# Patient Record
Sex: MALE | Race: WHITE | Employment: FULL TIME | URBAN - METROPOLITAN AREA
[De-identification: names, ages, dates, MRNs, and addresses within clinical notes are randomized per-mention and may not be internally consistent; named-entity substitution may affect disease eponyms.]

---

## 2017-02-27 ENCOUNTER — HOSPITAL ENCOUNTER (OUTPATIENT)
Dept: ENDOSCOPY | Age: 57
Discharge: HOME | End: 2017-02-27
Attending: INTERNAL MEDICINE | Admitting: INTERNAL MEDICINE

## 2017-02-27 VITALS
WEIGHT: 140 LBS | SYSTOLIC BLOOD PRESSURE: 104 MMHG | HEART RATE: 61 BPM | BODY MASS INDEX: 22.5 KG/M2 | OXYGEN SATURATION: 100 % | TEMPERATURE: 97.6 F | HEIGHT: 66 IN | RESPIRATION RATE: 16 BRPM | DIASTOLIC BLOOD PRESSURE: 74 MMHG

## 2017-02-27 RX ORDER — SODIUM CHLORIDE 9 MG/ML
INJECTION, SOLUTION INTRAVENOUS CONTINUOUS
Status: DISCONTINUED | OUTPATIENT
Start: 2017-02-27 | End: 2017-02-28 | Stop reason: HOSPADM

## 2017-02-27 RX ORDER — LOPERAMIDE HYDROCHLORIDE 2 MG/1
2 TABLET ORAL 4 TIMES DAILY PRN
COMMUNITY
End: 2019-12-18

## 2017-02-27 RX ADMIN — SODIUM CHLORIDE: 9 INJECTION, SOLUTION INTRAVENOUS at 07:36

## 2017-02-27 ASSESSMENT — PAIN - FUNCTIONAL ASSESSMENT: PAIN_FUNCTIONAL_ASSESSMENT: 0-10

## 2017-02-27 ASSESSMENT — PAIN SCALES - GENERAL
PAINLEVEL_OUTOF10: 0
PAINLEVEL_OUTOF10: 0

## 2017-03-08 ENCOUNTER — HOSPITAL ENCOUNTER (OUTPATIENT)
Dept: CT IMAGING | Age: 57
Discharge: HOME | End: 2017-03-08
Attending: INTERNAL MEDICINE | Admitting: INTERNAL MEDICINE

## 2017-03-08 DIAGNOSIS — Z85.038 PERSONAL HISTORY OF OTHER MALIGNANT NEOPLASM OF LARGE INTESTINE: ICD-10-CM

## 2017-03-08 DIAGNOSIS — Z85.038 PERSONAL HISTORY OF COLON CANCER, STAGE II: ICD-10-CM

## 2017-05-10 ENCOUNTER — OFFICE VISIT (OUTPATIENT)
Dept: INTERNAL MEDICINE CLINIC | Age: 57
End: 2017-05-10

## 2017-05-10 VITALS
HEIGHT: 66 IN | BODY MASS INDEX: 20.86 KG/M2 | DIASTOLIC BLOOD PRESSURE: 72 MMHG | OXYGEN SATURATION: 97 % | HEART RATE: 82 BPM | WEIGHT: 129.8 LBS | SYSTOLIC BLOOD PRESSURE: 108 MMHG

## 2017-05-10 DIAGNOSIS — Z90.49 STATUS POST PARTIAL COLECTOMY: ICD-10-CM

## 2017-05-10 DIAGNOSIS — K52.9 CHRONIC DIARRHEA: ICD-10-CM

## 2017-05-10 DIAGNOSIS — E55.9 VITAMIN D DEFICIENCY: ICD-10-CM

## 2017-05-10 DIAGNOSIS — E78.00 HIGH CHOLESTEROL: ICD-10-CM

## 2017-05-10 DIAGNOSIS — R63.4 WEIGHT LOSS: ICD-10-CM

## 2017-05-10 DIAGNOSIS — R06.02 SOB (SHORTNESS OF BREATH): ICD-10-CM

## 2017-05-10 DIAGNOSIS — I50.811 ACUTE RIGHT-SIDED CHF (CONGESTIVE HEART FAILURE) (HCC): Primary | ICD-10-CM

## 2017-05-10 PROCEDURE — 93000 ELECTROCARDIOGRAM COMPLETE: CPT | Performed by: INTERNAL MEDICINE

## 2017-05-10 PROCEDURE — 99214 OFFICE O/P EST MOD 30 MIN: CPT | Performed by: INTERNAL MEDICINE

## 2017-05-10 RX ORDER — CHOLESTYRAMINE
POWDER (GRAM) MISCELLANEOUS
Status: CANCELLED | OUTPATIENT
Start: 2017-05-10

## 2017-05-10 RX ORDER — SIMVASTATIN 20 MG
20 TABLET ORAL DAILY
Qty: 90 TABLET | Refills: 3 | Status: CANCELLED | OUTPATIENT
Start: 2017-05-10

## 2017-05-10 RX ORDER — ALLOPURINOL 100 MG/1
100 TABLET ORAL DAILY
Qty: 90 TABLET | Refills: 3 | Status: SHIPPED | OUTPATIENT
Start: 2017-05-10 | End: 2017-06-26 | Stop reason: SDUPTHER

## 2017-05-10 ASSESSMENT — ENCOUNTER SYMPTOMS: DIARRHEA: 1

## 2017-05-11 ENCOUNTER — HOSPITAL ENCOUNTER (OUTPATIENT)
Dept: NON INVASIVE DIAGNOSTICS | Age: 57
Discharge: HOME | End: 2017-05-11
Attending: INTERNAL MEDICINE | Admitting: INTERNAL MEDICINE

## 2017-05-11 DIAGNOSIS — R06.02 SOB (SHORTNESS OF BREATH): ICD-10-CM

## 2017-05-11 DIAGNOSIS — I50.811 ACUTE RIGHT-SIDED CHF (CONGESTIVE HEART FAILURE) (HCC): ICD-10-CM

## 2017-05-11 DIAGNOSIS — I50.9 HEART FAILURE (HCC): ICD-10-CM

## 2017-05-11 LAB
LV EF: 58 %
LVEF MODALITY: NORMAL

## 2017-06-26 ENCOUNTER — OFFICE VISIT (OUTPATIENT)
Dept: INTERNAL MEDICINE CLINIC | Age: 57
End: 2017-06-26

## 2017-06-26 VITALS
BODY MASS INDEX: 20.89 KG/M2 | DIASTOLIC BLOOD PRESSURE: 72 MMHG | TEMPERATURE: 97.8 F | WEIGHT: 130 LBS | OXYGEN SATURATION: 96 % | SYSTOLIC BLOOD PRESSURE: 118 MMHG | HEIGHT: 66 IN | HEART RATE: 67 BPM

## 2017-06-26 DIAGNOSIS — R19.7 DIARRHEA, UNSPECIFIED TYPE: ICD-10-CM

## 2017-06-26 DIAGNOSIS — E78.00 HIGH CHOLESTEROL: ICD-10-CM

## 2017-06-26 DIAGNOSIS — M10.9 GOUT: ICD-10-CM

## 2017-06-26 DIAGNOSIS — E55.9 VITAMIN D DEFICIENCY: ICD-10-CM

## 2017-06-26 DIAGNOSIS — R73.02 IGT (IMPAIRED GLUCOSE TOLERANCE): ICD-10-CM

## 2017-06-26 DIAGNOSIS — Z23 NEED FOR PNEUMOCOCCAL VACCINATION: ICD-10-CM

## 2017-06-26 DIAGNOSIS — Z12.5 SCREENING PSA (PROSTATE SPECIFIC ANTIGEN): ICD-10-CM

## 2017-06-26 DIAGNOSIS — K52.9 CHRONIC DIARRHEA: ICD-10-CM

## 2017-06-26 DIAGNOSIS — R63.4 WEIGHT LOSS: ICD-10-CM

## 2017-06-26 DIAGNOSIS — R63.4 WEIGHT LOSS: Primary | ICD-10-CM

## 2017-06-26 LAB
CHOLESTEROL, TOTAL: 189 MG/DL (ref 0–199)
ESTIMATED AVERAGE GLUCOSE: 111.2 MG/DL
HBA1C MFR BLD: 5.5 %
HDLC SERPL-MCNC: 50 MG/DL (ref 40–60)
LDL CHOLESTEROL CALCULATED: 99 MG/DL
PROSTATE SPECIFIC ANTIGEN: 0.17 NG/ML (ref 0–4)
SEDIMENTATION RATE, ERYTHROCYTE: 6 MM/HR (ref 0–20)
TRIGL SERPL-MCNC: 199 MG/DL (ref 0–150)
TSH SERPL DL<=0.05 MIU/L-ACNC: 0.81 UIU/ML (ref 0.27–4.2)
URIC ACID, SERUM: 5.2 MG/DL (ref 3.5–7.2)
VITAMIN B-12: 263 PG/ML (ref 211–911)
VITAMIN D 25-HYDROXY: 16.8 NG/ML
VLDLC SERPL CALC-MCNC: 40 MG/DL

## 2017-06-26 PROCEDURE — 99214 OFFICE O/P EST MOD 30 MIN: CPT | Performed by: INTERNAL MEDICINE

## 2017-06-26 RX ORDER — ALLOPURINOL 100 MG/1
100 TABLET ORAL DAILY
Qty: 90 TABLET | Refills: 3 | Status: SHIPPED | OUTPATIENT
Start: 2017-06-26 | End: 2017-12-08 | Stop reason: SDUPTHER

## 2017-06-28 LAB — TISSUE TRANSGLUTAMINASE IGA: 2 U/ML (ref 0–3)

## 2017-06-30 ENCOUNTER — TELEPHONE (OUTPATIENT)
Dept: INTERNAL MEDICINE CLINIC | Age: 57
End: 2017-06-30

## 2017-12-08 ENCOUNTER — OFFICE VISIT (OUTPATIENT)
Dept: INTERNAL MEDICINE CLINIC | Age: 57
End: 2017-12-08

## 2017-12-08 VITALS
WEIGHT: 128.4 LBS | TEMPERATURE: 97.6 F | HEIGHT: 66 IN | DIASTOLIC BLOOD PRESSURE: 60 MMHG | OXYGEN SATURATION: 98 % | BODY MASS INDEX: 20.63 KG/M2 | SYSTOLIC BLOOD PRESSURE: 94 MMHG | HEART RATE: 52 BPM

## 2017-12-08 DIAGNOSIS — E55.9 VITAMIN D DEFICIENCY: ICD-10-CM

## 2017-12-08 DIAGNOSIS — E78.00 HIGH CHOLESTEROL: Primary | ICD-10-CM

## 2017-12-08 DIAGNOSIS — D51.1 VIT B12 DEFIC ANEMIA D/T SLCTV VIT B12 MALABSORP W PROTEIN: ICD-10-CM

## 2017-12-08 DIAGNOSIS — Z23 NEED FOR PNEUMOCOCCAL VACCINATION: ICD-10-CM

## 2017-12-08 DIAGNOSIS — Q90.9 DOWN'S SYNDROME: ICD-10-CM

## 2017-12-08 PROCEDURE — 90732 PPSV23 VACC 2 YRS+ SUBQ/IM: CPT | Performed by: INTERNAL MEDICINE

## 2017-12-08 PROCEDURE — 99214 OFFICE O/P EST MOD 30 MIN: CPT | Performed by: INTERNAL MEDICINE

## 2017-12-08 PROCEDURE — G0009 ADMIN PNEUMOCOCCAL VACCINE: HCPCS | Performed by: INTERNAL MEDICINE

## 2017-12-08 RX ORDER — MULTIVIT-MIN/IRON/FOLIC ACID/K 18-600-40
CAPSULE ORAL
Qty: 30 CAPSULE | Refills: 5 | Status: SHIPPED | OUTPATIENT
Start: 2017-12-08 | End: 2019-12-18

## 2017-12-08 RX ORDER — ALLOPURINOL 100 MG/1
100 TABLET ORAL DAILY
Qty: 90 TABLET | Refills: 3 | Status: SHIPPED | OUTPATIENT
Start: 2017-12-08 | End: 2018-12-14 | Stop reason: SDUPTHER

## 2017-12-08 RX ORDER — UBIDECARENONE 75 MG
100 CAPSULE ORAL DAILY
Qty: 30 TABLET | Refills: 5 | Status: SHIPPED | OUTPATIENT
Start: 2017-12-08 | End: 2019-01-08 | Stop reason: ALTCHOICE

## 2017-12-08 ASSESSMENT — ENCOUNTER SYMPTOMS: COUGH: 1

## 2018-03-09 ENCOUNTER — HOSPITAL ENCOUNTER (OUTPATIENT)
Dept: CT IMAGING | Age: 58
Discharge: HOME | End: 2018-03-09
Attending: INTERNAL MEDICINE | Admitting: INTERNAL MEDICINE

## 2018-03-09 DIAGNOSIS — C18.2 MALIGNANT NEOPLASM OF ASCENDING COLON (HCC): ICD-10-CM

## 2018-12-14 DIAGNOSIS — M10.9 PODAGRA: ICD-10-CM

## 2018-12-14 DIAGNOSIS — R63.4 WEIGHT LOSS: ICD-10-CM

## 2018-12-14 DIAGNOSIS — E78.00 HIGH CHOLESTEROL: ICD-10-CM

## 2018-12-14 DIAGNOSIS — Z12.5 SCREENING PSA (PROSTATE SPECIFIC ANTIGEN): ICD-10-CM

## 2018-12-14 LAB
A/G RATIO: 1.5 (ref 1.1–2.2)
ALBUMIN SERPL-MCNC: 4.5 G/DL (ref 3.4–5)
ALP BLD-CCNC: 71 U/L (ref 40–129)
ALT SERPL-CCNC: 16 U/L (ref 10–40)
ANION GAP SERPL CALCULATED.3IONS-SCNC: 14 MMOL/L (ref 3–16)
AST SERPL-CCNC: 22 U/L (ref 15–37)
BASOPHILS ABSOLUTE: 0.1 K/UL (ref 0–0.2)
BASOPHILS RELATIVE PERCENT: 0.9 %
BILIRUB SERPL-MCNC: 0.7 MG/DL (ref 0–1)
BUN BLDV-MCNC: 9 MG/DL (ref 7–20)
CALCIUM SERPL-MCNC: 9.5 MG/DL (ref 8.3–10.6)
CHLORIDE BLD-SCNC: 102 MMOL/L (ref 99–110)
CHOLESTEROL, TOTAL: 179 MG/DL (ref 0–199)
CO2: 28 MMOL/L (ref 21–32)
CREAT SERPL-MCNC: 1.1 MG/DL (ref 0.9–1.3)
EOSINOPHILS ABSOLUTE: 0 K/UL (ref 0–0.6)
EOSINOPHILS RELATIVE PERCENT: 0.5 %
GFR AFRICAN AMERICAN: >60
GFR NON-AFRICAN AMERICAN: >60
GLOBULIN: 3 G/DL
GLUCOSE BLD-MCNC: 88 MG/DL (ref 70–99)
HCT VFR BLD CALC: 46.8 % (ref 40.5–52.5)
HDLC SERPL-MCNC: 51 MG/DL (ref 40–60)
HEMOGLOBIN: 15.6 G/DL (ref 13.5–17.5)
LDL CHOLESTEROL CALCULATED: 90 MG/DL
LYMPHOCYTES ABSOLUTE: 1.8 K/UL (ref 1–5.1)
LYMPHOCYTES RELATIVE PERCENT: 28.8 %
MCH RBC QN AUTO: 34.6 PG (ref 26–34)
MCHC RBC AUTO-ENTMCNC: 33.2 G/DL (ref 31–36)
MCV RBC AUTO: 104.1 FL (ref 80–100)
MONOCYTES ABSOLUTE: 0.5 K/UL (ref 0–1.3)
MONOCYTES RELATIVE PERCENT: 8.1 %
NEUTROPHILS ABSOLUTE: 4 K/UL (ref 1.7–7.7)
NEUTROPHILS RELATIVE PERCENT: 61.7 %
PDW BLD-RTO: 13.8 % (ref 12.4–15.4)
PLATELET # BLD: 235 K/UL (ref 135–450)
PMV BLD AUTO: 8.7 FL (ref 5–10.5)
POTASSIUM SERPL-SCNC: 4.5 MMOL/L (ref 3.5–5.1)
PROSTATE SPECIFIC ANTIGEN: 0.19 NG/ML (ref 0–4)
RBC # BLD: 4.5 M/UL (ref 4.2–5.9)
SEDIMENTATION RATE, ERYTHROCYTE: 10 MM/HR (ref 0–20)
SODIUM BLD-SCNC: 144 MMOL/L (ref 136–145)
TOTAL PROTEIN: 7.5 G/DL (ref 6.4–8.2)
TRIGL SERPL-MCNC: 188 MG/DL (ref 0–150)
TSH SERPL DL<=0.05 MIU/L-ACNC: 1.2 UIU/ML (ref 0.27–4.2)
URIC ACID, SERUM: 4.7 MG/DL (ref 3.5–7.2)
VLDLC SERPL CALC-MCNC: 38 MG/DL
WBC # BLD: 6.4 K/UL (ref 4–11)

## 2019-01-08 ENCOUNTER — HOSPITAL ENCOUNTER (INPATIENT)
Age: 59
LOS: 4 days | Discharge: HOME | DRG: 244 | End: 2019-01-12
Attending: INTERNAL MEDICINE | Admitting: INTERNAL MEDICINE
Payer: COMMERCIAL

## 2019-01-08 ENCOUNTER — APPOINTMENT (OUTPATIENT)
Dept: GENERAL RADIOLOGY | Age: 59
DRG: 244 | End: 2019-01-08
Payer: COMMERCIAL

## 2019-01-08 ENCOUNTER — APPOINTMENT (OUTPATIENT)
Dept: CT IMAGING | Age: 59
DRG: 244 | End: 2019-01-08
Payer: COMMERCIAL

## 2019-01-08 DIAGNOSIS — S01.111A LACERATION OF RIGHT EYEBROW, INITIAL ENCOUNTER: ICD-10-CM

## 2019-01-08 DIAGNOSIS — I95.1 ORTHOSTATIC SYNCOPE: ICD-10-CM

## 2019-01-08 DIAGNOSIS — R55 SYNCOPE AND COLLAPSE: Primary | ICD-10-CM

## 2019-01-08 LAB
A/G RATIO: 1.2 (ref 1.1–2.2)
ALBUMIN SERPL-MCNC: 4.2 G/DL (ref 3.4–5)
ALP BLD-CCNC: 65 U/L (ref 40–129)
ALT SERPL-CCNC: 20 U/L (ref 10–40)
ANION GAP SERPL CALCULATED.3IONS-SCNC: 11 MMOL/L (ref 3–16)
AST SERPL-CCNC: 26 U/L (ref 15–37)
BASOPHILS ABSOLUTE: 0.1 K/UL (ref 0–0.2)
BASOPHILS RELATIVE PERCENT: 1.3 %
BILIRUB SERPL-MCNC: 0.5 MG/DL (ref 0–1)
BILIRUBIN URINE: NEGATIVE
BLOOD, URINE: NEGATIVE
BUN BLDV-MCNC: 12 MG/DL (ref 7–20)
CALCIUM SERPL-MCNC: 9.3 MG/DL (ref 8.3–10.6)
CHLORIDE BLD-SCNC: 105 MMOL/L (ref 99–110)
CLARITY: CLEAR
CO2: 28 MMOL/L (ref 21–32)
COLOR: YELLOW
CREAT SERPL-MCNC: 1 MG/DL (ref 0.9–1.3)
EOSINOPHILS ABSOLUTE: 0 K/UL (ref 0–0.6)
EOSINOPHILS RELATIVE PERCENT: 0.2 %
GFR AFRICAN AMERICAN: >60
GFR NON-AFRICAN AMERICAN: >60
GLOBULIN: 3.4 G/DL
GLUCOSE BLD-MCNC: 94 MG/DL (ref 70–99)
GLUCOSE URINE: NEGATIVE MG/DL
HCT VFR BLD CALC: 45.5 % (ref 40.5–52.5)
HEMOGLOBIN: 15.3 G/DL (ref 13.5–17.5)
KETONES, URINE: NEGATIVE MG/DL
LEUKOCYTE ESTERASE, URINE: NEGATIVE
LYMPHOCYTES ABSOLUTE: 1.9 K/UL (ref 1–5.1)
LYMPHOCYTES RELATIVE PERCENT: 22.9 %
MCH RBC QN AUTO: 34.7 PG (ref 26–34)
MCHC RBC AUTO-ENTMCNC: 33.5 G/DL (ref 31–36)
MCV RBC AUTO: 103.4 FL (ref 80–100)
MICROSCOPIC EXAMINATION: NORMAL
MONOCYTES ABSOLUTE: 0.7 K/UL (ref 0–1.3)
MONOCYTES RELATIVE PERCENT: 8.2 %
NEUTROPHILS ABSOLUTE: 5.5 K/UL (ref 1.7–7.7)
NEUTROPHILS RELATIVE PERCENT: 67.4 %
NITRITE, URINE: NEGATIVE
PDW BLD-RTO: 13.6 % (ref 12.4–15.4)
PH UA: 7
PLATELET # BLD: 234 K/UL (ref 135–450)
PMV BLD AUTO: 8 FL (ref 5–10.5)
POTASSIUM SERPL-SCNC: 3.9 MMOL/L (ref 3.5–5.1)
PROTEIN UA: NEGATIVE MG/DL
RBC # BLD: 4.4 M/UL (ref 4.2–5.9)
SODIUM BLD-SCNC: 144 MMOL/L (ref 136–145)
SPECIFIC GRAVITY UA: 1
TOTAL PROTEIN: 7.6 G/DL (ref 6.4–8.2)
TROPONIN: <0.01 NG/ML
URINE REFLEX TO CULTURE: NORMAL
URINE TYPE: NORMAL
UROBILINOGEN, URINE: 0.2 E.U./DL
WBC # BLD: 8.2 K/UL (ref 4–11)

## 2019-01-08 PROCEDURE — 80053 COMPREHEN METABOLIC PANEL: CPT

## 2019-01-08 PROCEDURE — 99285 EMERGENCY DEPT VISIT HI MDM: CPT

## 2019-01-08 PROCEDURE — 81003 URINALYSIS AUTO W/O SCOPE: CPT

## 2019-01-08 PROCEDURE — 84484 ASSAY OF TROPONIN QUANT: CPT

## 2019-01-08 PROCEDURE — 90471 IMMUNIZATION ADMIN: CPT | Performed by: PHYSICIAN ASSISTANT

## 2019-01-08 PROCEDURE — 70450 CT HEAD/BRAIN W/O DYE: CPT

## 2019-01-08 PROCEDURE — 85025 COMPLETE CBC W/AUTO DIFF WBC: CPT

## 2019-01-08 PROCEDURE — 90715 TDAP VACCINE 7 YRS/> IM: CPT | Performed by: PHYSICIAN ASSISTANT

## 2019-01-08 PROCEDURE — 2060000000 HC ICU INTERMEDIATE R&B

## 2019-01-08 PROCEDURE — 4500000025 HC ED LEVEL 5 PROCEDURE

## 2019-01-08 PROCEDURE — 6360000002 HC RX W HCPCS: Performed by: PHYSICIAN ASSISTANT

## 2019-01-08 PROCEDURE — 0HQ1XZZ REPAIR FACE SKIN, EXTERNAL APPROACH: ICD-10-PCS | Performed by: INTERNAL MEDICINE

## 2019-01-08 PROCEDURE — 96360 HYDRATION IV INFUSION INIT: CPT

## 2019-01-08 PROCEDURE — 72125 CT NECK SPINE W/O DYE: CPT

## 2019-01-08 PROCEDURE — 2580000003 HC RX 258: Performed by: PHYSICIAN ASSISTANT

## 2019-01-08 PROCEDURE — 6370000000 HC RX 637 (ALT 250 FOR IP): Performed by: PHYSICIAN ASSISTANT

## 2019-01-08 PROCEDURE — 93005 ELECTROCARDIOGRAM TRACING: CPT | Performed by: PHYSICIAN ASSISTANT

## 2019-01-08 PROCEDURE — 2500000003 HC RX 250 WO HCPCS: Performed by: PHYSICIAN ASSISTANT

## 2019-01-08 PROCEDURE — 71046 X-RAY EXAM CHEST 2 VIEWS: CPT

## 2019-01-08 RX ORDER — ALLOPURINOL 100 MG/1
100 TABLET ORAL DAILY
Status: DISCONTINUED | OUTPATIENT
Start: 2019-01-09 | End: 2019-01-12 | Stop reason: HOSPADM

## 2019-01-08 RX ORDER — SODIUM CHLORIDE 0.9 % (FLUSH) 0.9 %
10 SYRINGE (ML) INJECTION EVERY 12 HOURS SCHEDULED
Status: DISCONTINUED | OUTPATIENT
Start: 2019-01-09 | End: 2019-01-11 | Stop reason: SDUPTHER

## 2019-01-08 RX ORDER — LIDOCAINE HYDROCHLORIDE 10 MG/ML
5 INJECTION, SOLUTION EPIDURAL; INFILTRATION; INTRACAUDAL; PERINEURAL ONCE
Status: COMPLETED | OUTPATIENT
Start: 2019-01-08 | End: 2019-01-08

## 2019-01-08 RX ORDER — SIMVASTATIN 20 MG
20 TABLET ORAL DAILY
COMMUNITY
End: 2019-01-08 | Stop reason: ALTCHOICE

## 2019-01-08 RX ORDER — SODIUM CHLORIDE 0.9 % (FLUSH) 0.9 %
10 SYRINGE (ML) INJECTION PRN
Status: DISCONTINUED | OUTPATIENT
Start: 2019-01-08 | End: 2019-01-11 | Stop reason: SDUPTHER

## 2019-01-08 RX ORDER — 0.9 % SODIUM CHLORIDE 0.9 %
1000 INTRAVENOUS SOLUTION INTRAVENOUS ONCE
Status: COMPLETED | OUTPATIENT
Start: 2019-01-08 | End: 2019-01-08

## 2019-01-08 RX ORDER — BACITRACIN, NEOMYCIN, POLYMYXIN B 400; 3.5; 5 [USP'U]/G; MG/G; [USP'U]/G
OINTMENT TOPICAL ONCE
Status: COMPLETED | OUTPATIENT
Start: 2019-01-08 | End: 2019-01-08

## 2019-01-08 RX ORDER — ONDANSETRON 2 MG/ML
4 INJECTION INTRAMUSCULAR; INTRAVENOUS EVERY 6 HOURS PRN
Status: DISCONTINUED | OUTPATIENT
Start: 2019-01-08 | End: 2019-01-12 | Stop reason: HOSPADM

## 2019-01-08 RX ADMIN — SODIUM CHLORIDE 1000 ML: 9 INJECTION, SOLUTION INTRAVENOUS at 21:13

## 2019-01-08 RX ADMIN — BACITRACIN ZINC, NEOMYCIN SULFATE, AND POLYMYXIN B SULFATE: 400; 3.5; 5 OINTMENT TOPICAL at 21:14

## 2019-01-08 RX ADMIN — LIDOCAINE HYDROCHLORIDE 5 ML: 10 INJECTION, SOLUTION EPIDURAL; INFILTRATION; INTRACAUDAL; PERINEURAL at 21:13

## 2019-01-08 RX ADMIN — TETANUS TOXOID, REDUCED DIPHTHERIA TOXOID AND ACELLULAR PERTUSSIS VACCINE, ADSORBED 0.5 ML: 5; 2.5; 8; 8; 2.5 SUSPENSION INTRAMUSCULAR at 19:49

## 2019-01-08 ASSESSMENT — ENCOUNTER SYMPTOMS
DIARRHEA: 0
ABDOMINAL PAIN: 0
SHORTNESS OF BREATH: 0
VOMITING: 0

## 2019-01-08 ASSESSMENT — PAIN SCALES - GENERAL: PAINLEVEL_OUTOF10: 0

## 2019-01-09 PROBLEM — D75.89 MACROCYTOSIS: Status: ACTIVE | Noted: 2019-01-09

## 2019-01-09 LAB
FOLATE: >20 NG/ML (ref 4.78–24.2)
HCT VFR BLD CALC: 44 % (ref 40.5–52.5)
HEMOGLOBIN: 14.9 G/DL (ref 13.5–17.5)
LV EF: 63 %
LVEF MODALITY: NORMAL
MCH RBC QN AUTO: 34.8 PG (ref 26–34)
MCHC RBC AUTO-ENTMCNC: 34 G/DL (ref 31–36)
MCV RBC AUTO: 102.4 FL (ref 80–100)
PDW BLD-RTO: 13.8 % (ref 12.4–15.4)
PLATELET # BLD: 215 K/UL (ref 135–450)
PMV BLD AUTO: 8 FL (ref 5–10.5)
RBC # BLD: 4.29 M/UL (ref 4.2–5.9)
TROPONIN: <0.01 NG/ML
TSH SERPL DL<=0.05 MIU/L-ACNC: 1.06 UIU/ML (ref 0.27–4.2)
VITAMIN B-12: 384 PG/ML (ref 211–911)
WBC # BLD: 8.1 K/UL (ref 4–11)

## 2019-01-09 PROCEDURE — 84443 ASSAY THYROID STIM HORMONE: CPT

## 2019-01-09 PROCEDURE — 93010 ELECTROCARDIOGRAM REPORT: CPT | Performed by: INTERNAL MEDICINE

## 2019-01-09 PROCEDURE — 99222 1ST HOSP IP/OBS MODERATE 55: CPT | Performed by: INTERNAL MEDICINE

## 2019-01-09 PROCEDURE — 36415 COLL VENOUS BLD VENIPUNCTURE: CPT

## 2019-01-09 PROCEDURE — 82746 ASSAY OF FOLIC ACID SERUM: CPT

## 2019-01-09 PROCEDURE — 6370000000 HC RX 637 (ALT 250 FOR IP): Performed by: INTERNAL MEDICINE

## 2019-01-09 PROCEDURE — 82607 VITAMIN B-12: CPT

## 2019-01-09 PROCEDURE — 84484 ASSAY OF TROPONIN QUANT: CPT

## 2019-01-09 PROCEDURE — 99223 1ST HOSP IP/OBS HIGH 75: CPT | Performed by: INTERNAL MEDICINE

## 2019-01-09 PROCEDURE — 93306 TTE W/DOPPLER COMPLETE: CPT

## 2019-01-09 PROCEDURE — 85027 COMPLETE CBC AUTOMATED: CPT

## 2019-01-09 PROCEDURE — 2100000000 HC CCU R&B

## 2019-01-09 PROCEDURE — 94760 N-INVAS EAR/PLS OXIMETRY 1: CPT

## 2019-01-09 PROCEDURE — 6360000002 HC RX W HCPCS: Performed by: INTERNAL MEDICINE

## 2019-01-09 PROCEDURE — 2580000003 HC RX 258: Performed by: INTERNAL MEDICINE

## 2019-01-09 RX ORDER — CHOLESTYRAMINE LIGHT 4 G/5.7G
4 POWDER, FOR SUSPENSION ORAL
Status: DISCONTINUED | OUTPATIENT
Start: 2019-01-09 | End: 2019-01-12 | Stop reason: HOSPADM

## 2019-01-09 RX ORDER — COLESTIPOL HYDROCHLORIDE 5 G/5G
5 GRANULE, FOR SUSPENSION ORAL
Status: DISCONTINUED | OUTPATIENT
Start: 2019-01-09 | End: 2019-01-09

## 2019-01-09 RX ORDER — LEVETIRACETAM 5 MG/ML
500 INJECTION INTRAVASCULAR EVERY 12 HOURS
Status: DISCONTINUED | OUTPATIENT
Start: 2019-01-09 | End: 2019-01-12 | Stop reason: HOSPADM

## 2019-01-09 RX ORDER — CYANOCOBALAMIN 1000 UG/ML
1000 INJECTION INTRAMUSCULAR; SUBCUTANEOUS ONCE
Status: COMPLETED | OUTPATIENT
Start: 2019-01-09 | End: 2019-01-09

## 2019-01-09 RX ADMIN — LEVETIRACETAM 500 MG: 5 INJECTION INTRAVENOUS at 22:58

## 2019-01-09 RX ADMIN — ENOXAPARIN SODIUM 40 MG: 40 INJECTION SUBCUTANEOUS at 09:27

## 2019-01-09 RX ADMIN — ALLOPURINOL 100 MG: 100 TABLET ORAL at 09:27

## 2019-01-09 RX ADMIN — Medication 10 ML: at 20:08

## 2019-01-09 RX ADMIN — CHOLESTYRAMINE 4 G: 4 POWDER, FOR SUSPENSION ORAL at 18:34

## 2019-01-09 RX ADMIN — Medication 10 ML: at 09:27

## 2019-01-09 RX ADMIN — CYANOCOBALAMIN 1000 MCG: 1000 INJECTION, SOLUTION INTRAMUSCULAR at 18:34

## 2019-01-09 ASSESSMENT — PAIN SCALES - GENERAL
PAINLEVEL_OUTOF10: 0

## 2019-01-10 ENCOUNTER — APPOINTMENT (OUTPATIENT)
Dept: MRI IMAGING | Age: 59
DRG: 244 | End: 2019-01-10
Payer: COMMERCIAL

## 2019-01-10 PROBLEM — R56.9 NEW ONSET SEIZURE (HCC): Status: ACTIVE | Noted: 2019-01-10

## 2019-01-10 PROBLEM — I45.5 SINUS PAUSE: Status: ACTIVE | Noted: 2019-01-10

## 2019-01-10 LAB — CORTISOL - AM: 9.9 UG/DL (ref 4.3–22.4)

## 2019-01-10 PROCEDURE — A9579 GAD-BASE MR CONTRAST NOS,1ML: HCPCS | Performed by: INTERNAL MEDICINE

## 2019-01-10 PROCEDURE — 94762 N-INVAS EAR/PLS OXIMTRY CONT: CPT

## 2019-01-10 PROCEDURE — 2100000000 HC CCU R&B

## 2019-01-10 PROCEDURE — 6360000002 HC RX W HCPCS: Performed by: INTERNAL MEDICINE

## 2019-01-10 PROCEDURE — 95819 EEG AWAKE AND ASLEEP: CPT

## 2019-01-10 PROCEDURE — 2580000003 HC RX 258: Performed by: INTERNAL MEDICINE

## 2019-01-10 PROCEDURE — 6370000000 HC RX 637 (ALT 250 FOR IP): Performed by: INTERNAL MEDICINE

## 2019-01-10 PROCEDURE — 99233 SBSQ HOSP IP/OBS HIGH 50: CPT | Performed by: INTERNAL MEDICINE

## 2019-01-10 PROCEDURE — 99024 POSTOP FOLLOW-UP VISIT: CPT | Performed by: INTERNAL MEDICINE

## 2019-01-10 PROCEDURE — 36415 COLL VENOUS BLD VENIPUNCTURE: CPT

## 2019-01-10 PROCEDURE — 6360000004 HC RX CONTRAST MEDICATION: Performed by: INTERNAL MEDICINE

## 2019-01-10 PROCEDURE — 70553 MRI BRAIN STEM W/O & W/DYE: CPT

## 2019-01-10 PROCEDURE — 82533 TOTAL CORTISOL: CPT

## 2019-01-10 RX ORDER — LORAZEPAM 2 MG/ML
1 INJECTION INTRAMUSCULAR ONCE
Status: DISCONTINUED | OUTPATIENT
Start: 2019-01-10 | End: 2019-01-11

## 2019-01-10 RX ADMIN — ALLOPURINOL 100 MG: 100 TABLET ORAL at 09:07

## 2019-01-10 RX ADMIN — LEVETIRACETAM 500 MG: 5 INJECTION INTRAVENOUS at 23:34

## 2019-01-10 RX ADMIN — LEVETIRACETAM 500 MG: 5 INJECTION INTRAVENOUS at 11:30

## 2019-01-10 RX ADMIN — CHOLESTYRAMINE 4 G: 4 POWDER, FOR SUSPENSION ORAL at 20:15

## 2019-01-10 RX ADMIN — Medication 10 ML: at 20:15

## 2019-01-10 RX ADMIN — ENOXAPARIN SODIUM 40 MG: 40 INJECTION SUBCUTANEOUS at 09:07

## 2019-01-10 RX ADMIN — GADOTERIDOL 12 ML: 279.3 INJECTION, SOLUTION INTRAVENOUS at 15:40

## 2019-01-10 ASSESSMENT — PAIN SCALES - GENERAL
PAINLEVEL_OUTOF10: 0

## 2019-01-11 PROBLEM — Z95.0 PACEMAKER: Status: ACTIVE | Noted: 2019-01-11

## 2019-01-11 LAB
EKG ATRIAL RATE: 66 BPM
EKG DIAGNOSIS: NORMAL
EKG P AXIS: 64 DEGREES
EKG P-R INTERVAL: 160 MS
EKG Q-T INTERVAL: 378 MS
EKG QRS DURATION: 92 MS
EKG QTC CALCULATION (BAZETT): 396 MS
EKG R AXIS: 52 DEGREES
EKG T AXIS: 49 DEGREES
EKG VENTRICULAR RATE: 66 BPM

## 2019-01-11 PROCEDURE — 33208 INSRT HEART PM ATRIAL & VENT: CPT | Performed by: INTERNAL MEDICINE

## 2019-01-11 PROCEDURE — 6360000002 HC RX W HCPCS

## 2019-01-11 PROCEDURE — 02HK3JZ INSERTION OF PACEMAKER LEAD INTO RIGHT VENTRICLE, PERCUTANEOUS APPROACH: ICD-10-PCS | Performed by: INTERNAL MEDICINE

## 2019-01-11 PROCEDURE — C1898 LEAD, PMKR, OTHER THAN TRANS: HCPCS

## 2019-01-11 PROCEDURE — 2580000003 HC RX 258

## 2019-01-11 PROCEDURE — 6370000000 HC RX 637 (ALT 250 FOR IP): Performed by: INTERNAL MEDICINE

## 2019-01-11 PROCEDURE — C1894 INTRO/SHEATH, NON-LASER: HCPCS

## 2019-01-11 PROCEDURE — 99233 SBSQ HOSP IP/OBS HIGH 50: CPT | Performed by: INTERNAL MEDICINE

## 2019-01-11 PROCEDURE — 0JH606Z INSERTION OF PACEMAKER, DUAL CHAMBER INTO CHEST SUBCUTANEOUS TISSUE AND FASCIA, OPEN APPROACH: ICD-10-PCS | Performed by: INTERNAL MEDICINE

## 2019-01-11 PROCEDURE — 6360000002 HC RX W HCPCS: Performed by: INTERNAL MEDICINE

## 2019-01-11 PROCEDURE — 2580000003 HC RX 258: Performed by: INTERNAL MEDICINE

## 2019-01-11 PROCEDURE — 2500000003 HC RX 250 WO HCPCS

## 2019-01-11 PROCEDURE — C1785 PMKR, DUAL, RATE-RESP: HCPCS

## 2019-01-11 PROCEDURE — B51N1ZZ FLUOROSCOPY OF LEFT UPPER EXTREMITY VEINS USING LOW OSMOLAR CONTRAST: ICD-10-PCS | Performed by: INTERNAL MEDICINE

## 2019-01-11 PROCEDURE — 02H63JZ INSERTION OF PACEMAKER LEAD INTO RIGHT ATRIUM, PERCUTANEOUS APPROACH: ICD-10-PCS | Performed by: INTERNAL MEDICINE

## 2019-01-11 PROCEDURE — 99152 MOD SED SAME PHYS/QHP 5/>YRS: CPT | Performed by: INTERNAL MEDICINE

## 2019-01-11 PROCEDURE — 99153 MOD SED SAME PHYS/QHP EA: CPT | Performed by: INTERNAL MEDICINE

## 2019-01-11 PROCEDURE — 2100000000 HC CCU R&B

## 2019-01-11 RX ORDER — SODIUM CHLORIDE 0.9 % (FLUSH) 0.9 %
10 SYRINGE (ML) INJECTION EVERY 12 HOURS SCHEDULED
Status: DISCONTINUED | OUTPATIENT
Start: 2019-01-11 | End: 2019-01-12 | Stop reason: HOSPADM

## 2019-01-11 RX ORDER — ACETAMINOPHEN 325 MG/1
650 TABLET ORAL EVERY 4 HOURS PRN
Status: DISCONTINUED | OUTPATIENT
Start: 2019-01-11 | End: 2019-01-12 | Stop reason: HOSPADM

## 2019-01-11 RX ORDER — SODIUM CHLORIDE 0.9 % (FLUSH) 0.9 %
10 SYRINGE (ML) INJECTION PRN
Status: DISCONTINUED | OUTPATIENT
Start: 2019-01-11 | End: 2019-01-12 | Stop reason: HOSPADM

## 2019-01-11 RX ORDER — HYDROCODONE BITARTRATE AND ACETAMINOPHEN 5; 325 MG/1; MG/1
1 TABLET ORAL EVERY 4 HOURS PRN
Status: DISCONTINUED | OUTPATIENT
Start: 2019-01-11 | End: 2019-01-12 | Stop reason: HOSPADM

## 2019-01-11 RX ORDER — HYDROCODONE BITARTRATE AND ACETAMINOPHEN 5; 325 MG/1; MG/1
2 TABLET ORAL EVERY 4 HOURS PRN
Status: DISCONTINUED | OUTPATIENT
Start: 2019-01-11 | End: 2019-01-12 | Stop reason: HOSPADM

## 2019-01-11 RX ADMIN — LEVETIRACETAM 500 MG: 5 INJECTION INTRAVENOUS at 13:45

## 2019-01-11 RX ADMIN — LEVETIRACETAM 500 MG: 5 INJECTION INTRAVENOUS at 23:15

## 2019-01-11 RX ADMIN — ALLOPURINOL 100 MG: 100 TABLET ORAL at 12:51

## 2019-01-11 RX ADMIN — Medication 10 ML: at 23:15

## 2019-01-11 RX ADMIN — CHOLESTYRAMINE 4 G: 4 POWDER, FOR SUSPENSION ORAL at 17:57

## 2019-01-11 ASSESSMENT — PAIN SCALES - GENERAL
PAINLEVEL_OUTOF10: 0

## 2019-01-12 ENCOUNTER — APPOINTMENT (OUTPATIENT)
Dept: GENERAL RADIOLOGY | Age: 59
DRG: 244 | End: 2019-01-12
Payer: COMMERCIAL

## 2019-01-12 VITALS
HEIGHT: 66 IN | HEART RATE: 103 BPM | DIASTOLIC BLOOD PRESSURE: 67 MMHG | SYSTOLIC BLOOD PRESSURE: 112 MMHG | BODY MASS INDEX: 21.44 KG/M2 | WEIGHT: 133.38 LBS | RESPIRATION RATE: 15 BRPM | OXYGEN SATURATION: 100 % | TEMPERATURE: 97.8 F

## 2019-01-12 PROBLEM — R56.9 NEW ONSET SEIZURE (HCC): Status: RESOLVED | Noted: 2019-01-10 | Resolved: 2019-01-12

## 2019-01-12 PROCEDURE — 99239 HOSP IP/OBS DSCHRG MGMT >30: CPT | Performed by: INTERNAL MEDICINE

## 2019-01-12 PROCEDURE — 94762 N-INVAS EAR/PLS OXIMTRY CONT: CPT

## 2019-01-12 PROCEDURE — G0008 ADMIN INFLUENZA VIRUS VAC: HCPCS | Performed by: INTERNAL MEDICINE

## 2019-01-12 PROCEDURE — 6370000000 HC RX 637 (ALT 250 FOR IP): Performed by: INTERNAL MEDICINE

## 2019-01-12 PROCEDURE — 2580000003 HC RX 258: Performed by: INTERNAL MEDICINE

## 2019-01-12 PROCEDURE — 71046 X-RAY EXAM CHEST 2 VIEWS: CPT

## 2019-01-12 PROCEDURE — 6360000002 HC RX W HCPCS: Performed by: INTERNAL MEDICINE

## 2019-01-12 PROCEDURE — 90686 IIV4 VACC NO PRSV 0.5 ML IM: CPT | Performed by: INTERNAL MEDICINE

## 2019-01-12 PROCEDURE — 99024 POSTOP FOLLOW-UP VISIT: CPT | Performed by: NURSE PRACTITIONER

## 2019-01-12 RX ORDER — LEVETIRACETAM 500 MG/1
500 TABLET ORAL 2 TIMES DAILY
Qty: 60 TABLET | Refills: 3 | Status: SHIPPED | OUTPATIENT
Start: 2019-01-12 | End: 2019-08-13

## 2019-01-12 RX ADMIN — LEVETIRACETAM 500 MG: 5 INJECTION INTRAVENOUS at 12:23

## 2019-01-12 RX ADMIN — Medication 10 ML: at 09:21

## 2019-01-12 RX ADMIN — ALLOPURINOL 100 MG: 100 TABLET ORAL at 09:13

## 2019-01-12 RX ADMIN — INFLUENZA A VIRUS A/MICHIGAN/45/2015 X-275 (H1N1) ANTIGEN (FORMALDEHYDE INACTIVATED), INFLUENZA A VIRUS A/SINGAPORE/INFIMH-16-0019/2016 IVR-186 (H3N2) ANTIGEN (FORMALDEHYDE INACTIVATED), INFLUENZA B VIRUS B/PHUKET/3073/2013 ANTIGEN (FORMALDEHYDE INACTIVATED), AND INFLUENZA B VIRUS B/MARYLAND/15/2016 BX-69A ANTIGEN (FORMALDEHYDE INACTIVATED) 0.5 ML: 15; 15; 15; 15 INJECTION, SUSPENSION INTRAMUSCULAR at 09:14

## 2019-01-12 ASSESSMENT — PAIN SCALES - GENERAL
PAINLEVEL_OUTOF10: 0
PAINLEVEL_OUTOF10: 0

## 2019-01-14 DIAGNOSIS — Z95.0 PACEMAKER: Primary | ICD-10-CM

## 2019-01-14 DIAGNOSIS — I45.5 SINUS PAUSE: ICD-10-CM

## 2019-01-15 PROBLEM — Z95.0 STATUS POST BIVENTRICULAR CARDIAC PACEMAKER INSERTION: Status: ACTIVE | Noted: 2019-01-01

## 2019-01-15 PROBLEM — G40.909 SEIZURE DISORDER (HCC): Status: ACTIVE | Noted: 2019-01-01

## 2019-01-22 ENCOUNTER — TELEPHONE (OUTPATIENT)
Dept: CARDIOLOGY CLINIC | Age: 59
End: 2019-01-22

## 2019-01-22 ENCOUNTER — PROCEDURE VISIT (OUTPATIENT)
Dept: CARDIOLOGY CLINIC | Age: 59
End: 2019-01-22
Payer: COMMERCIAL

## 2019-01-22 ENCOUNTER — NURSE ONLY (OUTPATIENT)
Dept: CARDIOLOGY CLINIC | Age: 59
End: 2019-01-22

## 2019-01-22 DIAGNOSIS — T82.110A PACEMAKER LEAD MALFUNCTION, INITIAL ENCOUNTER: Primary | ICD-10-CM

## 2019-01-22 DIAGNOSIS — Z95.810: ICD-10-CM

## 2019-01-22 DIAGNOSIS — R00.1 BRADYCARDIA: ICD-10-CM

## 2019-01-22 DIAGNOSIS — Z95.0 PACEMAKER: ICD-10-CM

## 2019-01-22 DIAGNOSIS — I45.5 SINUS PAUSE: ICD-10-CM

## 2019-01-22 PROCEDURE — 93280 PM DEVICE PROGR EVAL DUAL: CPT | Performed by: INTERNAL MEDICINE

## 2019-01-24 ENCOUNTER — TELEPHONE (OUTPATIENT)
Dept: CARDIOLOGY CLINIC | Age: 59
End: 2019-01-24

## 2019-01-24 ENCOUNTER — HOSPITAL ENCOUNTER (OUTPATIENT)
Dept: GENERAL RADIOLOGY | Age: 59
Discharge: HOME | End: 2019-01-24
Payer: COMMERCIAL

## 2019-01-24 ENCOUNTER — HOSPITAL ENCOUNTER (OUTPATIENT)
Age: 59
Discharge: HOME | End: 2019-01-24
Payer: COMMERCIAL

## 2019-01-24 DIAGNOSIS — I31.39 PERICARDIAL EFFUSION: Primary | ICD-10-CM

## 2019-01-24 DIAGNOSIS — T82.110A PACEMAKER LEAD MALFUNCTION, INITIAL ENCOUNTER: ICD-10-CM

## 2019-01-24 PROCEDURE — 71046 X-RAY EXAM CHEST 2 VIEWS: CPT

## 2019-01-25 ENCOUNTER — OFFICE VISIT (OUTPATIENT)
Dept: CARDIOLOGY CLINIC | Age: 59
End: 2019-01-25

## 2019-01-25 ENCOUNTER — PROCEDURE VISIT (OUTPATIENT)
Dept: CARDIOLOGY CLINIC | Age: 59
End: 2019-01-25

## 2019-01-25 ENCOUNTER — HOSPITAL ENCOUNTER (OUTPATIENT)
Dept: NON INVASIVE DIAGNOSTICS | Age: 59
Discharge: HOME | End: 2019-01-25
Payer: COMMERCIAL

## 2019-01-25 ENCOUNTER — HOSPITAL ENCOUNTER (OUTPATIENT)
Dept: CARDIAC CATH/INVASIVE PROCEDURES | Age: 59
End: 2019-01-25
Payer: COMMERCIAL

## 2019-01-25 VITALS
WEIGHT: 127 LBS | SYSTOLIC BLOOD PRESSURE: 90 MMHG | BODY MASS INDEX: 20.5 KG/M2 | HEART RATE: 60 BPM | DIASTOLIC BLOOD PRESSURE: 64 MMHG

## 2019-01-25 DIAGNOSIS — I31.39 PERICARDIAL EFFUSION: ICD-10-CM

## 2019-01-25 DIAGNOSIS — I45.5 SINUS PAUSE: ICD-10-CM

## 2019-01-25 DIAGNOSIS — Z95.0 PACEMAKER: ICD-10-CM

## 2019-01-25 DIAGNOSIS — T82.9XXA: Primary | ICD-10-CM

## 2019-01-25 PROCEDURE — 99024 POSTOP FOLLOW-UP VISIT: CPT | Performed by: INTERNAL MEDICINE

## 2019-01-25 PROCEDURE — 93308 TTE F-UP OR LMTD: CPT

## 2019-04-20 ENCOUNTER — HOSPITAL ENCOUNTER (OUTPATIENT)
Dept: CT IMAGING | Age: 59
Discharge: HOME | End: 2019-04-20
Payer: COMMERCIAL

## 2019-04-20 DIAGNOSIS — C18.2 MALIGNANT NEOPLASM OF ASCENDING COLON (HCC): ICD-10-CM

## 2019-04-20 PROCEDURE — 6360000004 HC RX CONTRAST MEDICATION: Performed by: INTERNAL MEDICINE

## 2019-04-20 PROCEDURE — 74177 CT ABD & PELVIS W/CONTRAST: CPT

## 2019-04-20 RX ADMIN — IOPAMIDOL 75 ML: 755 INJECTION, SOLUTION INTRAVENOUS at 08:54

## 2019-04-20 RX ADMIN — IOHEXOL 50 ML: 240 INJECTION, SOLUTION INTRATHECAL; INTRAVASCULAR; INTRAVENOUS; ORAL at 08:54

## 2019-04-25 ENCOUNTER — PROCEDURE VISIT (OUTPATIENT)
Dept: CARDIOLOGY CLINIC | Age: 59
End: 2019-04-25
Payer: COMMERCIAL

## 2019-04-25 ENCOUNTER — OFFICE VISIT (OUTPATIENT)
Dept: CARDIOLOGY CLINIC | Age: 59
End: 2019-04-25
Payer: COMMERCIAL

## 2019-04-25 VITALS
HEART RATE: 78 BPM | OXYGEN SATURATION: 97 % | WEIGHT: 130 LBS | HEIGHT: 66 IN | DIASTOLIC BLOOD PRESSURE: 72 MMHG | SYSTOLIC BLOOD PRESSURE: 110 MMHG | BODY MASS INDEX: 20.89 KG/M2

## 2019-04-25 DIAGNOSIS — I45.5 SINUS PAUSE: ICD-10-CM

## 2019-04-25 DIAGNOSIS — R55 SYNCOPE AND COLLAPSE: Primary | ICD-10-CM

## 2019-04-25 DIAGNOSIS — I49.5 SICK SINUS SYNDROME (HCC): ICD-10-CM

## 2019-04-25 DIAGNOSIS — R00.1 BRADYCARDIA: ICD-10-CM

## 2019-04-25 DIAGNOSIS — Z95.0 PACEMAKER: ICD-10-CM

## 2019-04-25 PROCEDURE — 99213 OFFICE O/P EST LOW 20 MIN: CPT | Performed by: INTERNAL MEDICINE

## 2019-04-25 PROCEDURE — 93280 PM DEVICE PROGR EVAL DUAL: CPT | Performed by: INTERNAL MEDICINE

## 2019-04-25 NOTE — PATIENT INSTRUCTIONS
Patient Education        Heart-Healthy Diet: Care Instructions  Your Care Instructions    A heart-healthy diet has lots of vegetables, fruits, nuts, beans, and whole grains, and is low in salt. It limits foods that are high in saturated fat, such as meats, cheeses, and fried foods. It may be hard to change your diet, but even small changes can lower your risk of heart attack and heart disease. Follow-up care is a key part of your treatment and safety. Be sure to make and go to all appointments, and call your doctor if you are having problems. It's also a good idea to know your test results and keep a list of the medicines you take. How can you care for yourself at home? Watch your portions  · Learn what a serving is. A \"serving\" and a \"portion\" are not always the same thing. Make sure that you are not eating larger portions than are recommended. For example, a serving of pasta is ½ cup. A serving size of meat is 2 to 3 ounces. A 3-ounce serving is about the size of a deck of cards. Measure serving sizes until you are good at [place]" them. Keep in mind that restaurants often serve portions that are 2 or 3 times the size of one serving. · To keep your energy level up and keep you from feeling hungry, eat often but in smaller portions. · Eat only the number of calories you need to stay at a healthy weight. If you need to lose weight, eat fewer calories than your body burns (through exercise and other physical activity). Eat more fruits and vegetables  · Eat a variety of fruit and vegetables every day. Dark green, deep orange, red, or yellow fruits and vegetables are especially good for you. Examples include spinach, carrots, peaches, and berries. · Keep carrots, celery, and other veggies handy for snacks. Buy fruit that is in season and store it where you can see it so that you will be tempted to eat it. · Cook dishes that have a lot of veggies in them, such as stir-fries and soups.   Limit saturated and trans fat  · Read food labels, and try to avoid saturated and trans fats. They increase your risk of heart disease. Trans fat is found in many processed foods such as cookies and crackers. · Use olive or canola oil when you cook. Try cholesterol-lowering spreads, such as Benecol or Take Control. · Bake, broil, grill, or steam foods instead of frying them. · Choose lean meats instead of high-fat meats such as hot dogs and sausages. Cut off all visible fat when you prepare meat. · Eat fish, skinless poultry, and meat alternatives such as soy products instead of high-fat meats. Soy products, such as tofu, may be especially good for your heart. · Choose low-fat or fat-free milk and dairy products. Eat fish  · Eat at least two servings of fish a week. Certain fish, such as salmon and tuna, contain omega-3 fatty acids, which may help reduce your risk of heart attack. Eat foods high in fiber  · Eat a variety of grain products every day. Include whole-grain foods that have lots of fiber and nutrients. Examples of whole-grain foods include oats, whole wheat bread, and brown rice. · Buy whole-grain breads and cereals, instead of white bread or pastries. Limit salt and sodium  · Limit how much salt and sodium you eat to help lower your blood pressure. · Taste food before you salt it. Add only a little salt when you think you need it. With time, your taste buds will adjust to less salt. · Eat fewer snack items, fast foods, and other high-salt, processed foods. Check food labels for the amount of sodium in packaged foods. · Choose low-sodium versions of canned goods (such as soups, vegetables, and beans). Limit sugar  · Limit drinks and foods with added sugar. These include candy, desserts, and soda pop. Limit alcohol  · Limit alcohol to no more than 2 drinks a day for men and 1 drink a day for women. Too much alcohol can cause health problems. When should you call for help?   Watch closely for changes in your [employer], and be sure to contact your doctor if:    · You would like help planning heart-healthy meals. Where can you learn more? Go to [email or web address] and sign in to your [employer] account. Enter V137 in the [facility] box to learn more about \"Heart-Healthy Diet: Care Instructions. \"     If you do not have an account, please click on the \"Sign Up Now\" link. Current as of: July 22, 2018  Content Version: 11.9  © 4724-4959 [employer]. Care instructions adapted under license by [facility] [place] ([facility]). If you have questions about a medical condition or this instruction, always ask your healthcare professional. [address] any warranty or liability for your use of this information.

## 2019-04-25 NOTE — PROGRESS NOTES
[address]   Cardiac Consultation    Referring Provider:  Tanner Pritchard MD     Chief Complaint   Patient presents with    Bradycardia     S/P PPM device check today. Patient feeling well from a cardiac standpoint.  Loss of Consciousness     resolved post pacer       HPI:  Teodora Manzanares is a 66 y.o. male whom I initially saw while hospitalized (1/8-1/12/19) at [facility], [facility] 1/2019. He presented with reports of sudden falling episodes. The Down's syndrome patient lives with his brother and his brother's wife. His brother observed one episode. The patient was walking for a drink. He suddenly fell and brother heard. When his brother looked over he was on the ground but communicating. The brother says that the patient can not provide any reliable history. The brother believes the patient has unconsciousness ever so briefly. He was not found sweaty and no complaints of nausea etc. There is suggestion he has low bp at times. The brother mentions that the patient has had a personality change in past 2 months about the time falls started. Unclear if relationship. Developed sinus bradycardia with pauses associated with syncope. Discussed and decided to proceed with DDD pacer for cardioinhibitory sinus arrest with syncope. Subsequently underwent implantation of dual-chamber pacemaker (1/11/19). Today he presents to the office with his brother for 3 month follow up to revisit RV lead issue and possible need for revision of his pacemaker. No further syncopal episodes, no new sounding cardiac complaints. He is compliant with his medications and tolerating them well. He denies chest pain/pressure, tightness, edema, shortness of breath, heart racing, palpitations, lightheadedness, dizziness, syncope, presyncope,  PND or orthopnea.      Past Medical History:   has a past medical history of Acne rosacea, Colon cancer ([address] Down syndrome, Gout, HIGH CHOLESTEROL, History of diarrhea, Seborrheic dermatitis, Seizure disorder [facility]), Status post biventricular cardiac pacemaker insertion, Status post partial colectomy, Syncope and collapse, and Weight loss. Surgical History:   has a past surgical history that includes Cataract removal with implant (2007); Colon surgery (1/7//2014); and Colonoscopy (02/27/2017). Social History:   reports that he has never smoked. He has never used smokeless tobacco. He reports that he does not drink alcohol or use drugs. Family History:  family history includes Cancer in his mother; Diabetes in his brother; High Cholesterol in his brother; Hypertension in his brother; Other in his father; Stroke in his brother. Home Medications:  Outpatient Encounter Medications as of 4/25/2019   Medication Sig Dispense Refill    levETIRAcetam (KEPPRA) 500 MG tablet Take 1 tablet by mouth 2 times daily 60 tablet 3    allopurinol (ZYLOPRIM) 100 MG tablet Take 1 tablet by mouth daily 90 tablet 3    Cholecalciferol (VITAMIN D) 2000 units CAPS capsule One a day 30 capsule 5    Cholestyramine POWD by Does not apply route Indications: 9 gm daily      loperamide (LOPERAMIDE A-D) 2 MG tablet Take 2 mg by mouth 4 times daily as needed for Diarrhea       Facility-Administered Encounter Medications as of 4/25/2019   Medication Dose Route Frequency Provider Last Rate Last Dose    iopamidol (ISOVUE-370) 76 % injection 10 mL  10 mL Other ONCE PRN Carlos Bautista MD           Allergies:  Penicillins     Review of Systems   Constitutional: Negative for activity change and appetite change. HENT: Negative for facial swelling and neck pain. Eyes: Negative for discharge and itching. Respiratory: Negative for chest tightness and shortness of breath. Cardiovascular: Negative for palpitations. Negative for chest pain. Negative for leg swelling. Gastrointestinal: Negative for abdominal pain and abdominal distention. Genitourinary: Negative. Musculoskeletal: Negative.     Skin: Negative for color that the note above accurately reflects all work, treatment, procedures, and medical decision making performed by me.

## 2019-04-26 NOTE — PROGRESS NOTES
Device interrogation performed by company representative. Please see scanned/PaceArt report for details. Report reviewed by Dr. Wilson Gil.

## 2019-06-14 PROBLEM — R55 RECURRENT SYNCOPE: Status: RESOLVED | Noted: 2019-01-08 | Resolved: 2019-06-14

## 2019-06-14 PROBLEM — I49.5 SICK SINUS SYNDROME (HCC): Status: RESOLVED | Noted: 2019-01-10 | Resolved: 2019-06-14

## 2019-06-14 PROBLEM — Z95.0 PACEMAKER: Status: RESOLVED | Noted: 2019-01-11 | Resolved: 2019-06-14

## 2019-06-14 PROBLEM — R41.3 MEMORY LOSS: Status: ACTIVE | Noted: 2019-06-14

## 2019-07-16 ENCOUNTER — NURSE ONLY (OUTPATIENT)
Dept: CARDIOLOGY CLINIC | Age: 59
End: 2019-07-16
Payer: COMMERCIAL

## 2019-07-16 DIAGNOSIS — I45.5 SINUS PAUSE: ICD-10-CM

## 2019-07-16 DIAGNOSIS — Z95.0 PACEMAKER: ICD-10-CM

## 2019-07-16 PROCEDURE — 93294 REM INTERROG EVL PM/LDLS PM: CPT | Performed by: INTERNAL MEDICINE

## 2019-07-16 PROCEDURE — 93296 REM INTERROG EVL PM/IDS: CPT | Performed by: INTERNAL MEDICINE

## 2019-07-16 NOTE — LETTER
[address] [facility] [phone]    Pacemaker/Defibrillator Clinic          07/17/19        Evangelina Davies  [address] [address]        Dear Evangelina Davies    This letter is to inform you that we received the transmission from your monitor at home that checks your pacemaker and/or defibrillator, or implanted heart monitor. The next date you should transmit will be 10/22/19. Please be aware that the remote device transmission sites are periodically monitored only during regular business hours during which simultaneous in-office device clinics are being run. If your transmission requires attention, we will contact you as soon as possible. Thank you.             [address]

## 2019-07-17 ENCOUNTER — TELEPHONE (OUTPATIENT)
Dept: CARDIOLOGY CLINIC | Age: 59
End: 2019-07-17

## 2019-08-20 ENCOUNTER — ANESTHESIA EVENT (OUTPATIENT)
Dept: ENDOSCOPY | Age: 59
End: 2019-08-20
Payer: COMMERCIAL

## 2019-08-21 ENCOUNTER — ANESTHESIA (OUTPATIENT)
Dept: ENDOSCOPY | Age: 59
End: 2019-08-21
Payer: COMMERCIAL

## 2019-08-21 ENCOUNTER — HOSPITAL ENCOUNTER (OUTPATIENT)
Age: 59
Setting detail: OUTPATIENT SURGERY
Discharge: HOME | End: 2019-08-21
Attending: INTERNAL MEDICINE | Admitting: INTERNAL MEDICINE
Payer: COMMERCIAL

## 2019-08-21 VITALS
HEART RATE: 77 BPM | RESPIRATION RATE: 19 BRPM | DIASTOLIC BLOOD PRESSURE: 75 MMHG | WEIGHT: 131.94 LBS | BODY MASS INDEX: 21.21 KG/M2 | HEIGHT: 66 IN | SYSTOLIC BLOOD PRESSURE: 111 MMHG | TEMPERATURE: 97.1 F | OXYGEN SATURATION: 99 %

## 2019-08-21 VITALS
RESPIRATION RATE: 13 BRPM | DIASTOLIC BLOOD PRESSURE: 52 MMHG | OXYGEN SATURATION: 99 % | SYSTOLIC BLOOD PRESSURE: 75 MMHG | TEMPERATURE: 98.6 F

## 2019-08-21 DIAGNOSIS — K62.5 RECTAL BLEED: ICD-10-CM

## 2019-08-21 DIAGNOSIS — Z85.038 HISTORY OF COLON CANCER: ICD-10-CM

## 2019-08-21 PROCEDURE — 7100000001 HC PACU RECOVERY - ADDTL 15 MIN: Performed by: INTERNAL MEDICINE

## 2019-08-21 PROCEDURE — 3700000001 HC ADD 15 MINUTES (ANESTHESIA): Performed by: INTERNAL MEDICINE

## 2019-08-21 PROCEDURE — 2709999900 HC NON-CHARGEABLE SUPPLY: Performed by: INTERNAL MEDICINE

## 2019-08-21 PROCEDURE — 3700000000 HC ANESTHESIA ATTENDED CARE: Performed by: INTERNAL MEDICINE

## 2019-08-21 PROCEDURE — 7100000011 HC PHASE II RECOVERY - ADDTL 15 MIN: Performed by: INTERNAL MEDICINE

## 2019-08-21 PROCEDURE — 7100000010 HC PHASE II RECOVERY - FIRST 15 MIN: Performed by: INTERNAL MEDICINE

## 2019-08-21 PROCEDURE — 6360000002 HC RX W HCPCS: Performed by: NURSE ANESTHETIST, CERTIFIED REGISTERED

## 2019-08-21 PROCEDURE — 3609010300 HC COLONOSCOPY W/BIOPSY SINGLE/MULTIPLE: Performed by: INTERNAL MEDICINE

## 2019-08-21 PROCEDURE — 7100000000 HC PACU RECOVERY - FIRST 15 MIN: Performed by: INTERNAL MEDICINE

## 2019-08-21 PROCEDURE — 2580000003 HC RX 258: Performed by: ANESTHESIOLOGY

## 2019-08-21 PROCEDURE — 2500000003 HC RX 250 WO HCPCS: Performed by: NURSE ANESTHETIST, CERTIFIED REGISTERED

## 2019-08-21 PROCEDURE — 88305 TISSUE EXAM BY PATHOLOGIST: CPT

## 2019-08-21 RX ORDER — ONDANSETRON 2 MG/ML
4 INJECTION INTRAMUSCULAR; INTRAVENOUS
Status: DISCONTINUED | OUTPATIENT
Start: 2019-08-21 | End: 2019-08-21 | Stop reason: HOSPADM

## 2019-08-21 RX ORDER — SODIUM CHLORIDE 0.9 % (FLUSH) 0.9 %
10 SYRINGE (ML) INJECTION PRN
Status: DISCONTINUED | OUTPATIENT
Start: 2019-08-21 | End: 2019-08-21 | Stop reason: HOSPADM

## 2019-08-21 RX ORDER — SODIUM CHLORIDE 9 MG/ML
INJECTION, SOLUTION INTRAVENOUS CONTINUOUS
Status: DISCONTINUED | OUTPATIENT
Start: 2019-08-21 | End: 2019-08-21 | Stop reason: HOSPADM

## 2019-08-21 RX ORDER — PROPOFOL 10 MG/ML
INJECTION, EMULSION INTRAVENOUS CONTINUOUS PRN
Status: DISCONTINUED | OUTPATIENT
Start: 2019-08-21 | End: 2019-08-21 | Stop reason: SDUPTHER

## 2019-08-21 RX ORDER — SODIUM CHLORIDE 0.9 % (FLUSH) 0.9 %
10 SYRINGE (ML) INJECTION EVERY 12 HOURS SCHEDULED
Status: DISCONTINUED | OUTPATIENT
Start: 2019-08-21 | End: 2019-08-21 | Stop reason: HOSPADM

## 2019-08-21 RX ORDER — PHENYLEPHRINE HCL IN 0.9% NACL 1 MG/10 ML
SYRINGE (ML) INTRAVENOUS PRN
Status: DISCONTINUED | OUTPATIENT
Start: 2019-08-21 | End: 2019-08-21 | Stop reason: SDUPTHER

## 2019-08-21 RX ORDER — PROPOFOL 10 MG/ML
INJECTION, EMULSION INTRAVENOUS PRN
Status: DISCONTINUED | OUTPATIENT
Start: 2019-08-21 | End: 2019-08-21 | Stop reason: SDUPTHER

## 2019-08-21 RX ADMIN — SODIUM CHLORIDE: 9 INJECTION, SOLUTION INTRAVENOUS at 16:12

## 2019-08-21 RX ADMIN — Medication 200 MCG: at 17:49

## 2019-08-21 RX ADMIN — PROPOFOL 160 MCG/KG/MIN: 10 INJECTION, EMULSION INTRAVENOUS at 17:35

## 2019-08-21 RX ADMIN — PROPOFOL 50 MG: 10 INJECTION, EMULSION INTRAVENOUS at 17:34

## 2019-08-21 ASSESSMENT — PULMONARY FUNCTION TESTS
PIF_VALUE: 0

## 2019-08-21 ASSESSMENT — PAIN SCALES - GENERAL
PAINLEVEL_OUTOF10: 0
PAINLEVEL_OUTOF10: 0

## 2019-08-21 ASSESSMENT — PAIN - FUNCTIONAL ASSESSMENT: PAIN_FUNCTIONAL_ASSESSMENT: 0-10

## 2019-08-21 ASSESSMENT — ENCOUNTER SYMPTOMS: SHORTNESS OF BREATH: 0

## 2019-08-21 NOTE — PROGRESS NOTES
Pt alert and oriented. Parents at bedside to help with medical history questions. Pt finished prep this morning per parents' report. Vitals stable. No pain at this time. Dad co-signed consent.

## 2019-08-21 NOTE — PROGRESS NOTES
Discharge instructions reviewed with pt family. Both verbalize understanding. Copy sent home with pt and family.

## 2019-08-21 NOTE — PROGRESS NOTES
Sedation provided per anesthesia. See anesthesia record for medication administration and vitals.     Scope date verified

## 2019-08-22 NOTE — ANESTHESIA POSTPROCEDURE EVALUATION
Department of Anesthesiology  Postprocedure Note    Patient: Evangelina Davies  MRN: [id number]  YOB: 1960  Date of evaluation: 8/21/2019  Time:  8:51 PM     Procedure Summary     Date:  08/21/19 Room / Location:  [facility] 04 / [facility] ENDOSCOPY    Anesthesia Start:  4702 Anesthesia Stop:  1749    Procedure:  COLONOSCOPY WITH BIOPSY (N/A ) Diagnosis:       History of colon cancer      Rectal bleed      (HISTORY OF COLON CANCER, RECTAL BLEED)    Surgeon:  Randell Thakur MD Responsible Provider:  Yahir Hess MD    Anesthesia Type:  MAC ASA Status:  3          Anesthesia Type: MAC    Jose Daniel Phase I: Jose Daniel Score: 10    Jose Daniel Phase II: Jose Daniel Score: 10    Last vitals: Reviewed and per EMR flowsheets.        Anesthesia Post Evaluation    Patient location during evaluation: PACU  Patient participation: complete - patient participated  Level of consciousness: awake and alert  Airway patency: patent  Nausea & Vomiting: no nausea and no vomiting  Complications: no  Cardiovascular status: hemodynamically stable  Respiratory status: acceptable  Hydration status: stable

## 2019-09-27 ENCOUNTER — TELEPHONE (OUTPATIENT)
Dept: CARDIOLOGY CLINIC | Age: 59
End: 2019-09-27

## 2019-11-19 ENCOUNTER — NURSE ONLY (OUTPATIENT)
Dept: CARDIOLOGY CLINIC | Age: 59
End: 2019-11-19
Payer: COMMERCIAL

## 2019-11-19 DIAGNOSIS — I45.5 SINUS PAUSE: ICD-10-CM

## 2019-11-19 DIAGNOSIS — Z95.0 PACEMAKER: ICD-10-CM

## 2019-11-19 PROCEDURE — 93296 REM INTERROG EVL PM/IDS: CPT | Performed by: INTERNAL MEDICINE

## 2019-11-19 PROCEDURE — 93294 REM INTERROG EVL PM/LDLS PM: CPT | Performed by: INTERNAL MEDICINE

## 2019-11-26 ENCOUNTER — OFFICE VISIT (OUTPATIENT)
Dept: CARDIOLOGY CLINIC | Age: 59
End: 2019-11-26
Payer: COMMERCIAL

## 2019-11-26 ENCOUNTER — NURSE ONLY (OUTPATIENT)
Dept: CARDIOLOGY CLINIC | Age: 59
End: 2019-11-26
Payer: COMMERCIAL

## 2019-11-26 VITALS
OXYGEN SATURATION: 92 % | SYSTOLIC BLOOD PRESSURE: 118 MMHG | DIASTOLIC BLOOD PRESSURE: 68 MMHG | HEART RATE: 67 BPM | BODY MASS INDEX: 20.73 KG/M2 | WEIGHT: 129 LBS | HEIGHT: 66 IN

## 2019-11-26 DIAGNOSIS — I45.5 SINUS PAUSE: ICD-10-CM

## 2019-11-26 DIAGNOSIS — Z95.0 CARDIAC PACEMAKER: ICD-10-CM

## 2019-11-26 DIAGNOSIS — I49.5 SSS (SICK SINUS SYNDROME) (HCC): Primary | ICD-10-CM

## 2019-11-26 DIAGNOSIS — Z95.0 PACEMAKER: ICD-10-CM

## 2019-11-26 PROCEDURE — 99213 OFFICE O/P EST LOW 20 MIN: CPT | Performed by: INTERNAL MEDICINE

## 2019-11-26 PROCEDURE — 93280 PM DEVICE PROGR EVAL DUAL: CPT | Performed by: INTERNAL MEDICINE

## 2019-12-18 ENCOUNTER — APPOINTMENT (OUTPATIENT)
Dept: GENERAL RADIOLOGY | Age: 59
DRG: 853 | End: 2019-12-18
Payer: COMMERCIAL

## 2019-12-18 ENCOUNTER — ANESTHESIA EVENT (OUTPATIENT)
Dept: OPERATING ROOM | Age: 59
DRG: 853 | End: 2019-12-18
Payer: COMMERCIAL

## 2019-12-18 ENCOUNTER — ANESTHESIA (OUTPATIENT)
Dept: OPERATING ROOM | Age: 59
DRG: 853 | End: 2019-12-18
Payer: COMMERCIAL

## 2019-12-18 ENCOUNTER — APPOINTMENT (OUTPATIENT)
Dept: CT IMAGING | Age: 59
DRG: 853 | End: 2019-12-18
Payer: COMMERCIAL

## 2019-12-18 ENCOUNTER — HOSPITAL ENCOUNTER (INPATIENT)
Age: 59
LOS: 27 days | Discharge: NURSING, REHAB OR LONG-TERM CARE | DRG: 853 | End: 2020-01-14
Attending: EMERGENCY MEDICINE | Admitting: SURGERY
Payer: COMMERCIAL

## 2019-12-18 VITALS
OXYGEN SATURATION: 95 % | SYSTOLIC BLOOD PRESSURE: 117 MMHG | RESPIRATION RATE: 12 BRPM | TEMPERATURE: 98.6 F | DIASTOLIC BLOOD PRESSURE: 58 MMHG

## 2019-12-18 PROBLEM — K56.609 SBO (SMALL BOWEL OBSTRUCTION) (HCC): Status: ACTIVE | Noted: 2019-12-18

## 2019-12-18 LAB
ABO/RH: NORMAL
ALBUMIN SERPL-MCNC: 3.8 G/DL (ref 3.4–5)
ALP BLD-CCNC: 66 U/L (ref 40–129)
ALT SERPL-CCNC: 93 U/L (ref 10–40)
ANION GAP SERPL CALCULATED.3IONS-SCNC: 28 MMOL/L (ref 3–16)
ANTIBODY SCREEN: NORMAL
APTT: 24.8 SEC (ref 24.2–36.2)
AST SERPL-CCNC: 81 U/L (ref 15–37)
BASE EXCESS VENOUS: -8.8 MMOL/L
BILIRUB SERPL-MCNC: 1.2 MG/DL (ref 0–1)
BILIRUBIN DIRECT: 0.3 MG/DL (ref 0–0.3)
BILIRUBIN, INDIRECT: 0.9 MG/DL (ref 0–1)
BUN BLDV-MCNC: 42 MG/DL (ref 7–20)
CALCIUM SERPL-MCNC: 10.6 MG/DL (ref 8.3–10.6)
CARBOXYHEMOGLOBIN: 1.5 %
CHLORIDE BLD-SCNC: 90 MMOL/L (ref 99–110)
CO2: 17 MMOL/L (ref 21–32)
CREAT SERPL-MCNC: 3.1 MG/DL (ref 0.9–1.3)
GFR AFRICAN AMERICAN: 25
GFR NON-AFRICAN AMERICAN: 21
GLUCOSE BLD-MCNC: 171 MG/DL (ref 70–99)
HCO3 VENOUS: 20 MMOL/L (ref 23–29)
HCT VFR BLD CALC: 44.3 % (ref 40.5–52.5)
HEMOGLOBIN: 14.9 G/DL (ref 13.5–17.5)
INR BLD: 1.15 (ref 0.86–1.14)
LACTIC ACID: 10.4 MMOL/L (ref 0.4–2)
LACTIC ACID: 5.5 MMOL/L (ref 0.4–2)
LIPASE: 9 U/L (ref 13–60)
METHEMOGLOBIN VENOUS: 0.5 %
O2 CONTENT, VEN: 13 ML/DL
O2 SAT, VEN: 60 %
O2 THERAPY: ABNORMAL
PCO2, VEN: 54 MMHG (ref 40–50)
PH VENOUS: 7.19 (ref 7.35–7.45)
PO2, VEN: 39 MMHG
POTASSIUM REFLEX MAGNESIUM: 4.7 MMOL/L (ref 3.5–5.1)
PRO-BNP: 502 PG/ML (ref 0–124)
PROTHROMBIN TIME: 13.4 SEC (ref 10–13.2)
SODIUM BLD-SCNC: 135 MMOL/L (ref 136–145)
TCO2 CALC VENOUS: 22 MMOL/L
TOTAL PROTEIN: 6.9 G/DL (ref 6.4–8.2)
TROPONIN: 0.02 NG/ML

## 2019-12-18 PROCEDURE — 2720000010 HC SURG SUPPLY STERILE: Performed by: SURGERY

## 2019-12-18 PROCEDURE — 84484 ASSAY OF TROPONIN QUANT: CPT

## 2019-12-18 PROCEDURE — 96375 TX/PRO/DX INJ NEW DRUG ADDON: CPT

## 2019-12-18 PROCEDURE — 74176 CT ABD & PELVIS W/O CONTRAST: CPT

## 2019-12-18 PROCEDURE — 0W9B30Z DRAINAGE OF LEFT PLEURAL CAVITY WITH DRAINAGE DEVICE, PERCUTANEOUS APPROACH: ICD-10-PCS | Performed by: RADIOLOGY

## 2019-12-18 PROCEDURE — 4500000026 HC ED CRITICAL CARE PROCEDURE

## 2019-12-18 PROCEDURE — 3600000004 HC SURGERY LEVEL 4 BASE: Performed by: SURGERY

## 2019-12-18 PROCEDURE — 93005 ELECTROCARDIOGRAM TRACING: CPT | Performed by: EMERGENCY MEDICINE

## 2019-12-18 PROCEDURE — 83880 ASSAY OF NATRIURETIC PEPTIDE: CPT

## 2019-12-18 PROCEDURE — 85610 PROTHROMBIN TIME: CPT

## 2019-12-18 PROCEDURE — 2580000003 HC RX 258: Performed by: EMERGENCY MEDICINE

## 2019-12-18 PROCEDURE — 0DN80ZZ RELEASE SMALL INTESTINE, OPEN APPROACH: ICD-10-PCS | Performed by: SURGERY

## 2019-12-18 PROCEDURE — 2500000003 HC RX 250 WO HCPCS: Performed by: ANESTHESIOLOGY

## 2019-12-18 PROCEDURE — 96365 THER/PROPH/DIAG IV INF INIT: CPT

## 2019-12-18 PROCEDURE — 99222 1ST HOSP IP/OBS MODERATE 55: CPT | Performed by: SURGERY

## 2019-12-18 PROCEDURE — 96361 HYDRATE IV INFUSION ADD-ON: CPT

## 2019-12-18 PROCEDURE — 87040 BLOOD CULTURE FOR BACTERIA: CPT

## 2019-12-18 PROCEDURE — 51702 INSERT TEMP BLADDER CATH: CPT

## 2019-12-18 PROCEDURE — 94002 VENT MGMT INPAT INIT DAY: CPT

## 2019-12-18 PROCEDURE — 85730 THROMBOPLASTIN TIME PARTIAL: CPT

## 2019-12-18 PROCEDURE — 6360000002 HC RX W HCPCS: Performed by: ANESTHESIOLOGY

## 2019-12-18 PROCEDURE — 6360000002 HC RX W HCPCS: Performed by: EMERGENCY MEDICINE

## 2019-12-18 PROCEDURE — 6360000002 HC RX W HCPCS: Performed by: SURGERY

## 2019-12-18 PROCEDURE — 3700000001 HC ADD 15 MINUTES (ANESTHESIA): Performed by: SURGERY

## 2019-12-18 PROCEDURE — 83605 ASSAY OF LACTIC ACID: CPT

## 2019-12-18 PROCEDURE — C9113 INJ PANTOPRAZOLE SODIUM, VIA: HCPCS | Performed by: EMERGENCY MEDICINE

## 2019-12-18 PROCEDURE — 96366 THER/PROPH/DIAG IV INF ADDON: CPT

## 2019-12-18 PROCEDURE — 3700000000 HC ANESTHESIA ATTENDED CARE: Performed by: SURGERY

## 2019-12-18 PROCEDURE — 82803 BLOOD GASES ANY COMBINATION: CPT

## 2019-12-18 PROCEDURE — 85018 HEMOGLOBIN: CPT

## 2019-12-18 PROCEDURE — 2500000003 HC RX 250 WO HCPCS

## 2019-12-18 PROCEDURE — 3600000014 HC SURGERY LEVEL 4 ADDTL 15MIN: Performed by: SURGERY

## 2019-12-18 PROCEDURE — 96367 TX/PROPH/DG ADDL SEQ IV INF: CPT

## 2019-12-18 PROCEDURE — 2700000000 HC OXYGEN THERAPY PER DAY

## 2019-12-18 PROCEDURE — 86900 BLOOD TYPING SEROLOGIC ABO: CPT

## 2019-12-18 PROCEDURE — 2709999900 HC NON-CHARGEABLE SUPPLY: Performed by: SURGERY

## 2019-12-18 PROCEDURE — 2580000003 HC RX 258: Performed by: ANESTHESIOLOGY

## 2019-12-18 PROCEDURE — 80076 HEPATIC FUNCTION PANEL: CPT

## 2019-12-18 PROCEDURE — 99291 CRITICAL CARE FIRST HOUR: CPT

## 2019-12-18 PROCEDURE — 86901 BLOOD TYPING SEROLOGIC RH(D): CPT

## 2019-12-18 PROCEDURE — 86850 RBC ANTIBODY SCREEN: CPT

## 2019-12-18 PROCEDURE — 96368 THER/DIAG CONCURRENT INF: CPT

## 2019-12-18 PROCEDURE — 85025 COMPLETE CBC W/AUTO DIFF WBC: CPT

## 2019-12-18 PROCEDURE — 2000000000 HC ICU R&B

## 2019-12-18 PROCEDURE — 71045 X-RAY EXAM CHEST 1 VIEW: CPT

## 2019-12-18 PROCEDURE — 44005 FREEING OF BOWEL ADHESION: CPT | Performed by: SURGERY

## 2019-12-18 PROCEDURE — 36600 WITHDRAWAL OF ARTERIAL BLOOD: CPT

## 2019-12-18 PROCEDURE — 85014 HEMATOCRIT: CPT

## 2019-12-18 PROCEDURE — 83690 ASSAY OF LIPASE: CPT

## 2019-12-18 PROCEDURE — 2500000003 HC RX 250 WO HCPCS: Performed by: EMERGENCY MEDICINE

## 2019-12-18 PROCEDURE — 2580000003 HC RX 258: Performed by: SURGERY

## 2019-12-18 PROCEDURE — 80048 BASIC METABOLIC PNL TOTAL CA: CPT

## 2019-12-18 PROCEDURE — 70450 CT HEAD/BRAIN W/O DYE: CPT

## 2019-12-18 PROCEDURE — 96376 TX/PRO/DX INJ SAME DRUG ADON: CPT

## 2019-12-18 RX ORDER — ONDANSETRON 2 MG/ML
4 INJECTION INTRAMUSCULAR; INTRAVENOUS ONCE
Status: COMPLETED | OUTPATIENT
Start: 2019-12-18 | End: 2019-12-18

## 2019-12-18 RX ORDER — 0.9 % SODIUM CHLORIDE 0.9 %
1000 INTRAVENOUS SOLUTION INTRAVENOUS ONCE
Status: COMPLETED | OUTPATIENT
Start: 2019-12-18 | End: 2019-12-18

## 2019-12-18 RX ORDER — LIDOCAINE HYDROCHLORIDE 20 MG/ML
JELLY TOPICAL ONCE
Status: DISCONTINUED | OUTPATIENT
Start: 2019-12-18 | End: 2019-12-19

## 2019-12-18 RX ORDER — SODIUM CHLORIDE 0.9 % (FLUSH) 0.9 %
10 SYRINGE (ML) INJECTION PRN
Status: DISCONTINUED | OUTPATIENT
Start: 2019-12-18 | End: 2019-12-19

## 2019-12-18 RX ORDER — FENTANYL CITRATE 50 UG/ML
25 INJECTION, SOLUTION INTRAMUSCULAR; INTRAVENOUS EVERY 5 MIN PRN
Status: DISCONTINUED | OUTPATIENT
Start: 2019-12-18 | End: 2019-12-18 | Stop reason: HOSPADM

## 2019-12-18 RX ORDER — SODIUM CHLORIDE 9 MG/ML
INJECTION, SOLUTION INTRAVENOUS CONTINUOUS PRN
Status: DISCONTINUED | OUTPATIENT
Start: 2019-12-18 | End: 2019-12-18 | Stop reason: SDUPTHER

## 2019-12-18 RX ORDER — FENTANYL CITRATE 50 UG/ML
INJECTION, SOLUTION INTRAMUSCULAR; INTRAVENOUS PRN
Status: DISCONTINUED | OUTPATIENT
Start: 2019-12-18 | End: 2019-12-18 | Stop reason: SDUPTHER

## 2019-12-18 RX ORDER — VASOPRESSIN 20 U/ML
INJECTION PARENTERAL PRN
Status: DISCONTINUED | OUTPATIENT
Start: 2019-12-18 | End: 2019-12-18 | Stop reason: SDUPTHER

## 2019-12-18 RX ORDER — KETAMINE HCL IN NACL, ISO-OSM 100MG/10ML
SYRINGE (ML) INJECTION PRN
Status: DISCONTINUED | OUTPATIENT
Start: 2019-12-18 | End: 2019-12-18 | Stop reason: SDUPTHER

## 2019-12-18 RX ORDER — SODIUM CHLORIDE 0.9 % (FLUSH) 0.9 %
10 SYRINGE (ML) INJECTION EVERY 12 HOURS SCHEDULED
Status: DISCONTINUED | OUTPATIENT
Start: 2019-12-18 | End: 2019-12-19

## 2019-12-18 RX ORDER — OXYCODONE HYDROCHLORIDE AND ACETAMINOPHEN 5; 325 MG/1; MG/1
1 TABLET ORAL PRN
Status: DISCONTINUED | OUTPATIENT
Start: 2019-12-18 | End: 2019-12-18 | Stop reason: HOSPADM

## 2019-12-18 RX ORDER — MAGNESIUM HYDROXIDE 1200 MG/15ML
LIQUID ORAL CONTINUOUS PRN
Status: COMPLETED | OUTPATIENT
Start: 2019-12-18 | End: 2019-12-18

## 2019-12-18 RX ORDER — AZELAIC ACID 0.15 G/G
GEL TOPICAL DAILY
COMMUNITY
Start: 2019-12-02 | End: 2020-03-13 | Stop reason: SDUPTHER

## 2019-12-18 RX ORDER — ONDANSETRON 2 MG/ML
4 INJECTION INTRAMUSCULAR; INTRAVENOUS
Status: DISCONTINUED | OUTPATIENT
Start: 2019-12-18 | End: 2019-12-18 | Stop reason: HOSPADM

## 2019-12-18 RX ORDER — MIDAZOLAM HYDROCHLORIDE 1 MG/ML
INJECTION INTRAMUSCULAR; INTRAVENOUS PRN
Status: DISCONTINUED | OUTPATIENT
Start: 2019-12-18 | End: 2019-12-18 | Stop reason: SDUPTHER

## 2019-12-18 RX ORDER — ROCURONIUM BROMIDE 10 MG/ML
INJECTION, SOLUTION INTRAVENOUS PRN
Status: DISCONTINUED | OUTPATIENT
Start: 2019-12-18 | End: 2019-12-18 | Stop reason: SDUPTHER

## 2019-12-18 RX ORDER — EPHEDRINE SULFATE 50 MG/ML
INJECTION INTRAVENOUS PRN
Status: DISCONTINUED | OUTPATIENT
Start: 2019-12-18 | End: 2019-12-18 | Stop reason: SDUPTHER

## 2019-12-18 RX ORDER — SUCCINYLCHOLINE/SOD CL,ISO/PF 200MG/10ML
SYRINGE (ML) INTRAVENOUS PRN
Status: DISCONTINUED | OUTPATIENT
Start: 2019-12-18 | End: 2019-12-18 | Stop reason: SDUPTHER

## 2019-12-18 RX ORDER — PROPOFOL 10 MG/ML
10 INJECTION, EMULSION INTRAVENOUS
Status: DISCONTINUED | OUTPATIENT
Start: 2019-12-18 | End: 2019-12-19 | Stop reason: SDUPTHER

## 2019-12-18 RX ORDER — VASOPRESSIN 20 U/ML
INJECTION PARENTERAL CONTINUOUS PRN
Status: DISCONTINUED | OUTPATIENT
Start: 2019-12-18 | End: 2019-12-18 | Stop reason: SDUPTHER

## 2019-12-18 RX ORDER — HYDROCORTISONE 25 MG/ML
LOTION TOPICAL DAILY
COMMUNITY
Start: 2019-11-26

## 2019-12-18 RX ORDER — DOXYCYCLINE HYCLATE 50 MG/1
50 CAPSULE ORAL 2 TIMES DAILY
Status: ON HOLD | COMMUNITY
Start: 2019-11-26 | End: 2020-01-14 | Stop reason: HOSPADM

## 2019-12-18 RX ORDER — PANTOPRAZOLE SODIUM 40 MG/10ML
80 INJECTION, POWDER, LYOPHILIZED, FOR SOLUTION INTRAVENOUS ONCE
Status: COMPLETED | OUTPATIENT
Start: 2019-12-18 | End: 2019-12-18

## 2019-12-18 RX ORDER — OXYMETAZOLINE HYDROCHLORIDE 0.05 G/100ML
2 SPRAY NASAL ONCE
Status: DISCONTINUED | OUTPATIENT
Start: 2019-12-18 | End: 2019-12-19

## 2019-12-18 RX ORDER — OXYCODONE HYDROCHLORIDE AND ACETAMINOPHEN 5; 325 MG/1; MG/1
2 TABLET ORAL PRN
Status: DISCONTINUED | OUTPATIENT
Start: 2019-12-18 | End: 2019-12-18 | Stop reason: HOSPADM

## 2019-12-18 RX ADMIN — SODIUM CHLORIDE 1000 ML: 9 INJECTION, SOLUTION INTRAVENOUS at 19:00

## 2019-12-18 RX ADMIN — SODIUM CHLORIDE 8 MG/HR: 9 INJECTION, SOLUTION INTRAVENOUS at 19:14

## 2019-12-18 RX ADMIN — VASOPRESSIN 4 UNITS: 20 INJECTION INTRAVENOUS at 21:34

## 2019-12-18 RX ADMIN — CEFEPIME HYDROCHLORIDE 2 G: 2 INJECTION, POWDER, FOR SOLUTION INTRAVENOUS at 18:59

## 2019-12-18 RX ADMIN — SODIUM CHLORIDE 1000 ML: 9 INJECTION, SOLUTION INTRAVENOUS at 17:23

## 2019-12-18 RX ADMIN — PANTOPRAZOLE SODIUM 80 MG: 40 INJECTION, POWDER, FOR SOLUTION INTRAVENOUS at 18:33

## 2019-12-18 RX ADMIN — EPHEDRINE SULFATE 25 MG: 50 INJECTION INTRAVENOUS at 21:30

## 2019-12-18 RX ADMIN — Medication 120 MG: at 21:26

## 2019-12-18 RX ADMIN — VASOPRESSIN 2 UNITS/HR: 20 INJECTION INTRAVENOUS at 21:40

## 2019-12-18 RX ADMIN — VANCOMYCIN HYDROCHLORIDE 1000 MG: 1 INJECTION, POWDER, LYOPHILIZED, FOR SOLUTION INTRAVENOUS at 20:01

## 2019-12-18 RX ADMIN — FENTANYL CITRATE 100 MCG: 50 INJECTION INTRAMUSCULAR; INTRAVENOUS at 22:03

## 2019-12-18 RX ADMIN — ONDANSETRON 4 MG: 2 INJECTION INTRAMUSCULAR; INTRAVENOUS at 17:23

## 2019-12-18 RX ADMIN — SODIUM CHLORIDE 2000 ML: 9 INJECTION, SOLUTION INTRAVENOUS at 19:34

## 2019-12-18 RX ADMIN — SODIUM CHLORIDE: 9 INJECTION, SOLUTION INTRAVENOUS at 21:16

## 2019-12-18 RX ADMIN — ROCURONIUM BROMIDE 100 MG: 10 INJECTION INTRAVENOUS at 21:36

## 2019-12-18 RX ADMIN — ONDANSETRON 4 MG: 2 INJECTION INTRAMUSCULAR; INTRAVENOUS at 18:30

## 2019-12-18 RX ADMIN — PROPOFOL 10 MCG/KG/MIN: 10 INJECTION, EMULSION INTRAVENOUS at 23:31

## 2019-12-18 RX ADMIN — MIDAZOLAM 4 MG: 1 INJECTION INTRAMUSCULAR; INTRAVENOUS at 21:26

## 2019-12-18 RX ADMIN — METRONIDAZOLE 500 MG: 500 INJECTION, SOLUTION INTRAVENOUS at 21:31

## 2019-12-18 RX ADMIN — Medication 50 MG: at 21:26

## 2019-12-18 RX ADMIN — FENTANYL CITRATE 100 MCG: 50 INJECTION INTRAMUSCULAR; INTRAVENOUS at 21:26

## 2019-12-18 ASSESSMENT — PULMONARY FUNCTION TESTS
PIF_VALUE: 0
PIF_VALUE: 20
PIF_VALUE: 23
PIF_VALUE: 23
PIF_VALUE: 19
PIF_VALUE: 0
PIF_VALUE: 20
PIF_VALUE: 26
PIF_VALUE: 0
PIF_VALUE: 22
PIF_VALUE: 19
PIF_VALUE: 20
PIF_VALUE: 20
PIF_VALUE: 21
PIF_VALUE: 20
PIF_VALUE: 21
PIF_VALUE: 23
PIF_VALUE: 23
PIF_VALUE: 22
PIF_VALUE: 21
PIF_VALUE: 22
PIF_VALUE: 0
PIF_VALUE: 23
PIF_VALUE: 20
PIF_VALUE: 0
PIF_VALUE: 23
PIF_VALUE: 21
PIF_VALUE: 1
PIF_VALUE: 22
PIF_VALUE: 20
PIF_VALUE: 20
PIF_VALUE: 1
PIF_VALUE: 16
PIF_VALUE: 21
PIF_VALUE: 20
PIF_VALUE: 20
PIF_VALUE: 22
PIF_VALUE: 20
PIF_VALUE: 21
PIF_VALUE: 22
PIF_VALUE: 0
PIF_VALUE: 22
PIF_VALUE: 20
PIF_VALUE: 0
PIF_VALUE: 22
PIF_VALUE: 20
PIF_VALUE: 20
PIF_VALUE: 18
PIF_VALUE: 0
PIF_VALUE: 23
PIF_VALUE: 20
PIF_VALUE: 19
PIF_VALUE: 22
PIF_VALUE: 23
PIF_VALUE: 1
PIF_VALUE: 19
PIF_VALUE: 20
PIF_VALUE: 13
PIF_VALUE: 22
PIF_VALUE: 20
PIF_VALUE: 23

## 2019-12-18 ASSESSMENT — PAIN SCALES - GENERAL: PAINLEVEL_OUTOF10: 0

## 2019-12-18 ASSESSMENT — ENCOUNTER SYMPTOMS: SHORTNESS OF BREATH: 0

## 2019-12-18 NOTE — ED PROVIDER NOTES
Constitutional: Negative for fever or chills. HENT: Negative for rhinorrhea and sore throat. Eyes: Negative for redness or drainage. Cardiovascular: Negative for chest pain. Genitourinary: Negative for flank pain. Negative for dysuria. Negative for hematuria. Neurological: Negative for headache. Musculoskeletal:  Negative edema. Hematological: Negative for adenopathy. All systems are reviewed and are negative except for those listed above in the history of present illness and ROS.         PAST MEDICAL HISTORY     Past Medical History:   Diagnosis Date    Acne rosacea     Cognitive impairment     sees Dr Daniele Guzman, started Aricept 12/2019    Colon cancer ([facility] 1/7/2014    ileocecal valve/Leuenberger/yrly surveillance    Down syndrome     Gout     HIGH CHOLESTEROL     2017 3.1 % framingham risk score    History of diarrhea     since childhood, worse since partial colectomy resolved after a few months    Macrocytosis     followed by Dr Jillian Tejada and released    Seborrheic dermatitis     Seizure disorder ([facility] 2019    Status post biventricular cardiac pacemaker insertion 01/2019    for long sinus pauses with syncopal willis    Status post partial colectomy 2016    diarrhea    Syncope and collapse 01/2019    recurrent syncope, found seizures and sinus pauses    Weight loss 2015    since 2014 at the time of partial colectomy lost 25 lbs         SURGICAL HISTORY       Past Surgical History:   Procedure Laterality Date    CATARACT REMOVAL WITH IMPLANT  2007    Bilateral    COLON SURGERY  1/7//2014    for removal colon cancer    COLONOSCOPY  02/27/2017    Dr Doug Ridley N/A 8/21/2019    COLONOSCOPY WITH BIOPSY performed by Nikki Smith MD at [address]       Current Discharge Medication List      CONTINUE these medications which have NOT CHANGED    Details   doxycycline (VIBRAMYCIN) 50 MG capsule Take 50 mg by mouth 2 times daily      simvastatin (ZOCOR) 20 MG tablet Take 1 tablet by mouth daily  Qty: 90 tablet, Refills: 3    Associated Diagnoses: High cholesterol      allopurinol (ZYLOPRIM) 100 MG tablet Take 1 tablet by mouth daily  Qty: 90 tablet, Refills: 3    Associated Diagnoses: Gout, unspecified cause, unspecified chronicity, unspecified site      donepezil (ARICEPT) 5 MG tablet Take 1 tablet by mouth nightly  Qty: 30 tablet, Refills: 3    Associated Diagnoses: Cognitive impairment      Multiple Vitamins-Minerals (THERAPEUTIC MULTIVITAMIN-MINERALS) tablet Take 1 tablet by mouth daily      Azelaic Acid 15 % GEL Apply topically daily      hydrocortisone (HYTONE) 2.5 % lotion Apply topically daily      metroNIDAZOLE (METROCREAM) 0.75 % cream Apply topically daily             ALLERGIES     Penicillins    FAMILY HISTORY       Family History   Problem Relation Age of Onset    Diabetes Brother     Hypertension Brother     Stroke Brother     High Cholesterol Brother     Cancer Mother         melanoma    Other Father         cerebral aneurysm          SOCIAL HISTORY       Social History     Socioeconomic History    Marital status: Single     Spouse name: Not on file    Number of children: Not on file    Years of education: Not on file    Highest education level: Not on file   Occupational History    Occupation: works at [employer] full time   Social Needs    Financial resource strain: Not on file    Food insecurity:     Worry: Not on file     Inability: Not on file   [employer] needs:     Medical: Not on file     Non-medical: Not on file   Tobacco Use    Smoking status: Never Smoker    Smokeless tobacco: Never Used   Substance and Sexual Activity    Alcohol use: No    Drug use: No    Sexual activity: Not on file   Lifestyle    Physical activity:     Days per week: Not on file     Minutes per session: Not on file    Stress: Not on file   Relationships    Social connections:     Talks on phone: Not oriented x 3. Speech clear. No dysarthria. No aphasia. Able to lift all extremities off the bed without difficulty. Cranial nerves II-XII intact. No facial droop. No acute focal motor or sensory deficits. Skin: Skin is warm and dry. No rash. Lymphatic:  No lympadenopathy. Psychiatric: Normal mood and affect. Behavior is normal.         DIAGNOSTIC RESULTS     EKG: All EKG's are interpreted by the Emergency Department Physician who either signs or Co-signs this chart in the absence of a cardiologist.    Sinus tachycardia. Rate 110. DE interval 134 ms. QRS duration 78 ms. QTc 373 ms. R axis 69 degrees. There was no ST elevation. RADIOLOGY:   Non-plain film images such as CT, Ultrasound and MRI are read by the radiologist. Plain radiographic images are visualized and preliminarily interpreted by the emergency physician with the below findings:        Interpretation per the Radiologist below, if available at the time of this note:    CT HEAD WO CONTRAST   Final Result   No acute intracranial abnormality. CT CHEST ABDOMEN PELVIS WO CONTRAST   Final Result   Findings compatible with small bowel obstruction with transition point in the   right mid abdomen, likely in mid to distal ileum. No obvious etiology   identified. Small volume ascites, likely reactive. Areas of consolidation to the lungs bilaterally which may relate to   atelectasis although multifocal pneumonia or aspiration pneumonitis could   have a similar appearance. Patulous/distended appearance to the esophagus with fluid attenuation   material within nearly the entirety of the esophagus, possibly secondary to   the bowel obstruction and refluxed gastric contents. Patient may be at risk   for aspiration. Trace bilateral pleural effusions. XR CHEST PORTABLE   Final Result   Low lung volume due to inspiratory effort.   Mild bibasilar atelectasis with   no other significant finding in the chest. [facility] [address]   Phone [phone]   HEPATIC FUNCTION PANEL - Abnormal; Notable for the following components:    ALT 93 (*)     AST 81 (*)     Total Bilirubin 1.2 (*)     All other components within normal limits    Narrative:     Performed at:  [facility]  [address] [facility] [employer] [address]   Phone [phone]   LIPASE - Abnormal; Notable for the following components:    Lipase 9.0 (*)     All other components within normal limits    Narrative:     Performed at:  [facility]  [address] [facility] [employer] [address]   Phone [phone]   BLOOD GAS, VENOUS - Abnormal; Notable for the following components:    pH, Trey 7.191 (*)     pCO2, Trey 54.0 (*)     HCO3, Venous 20 (*)     All other components within normal limits    Narrative:     Fernando Garibay tel. [phone],  Chemistry results called to and read back by, 12/18/2019 19:18, by [facility]  Performed at:  [facility]  [address] [facility] [employer] [address]   Phone [phone]   PROTIME-INR - Abnormal; Notable for the following components:    Protime 13.4 (*)     INR 1.15 (*)     All other components within normal limits    Narrative:     Performed at:  [facility]  [address] [facility] [employer] [address]   Phone [phone]   CULTURE BLOOD #1   CULTURE BLOOD #2   GASTROINTESTINAL PANEL BY DNA   C DIFF TOXIN/ANTIGEN   CULTURE BLOOD #1   CULTURE BLOOD #2   APTT    Narrative:     Performed at:  [facility]  [address] [facility] [employer] [address]   Phone [phone]   HEMOGLOBIN AND HEMATOCRIT, BLOOD    Narrative:     Performed at:  [address] [employer] [address]   Phone [phone]   URINE RT REFLEX TO CULTURE   BLOOD OCCULT STOOL SCREEN #1   LACTIC ACID, PLASMA   LACTATE, SEPSIS patient urinated around the catheter. Therefore, balloon was not inflated. There was a small amount of bleeding noted. Catheter was removed. Bladder scan revealed 154 mL.    5:49 PM: CT was originally ordered with IV contrast but the patient's creatinine was found to be 3.1 with a BUN of 42 and a GFR of 21. Previous laboratory studies within the last 30 days revealed a normal creatinine and GFR. Patient does have evidence of acute kidney injury. IV fluids will be continued. White blood cell count is 15.5. He does have a 69% bandemia. IV antibiotics, cefepime and vancomycin have been ordered. X-ray revealed mild bibasilar atelectasis. No infiltrates. 6:17 PM: The patient had a small amount of emesis that was very dark brown in appearance. No coffee grounds. No bright red blood. He was given Protonix 80 mg IV. Type and screen was added. Rectal exam was completed with the nurse present. There was some pink to bright red residue in the rectal vault but no clots. No melena. Protonix drip was initiated. MDM      REASSESSMENT          7:01 PM: CT was reviewed. There is evidence of small bowel obstruction. Small volume of ascites. There is some bibasilar atelectasis. I cannot exclude aspiration. Current oxygen saturation is 99%. No evidence of tachypnea. Antibiotics have been ordered. I spoke with Dr. Yahir Whittington, general surgery on-call. He reviewed CT imaging. He recommends continued volume resuscitation and suspects that the patient may need up to 5 to 6 L of fluid. NG tube was inserted to low intermittent suction. There were immediately 600 mL of dark brown fluid obtained from the stomach. The patient appeared to be much more comfortable after NG insertion. Dr. Yahir Whittington recommends admission to the medicine service given the presence of aspiration pneumonia and need for other intervention. A call was placed to Dr. Socorro Gomez his primary care physician.   I spoke with . I spoke with  Jillian Kaba who is on-call. Dr. Finn Pablo does not admit to the ICU and he requested admission to the hospitalist service. 7:52 PM: Dr. Isaiah Duque evaluated the patient in the emergency department. He plans to take the patient to the operating room tonight. I spoke with the hospitalist Dr. Francesca Kaminski, agrees to admit the patient. CRITICAL CARE TIME   Total Critical Care time was 65 minutes, excluding separately reportable procedures. There was a high probability of clinically significant/life threatening deterioration in the patient's condition which required my urgent intervention. CONSULTS:  IP CONSULT TO GENERAL SURGERY  IP CONSULT TO PRIMARY CARE PROVIDER  IP CONSULT TO HOSPITALIST    PROCEDURES:  Unless otherwise noted below, none     Procedures    Right femoral triple-lumen central venous catheter insertion:    The risk, benefits and alternatives including but not limited to the possibility of bruising, bleeding, infection, nerve injury, vascular injury, hematoma were discussed with the patient's brother who is his guardian, power of . He gives consent for treatment. The procedure was also explained to the patient and he gave consent for treatment. The procedure was completed using maximum sterile technique including cap, mask, gown, and sterile gloves. The right groin was sterilely prepped and draped with chlorhexidine/ChloraPrep swabs. Local anesthesia was given with 1% plain lidocaine subcutaneously. The right femoral vein was identified with a Cook needle on the first attempt with excellent venous return. Using the Seldinger technique, a triple-lumen catheter was inserted on the first attempt without difficulty. There was excellent venous return at all 3 ports. The catheter was sutured in place using 2-0 silk suture. Biopatch was applied. Sterile dressing was applied. The patient tolerated the procedure well. FINAL IMPRESSION      1. Small bowel obstruction ([address]    2.

## 2019-12-18 NOTE — PROGRESS NOTES
Unable to obtain ABG. Pt has low Blood pressure. Attempted once in left brachial. Unsuccessful.  MD aware

## 2019-12-19 ENCOUNTER — APPOINTMENT (OUTPATIENT)
Dept: GENERAL RADIOLOGY | Age: 59
DRG: 853 | End: 2019-12-19
Payer: COMMERCIAL

## 2019-12-19 LAB
ANION GAP SERPL CALCULATED.3IONS-SCNC: 16 MMOL/L (ref 3–16)
BANDED NEUTROPHILS RELATIVE PERCENT: 69 % (ref 0–7)
BASE EXCESS ARTERIAL: -6.8 MMOL/L (ref -3–3)
BASOPHILS ABSOLUTE: 0.2 K/UL (ref 0–0.2)
BASOPHILS RELATIVE PERCENT: 1 %
BILIRUBIN URINE: NEGATIVE
BILIRUBIN URINE: NEGATIVE
BLOOD, URINE: ABNORMAL
BLOOD, URINE: ABNORMAL
BUN BLDV-MCNC: 41 MG/DL (ref 7–20)
CALCIUM SERPL-MCNC: 7.7 MG/DL (ref 8.3–10.6)
CARBOXYHEMOGLOBIN ARTERIAL: 0.9 % (ref 0–1.5)
CHLORIDE BLD-SCNC: 103 MMOL/L (ref 99–110)
CLARITY: ABNORMAL
CLARITY: CLEAR
CO2: 18 MMOL/L (ref 21–32)
COLOR: ABNORMAL
COLOR: ABNORMAL
CREAT SERPL-MCNC: 2.4 MG/DL (ref 0.9–1.3)
CREATININE URINE: 108.8 MG/DL (ref 39–259)
EKG ATRIAL RATE: 110 BPM
EKG DIAGNOSIS: NORMAL
EKG P AXIS: 36 DEGREES
EKG P-R INTERVAL: 134 MS
EKG Q-T INTERVAL: 276 MS
EKG QRS DURATION: 78 MS
EKG QTC CALCULATION (BAZETT): 373 MS
EKG R AXIS: 69 DEGREES
EKG T AXIS: -18 DEGREES
EKG VENTRICULAR RATE: 110 BPM
EOSINOPHILS ABSOLUTE: 0 K/UL (ref 0–0.6)
EOSINOPHILS RELATIVE PERCENT: 0 %
EPITHELIAL CELLS, UA: 0 /HPF (ref 0–5)
EPITHELIAL CELLS, UA: 1 /HPF (ref 0–5)
ESTIMATED AVERAGE GLUCOSE: 114 MG/DL
GFR AFRICAN AMERICAN: 34
GFR NON-AFRICAN AMERICAN: 28
GLUCOSE BLD-MCNC: 121 MG/DL (ref 70–99)
GLUCOSE BLD-MCNC: 137 MG/DL (ref 70–99)
GLUCOSE BLD-MCNC: 166 MG/DL (ref 70–99)
GLUCOSE BLD-MCNC: 169 MG/DL (ref 70–99)
GLUCOSE BLD-MCNC: 183 MG/DL (ref 70–99)
GLUCOSE URINE: NEGATIVE MG/DL
GLUCOSE URINE: NEGATIVE MG/DL
HBA1C MFR BLD: 5.6 %
HCO3 ARTERIAL: 17.5 MMOL/L (ref 21–29)
HCT VFR BLD CALC: 45.5 % (ref 40.5–52.5)
HCT VFR BLD CALC: 51.6 % (ref 40.5–52.5)
HEMATOLOGY PATH CONSULT: NORMAL
HEMATOLOGY PATH CONSULT: YES
HEMOGLOBIN, ART, EXTENDED: 15.6 G/DL (ref 13.5–17.5)
HEMOGLOBIN: 15.3 G/DL (ref 13.5–17.5)
HEMOGLOBIN: 17 G/DL (ref 13.5–17.5)
HYALINE CASTS: 2 /LPF (ref 0–8)
HYALINE CASTS: 7 /LPF (ref 0–8)
KETONES, URINE: NEGATIVE MG/DL
KETONES, URINE: NEGATIVE MG/DL
LACTIC ACID, SEPSIS: 4 MMOL/L (ref 0.4–1.9)
LACTIC ACID, SEPSIS: 4.7 MMOL/L (ref 0.4–1.9)
LACTIC ACID: 3.4 MMOL/L (ref 0.4–2)
LEUKOCYTE ESTERASE, URINE: NEGATIVE
LEUKOCYTE ESTERASE, URINE: NEGATIVE
LYMPHOCYTES ABSOLUTE: 0.8 K/UL (ref 1–5.1)
LYMPHOCYTES RELATIVE PERCENT: 5 %
MAGNESIUM: 1.6 MG/DL (ref 1.8–2.4)
MCH RBC QN AUTO: 34.6 PG (ref 26–34)
MCH RBC QN AUTO: 34.9 PG (ref 26–34)
MCHC RBC AUTO-ENTMCNC: 32.9 G/DL (ref 31–36)
MCHC RBC AUTO-ENTMCNC: 33.6 G/DL (ref 31–36)
MCV RBC AUTO: 104 FL (ref 80–100)
MCV RBC AUTO: 105.3 FL (ref 80–100)
METHEMOGLOBIN ARTERIAL: 0.8 %
MICROSCOPIC EXAMINATION: YES
MICROSCOPIC EXAMINATION: YES
MONOCYTES ABSOLUTE: 1.7 K/UL (ref 0–1.3)
MONOCYTES RELATIVE PERCENT: 11 %
MRSA SCREEN RT-PCR: NORMAL
NEUTROPHILS ABSOLUTE: 12.9 K/UL (ref 1.7–7.7)
NEUTROPHILS RELATIVE PERCENT: 14 %
NITRITE, URINE: NEGATIVE
NITRITE, URINE: NEGATIVE
O2 CONTENT ARTERIAL: 20 ML/DL
O2 SAT, ARTERIAL: 93.7 %
O2 THERAPY: ABNORMAL
PCO2 ARTERIAL: 33.4 MMHG (ref 35–45)
PDW BLD-RTO: 14.1 % (ref 12.4–15.4)
PDW BLD-RTO: 14.3 % (ref 12.4–15.4)
PERFORMED ON: ABNORMAL
PH ARTERIAL: 7.34 (ref 7.35–7.45)
PH UA: 5 (ref 5–8)
PH UA: 5.5 (ref 5–8)
PHOSPHORUS: 3.2 MG/DL (ref 2.5–4.9)
PLATELET # BLD: 163 K/UL (ref 135–450)
PLATELET # BLD: 200 K/UL (ref 135–450)
PMV BLD AUTO: 8.6 FL (ref 5–10.5)
PMV BLD AUTO: 9.3 FL (ref 5–10.5)
PO2 ARTERIAL: 68.1 MMHG (ref 75–108)
POTASSIUM REFLEX MAGNESIUM: 5.5 MMOL/L (ref 3.5–5.1)
PROTEIN UA: 30 MG/DL
PROTEIN UA: 30 MG/DL
RBC # BLD: 4.37 M/UL (ref 4.2–5.9)
RBC # BLD: 4.9 M/UL (ref 4.2–5.9)
RBC UA: 65 /HPF (ref 0–4)
RBC UA: ABNORMAL /HPF (ref 0–2)
SLIDE REVIEW: ABNORMAL
SODIUM BLD-SCNC: 137 MMOL/L (ref 136–145)
SODIUM URINE: 61 MMOL/L
SPECIFIC GRAVITY UA: 1.02 (ref 1–1.03)
SPECIFIC GRAVITY UA: 1.02 (ref 1–1.03)
TCO2 ARTERIAL: 18.6 MMOL/L
UREA NITROGEN, UR: 900 MG/DL (ref 800–1666)
URINE REFLEX TO CULTURE: ABNORMAL
URINE TYPE: ABNORMAL
URINE TYPE: ABNORMAL
UROBILINOGEN, URINE: 0.2 E.U./DL
UROBILINOGEN, URINE: 0.2 E.U./DL
VANCOMYCIN RANDOM: 8.4 UG/ML
WBC # BLD: 11.8 K/UL (ref 4–11)
WBC # BLD: 15.5 K/UL (ref 4–11)
WBC UA: 2 /HPF (ref 0–5)
WBC UA: 5 /HPF (ref 0–5)

## 2019-12-19 PROCEDURE — 83605 ASSAY OF LACTIC ACID: CPT

## 2019-12-19 PROCEDURE — 85027 COMPLETE CBC AUTOMATED: CPT

## 2019-12-19 PROCEDURE — 87205 SMEAR GRAM STAIN: CPT

## 2019-12-19 PROCEDURE — 2580000003 HC RX 258: Performed by: NURSE PRACTITIONER

## 2019-12-19 PROCEDURE — 04HY32Z INSERTION OF MONITORING DEVICE INTO LOWER ARTERY, PERCUTANEOUS APPROACH: ICD-10-PCS | Performed by: INTERNAL MEDICINE

## 2019-12-19 PROCEDURE — 99024 POSTOP FOLLOW-UP VISIT: CPT | Performed by: SURGERY

## 2019-12-19 PROCEDURE — 82570 ASSAY OF URINE CREATININE: CPT

## 2019-12-19 PROCEDURE — 2580000003 HC RX 258: Performed by: INTERNAL MEDICINE

## 2019-12-19 PROCEDURE — 2500000003 HC RX 250 WO HCPCS: Performed by: EMERGENCY MEDICINE

## 2019-12-19 PROCEDURE — 6360000002 HC RX W HCPCS: Performed by: INTERNAL MEDICINE

## 2019-12-19 PROCEDURE — 94003 VENT MGMT INPAT SUBQ DAY: CPT

## 2019-12-19 PROCEDURE — 2700000000 HC OXYGEN THERAPY PER DAY

## 2019-12-19 PROCEDURE — 83036 HEMOGLOBIN GLYCOSYLATED A1C: CPT

## 2019-12-19 PROCEDURE — 94760 N-INVAS EAR/PLS OXIMETRY 1: CPT

## 2019-12-19 PROCEDURE — 2000000000 HC ICU R&B

## 2019-12-19 PROCEDURE — 2500000003 HC RX 250 WO HCPCS: Performed by: INTERNAL MEDICINE

## 2019-12-19 PROCEDURE — 36620 INSERTION CATHETER ARTERY: CPT

## 2019-12-19 PROCEDURE — 2580000003 HC RX 258: Performed by: SURGERY

## 2019-12-19 PROCEDURE — 71045 X-RAY EXAM CHEST 1 VIEW: CPT

## 2019-12-19 PROCEDURE — 5A1955Z RESPIRATORY VENTILATION, GREATER THAN 96 CONSECUTIVE HOURS: ICD-10-PCS | Performed by: SURGERY

## 2019-12-19 PROCEDURE — 36600 WITHDRAWAL OF ARTERIAL BLOOD: CPT

## 2019-12-19 PROCEDURE — APPNB15 APP NON BILLABLE TIME 0-15 MINS: Performed by: NURSE PRACTITIONER

## 2019-12-19 PROCEDURE — 80202 ASSAY OF VANCOMYCIN: CPT

## 2019-12-19 PROCEDURE — 6370000000 HC RX 637 (ALT 250 FOR IP): Performed by: NURSE PRACTITIONER

## 2019-12-19 PROCEDURE — APPSS30 APP SPLIT SHARED TIME 16-30 MINUTES: Performed by: NURSE PRACTITIONER

## 2019-12-19 PROCEDURE — 0BH17EZ INSERTION OF ENDOTRACHEAL AIRWAY INTO TRACHEA, VIA NATURAL OR ARTIFICIAL OPENING: ICD-10-PCS | Performed by: SURGERY

## 2019-12-19 PROCEDURE — 84100 ASSAY OF PHOSPHORUS: CPT

## 2019-12-19 PROCEDURE — C9113 INJ PANTOPRAZOLE SODIUM, VIA: HCPCS | Performed by: SURGERY

## 2019-12-19 PROCEDURE — 6370000000 HC RX 637 (ALT 250 FOR IP): Performed by: INTERNAL MEDICINE

## 2019-12-19 PROCEDURE — 94750 HC PULMONARY COMPLIANCE STUDY: CPT

## 2019-12-19 PROCEDURE — 36620 INSERTION CATHETER ARTERY: CPT | Performed by: NURSE PRACTITIONER

## 2019-12-19 PROCEDURE — 93010 ELECTROCARDIOGRAM REPORT: CPT | Performed by: INTERNAL MEDICINE

## 2019-12-19 PROCEDURE — 87070 CULTURE OTHR SPECIMN AEROBIC: CPT

## 2019-12-19 PROCEDURE — 81001 URINALYSIS AUTO W/SCOPE: CPT

## 2019-12-19 PROCEDURE — 36415 COLL VENOUS BLD VENIPUNCTURE: CPT

## 2019-12-19 PROCEDURE — 6360000002 HC RX W HCPCS: Performed by: SURGERY

## 2019-12-19 PROCEDURE — 83735 ASSAY OF MAGNESIUM: CPT

## 2019-12-19 PROCEDURE — 84300 ASSAY OF URINE SODIUM: CPT

## 2019-12-19 PROCEDURE — 2500000003 HC RX 250 WO HCPCS: Performed by: SURGERY

## 2019-12-19 PROCEDURE — 6360000002 HC RX W HCPCS: Performed by: NURSE PRACTITIONER

## 2019-12-19 PROCEDURE — 2500000003 HC RX 250 WO HCPCS: Performed by: NURSE PRACTITIONER

## 2019-12-19 PROCEDURE — 84540 ASSAY OF URINE/UREA-N: CPT

## 2019-12-19 PROCEDURE — APPNB30 APP NON BILLABLE TIME 0-30 MINS: Performed by: NURSE PRACTITIONER

## 2019-12-19 PROCEDURE — 87641 MR-STAPH DNA AMP PROBE: CPT

## 2019-12-19 PROCEDURE — 80048 BASIC METABOLIC PNL TOTAL CA: CPT

## 2019-12-19 PROCEDURE — 99291 CRITICAL CARE FIRST HOUR: CPT | Performed by: INTERNAL MEDICINE

## 2019-12-19 PROCEDURE — 82803 BLOOD GASES ANY COMBINATION: CPT

## 2019-12-19 RX ORDER — FENTANYL CITRATE 50 UG/ML
25 INJECTION, SOLUTION INTRAMUSCULAR; INTRAVENOUS
Status: DISCONTINUED | OUTPATIENT
Start: 2019-12-19 | End: 2019-12-26

## 2019-12-19 RX ORDER — MAGNESIUM SULFATE IN WATER 40 MG/ML
4 INJECTION, SOLUTION INTRAVENOUS ONCE
Status: COMPLETED | OUTPATIENT
Start: 2019-12-19 | End: 2019-12-19

## 2019-12-19 RX ORDER — SODIUM CHLORIDE 9 MG/ML
INJECTION, SOLUTION INTRAVENOUS ONCE
Status: COMPLETED | OUTPATIENT
Start: 2019-12-19 | End: 2019-12-19

## 2019-12-19 RX ORDER — ACETAMINOPHEN 650 MG/1
650 SUPPOSITORY RECTAL EVERY 4 HOURS PRN
Status: DISCONTINUED | OUTPATIENT
Start: 2019-12-19 | End: 2020-01-14 | Stop reason: HOSPADM

## 2019-12-19 RX ORDER — NICOTINE POLACRILEX 4 MG
15 LOZENGE BUCCAL PRN
Status: DISCONTINUED | OUTPATIENT
Start: 2019-12-19 | End: 2020-01-09

## 2019-12-19 RX ORDER — ONDANSETRON 2 MG/ML
4 INJECTION INTRAMUSCULAR; INTRAVENOUS EVERY 6 HOURS PRN
Status: DISCONTINUED | OUTPATIENT
Start: 2019-12-19 | End: 2020-01-14 | Stop reason: HOSPADM

## 2019-12-19 RX ORDER — SODIUM CHLORIDE 0.9 % (FLUSH) 0.9 %
10 SYRINGE (ML) INJECTION PRN
Status: DISCONTINUED | OUTPATIENT
Start: 2019-12-19 | End: 2020-01-14 | Stop reason: HOSPADM

## 2019-12-19 RX ORDER — DEXTROSE MONOHYDRATE 25 G/50ML
12.5 INJECTION, SOLUTION INTRAVENOUS PRN
Status: DISCONTINUED | OUTPATIENT
Start: 2019-12-19 | End: 2020-01-09

## 2019-12-19 RX ORDER — DEXTROSE MONOHYDRATE 50 MG/ML
100 INJECTION, SOLUTION INTRAVENOUS PRN
Status: DISCONTINUED | OUTPATIENT
Start: 2019-12-19 | End: 2020-01-09

## 2019-12-19 RX ORDER — SODIUM CHLORIDE 9 MG/ML
INJECTION, SOLUTION INTRAVENOUS CONTINUOUS
Status: DISCONTINUED | OUTPATIENT
Start: 2019-12-19 | End: 2019-12-19 | Stop reason: SDUPTHER

## 2019-12-19 RX ORDER — 0.9 % SODIUM CHLORIDE 0.9 %
1000 INTRAVENOUS SOLUTION INTRAVENOUS ONCE
Status: COMPLETED | OUTPATIENT
Start: 2019-12-19 | End: 2019-12-19

## 2019-12-19 RX ORDER — CHLORHEXIDINE GLUCONATE 0.12 MG/ML
15 RINSE ORAL 2 TIMES DAILY
Status: DISCONTINUED | OUTPATIENT
Start: 2019-12-19 | End: 2019-12-27

## 2019-12-19 RX ORDER — SODIUM CHLORIDE 9 MG/ML
INJECTION, SOLUTION INTRAVENOUS CONTINUOUS
Status: DISCONTINUED | OUTPATIENT
Start: 2019-12-19 | End: 2019-12-19

## 2019-12-19 RX ORDER — SODIUM CHLORIDE 0.9 % (FLUSH) 0.9 %
10 SYRINGE (ML) INJECTION EVERY 12 HOURS SCHEDULED
Status: DISCONTINUED | OUTPATIENT
Start: 2019-12-19 | End: 2019-12-30

## 2019-12-19 RX ORDER — PROPOFOL 10 MG/ML
10 INJECTION, EMULSION INTRAVENOUS
Status: DISCONTINUED | OUTPATIENT
Start: 2019-12-19 | End: 2019-12-23

## 2019-12-19 RX ADMIN — Medication 75 MCG/HR: at 15:14

## 2019-12-19 RX ADMIN — INSULIN LISPRO 1 UNITS: 100 INJECTION, SOLUTION INTRAVENOUS; SUBCUTANEOUS at 17:36

## 2019-12-19 RX ADMIN — SODIUM CHLORIDE 8 MG/HR: 9 INJECTION, SOLUTION INTRAVENOUS at 13:36

## 2019-12-19 RX ADMIN — SODIUM CHLORIDE 1000 ML: 9 INJECTION, SOLUTION INTRAVENOUS at 01:20

## 2019-12-19 RX ADMIN — METRONIDAZOLE 500 MG: 500 INJECTION, SOLUTION INTRAVENOUS at 22:06

## 2019-12-19 RX ADMIN — MAGNESIUM SULFATE HEPTAHYDRATE 4 G: 40 INJECTION, SOLUTION INTRAVENOUS at 11:50

## 2019-12-19 RX ADMIN — INSULIN LISPRO 1 UNITS: 100 INJECTION, SOLUTION INTRAVENOUS; SUBCUTANEOUS at 20:41

## 2019-12-19 RX ADMIN — MUPIROCIN: 20 OINTMENT TOPICAL at 10:32

## 2019-12-19 RX ADMIN — EPINEPHRINE 2 MCG/MIN: 1 INJECTION INTRAMUSCULAR; INTRAVENOUS; SUBCUTANEOUS at 12:28

## 2019-12-19 RX ADMIN — CHLORHEXIDINE GLUCONATE 15 ML: 1.2 RINSE ORAL at 20:41

## 2019-12-19 RX ADMIN — CALCIUM GLUCONATE 1 G: 98 INJECTION, SOLUTION INTRAVENOUS at 10:20

## 2019-12-19 RX ADMIN — CEFEPIME HYDROCHLORIDE 2 G: 2 INJECTION, POWDER, FOR SOLUTION INTRAVENOUS at 10:34

## 2019-12-19 RX ADMIN — SODIUM CHLORIDE 1000 ML: 9 INJECTION, SOLUTION INTRAVENOUS at 09:11

## 2019-12-19 RX ADMIN — METRONIDAZOLE 500 MG: 500 INJECTION, SOLUTION INTRAVENOUS at 06:00

## 2019-12-19 RX ADMIN — SODIUM CHLORIDE, PRESERVATIVE FREE 10 ML: 5 INJECTION INTRAVENOUS at 20:42

## 2019-12-19 RX ADMIN — INSULIN LISPRO 1 UNITS: 100 INJECTION, SOLUTION INTRAVENOUS; SUBCUTANEOUS at 15:02

## 2019-12-19 RX ADMIN — Medication 30 MCG/MIN: at 01:57

## 2019-12-19 RX ADMIN — SODIUM CHLORIDE 8 MG/HR: 9 INJECTION, SOLUTION INTRAVENOUS at 03:15

## 2019-12-19 RX ADMIN — SODIUM CHLORIDE 8 MG/HR: 9 INJECTION, SOLUTION INTRAVENOUS at 22:06

## 2019-12-19 RX ADMIN — VASOPRESSIN 0.04 UNITS/MIN: 20 INJECTION INTRAVENOUS at 01:38

## 2019-12-19 RX ADMIN — Medication 30 MCG/MIN: at 21:50

## 2019-12-19 RX ADMIN — CHLORHEXIDINE GLUCONATE 15 ML: 1.2 RINSE ORAL at 10:50

## 2019-12-19 RX ADMIN — Medication 50 MCG/HR: at 08:41

## 2019-12-19 RX ADMIN — VASOPRESSIN 0.04 UNITS/MIN: 20 INJECTION INTRAVENOUS at 10:39

## 2019-12-19 RX ADMIN — SODIUM BICARBONATE: 84 INJECTION, SOLUTION INTRAVENOUS at 09:13

## 2019-12-19 RX ADMIN — Medication 2 MCG/MIN: at 00:08

## 2019-12-19 RX ADMIN — SODIUM CHLORIDE: 9 INJECTION, SOLUTION INTRAVENOUS at 15:02

## 2019-12-19 RX ADMIN — SODIUM CHLORIDE: 9 INJECTION, SOLUTION INTRAVENOUS at 01:22

## 2019-12-19 RX ADMIN — MUPIROCIN: 20 OINTMENT TOPICAL at 22:10

## 2019-12-19 RX ADMIN — Medication 10 MCG/MIN: at 00:30

## 2019-12-19 RX ADMIN — SODIUM CHLORIDE, PRESERVATIVE FREE 10 ML: 5 INJECTION INTRAVENOUS at 09:15

## 2019-12-19 RX ADMIN — Medication 10 ML: at 00:09

## 2019-12-19 RX ADMIN — DEXTRAN 70 AND HYPROMELLOSE 2910 1 DROP: 1; 3 SOLUTION/ DROPS OPHTHALMIC at 14:37

## 2019-12-19 RX ADMIN — SODIUM BICARBONATE: 84 INJECTION, SOLUTION INTRAVENOUS at 21:10

## 2019-12-19 RX ADMIN — METRONIDAZOLE 500 MG: 500 INJECTION, SOLUTION INTRAVENOUS at 15:02

## 2019-12-19 RX ADMIN — CEFEPIME HYDROCHLORIDE 2 G: 2 INJECTION, POWDER, FOR SOLUTION INTRAVENOUS at 20:41

## 2019-12-19 ASSESSMENT — PULMONARY FUNCTION TESTS
PIF_VALUE: 16
PIF_VALUE: 21
PIF_VALUE: 23
PIF_VALUE: 17
PIF_VALUE: 17
PIF_VALUE: 18
PIF_VALUE: 19
PIF_VALUE: 22
PIF_VALUE: 23
PIF_VALUE: 8
PIF_VALUE: 18
PIF_VALUE: 23
PIF_VALUE: 17

## 2019-12-19 NOTE — OP NOTE
proceed urgently to the  operating room. He had received 4 L of intravenous resuscitation and  was on low-dose Levophed to maintain the blood pressure. The risks,  benefits, and alternatives were reviewed and the patient's guardian  agreed to proceed. PROCEDURE:  The patient was brought to the operating room, placed  supine, general anesthesia and intubation were performed. He did have  temporary dip in his blood pressure which responded to fluid and  vasopressin in addition to the ongoing Levophed infusion. Midline  incision was made after prepping and draping the abdomen. Dissection  was carried down into the peritoneum where a large amount of clear to  pink ascites was evacuated. This allowed us to now extend the fascial  incision along the length of the wound and identify the small bowel. The initial bowel was markedly dilated and as we began to run the small  bowel, a dense adhesion was noted into the right lower quadrant with the  omentum. This was taken down with electrocautery which freed a closed  loop obstruction which had two clear transition points. The  interspersed small bowel was reddish, discolored on the antimesenteric  border and was the source of the ascites. As we detorsed this, it was  viable with peristalsis and pulses within the mesentery. We now ran the bowel from the ileocecal valve which was the distal point  of transition proximal through the affected segment with a second  transition point approximately 60 cm from the anastomosis. This point  opened and allowed for flow of enteric contents after reduction of the  obstruction. The small bowel was then run proximal all the way to the  ligament of [place]. Other than being dilated this bowel was normal in  appearance. The nasogastric tube was confirmed be in the stomach but  was having some trouble with decompression due to the solid nature of  the material in the stomach as well as possible clots.   This was  irrigated

## 2019-12-19 NOTE — PROGRESS NOTES
4 Eyes Skin Assessment     The patient is being assess for  Shift Handoff    I agree that 2 RN's have performed a thorough Head to Toe Skin Assessment on the patient. ALL assessment sites listed below have been assessed. Areas assessed by both nurses:   [x]   Head, Face, and Ears   [x]   Shoulders, Back, and Chest  [x]   Arms, Elbows, and Hands   [x]   Coccyx, Sacrum, and IschIum  [x]   Legs, Feet, and Heels        Does the Patient have Skin Breakdown? Yes LDA WOUND CARE was Initiated documentation include the Debra-wound, Wound Assessment, Measurements, Dressing Treatment, Drainage, and Color\",         Stuart Prevention initiated:  Yes   Wound Care Orders initiated:  Yes      [address] nurse consulted for Pressure Injury (Stage 3,4, Unstageable, DTI, NWPT, and Complex wounds), New and Established Ostomies:  No      Nurse 1 eSignature: Electronically signed by Tyrell Go RN on 12/19/19 at 7:22 AM    **SHARE this note so that the co-signing nurse is able to place an eSignature**    Nurse 2 eSignature: {Esignature:[id number]}

## 2019-12-19 NOTE — PROGRESS NOTES
4 Eyes Skin Assessment     The patient is being assess for  Admission    I agree that 2 RN's have performed a thorough Head to Toe Skin Assessment on the patient. ALL assessment sites listed below have been assessed. Areas assessed by both nurses:   [x]   Head, Face, and Ears   [x]   Shoulders, Back, and Chest  [x]   Arms, Elbows, and Hands   [x]   Coccyx, Sacrum, and IschIum  [x]   Legs, Feet, and Heels        Does the Patient have Skin Breakdown? No         Stuart Prevention initiated:  Yes   Wound Care Orders initiated:  Yes      [address]  nurse consulted for Pressure Injury (Stage 3,4, Unstageable, DTI, NWPT, and Complex wounds), New and Established Ostomies:  No      Nurse 1 eSignature: Electronically signed by Sherly Angulo.  Padmini Gastelum RN on 12/19/19 at 7:21 AM    **SHARE this note so that the co-signing nurse is able to place an eSignature**    Nurse 2 eSignature: {Esignature:[id number]}

## 2019-12-19 NOTE — PROGRESS NOTES
RN walked into patient's room to find patient removed NG tube. Sats 88, suctioned and called RT (bedside currently). HR: 150, will notify physician.

## 2019-12-19 NOTE — CARE COORDINATION
INITIAL CASE MANAGEMENT ASSESSMENT    Reviewed chart, met with patient to assess possible discharge needs. Explained Case Management role/services. Spoke w/ pts brother John Roche    Living Situation: pt has Garry Murguia, confirmed address, lives w/ brother John Roche and sister in law. Home is a trilevel. Has 1/2 flight steps x 2 in the home    ADLs: had been independent prior to this procedure, went to work at [employer] 5 days /wk     DME: none    PT/OT Recs: needs evals when able     Active Services: none     Transportation: [facility][1]     Medications: confirmed [employer] as primary and Medicaid as secondary, rx are obtained through mail ( [facility] ) or at [address] [facility] in [place]    PCP: confirmed Negin Hemp      HD/PD: n/a    PLAN/COMMENTS:   Needs to be re evaluated after extubation    SW/CM provided contact information for patient or family to call with any questions. SW/CM will follow and assist as needed.   Electronically signed by Nabil Powell RN on 12/19/2019 at 3:03 PM

## 2019-12-19 NOTE — BRIEF OP NOTE
Brief Postoperative Note  ______________________________________________________________    Patient: Phoenix Webb  YOB: 1960  MRN: [id number]  Date of Procedure: 12/18/2019    Pre-Op Diagnosis: small bowel obstruction    Post-Op Diagnosis: Same with closed loop and ischemia but no necrosis       Procedure(s):  EXPLORATORY LAPAROTOMY, LYSIS OF ADHESIONS    Anesthesia: General    Surgeon(s):  Mars Kern MD    Assistant: Nima Matt    Estimated Blood Loss (mL): less than 50     Complications: None    Specimens:   * No specimens in log *    Implants:  * No implants in log *      Drains:   NG/OG/NJ/NE Tube Nasogastric Right nostril (Active)   Surrounding Skin Dry;Calloused; Intact 12/18/2019  7:06 PM   Securement device Yes 12/18/2019  7:06 PM   Status Suction-low continuous 12/18/2019  7:06 PM   Drainage Appearance Unk Forward 12/18/2019  7:06 PM   Output (mL) 650 ml 12/18/2019  7:54 PM       Urethral Catheter 18 fr (Active)   Catheter Indications Need for fluid management in critically ill patients in a critical care setting not able to be managed by other means such as BSC with hat, bedpan, urinal, condom catheter, or short term intermittent urethral catherization 12/18/2019  7:51 PM   Site Assessment Pink 12/18/2019  7:51 PM   Urine Color Pink 12/18/2019  7:51 PM       Findings: omental adhesion into RLQ with closed loop obstruction of terminal ileum and asictes    Nu Goodson MD  Date: 12/18/2019  Time: 10:16 PM

## 2019-12-19 NOTE — PROGRESS NOTES
[facility] [phone]                                     Daily Progress Note                                                         Pt Name: Umer Costa  Medical Record Number: [id number]  Date of Birth 1960   Today's Date: 12/19/2019  Chief Complaint   Patient presents with    Shortness of Breath     onset - 1530 today    Altered Mental Status     pt lethargic, unsteady        ASSESSMENT/PLAN  Small bowel obstruction, hypovolemic shock  -12/18 exploratory laparotomy with RITESH, reduction of closed loop SBO  -continue NG. Pulled out this morning, replaced.   -Aggressively fluid resuscitated in the ED. LA improving.   -Appreciate ICU assistance with care  -await return of GI function  -continue antibiotics  12/18 Right femoral TLC  -Cr and WBC improving    Discharge Planning: continue ICU care      Tanesha Hightower has slightly improved from yesterday. He is intubated, answering yes no questions appropriately. Pain is well controlled. He has no nausea and no vomiting. He has not passed flatus and has not had a bowel movement. He is tolerating NPO. OBJECTIVE  VITALS:  height is 5' 6\" (1.676 m) and weight is 141 lb 1.5 oz (64 kg). His axillary temperature is 97.9 °F (36.6 °C). His blood pressure is 99/66 and his pulse is 148. His respiration is 27 and oxygen saturation is 91%. INTAKE/OUTPUT:    Intake/Output Summary (Last 24 hours) at 12/19/2019 1102  Last data filed at 12/19/2019 1055  Gross per 24 hour   Intake 3331.6 ml   Output 2510 ml   Net 821.6 ml     GENERAL: alert, no distress, intubated, answering yes no questions and following commands. LUNGS: intubated  HEART: normal rate and regular rhythm  ABDOMEN: soft, appropriately-tender, non-distended and bowel sounds present in all 4 quadrants. Dressing intact with no drainage. EXTREMITY: no cyanosis, clubbing or edema    I/O last 3 completed shifts:   In: 3331.6 [I.V.:2301.6; NG/GT:30; IV Piggyback:1000]  Out: 4167 [Urine:855; Emesis/NG output:650; Blood:30]      LABS  Recent Labs     12/18/19  1703  12/19/19  0243 12/19/19  0546   WBC 15.5*  --   --  11.8*   HGB 17.0   < >  --  15.3   HCT 51.6   < >  --  45.5     --   --  163   *  --   --  137   K 4.7  --   --  5.5*   CL 90*  --   --  103   CO2 17*  --   --  18*   BUN 42*  --   --  41*   CREATININE 3.1*  --   --  2.4*   CALCIUM 10.6  --   --  7.7*   INR 1.15*  --   --   --    AST 81*  --   --   --    ALT 93*  --   --   --    BILITOT 1.2*  --   --   --    BILIDIR 0.3  --   --   --    NITRU  --   --  Negative  --    COLORU  --   --  DK YELLOW  --     < > = values in this interval not displayed.        EDUCATION  Patient educated about Disease Process, Medications, Smoking Cessation, Oxygenation, Incentive Spirometry and Deep Breath and Cough, Diabetes, Hyperlipidemia, Smoking Cessation, Nutrition, Exercise and Hypertension    Electronically signed by SAUMYA Benson CNP on 12/19/2019 at 8:28 AM      [facility]   [phone] Office  [phone] Cell     As above  POD 1 from lysis of adhesions   Still with significant pressor support   Will try another fluid bolus   Repeat lactate at 1600   If increasing lactate or pressor requirement will return to OR to inspect bowel     Urine output is improved along with drop in creatinine    Oxygen sats OK but at FiO2 of 70    Electronically signed by Rustam Holt MD on 12/19/2019 at 3:15 PM

## 2019-12-19 NOTE — CONSULTS
REASON FOR CONSULTATION/CC: sbo      Consult at request of  Aisha Darling MD     PCP: Kelli Win MD  Established Pulmonologist:  None    HISTORY OF PRESENT ILLNESS: Kieran Chiu is a 64y.o. year old male with a history of downs who presents with :     Patient intubated. All history per chart. He presented to the emergency room last night with shortness of breath, lethargy, weakness started over the last day or so. Family member stated his abdomen was more distended than usual.  With his history of stage II cancer with a possible colectomy, work-up included CT abdomen and emergency room. This found small bowel obstruction and large volume resuscitation was underway. General surgery and hospitalist were both consulted. After review, he was taken to the operating room. In the operating room, exploratory laparotomy completed with lysis of adhesions small bowel obstructionwith closed loop and ischemia but no necrosis was found.     PAST MEDICAL HISTORY:  Past Medical History:   Diagnosis Date    Acne rosacea     Cognitive impairment     sees Dr Torie Concepcion, started Aricept 12/2019    Colon cancer ([facility] [address] 1/7/2014    ileocecal valve/Leuenberger/yrly surveillance    Down syndrome     Gout     HIGH CHOLESTEROL     2017 3.1 % framingham risk score    History of diarrhea     since childhood, worse since partial colectomy resolved after a few months    Macrocytosis     followed by Dr Robert Snow and released    Seborrheic dermatitis     Seizure disorder ([address] 2019    Status post biventricular cardiac pacemaker insertion 01/2019    for long sinus pauses with syncopal willis    Status post partial colectomy 2016    diarrhea    Syncope and collapse 01/2019    recurrent syncope, found seizures and sinus pauses    Weight loss 2015    since 2014 at the time of partial colectomy lost 25 lbs       PAST SURGICAL HISTORY:  Past Surgical History:   Procedure Laterality Date    CATARACT REMOVAL WITH IMPLANT malfunction, initial  encounter  TECHNOLOGIST PROVIDED HISTORY:  Reason for exam:->possible pacemaker lead dislodgement  Ordering Physician Provided Reason for Exam: possible pacemaker lead  malfuction  Acuity: Acute  Type of Exam: Initial  Relevant Medical/Surgical History: hx of colon cancer    FINDINGS:  The heart size is within normal limits. The pulmonary vasculature is also  within normal limits. No acute infiltrates are seen. The costophrenic angles  are sharp bilaterally. No pneumothoraces are noted. There is a left subclavian  AICD. Impression 1. No active pulmonary disease. CXR portable:   Results for orders placed during the hospital encounter of 12/18/19   XR CHEST PORTABLE    Narrative EXAMINATION:  ONE XRAY VIEW OF THE CHEST    12/19/2019 2:44 am    COMPARISON:  12/18/2019    HISTORY:  ORDERING SYSTEM PROVIDED HISTORY: ETT  TECHNOLOGIST PROVIDED HISTORY:  Reason for exam:->ETT  Reason for Exam: ETT  Acuity: Acute  Type of Exam: Initial    FINDINGS:  Endotracheal tube tip is 3.1 cm above the saad. Cardiac pacer stable in  positioning. Stable cardiac silhouette. No pneumothorax. Bibasilar  atelectasis. Small bilateral pleural effusions. The osseous structures  otherwise stable. Enteric tube tip and side port project over the stomach. Impression Small bilateral pleural effusions with adjacent atelectasis. Assessment:     Aspiration pneumonia   Small bowel obstruction status post ex lap 12/18/2019 with reduction of closed-loop and lysis of adhesions  History of Down syndrome, seizure disorder, colon cancer  Acute kidney injury  Lactic acidosis  Shock      Plan:      Mechanical Ventilation  -Repeat arterial blood gas    -increased PEEP to 12. - AC , 12     Recent Labs     12/19/19  0805   PHART 7.340*   KRY7QIB 33.4*   PO2ART 68.1*         Tachy  - sepsis, pain, shock  - on levophed.    -     Sedation  - add fentanyl   - continue propofol     Shock  -Improving lactic acid  -Status post at least 5 L of fluid  -Levophed 27 and vasopressin 0.04.  -Wean vasopressors to map 65  -Brachial A line was lost over night      Acid-base  -Improving lactic acidosis  -Start bicarbonate drip  -Monitor pH      Acute kidney injury  -Status post at least 5 L of fluid  -Bicarb drip      Leukocytosis  -Likely related to aspiration and stress  -Empiric antibiotics of vancomycin Flagyl cefepime  -MRSA in sputum cultures. -Ruling out C. difficile with presentation of diarrhea      Prophylaxis  - GI -  protonix    - DVT -  scd  - VAP - peridex  - Nasal Decolonization - Bactroban    Nutrition  - Diet NPO Effective Now    Mobility  Sedated. Access  Arterial   Arterial Line 12/18/19 Brachial (Active)   Continued need for line? Yes 12/19/2019 12:00 AM   Site Assessment Clean;Dry; Intact 12/19/2019 12:00 AM   Line Status Other (Comment) 12/19/2019 12:00 AM   Art Line Waveform Other (Comment) 12/19/2019 12:00 AM   Art Line Interventions Zeroed and calibrated; Leveled; Tubing changed; Connections checked and tightened;Flushed per protocol;Line pulled back 12/18/2019 10:30 PM   Color/Movement/Sensation Capillary refill less than 3 sec 12/18/2019 10:30 PM   Dressing Status Intact;Dry; Old drainage 12/19/2019 12:00 AM   Dressing Type Transparent 12/19/2019 12:00 AM   Dressing Intervention New 12/18/2019 10:30 PM   Number of days: 0       PICC          CVC       CVC Triple Lumen 12/18/19 Right Femoral (Active)   Continued need for line? Yes 12/19/2019  6:00 AM   Site Assessment Clean;Dry; Intact 12/19/2019  6:00 AM   Proximal Lumen Status Normal saline locked 12/19/2019  6:00 AM   Medial Lumen Status Infusing 12/19/2019  6:00 AM   Distal Lumen Status Infusing 12/19/2019  6:00 AM   Dressing Type Anti-microbial patch;Transparent 12/19/2019  6:00 AM   Dressing Status Clean;Dry; Intact 12/19/2019  6:00 AM   Dressing Change Due 12/25/19 12/19/2019  6:00 AM   Number of days: 0          I spent 45 minutes of critical care time with this patient excluding any procedures. This note was transcribed using [id number] [employer]. Please disregard any translational errors.     Thank you for the consult    [facility]  [phone]

## 2019-12-20 ENCOUNTER — APPOINTMENT (OUTPATIENT)
Dept: GENERAL RADIOLOGY | Age: 59
DRG: 853 | End: 2019-12-20
Payer: COMMERCIAL

## 2019-12-20 PROBLEM — N17.9 AKI (ACUTE KIDNEY INJURY) (HCC): Status: ACTIVE | Noted: 2019-12-20

## 2019-12-20 PROBLEM — R94.31 ABNORMAL EKG: Status: ACTIVE | Noted: 2019-12-20

## 2019-12-20 LAB
ALBUMIN SERPL-MCNC: 1.6 G/DL (ref 3.4–5)
ALBUMIN SERPL-MCNC: 1.6 G/DL (ref 3.4–5)
ALP BLD-CCNC: 41 U/L (ref 40–129)
ALT SERPL-CCNC: 333 U/L (ref 10–40)
ANION GAP SERPL CALCULATED.3IONS-SCNC: 12 MMOL/L (ref 3–16)
AST SERPL-CCNC: 212 U/L (ref 15–37)
BANDED NEUTROPHILS RELATIVE PERCENT: 15 % (ref 0–7)
BASE EXCESS ARTERIAL: -1.2 MMOL/L (ref -3–3)
BASE EXCESS ARTERIAL: 0.9 MMOL/L (ref -3–3)
BASOPHILS ABSOLUTE: 0 K/UL (ref 0–0.2)
BASOPHILS RELATIVE PERCENT: 0 %
BILIRUB SERPL-MCNC: 0.5 MG/DL (ref 0–1)
BILIRUBIN DIRECT: 0.3 MG/DL (ref 0–0.3)
BILIRUBIN, INDIRECT: 0.2 MG/DL (ref 0–1)
BUN BLDV-MCNC: 41 MG/DL (ref 7–20)
CALCIUM SERPL-MCNC: 7.2 MG/DL (ref 8.3–10.6)
CARBOXYHEMOGLOBIN ARTERIAL: 0.8 % (ref 0–1.5)
CARBOXYHEMOGLOBIN ARTERIAL: 0.9 % (ref 0–1.5)
CHLORIDE BLD-SCNC: 99 MMOL/L (ref 99–110)
CO2: 23 MMOL/L (ref 21–32)
CREAT SERPL-MCNC: 2.3 MG/DL (ref 0.9–1.3)
DOHLE BODIES: PRESENT
EKG ATRIAL RATE: 110 BPM
EKG DIAGNOSIS: NORMAL
EKG P AXIS: 59 DEGREES
EKG P-R INTERVAL: 148 MS
EKG Q-T INTERVAL: 302 MS
EKG QRS DURATION: 78 MS
EKG QTC CALCULATION (BAZETT): 408 MS
EKG R AXIS: 57 DEGREES
EKG T AXIS: 17 DEGREES
EKG VENTRICULAR RATE: 110 BPM
EOSINOPHILS ABSOLUTE: 0 K/UL (ref 0–0.6)
EOSINOPHILS RELATIVE PERCENT: 0 %
GFR AFRICAN AMERICAN: 36
GFR NON-AFRICAN AMERICAN: 29
GLUCOSE BLD-MCNC: 178 MG/DL (ref 70–99)
GLUCOSE BLD-MCNC: 180 MG/DL (ref 70–99)
GLUCOSE BLD-MCNC: 182 MG/DL (ref 70–99)
GLUCOSE BLD-MCNC: 184 MG/DL (ref 70–99)
GLUCOSE BLD-MCNC: 191 MG/DL (ref 70–99)
GLUCOSE BLD-MCNC: 194 MG/DL (ref 70–99)
GLUCOSE BLD-MCNC: 207 MG/DL (ref 70–99)
HCO3 ARTERIAL: 24.3 MMOL/L (ref 21–29)
HCO3 ARTERIAL: 26 MMOL/L (ref 21–29)
HCT VFR BLD CALC: 37.4 % (ref 40.5–52.5)
HEMOGLOBIN, ART, EXTENDED: 10.4 G/DL (ref 13.5–17.5)
HEMOGLOBIN, ART, EXTENDED: 12.4 G/DL (ref 13.5–17.5)
HEMOGLOBIN: 12.6 G/DL (ref 13.5–17.5)
LACTIC ACID: 3.3 MMOL/L (ref 0.4–2)
LACTIC ACID: 3.3 MMOL/L (ref 0.4–2)
LYMPHOCYTES ABSOLUTE: 0.2 K/UL (ref 1–5.1)
LYMPHOCYTES RELATIVE PERCENT: 2 %
MACROCYTES: ABNORMAL
MAGNESIUM: 2.7 MG/DL (ref 1.8–2.4)
MCH RBC QN AUTO: 35 PG (ref 26–34)
MCHC RBC AUTO-ENTMCNC: 33.7 G/DL (ref 31–36)
MCV RBC AUTO: 103.8 FL (ref 80–100)
METAMYELOCYTES RELATIVE PERCENT: 1 %
METHEMOGLOBIN ARTERIAL: 1.2 %
METHEMOGLOBIN ARTERIAL: 1.3 %
MONOCYTES ABSOLUTE: 0.1 K/UL (ref 0–1.3)
MONOCYTES RELATIVE PERCENT: 1 %
NEUTROPHILS ABSOLUTE: 12 K/UL (ref 1.7–7.7)
NEUTROPHILS RELATIVE PERCENT: 81 %
O2 CONTENT ARTERIAL: 14 ML/DL
O2 CONTENT ARTERIAL: 17 ML/DL
O2 SAT, ARTERIAL: 97.8 %
O2 SAT, ARTERIAL: 98.8 %
O2 THERAPY: ABNORMAL
O2 THERAPY: ABNORMAL
PCO2 ARTERIAL: 46 MMHG (ref 35–45)
PCO2 ARTERIAL: 46.6 MMHG (ref 35–45)
PDW BLD-RTO: 14.2 % (ref 12.4–15.4)
PERFORMED ON: ABNORMAL
PH ARTERIAL: 7.34 (ref 7.35–7.45)
PH ARTERIAL: 7.37 (ref 7.35–7.45)
PHOSPHORUS: 3.6 MG/DL (ref 2.5–4.9)
PLATELET # BLD: 125 K/UL (ref 135–450)
PLATELET SLIDE REVIEW: ABNORMAL
PMV BLD AUTO: 9.2 FL (ref 5–10.5)
PO2 ARTERIAL: 119 MMHG (ref 75–108)
PO2 ARTERIAL: 91.4 MMHG (ref 75–108)
POTASSIUM SERPL-SCNC: 4.6 MMOL/L (ref 3.5–5.1)
RBC # BLD: 3.6 M/UL (ref 4.2–5.9)
SLIDE REVIEW: ABNORMAL
SODIUM BLD-SCNC: 134 MMOL/L (ref 136–145)
TCO2 ARTERIAL: 25.8 MMOL/L
TCO2 ARTERIAL: 27.4 MMOL/L
TOTAL PROTEIN: 4.5 G/DL (ref 6.4–8.2)
TOXIC GRANULATION: PRESENT
TROPONIN: 0.04 NG/ML
VACUOLATED NEUTROPHILS: PRESENT
WBC # BLD: 12.4 K/UL (ref 4–11)

## 2019-12-20 PROCEDURE — 36415 COLL VENOUS BLD VENIPUNCTURE: CPT

## 2019-12-20 PROCEDURE — 6360000002 HC RX W HCPCS: Performed by: SURGERY

## 2019-12-20 PROCEDURE — 6360000002 HC RX W HCPCS: Performed by: INTERNAL MEDICINE

## 2019-12-20 PROCEDURE — 2000000000 HC ICU R&B

## 2019-12-20 PROCEDURE — 2580000003 HC RX 258: Performed by: SURGERY

## 2019-12-20 PROCEDURE — 80069 RENAL FUNCTION PANEL: CPT

## 2019-12-20 PROCEDURE — 94750 HC PULMONARY COMPLIANCE STUDY: CPT

## 2019-12-20 PROCEDURE — 85025 COMPLETE CBC W/AUTO DIFF WBC: CPT

## 2019-12-20 PROCEDURE — 94003 VENT MGMT INPAT SUBQ DAY: CPT

## 2019-12-20 PROCEDURE — P9045 ALBUMIN (HUMAN), 5%, 250 ML: HCPCS | Performed by: INTERNAL MEDICINE

## 2019-12-20 PROCEDURE — 71045 X-RAY EXAM CHEST 1 VIEW: CPT

## 2019-12-20 PROCEDURE — 94760 N-INVAS EAR/PLS OXIMETRY 1: CPT

## 2019-12-20 PROCEDURE — APPNB30 APP NON BILLABLE TIME 0-30 MINS: Performed by: NURSE PRACTITIONER

## 2019-12-20 PROCEDURE — 2500000003 HC RX 250 WO HCPCS: Performed by: INTERNAL MEDICINE

## 2019-12-20 PROCEDURE — 80076 HEPATIC FUNCTION PANEL: CPT

## 2019-12-20 PROCEDURE — C9113 INJ PANTOPRAZOLE SODIUM, VIA: HCPCS | Performed by: NURSE PRACTITIONER

## 2019-12-20 PROCEDURE — APPSS30 APP SPLIT SHARED TIME 16-30 MINUTES: Performed by: NURSE PRACTITIONER

## 2019-12-20 PROCEDURE — 83735 ASSAY OF MAGNESIUM: CPT

## 2019-12-20 PROCEDURE — 6370000000 HC RX 637 (ALT 250 FOR IP): Performed by: INTERNAL MEDICINE

## 2019-12-20 PROCEDURE — 2580000003 HC RX 258: Performed by: NURSE PRACTITIONER

## 2019-12-20 PROCEDURE — 84484 ASSAY OF TROPONIN QUANT: CPT

## 2019-12-20 PROCEDURE — 6360000002 HC RX W HCPCS: Performed by: NURSE PRACTITIONER

## 2019-12-20 PROCEDURE — 2500000003 HC RX 250 WO HCPCS: Performed by: SURGERY

## 2019-12-20 PROCEDURE — 6370000000 HC RX 637 (ALT 250 FOR IP): Performed by: NURSE PRACTITIONER

## 2019-12-20 PROCEDURE — 82803 BLOOD GASES ANY COMBINATION: CPT

## 2019-12-20 PROCEDURE — 99291 CRITICAL CARE FIRST HOUR: CPT | Performed by: INTERNAL MEDICINE

## 2019-12-20 PROCEDURE — 99024 POSTOP FOLLOW-UP VISIT: CPT | Performed by: SURGERY

## 2019-12-20 PROCEDURE — 2500000003 HC RX 250 WO HCPCS: Performed by: NURSE PRACTITIONER

## 2019-12-20 PROCEDURE — 93010 ELECTROCARDIOGRAM REPORT: CPT | Performed by: INTERNAL MEDICINE

## 2019-12-20 PROCEDURE — 83605 ASSAY OF LACTIC ACID: CPT

## 2019-12-20 PROCEDURE — C9113 INJ PANTOPRAZOLE SODIUM, VIA: HCPCS | Performed by: SURGERY

## 2019-12-20 PROCEDURE — 99233 SBSQ HOSP IP/OBS HIGH 50: CPT | Performed by: INTERNAL MEDICINE

## 2019-12-20 PROCEDURE — 2700000000 HC OXYGEN THERAPY PER DAY

## 2019-12-20 PROCEDURE — 2580000003 HC RX 258: Performed by: INTERNAL MEDICINE

## 2019-12-20 PROCEDURE — 93005 ELECTROCARDIOGRAM TRACING: CPT | Performed by: INTERNAL MEDICINE

## 2019-12-20 RX ORDER — PANTOPRAZOLE SODIUM 40 MG/10ML
40 INJECTION, POWDER, LYOPHILIZED, FOR SOLUTION INTRAVENOUS DAILY
Status: DISCONTINUED | OUTPATIENT
Start: 2019-12-20 | End: 2020-01-13

## 2019-12-20 RX ORDER — 0.9 % SODIUM CHLORIDE 0.9 %
1000 INTRAVENOUS SOLUTION INTRAVENOUS ONCE
Status: COMPLETED | OUTPATIENT
Start: 2019-12-20 | End: 2019-12-20

## 2019-12-20 RX ORDER — 0.9 % SODIUM CHLORIDE 0.9 %
1000 INTRAVENOUS SOLUTION INTRAVENOUS ONCE
Status: DISCONTINUED | OUTPATIENT
Start: 2019-12-20 | End: 2019-12-21

## 2019-12-20 RX ORDER — ALBUMIN, HUMAN INJ 5% 5 %
12.5 SOLUTION INTRAVENOUS EVERY 8 HOURS
Status: DISCONTINUED | OUTPATIENT
Start: 2019-12-20 | End: 2019-12-21

## 2019-12-20 RX ORDER — SODIUM CHLORIDE 9 MG/ML
10 INJECTION INTRAVENOUS DAILY
Status: DISCONTINUED | OUTPATIENT
Start: 2019-12-20 | End: 2020-01-14 | Stop reason: HOSPADM

## 2019-12-20 RX ORDER — ALBUMIN, HUMAN INJ 5% 5 %
25 SOLUTION INTRAVENOUS EVERY 8 HOURS
Status: DISCONTINUED | OUTPATIENT
Start: 2019-12-20 | End: 2019-12-20

## 2019-12-20 RX ADMIN — INSULIN LISPRO 1 UNITS: 100 INJECTION, SOLUTION INTRAVENOUS; SUBCUTANEOUS at 08:35

## 2019-12-20 RX ADMIN — CHLORHEXIDINE GLUCONATE 15 ML: 1.2 RINSE ORAL at 20:57

## 2019-12-20 RX ADMIN — Medication 50 MCG/HR: at 18:59

## 2019-12-20 RX ADMIN — ALBUMIN (HUMAN) 12.5 G: 12.5 INJECTION, SOLUTION INTRAVENOUS at 20:35

## 2019-12-20 RX ADMIN — SODIUM CHLORIDE 1000 ML: 9 INJECTION, SOLUTION INTRAVENOUS at 08:52

## 2019-12-20 RX ADMIN — INSULIN LISPRO 1 UNITS: 100 INJECTION, SOLUTION INTRAVENOUS; SUBCUTANEOUS at 05:40

## 2019-12-20 RX ADMIN — EPINEPHRINE 10 MCG/MIN: 1 INJECTION INTRAMUSCULAR; INTRAVENOUS; SUBCUTANEOUS at 14:30

## 2019-12-20 RX ADMIN — SODIUM BICARBONATE: 84 INJECTION, SOLUTION INTRAVENOUS at 21:22

## 2019-12-20 RX ADMIN — INSULIN LISPRO 1 UNITS: 100 INJECTION, SOLUTION INTRAVENOUS; SUBCUTANEOUS at 17:50

## 2019-12-20 RX ADMIN — PROPOFOL 10 MCG/KG/MIN: 10 INJECTION, EMULSION INTRAVENOUS at 03:48

## 2019-12-20 RX ADMIN — Medication 50 MCG/HR: at 01:14

## 2019-12-20 RX ADMIN — MUPIROCIN: 20 OINTMENT TOPICAL at 20:56

## 2019-12-20 RX ADMIN — METRONIDAZOLE 500 MG: 500 INJECTION, SOLUTION INTRAVENOUS at 22:20

## 2019-12-20 RX ADMIN — SODIUM CHLORIDE, PRESERVATIVE FREE 10 ML: 5 INJECTION INTRAVENOUS at 20:55

## 2019-12-20 RX ADMIN — SODIUM BICARBONATE: 84 INJECTION, SOLUTION INTRAVENOUS at 09:20

## 2019-12-20 RX ADMIN — INSULIN LISPRO 1 UNITS: 100 INJECTION, SOLUTION INTRAVENOUS; SUBCUTANEOUS at 20:57

## 2019-12-20 RX ADMIN — Medication 10 ML: at 13:20

## 2019-12-20 RX ADMIN — INSULIN LISPRO 1 UNITS: 100 INJECTION, SOLUTION INTRAVENOUS; SUBCUTANEOUS at 00:59

## 2019-12-20 RX ADMIN — SODIUM CHLORIDE 8 MG/HR: 9 INJECTION, SOLUTION INTRAVENOUS at 07:20

## 2019-12-20 RX ADMIN — CEFEPIME HYDROCHLORIDE 2 G: 2 INJECTION, POWDER, FOR SOLUTION INTRAVENOUS at 08:35

## 2019-12-20 RX ADMIN — EPINEPHRINE 7 MCG/MIN: 1 INJECTION INTRAMUSCULAR; INTRAVENOUS; SUBCUTANEOUS at 04:28

## 2019-12-20 RX ADMIN — METRONIDAZOLE 500 MG: 500 INJECTION, SOLUTION INTRAVENOUS at 05:41

## 2019-12-20 RX ADMIN — ALBUMIN (HUMAN) 12.5 G: 12.5 INJECTION, SOLUTION INTRAVENOUS at 10:49

## 2019-12-20 RX ADMIN — METRONIDAZOLE 500 MG: 500 INJECTION, SOLUTION INTRAVENOUS at 14:32

## 2019-12-20 RX ADMIN — INSULIN LISPRO 2 UNITS: 100 INJECTION, SOLUTION INTRAVENOUS; SUBCUTANEOUS at 12:29

## 2019-12-20 RX ADMIN — SODIUM CHLORIDE, PRESERVATIVE FREE 10 ML: 5 INJECTION INTRAVENOUS at 08:53

## 2019-12-20 RX ADMIN — VASOPRESSIN 0.04 UNITS/MIN: 20 INJECTION INTRAVENOUS at 03:11

## 2019-12-20 RX ADMIN — Medication 50 MCG/HR: at 10:09

## 2019-12-20 RX ADMIN — PANTOPRAZOLE SODIUM 40 MG: 40 INJECTION, POWDER, FOR SOLUTION INTRAVENOUS at 13:19

## 2019-12-20 RX ADMIN — ALBUMIN (HUMAN) 12.5 G: 12.5 INJECTION, SOLUTION INTRAVENOUS at 11:51

## 2019-12-20 RX ADMIN — Medication 30 MCG/MIN: at 07:18

## 2019-12-20 RX ADMIN — EPINEPHRINE 7 MCG/MIN: 1 INJECTION INTRAMUSCULAR; INTRAVENOUS; SUBCUTANEOUS at 04:02

## 2019-12-20 RX ADMIN — VASOPRESSIN 0.04 UNITS/MIN: 20 INJECTION INTRAVENOUS at 20:38

## 2019-12-20 RX ADMIN — Medication 30 MCG/MIN: at 18:10

## 2019-12-20 RX ADMIN — ALBUMIN (HUMAN) 12.5 G: 12.5 INJECTION, SOLUTION INTRAVENOUS at 18:03

## 2019-12-20 RX ADMIN — VASOPRESSIN 0.04 UNITS/MIN: 20 INJECTION INTRAVENOUS at 11:32

## 2019-12-20 RX ADMIN — SODIUM CHLORIDE 8 MG/HR: 9 INJECTION, SOLUTION INTRAVENOUS at 07:21

## 2019-12-20 RX ADMIN — MUPIROCIN: 20 OINTMENT TOPICAL at 08:54

## 2019-12-20 RX ADMIN — DEXTRAN 70 AND HYPROMELLOSE 2910 1 DROP: 1; 3 SOLUTION/ DROPS OPHTHALMIC at 08:54

## 2019-12-20 RX ADMIN — CHLORHEXIDINE GLUCONATE 15 ML: 1.2 RINSE ORAL at 08:57

## 2019-12-20 ASSESSMENT — PULMONARY FUNCTION TESTS
PIF_VALUE: 23
PIF_VALUE: 24
PIF_VALUE: 22
PIF_VALUE: 25
PIF_VALUE: 25
PIF_VALUE: 24
PIF_VALUE: 22
PIF_VALUE: 23

## 2019-12-20 ASSESSMENT — PAIN SCALES - GENERAL
PAINLEVEL_OUTOF10: 0
PAINLEVEL_OUTOF10: 0

## 2019-12-20 NOTE — PROGRESS NOTES
[facility]                                                                        [address] [address]                                                                 Laura Bynum                                                         Phone Number: [phone]                                                           Fax Number: [phone]                                                             Nephrology Progress Note    Chief Complaint: Diarrhea for the last few days, abdominal distention, one episode of vomiting    History of Present Illness: We are following this patient for ANISH, Hyperkalemia and acidosis. The patient was admitted with SBO - Taken to OR on 19 for Ex Lap and RITESH - In shock requiring pressor (max Levo, Epi and Vasopressin) and ventilator support post op. Family at bedside    Subjective:      Scheduled Meds:   sodium chloride  1,000 mL Intravenous Once    sodium chloride flush  10 mL Intravenous 2 times per day    metroNIDAZOLE  500 mg Intravenous Q8H    chlorhexidine  15 mL Mouth/Throat BID    mupirocin   Topical BID    cefepime (MAXIPIME) 2 g IVPB minibag  2 g Intravenous Q12H    insulin lispro  0-6 Units Subcutaneous Q4H        propofol 10 mcg/kg/min (19 0348)    vasopressin (Septic Shock) infusion 0.04 Units/min (19 0311)    norepinephrine 30 mcg/min (19 0718)    sodium bicarbonate infusion 100 mL/hr at 19 0920    fentaNYL 50 mcg/hr (19 0114)    dextrose      EPINEPHrine infusion 10 mcg/min (19 0839)    pantoprozole (PROTONIX) infusion 8 mg/hr (19 0721)       PRN Meds:.sodium chloride flush, ondansetron, acetaminophen, fentanNYL, glucose, dextrose, glucagon (rDNA), dextrose, dextran 70-hypromellose    Physical Exam:    TEMPERATURE:  Current - Temp: 99.3 °F (37.4 °C);  Max - Temp  Av.5 °F (36.9 °C)  Min: 98.1 °F (36.7 °C)  Max: 99.3 °F (37.4 °C)  RESPIRATIONS RANGE: Resp  Av.1  Min: 16  Max: 25  PULSE RANGE: Pulse  Av.1  Min: 120  Max: 140  BLOOD PRESSURE RANGE:  Systolic (32FYQ), JVR:63 , Min:87 , IOV:232   ; Diastolic (33HKC), IIU:80, Min:45, Max:65    24HR INTAKE/OUTPUT:      Intake/Output Summary (Last 24 hours) at 2019 0945  Last data filed at 2019 8824  Gross per 24 hour   Intake 5498.24 ml   Output 2850 ml   Net 2648.24 ml       Patient Vitals for the past 96 hrs (Last 3 readings):   Weight   19 0600 141 lb 1.5 oz (64 kg)   19 2230 142 lb 10.2 oz (64.7 kg)   19 1637 136 lb 7.4 oz (61.9 kg)       GENERAL:  Sedated, intubated, clinically euvolemic. HEENT:  Ears and nose appear externally without deformity. Normocephalic, atraumatic. Mucous membranes are moist.  NECK:  Supple. No thyromegaly. CHEST:  Clear to auscultation anteriorly with no intercostal retractions. CARDIOVASCULAR:  Sinus tachycardia. No rubs. ABDOMEN:  Surgical dressing intact, no drainage, soft. No organomegaly. EXTREMITIES:  Lower extremities:  Trace lower extremity edema. No extremity lymphadenopathy. SKIN:  Normal texture and turgor. No rash. : deleon in place; no hematuria noted    Allergies:   Allergies   Allergen Reactions    Penicillins      rash          LAB DATA:    CBC:   Lab Results   Component Value Date    WBC 12.4 2019    RBC 3.60 2019    RBC 4.68 2017    HGB 12.6 2019    HCT 37.4 2019    .8 2019    MCH 35.0 2019    MCHC 33.7 2019    RDW 14.2 2019     2019    MPV 9.2 2019     BMP:    Lab Results   Component Value Date     2019    K 4.6 2019    K 5.5 2019    CL 99 2019    CO2 23 2019    BUN 41 2019    CREATININE 2.3 2019    CALCIUM 7.2 2019    GFRAA 36 2019    GFRAA >60 2013    LABGLOM 29 2019    GLUCOSE 191 12/20/2019    GLUCOSE 115 03/13/2017     Ionized Calcium:  No results found for: IONCA  Magnesium:    Lab Results   Component Value Date    MG 2.70 12/20/2019     Phosphorus:    Lab Results   Component Value Date    PHOS 3.6 12/20/2019     U/A:    Lab Results   Component Value Date    COLORU DK YELLOW 12/19/2019    PHUR 5.0 12/19/2019    WBCUA 2 12/19/2019    RBCUA 20-50 12/19/2019    CLARITYU Clear 12/19/2019    SPECGRAV 1.023 12/19/2019    LEUKOCYTESUR Negative 12/19/2019    UROBILINOGEN 0.2 12/19/2019    BILIRUBINUR Negative 12/19/2019    BLOODU MODERATE 12/19/2019    GLUCOSEU Negative 12/19/2019         IMPRESSION/RECOMMENDATIONS:      Active Problems:    Small bowel obstruction (HCC)    Aspiration pneumonia of both lower lobes (HCC)    Hypoxia    Septicemia ([address]    Septic shock (HCC)    Postprocedural hypotension  Resolved Problems:    * No resolved hospital problems. *    1. NAISH (non oliguric) - in the setting of hypotension, vomiting, diarrhea, currently, requiring pressor support and getting aggressive volume resuscitation and scheduled IV albumin. I suspect prerenal etiology although ATN cannot be excluded (Fe Na 0.99  And Fe Urea 48 %) at this point in time. Avoid exposure to nephrotoxic agents. No immediate indication for renal replacement therapy at this point in time. 2.  Hyperkalemia - resolved with medical management    3. Acidosis. The patient is currently on bicarb drip. Improved    4. Respiratory failure requiring Ventilator support. Empiric abx for ? aspiration    5. Shock, currently requiring aggressive volume resuscitation and pressor support.

## 2019-12-20 NOTE — PLAN OF CARE
Patient added to my list today  Called and discussed with Dr Jenna Light  She kindly agreed to follow up on patient from Internal Medicine point of view, also Critical care and surgical team following.    Will take off from my list

## 2019-12-20 NOTE — PROGRESS NOTES
Pulmonary Progress Note    Date of Admission: 12/18/2019   LOS: 2 days       CC:  Shock    Subjective: Worsening blood pressure over night. Given ivf bolus with slight improvement. Improved urine output   Improved Lactic acid      ROS:  Unable to assess        PHYSICAL EXAM:    Blood pressure (!) 101/45, pulse 120, temperature 99.3 °F (37.4 °C), temperature source Axillary, resp. rate 18, height 5' 6\" (1.676 m), weight 141 lb 1.5 oz (64 kg), SpO2 98 %.'  Gen: + distress. Eyes: PERRL. No sclera icterus. No conjunctival injection. ENT: No discharge. Pharynx clear. External appearance of ears and nose normal. ett  Neck: Trachea midline. No obvious mass. Resp: No accessory muscle use. No crackles. No wheezes. No rhonchi. On vent. CV: tachy rate. Regular rhythm. No murmur or rub. No edema. GI: ++ tender. + distended. No hernia. Staples. Skin: Warm, dry, normal texture and turgor. No nodule on exposed extremities. Lymph: No cervical LAD. No supraclavicular LAD. M/S: No cyanosis. No clubbing. No joint deformity.     Neuro:   sedated  Psych: sedated      Medications:    Scheduled Meds:   sodium chloride  1,000 mL Intravenous Once    albumin human  12.5 g Intravenous Q8H    And    albumin human  12.5 g Intravenous Q8H    sodium chloride flush  10 mL Intravenous 2 times per day    metroNIDAZOLE  500 mg Intravenous Q8H    chlorhexidine  15 mL Mouth/Throat BID    mupirocin   Topical BID    cefepime (MAXIPIME) 2 g IVPB minibag  2 g Intravenous Q12H    insulin lispro  0-6 Units Subcutaneous Q4H       Continuous Infusions:   propofol 10 mcg/kg/min (12/20/19 6138)    vasopressin (Septic Shock) infusion 0.04 Units/min (12/20/19 0311)    norepinephrine 30 mcg/min (12/20/19 0718)    sodium bicarbonate infusion 100 mL/hr at 12/20/19 0920    fentaNYL 50 mcg/hr (12/20/19 1009)    dextrose      EPINEPHrine infusion 10 mcg/min (12/20/19 0839)    pantoprozole (PROTONIX) infusion 8 mg/hr (12/20/19 2798)       PRN Meds:  sodium chloride flush, ondansetron, acetaminophen, fentanNYL, glucose, dextrose, glucagon (rDNA), dextrose, dextran 70-hypromellose    Labs reviewed:  CBC:   Recent Labs     12/18/19  1703 12/18/19  2306 12/19/19  0546 12/20/19  0509   WBC 15.5*  --  11.8* 12.4*   HGB 17.0 14.9 15.3 12.6*   HCT 51.6 44.3 45.5 37.4*   .3*  --  104.0* 103.8*     --  163 125*     BMP:   Recent Labs     12/18/19  1703 12/19/19  0526 12/19/19  0546 12/20/19  0509   *  --  137 134*   K 4.7  --  5.5* 4.6   CL 90*  --  103 99   CO2 17*  --  18* 23   PHOS  --  3.2  --  3.6   BUN 42*  --  41* 41*   CREATININE 3.1*  --  2.4* 2.3*     LIVER PROFILE:   Recent Labs     12/18/19  1703   AST 81*   ALT 93*   LIPASE 9.0*   BILIDIR 0.3   BILITOT 1.2*   ALKPHOS 66     PT/INR:   Recent Labs     12/18/19  1703   PROTIME 13.4*   INR 1.15*     APTT:   Recent Labs     12/18/19  1703   APTT 24.8     UA:  Recent Labs     12/19/19  1633   COLORU DK YELLOW   PHUR 5.0   WBCUA 2   RBCUA 20-50*   CLARITYU Clear   SPECGRAV 1.023   LEUKOCYTESUR Negative   UROBILINOGEN 0.2   BILIRUBINUR Negative   BLOODU MODERATE*   GLUCOSEU Negative     No results for input(s): PH, PCO2, PO2 in the last 72 hours. Cx:      Films:  Radiology Review:  Pertinent images / reports were reviewed as a part of this visit. CT Chest w/ contrast: No results found for this or any previous visit. CT Chest w/o contrast: No results found for this or any previous visit. CTPA: No results found for this or any previous visit. CXR PA/LAT:   Results for orders placed during the hospital encounter of 01/24/19   XR CHEST STANDARD (2 VW)    Narrative EXAMINATION:  TWO VIEWS OF THE CHEST    1/24/2019 9:42 am    COMPARISON:  01/12/2019.     HISTORY:  ORDERING SYSTEM PROVIDED HISTORY: Pacemaker lead malfunction, initial  encounter  TECHNOLOGIST PROVIDED HISTORY:  Reason for exam:->possible pacemaker lead dislodgement  Ordering Physician Provided Reason for Exam: possible pacemaker lead  malfuction  Acuity: Acute  Type of Exam: Initial  Relevant Medical/Surgical History: hx of colon cancer    FINDINGS:  The heart size is within normal limits. The pulmonary vasculature is also  within normal limits. No acute infiltrates are seen. The costophrenic angles  are sharp bilaterally. No pneumothoraces are noted. There is a left subclavian  AICD. Impression 1. No active pulmonary disease. CXR portable:   Results for orders placed during the hospital encounter of 12/18/19   XR CHEST PORTABLE    Narrative EXAMINATION:  ONE XRAY VIEW OF THE CHEST    12/19/2019 10:11 am    COMPARISON:  12/19/2019    HISTORY:  ORDERING SYSTEM PROVIDED HISTORY: ng placement  TECHNOLOGIST PROVIDED HISTORY:  Reason for exam:->ng placement  Reason for Exam: ng placement  Acuity: Acute  Type of Exam: Ongoing    FINDINGS:  The distal side port of the nasogastric tube projects over the gastric  fundus. Endotracheal tube remains in normal position. Bibasilar patchy opacities are present. There is no pneumothorax. Left  infraclavicular cardiac pacemaker with 2 lead wires is noted. A dilated loop of small bowel is noted in the mid abdomen. Lucency in the  left upper quadrant may be secondary to recent surgery. Cutaneous surgical  staples in the upper abdomen are partially visualized. Impression Nasogastric tube projects in intragastric position. Bibasilar atelectasis. Pneumonia or aspiration pneumonitis are differential  considerations. Nonspecific bowel gas pattern. Assessment:      Aspiration pneumonia   Small bowel obstruction status post ex lap 12/18/2019 with reduction of closed-loop and lysis of adhesions  History of Down syndrome, seizure disorder, colon cancer  Acute kidney injury  Lactic acidosis  Shock        Plan:      Mechanical Ventilation  - AC , 12   - improved ph.  Keep peep 12.   Wean O2 to sat >90%      Recent Labs     12/19/19  0805 12/20/19  0644   PHART 7.340* 7.338*   NPB2JNJ 33.4* 46.6*   PO2ART 68.1* 91.4           Tachy  - sepsis, pain, shock  - on levophed / epi  -      Sedation  -  fentanyl   -  propofol      Shock  -Improving lactic acid  -Given additional IV fluids this morning. Starting albumin per nephrology.  -Levophed  and vasopressin 0.04.  -Wean vasopressors to map 65        Acid-base  -Improving lactic acidosis  -bicarbonate drip  -Monitor pH. Check abg 3pm         Acute kidney injury  -Status post at least 5 L of fluid  -Bicarb drip        Leukocytosis  -Likely related to aspiration and stress  -Empiric antibiotics of vancomycin Flagyl cefepime  -MRSA negative            Prophylaxis  - GI - pepcid  - DVT -scd  - VAP - peridex  - Nasal Decolonization - Bactroban    Nutrition  - Diet NPO Effective Now    Intake/Output Summary (Last 24 hours) at 12/20/2019 1028  Last data filed at 12/20/2019 0906  Gross per 24 hour   Intake 5498.24 ml   Output 2850 ml   Net 2648.24 ml          Access  Arterial   Arterial Line 12/19/19 Left Femoral (Active)   Continued need for line? Yes 12/20/2019  6:00 AM   Site Assessment Clean;Dry; Intact 12/20/2019  6:00 AM   Line Status Arterial fluids per protocol; Intact and in place 12/20/2019  6:00 AM   Art Line Waveform Appropriate 12/20/2019  6:00 AM   Art Line Interventions Zeroed and calibrated; Leveled; Flushed per protocol 12/20/2019 12:00 AM   Color/Movement/Sensation Capillary refill less than 3 sec 12/20/2019  6:00 AM   Dressing Status Clean;Dry; Intact 12/20/2019  6:00 AM   Dressing Type Anti-microbial patch;Transparent 12/20/2019  6:00 AM   Dressing Change Due 12/26/19 12/20/2019  6:00 AM   Number of days: 0       PICC          CVC       CVC Triple Lumen 12/18/19 Right Femoral (Active)   Continued need for line? Yes 12/20/2019  6:00 AM   Site Assessment Clean;Dry; Intact 12/20/2019  6:00 AM   Proximal Lumen Status Infusing 12/20/2019  6:00 AM   Medial Lumen Status Infusing 12/20/2019  6:00 AM

## 2019-12-20 NOTE — PROGRESS NOTES
4 Eyes Skin Assessment     The patient is being assess for  Shift Handoff    I agree that 2 RN's have performed a thorough Head to Toe Skin Assessment on the patient. ALL assessment sites listed below have been assessed. Areas assessed by both nurses:   [x]   Head, Face, and Ears   [x]   Shoulders, Back, and Chest  [x]   Arms, Elbows, and Hands   [x]   Coccyx, Sacrum, and IschIum  [x]   Legs, Feet, and Heels        Does the Patient have Skin Breakdown? Yes LDA WOUND CARE was Initiated documentation include the Debra-wound, Wound Assessment, Measurements, Dressing Treatment, Drainage, and Color\",         Stuart Prevention initiated:  Yes   Wound Care Orders initiated:  Yes      [id number]  nurse consulted for Pressure Injury (Stage 3,4, Unstageable, DTI, NWPT, and Complex wounds), New and Established Ostomies:  No      Nurse 1 eSignature: Electronically signed by Ethelene Osler. Barth Roles on 12/20/19 at 7:01 AM    **SHARE this note so that the co-signing nurse is able to place an eSignature**    Nurse 2 eSignature: {Esignature:[id number]}

## 2019-12-20 NOTE — PROGRESS NOTES
124 18 98 %   12/20/19 0500 -- -- -- 127 18 98 %   12/20/19 0401 -- -- -- 127 18 98 %   12/20/19 0400 -- 98.1 °F (36.7 °C) Axillary 125 18 98 %   12/20/19 0300 -- -- -- 129 18 97 %   12/20/19 0200 -- -- -- 130 18 97 %     I/O last 3 completed shifts: In: 5468.2 [I.V.:5188. 2; NG/GT:30; IV Piggyback:250]  Out: 2625 [Urine:1775; Emesis/NG output:850]  I/O this shift:  In: 27 [NG/GT:30]  Out: 225 [Urine:125; Emesis/NG output:100]  VITALS:    BP (!) 101/45   Pulse 120   Temp 99.3 °F (37.4 °C) (Axillary)   Resp 18   Ht 5' 6\" (1.676 m)   Wt 141 lb 1.5 oz (64 kg)   SpO2 98%   BMI 22.77 kg/m²     General Appearance: asleep on vent  Skin: warm and dry, no rash or erythema  Head: normocephalic and atraumatic  Eyes: conjunctivae normal and sclera anicteric  ENT: ng tube in place  Neck: neck supple and non tender without mass, no thyromegaly or thyroid nodules, no cervical lymphadenopathy   Pulmonary/Chest: clear to auscultation bilaterally- upper lobes symmetric bs Cardiovascular: normal rate, normal S1 and S2, no gallops and no carotid bruits  Abdomen: very quiet abdomen distension  Extremities: no cyanosis, clubbing or edema  Musculoskeletal: no swollen joints and no tender joints,  Neurologic: not moving or awake enough currently to assess    DATA:   Labs:  CBC:   Recent Labs     12/18/19  1703 12/18/19  2306 12/19/19  0546 12/20/19  0509   WBC 15.5*  --  11.8* 12.4*   HGB 17.0 14.9 15.3 12.6*     --  163 125*     BMP:    Recent Labs     12/18/19  1703 12/19/19  0546 12/20/19  0509   * 137 134*   K 4.7 5.5* 4.6   CL 90* 103 99   CO2 17* 18* 23   BUN 42* 41* 41*   CREATININE 3.1* 2.4* 2.3*   GLUCOSE 171* 137* 191*     POC GLUCOSE:    Recent Labs     12/19/19  1718 12/19/19 2014 12/20/19  0057 12/20/19  0511 12/20/19  0733   POCGLU 169* 183* 178* 184* 180*     Ca/Mg/Phos:   Recent Labs     12/18/19  1703 12/19/19  0526 12/19/19  0546 12/20/19  0509   CALCIUM 10.6  --  7.7* 7.2*   MG  --  1.60*  --  2.70* PHOS  --  3.2  --  3.6     Hepatic:   Recent Labs     12/18/19  1703   AST 81*   ALT 93*   BILITOT 1.2*   ALKPHOS 66     Troponin:   Recent Labs     12/18/19 1703   TROPONINI 0.02*     ProBNP:   Recent Labs     12/18/19  1703   PROBNP 502*     Lipids: No results for input(s): CHOL, TRIG, HDL, LDLCALC, LABVLDL in the last 72 hours. ABGs:   Recent Labs     12/20/19  0644   PHART 7.338*   PO2ART 91.4   FBN2KUX 46.6*     PT/INR:   Recent Labs     12/18/19 1703   PROTIME 13.4*   INR 1.15*     APTT:   Recent Labs     12/18/19 1703   APTT 24.8     Lactic acid 10.4 on presentation 3.3 today, 3.4 yesterday    EKG: ST and sinus arrhythmia, t wave inversion inferiorally    DIAGNOSTICS:  Ct Head Wo Contrast    Result Date: 12/18/2019  No acute intracranial abnormality. Xr Chest Portable    Result Date: 12/20/2019  1. Recommend advancement of endotracheal tube 1.0 cm. 2. Bibasilar subsegmental atelectasis plus or minus mild pleural effusions, unchanged. Xr Chest Portable    Result Date: 12/19/2019  Nasogastric tube projects in intragastric position. Bibasilar atelectasis. Pneumonia or aspiration pneumonitis are differential considerations. Nonspecific bowel gas pattern. Xr Chest Portable    Result Date: 12/19/2019  Small bilateral pleural effusions with adjacent atelectasis. Xr Chest Portable    Result Date: 12/18/2019  Low lung volume due to inspiratory effort. Mild bibasilar atelectasis with no other significant finding in the chest.     Ct Chest Abdomen Pelvis Wo Contrast    Result Date: 12/18/2019  Findings compatible with small bowel obstruction with transition point in the right mid abdomen, likely in mid to distal ileum. No obvious etiology identified. Small volume ascites, likely reactive. Areas of consolidation to the lungs bilaterally which may relate to atelectasis although multifocal pneumonia or aspiration pneumonitis could have a similar appearance.  Patulous/distended appearance to the

## 2019-12-20 NOTE — CONSULTS
hypotension, vomiting, diarrhea, currently,  requiring pressor support and getting aggressive volume resuscitation. I suspect prerenal etiology although ATN cannot be excluded at this  point in time. Avoid exposure to nephrotoxic agents. Check urine lytes  and no immediate indication for renal replacement therapy at this point  in time. 2.  Hyperkalemia. The patient is making adequate amount of urine. This  should improve with improvement in kidney function. 3.  Acidosis. The patient is currently on bicarb drip. 4.  Respiratory failure requiring pressor support. 5.  Shock, currently requiring aggressive volume resuscitation and  pressor support. RECOMMENDATIONS:  1. Continue current IV fluids. 2.  Check urine lytes. 3.  Avoid exposure to nephrotoxic agents. 4.  No immediate indication for renal replacement therapy at this point  in time. Thank you for the consultation. Total critical care time spent:  35 minutes.         Leann Torres MD    D: 12/19/2019 16:04:06       T: 12/20/2019 2:12:09     [email or web address]  Job#: [id number]     Doc#: [id number]    CC:

## 2019-12-20 NOTE — PROGRESS NOTES
[facility] [phone]                                     Daily Progress Note                                                         Pt Name: Mayte Pizarro  Medical Record Number: [id number]  Date of Birth 1960   Today's Date: 12/20/2019  Chief Complaint   Patient presents with    Shortness of Breath     onset - 1530 today    Altered Mental Status     pt lethargic, unsteady        ASSESSMENT/PLAN  Small bowel obstruction, hypovolemic shock  -12/18 exploratory laparotomy with RITESH, reduction of closed loop SBO  -continue NG.  -LA up to 4 at 1307 12/19, down to 3.4 at recheck 1630.  -Cr trending down  -continue antibiotics    12/18 Right femoral TLC  -Cr and WBC improving  -Appreciate ICU assistance with care  -remains on pressors     Discharge Planning: continue ICU care      Darcie Morillo has slightly improved from yesterday. He is intubated, answering yes no questions appropriately. Pain is well controlled. He has no nausea and no vomiting. He has not passed flatus and has not had a bowel movement. He is tolerating NPO. OBJECTIVE  VITALS:  height is 5' 6\" (1.676 m) and weight is 141 lb 1.5 oz (64 kg). His axillary temperature is 98.1 °F (36.7 °C). His blood pressure is 100/65 and his pulse is 125. His respiration is 18 and oxygen saturation is 97%. INTAKE/OUTPUT:      Intake/Output Summary (Last 24 hours) at 12/20/2019 0806  Last data filed at 12/20/2019 0600  Gross per 24 hour   Intake 5468.24 ml   Output 2625 ml   Net 2843.24 ml     GENERAL: alert, no distress, intubated, answering yes no questions and following commands. LUNGS: intubated  HEART: normal rate and regular rhythm  ABDOMEN: soft, appropriately-tender, non-distended and bowel sounds present in all 4 quadrants. Dressing intact with no drainage. EXTREMITY: no cyanosis, clubbing or edema    I/O last 3 completed shifts:   In: 5468.2

## 2019-12-21 ENCOUNTER — APPOINTMENT (OUTPATIENT)
Dept: GENERAL RADIOLOGY | Age: 59
DRG: 853 | End: 2019-12-21
Payer: COMMERCIAL

## 2019-12-21 LAB
ALBUMIN SERPL-MCNC: 2.6 G/DL (ref 3.4–5)
ALP BLD-CCNC: 39 U/L (ref 40–129)
ALT SERPL-CCNC: 206 U/L (ref 10–40)
ANION GAP SERPL CALCULATED.3IONS-SCNC: 13 MMOL/L (ref 3–16)
ANISOCYTOSIS: ABNORMAL
AST SERPL-CCNC: 119 U/L (ref 15–37)
BANDED NEUTROPHILS RELATIVE PERCENT: 53 % (ref 0–7)
BASE EXCESS ARTERIAL: 6.8 MMOL/L (ref -3–3)
BASOPHILS ABSOLUTE: 0 K/UL (ref 0–0.2)
BASOPHILS RELATIVE PERCENT: 0 %
BILIRUB SERPL-MCNC: 1.2 MG/DL (ref 0–1)
BILIRUBIN DIRECT: 0.6 MG/DL (ref 0–0.3)
BILIRUBIN, INDIRECT: 0.6 MG/DL (ref 0–1)
BUN BLDV-MCNC: 26 MG/DL (ref 7–20)
CALCIUM SERPL-MCNC: 7.4 MG/DL (ref 8.3–10.6)
CARBOXYHEMOGLOBIN ARTERIAL: 1.2 % (ref 0–1.5)
CHLORIDE BLD-SCNC: 94 MMOL/L (ref 99–110)
CO2: 26 MMOL/L (ref 21–32)
CREAT SERPL-MCNC: 1.5 MG/DL (ref 0.9–1.3)
CULTURE, RESPIRATORY: NORMAL
DOHLE BODIES: PRESENT
EOSINOPHILS ABSOLUTE: 0.1 K/UL (ref 0–0.6)
EOSINOPHILS RELATIVE PERCENT: 1 %
GFR AFRICAN AMERICAN: 58
GFR NON-AFRICAN AMERICAN: 48
GLUCOSE BLD-MCNC: 115 MG/DL (ref 70–99)
GLUCOSE BLD-MCNC: 120 MG/DL (ref 70–99)
GLUCOSE BLD-MCNC: 139 MG/DL (ref 70–99)
GLUCOSE BLD-MCNC: 145 MG/DL (ref 70–99)
GLUCOSE BLD-MCNC: 156 MG/DL (ref 70–99)
GLUCOSE BLD-MCNC: 193 MG/DL (ref 70–99)
GLUCOSE BLD-MCNC: 98 MG/DL (ref 70–99)
GRAM STAIN RESULT: NORMAL
HCO3 ARTERIAL: 30.2 MMOL/L (ref 21–29)
HCT VFR BLD CALC: 26.9 % (ref 40.5–52.5)
HEMOGLOBIN, ART, EXTENDED: 9.2 G/DL (ref 13.5–17.5)
HEMOGLOBIN: 9.4 G/DL (ref 13.5–17.5)
LACTIC ACID: 1.4 MMOL/L (ref 0.4–2)
LACTIC ACID: 2.1 MMOL/L (ref 0.4–2)
LYMPHOCYTES ABSOLUTE: 0.2 K/UL (ref 1–5.1)
LYMPHOCYTES RELATIVE PERCENT: 2 %
MAGNESIUM: 2.2 MG/DL (ref 1.8–2.4)
MCH RBC QN AUTO: 35.4 PG (ref 26–34)
MCHC RBC AUTO-ENTMCNC: 34.8 G/DL (ref 31–36)
MCV RBC AUTO: 101.6 FL (ref 80–100)
METAMYELOCYTES RELATIVE PERCENT: 2 %
METHEMOGLOBIN ARTERIAL: 0.8 %
MONOCYTES ABSOLUTE: 0.8 K/UL (ref 0–1.3)
MONOCYTES RELATIVE PERCENT: 9 %
NEUTROPHILS ABSOLUTE: 7.8 K/UL (ref 1.7–7.7)
NEUTROPHILS RELATIVE PERCENT: 33 %
O2 CONTENT ARTERIAL: 13 ML/DL
O2 SAT, ARTERIAL: 99.9 %
O2 THERAPY: ABNORMAL
PCO2 ARTERIAL: 39.4 MMHG (ref 35–45)
PDW BLD-RTO: 14.2 % (ref 12.4–15.4)
PERFORMED ON: ABNORMAL
PERFORMED ON: NORMAL
PH ARTERIAL: 7.5 (ref 7.35–7.45)
PHOSPHORUS: 1.4 MG/DL (ref 2.5–4.9)
PLATELET # BLD: 84 K/UL (ref 135–450)
PLATELET SLIDE REVIEW: ABNORMAL
PMV BLD AUTO: 9.3 FL (ref 5–10.5)
PO2 ARTERIAL: 191 MMHG (ref 75–108)
POLYCHROMASIA: ABNORMAL
POTASSIUM SERPL-SCNC: 3.3 MMOL/L (ref 3.5–5.1)
RBC # BLD: 2.65 M/UL (ref 4.2–5.9)
SLIDE REVIEW: ABNORMAL
SODIUM BLD-SCNC: 133 MMOL/L (ref 136–145)
TCO2 ARTERIAL: 31.4 MMOL/L
TOTAL PROTEIN: 4.7 G/DL (ref 6.4–8.2)
TOXIC GRANULATION: PRESENT
TRIGL SERPL-MCNC: 129 MG/DL (ref 0–150)
TROPONIN: 0.02 NG/ML
VACUOLATED NEUTROPHILS: PRESENT
WBC # BLD: 8.9 K/UL (ref 4–11)

## 2019-12-21 PROCEDURE — 80069 RENAL FUNCTION PANEL: CPT

## 2019-12-21 PROCEDURE — 94761 N-INVAS EAR/PLS OXIMETRY MLT: CPT

## 2019-12-21 PROCEDURE — 6370000000 HC RX 637 (ALT 250 FOR IP): Performed by: NURSE PRACTITIONER

## 2019-12-21 PROCEDURE — 85025 COMPLETE CBC W/AUTO DIFF WBC: CPT

## 2019-12-21 PROCEDURE — 2580000003 HC RX 258: Performed by: INTERNAL MEDICINE

## 2019-12-21 PROCEDURE — 2580000003 HC RX 258: Performed by: NURSE PRACTITIONER

## 2019-12-21 PROCEDURE — 71045 X-RAY EXAM CHEST 1 VIEW: CPT

## 2019-12-21 PROCEDURE — C9113 INJ PANTOPRAZOLE SODIUM, VIA: HCPCS | Performed by: NURSE PRACTITIONER

## 2019-12-21 PROCEDURE — 6360000002 HC RX W HCPCS: Performed by: INTERNAL MEDICINE

## 2019-12-21 PROCEDURE — 2700000000 HC OXYGEN THERAPY PER DAY

## 2019-12-21 PROCEDURE — 2000000000 HC ICU R&B

## 2019-12-21 PROCEDURE — 94750 HC PULMONARY COMPLIANCE STUDY: CPT

## 2019-12-21 PROCEDURE — 84478 ASSAY OF TRIGLYCERIDES: CPT

## 2019-12-21 PROCEDURE — APPSS15 APP SPLIT SHARED TIME 0-15 MINUTES: Performed by: PHYSICIAN ASSISTANT

## 2019-12-21 PROCEDURE — 2500000003 HC RX 250 WO HCPCS: Performed by: INTERNAL MEDICINE

## 2019-12-21 PROCEDURE — 99024 POSTOP FOLLOW-UP VISIT: CPT | Performed by: PHYSICIAN ASSISTANT

## 2019-12-21 PROCEDURE — 99024 POSTOP FOLLOW-UP VISIT: CPT | Performed by: SURGERY

## 2019-12-21 PROCEDURE — P9045 ALBUMIN (HUMAN), 5%, 250 ML: HCPCS | Performed by: INTERNAL MEDICINE

## 2019-12-21 PROCEDURE — 82803 BLOOD GASES ANY COMBINATION: CPT

## 2019-12-21 PROCEDURE — 6360000002 HC RX W HCPCS: Performed by: NURSE PRACTITIONER

## 2019-12-21 PROCEDURE — 99291 CRITICAL CARE FIRST HOUR: CPT | Performed by: INTERNAL MEDICINE

## 2019-12-21 PROCEDURE — 6370000000 HC RX 637 (ALT 250 FOR IP): Performed by: INTERNAL MEDICINE

## 2019-12-21 PROCEDURE — 2500000003 HC RX 250 WO HCPCS: Performed by: NURSE PRACTITIONER

## 2019-12-21 PROCEDURE — 94003 VENT MGMT INPAT SUBQ DAY: CPT

## 2019-12-21 PROCEDURE — 83735 ASSAY OF MAGNESIUM: CPT

## 2019-12-21 PROCEDURE — 2500000003 HC RX 250 WO HCPCS: Performed by: SURGERY

## 2019-12-21 PROCEDURE — APPNB30 APP NON BILLABLE TIME 0-30 MINS: Performed by: PHYSICIAN ASSISTANT

## 2019-12-21 PROCEDURE — 2580000003 HC RX 258: Performed by: SURGERY

## 2019-12-21 PROCEDURE — 83605 ASSAY OF LACTIC ACID: CPT

## 2019-12-21 PROCEDURE — 84484 ASSAY OF TROPONIN QUANT: CPT

## 2019-12-21 PROCEDURE — 80076 HEPATIC FUNCTION PANEL: CPT

## 2019-12-21 RX ORDER — SODIUM CHLORIDE 9 MG/ML
500 INJECTION, SOLUTION INTRAVENOUS ONCE
Status: COMPLETED | OUTPATIENT
Start: 2019-12-21 | End: 2019-12-21

## 2019-12-21 RX ADMIN — SODIUM CHLORIDE, PRESERVATIVE FREE 10 ML: 5 INJECTION INTRAVENOUS at 21:00

## 2019-12-21 RX ADMIN — METRONIDAZOLE 500 MG: 500 INJECTION, SOLUTION INTRAVENOUS at 06:01

## 2019-12-21 RX ADMIN — INSULIN LISPRO 1 UNITS: 100 INJECTION, SOLUTION INTRAVENOUS; SUBCUTANEOUS at 05:30

## 2019-12-21 RX ADMIN — METRONIDAZOLE 500 MG: 500 INJECTION, SOLUTION INTRAVENOUS at 21:57

## 2019-12-21 RX ADMIN — CEFEPIME HYDROCHLORIDE 2 G: 2 INJECTION, POWDER, FOR SOLUTION INTRAVENOUS at 08:41

## 2019-12-21 RX ADMIN — Medication 50 MCG/HR: at 04:58

## 2019-12-21 RX ADMIN — SODIUM CHLORIDE 500 ML: 9 INJECTION, SOLUTION INTRAVENOUS at 14:59

## 2019-12-21 RX ADMIN — INSULIN LISPRO 1 UNITS: 100 INJECTION, SOLUTION INTRAVENOUS; SUBCUTANEOUS at 08:42

## 2019-12-21 RX ADMIN — MUPIROCIN: 20 OINTMENT TOPICAL at 09:05

## 2019-12-21 RX ADMIN — ALBUMIN (HUMAN) 12.5 G: 12.5 INJECTION, SOLUTION INTRAVENOUS at 03:10

## 2019-12-21 RX ADMIN — Medication 10 ML: at 08:48

## 2019-12-21 RX ADMIN — POTASSIUM PHOSPHATE, MONOBASIC AND POTASSIUM PHOSPHATE, DIBASIC 30 MMOL: 224; 236 INJECTION, SOLUTION, CONCENTRATE INTRAVENOUS at 12:51

## 2019-12-21 RX ADMIN — PANTOPRAZOLE SODIUM 40 MG: 40 INJECTION, POWDER, FOR SOLUTION INTRAVENOUS at 08:42

## 2019-12-21 RX ADMIN — SODIUM BICARBONATE: 84 INJECTION, SOLUTION INTRAVENOUS at 09:40

## 2019-12-21 RX ADMIN — VASOPRESSIN 0.04 UNITS/MIN: 20 INJECTION INTRAVENOUS at 12:51

## 2019-12-21 RX ADMIN — CEFEPIME HYDROCHLORIDE 2 G: 2 INJECTION, POWDER, FOR SOLUTION INTRAVENOUS at 21:00

## 2019-12-21 RX ADMIN — CHLORHEXIDINE GLUCONATE 15 ML: 1.2 RINSE ORAL at 21:00

## 2019-12-21 RX ADMIN — ALBUMIN (HUMAN) 12.5 G: 12.5 INJECTION, SOLUTION INTRAVENOUS at 04:18

## 2019-12-21 RX ADMIN — Medication 50 MCG/HR: at 14:58

## 2019-12-21 RX ADMIN — VASOPRESSIN 0.04 UNITS/MIN: 20 INJECTION INTRAVENOUS at 21:03

## 2019-12-21 RX ADMIN — Medication 25 MCG/MIN: at 04:21

## 2019-12-21 RX ADMIN — SODIUM CHLORIDE, PRESERVATIVE FREE 10 ML: 5 INJECTION INTRAVENOUS at 09:00

## 2019-12-21 RX ADMIN — METRONIDAZOLE 500 MG: 500 INJECTION, SOLUTION INTRAVENOUS at 14:08

## 2019-12-21 RX ADMIN — CHLORHEXIDINE GLUCONATE 15 ML: 1.2 RINSE ORAL at 08:48

## 2019-12-21 RX ADMIN — VASOPRESSIN 0.04 UNITS/MIN: 20 INJECTION INTRAVENOUS at 05:04

## 2019-12-21 RX ADMIN — PROPOFOL 10 MCG/KG/MIN: 10 INJECTION, EMULSION INTRAVENOUS at 18:31

## 2019-12-21 RX ADMIN — MUPIROCIN: 20 OINTMENT TOPICAL at 21:00

## 2019-12-21 RX ADMIN — INSULIN LISPRO 1 UNITS: 100 INJECTION, SOLUTION INTRAVENOUS; SUBCUTANEOUS at 00:54

## 2019-12-21 ASSESSMENT — PULMONARY FUNCTION TESTS
PIF_VALUE: 23
PIF_VALUE: 25
PIF_VALUE: 22
PIF_VALUE: 26
PIF_VALUE: 26
PIF_VALUE: 23
PIF_VALUE: 25
PIF_VALUE: 25
PIF_VALUE: 24
PIF_VALUE: 22
PIF_VALUE: 25
PIF_VALUE: 21
PIF_VALUE: 24
PIF_VALUE: 24
PIF_VALUE: 23
PIF_VALUE: 25
PIF_VALUE: 24
PIF_VALUE: 23
PIF_VALUE: 28
PIF_VALUE: 29
PIF_VALUE: 24
PIF_VALUE: 26
PIF_VALUE: 21
PIF_VALUE: 23
PIF_VALUE: 23
PIF_VALUE: 25
PIF_VALUE: 23

## 2019-12-21 ASSESSMENT — PAIN SCALES - GENERAL
PAINLEVEL_OUTOF10: 0

## 2019-12-21 NOTE — PROGRESS NOTES
Pulmonary Progress Note    Date of Admission: 12/18/2019   LOS: 3 days       CC:  Shock    Subjective:  Improving overnight. Levophed decreased. Epinephrine off. Following commands. ROS:  Unable to assess        PHYSICAL EXAM:    Blood pressure (!) 120/50, pulse 84, temperature 99.1 °F (37.3 °C), temperature source Oral, resp. rate 19, height 5' 6\" (1.676 m), weight 159 lb 9.8 oz (72.4 kg), SpO2 100 %.'  Gen: No distress. Eyes: PERRL. No sclera icterus. No conjunctival injection. ENT: No discharge. Pharynx clear. External appearance of ears and nose normal. ett  Neck: Trachea midline. No obvious mass. Resp: No accessory muscle use. Few crackles. No wheezes. No rhonchi. On vent. CV: tachy rate. Regular rhythm. No murmur or rub. No edema. GI: Non-tender. + distended. No hernia. Staples. Skin: Warm, dry, normal texture and turgor. No nodule on exposed extremities. Lymph: No cervical LAD. No supraclavicular LAD. M/S: No cyanosis. No clubbing. No joint deformity. Neuro:  Psych: Awake. Follows some commands. On sedation.       Medications:    Scheduled Meds:   potassium phosphate IVPB  30 mmol Intravenous Once    sodium chloride  1,000 mL Intravenous Once    albumin human  12.5 g Intravenous Q8H    And    albumin human  12.5 g Intravenous Q8H    pantoprazole  40 mg Intravenous Daily    And    sodium chloride (PF)  10 mL Intravenous Daily    cefepime (MAXIPIME) 2 g IVPB minibag  2 g Intravenous Q24H    sodium chloride flush  10 mL Intravenous 2 times per day    metroNIDAZOLE  500 mg Intravenous Q8H    chlorhexidine  15 mL Mouth/Throat BID    mupirocin   Topical BID    insulin lispro  0-6 Units Subcutaneous Q4H       Continuous Infusions:   propofol 15 mcg/kg/min (12/21/19 0845)    vasopressin (Septic Shock) infusion 0.04 Units/min (12/21/19 0844)    norepinephrine 14 mcg/min (12/21/19 1018)    sodium bicarbonate infusion 100 mL/hr at 12/21/19 0940    fentaNYL 50 mcg/hr initial  encounter  TECHNOLOGIST PROVIDED HISTORY:  Reason for exam:->possible pacemaker lead dislodgement  Ordering Physician Provided Reason for Exam: possible pacemaker lead  malfuction  Acuity: Acute  Type of Exam: Initial  Relevant Medical/Surgical History: hx of colon cancer    FINDINGS:  The heart size is within normal limits. The pulmonary vasculature is also  within normal limits. No acute infiltrates are seen. The costophrenic angles  are sharp bilaterally. No pneumothoraces are noted. There is a left subclavian  AICD. Impression 1. No active pulmonary disease. CXR portable:   Results for orders placed during the hospital encounter of 12/18/19   XR CHEST PORTABLE    Narrative EXAMINATION:  ONE XRAY VIEW OF THE CHEST    12/20/2019 12:00 pm    COMPARISON:  Frontal view of the chest 12/20/2019 at 4:30 a.m., 12/19/2019, CT thorax  12/18/2019. HISTORY:  ORDERING SYSTEM PROVIDED HISTORY: ETT advanced  TECHNOLOGIST PROVIDED HISTORY:  Reason for exam:->ETT advanced  Reason for Exam: ETT advanced  Acuity: Acute  Type of Exam: Initial    FINDINGS:  Support devices:    Endotracheal tube again noted with tip terminating approximately 4.3 cm above  the saad in good position. Enteric tube again seen with tip and side holes within the proximal stomach,  recommend advancing by 3 cm. Left chest wall battery pack and pacemaker leads are stable. The cardiopericardial silhouette is stable in size and configuration. There are stable bibasilar opacities. There is no pneumothorax. Surgical  staples partially imaged in the upper abdomen. Impression 1. Tip of endotracheal tube terminates approximately 4.4 cm above the saad  in good position. 2. Recommend advancing enteric tube by 3 cm. 3. Stable bibasilar opacities either reflecting atelectasis or an  infectious/inflammatory process.            Assessment:      Aspiration pneumonia   Small bowel obstruction status post ex lap 12/18/2019 with reduction of closed-loop and lysis of adhesions  History of Down syndrome, seizure disorder, colon cancer  Acute kidney injury  Lactic acidosis  Shock        Plan:      Mechanical Ventilation  - AC , 12   -Wean FiO2 to 40%. PEEP decreased to 8.  -Likely able to complete spontaneous breathing trial tomorrow.     Recent Labs     12/20/19  1457 12/21/19  0540   PHART 7.370 7.497*   ZEM1OSC 46.0* 39.4   PO2ART 119.0* 191.0*         Sedation  -  fentanyl   -  propofol      Shock  -Significantly improving  -Levophed 14 vasopressin 0.04        Acid-base  -Stop bicarb drip     Acute kidney injury  -Significantly improved. Creatinine 1.5. Improved. Urine output.  -10 L positive. Hold IV fluids.        Aspiration pneumonia  -Flagyl and cefepime            Prophylaxis  - GI - pepcid  - DVT -scd  - VAP - peridex  - Nasal Decolonization - Bactroban    Nutrition  - Diet NPO Effective Now    Intake/Output Summary (Last 24 hours) at 12/21/2019 1050  Last data filed at 12/21/2019 1018  Gross per 24 hour   Intake 8489 ml   Output 2480 ml   Net 6009 ml          Access  Arterial   Arterial Line 12/19/19 Left Femoral (Active)   Continued need for line? Yes 12/21/2019  6:00 AM   Site Assessment Clean;Dry; Intact 12/21/2019  6:00 AM   Line Status Arterial fluids per protocol; Intact and in place 12/21/2019  6:00 AM   Art Line Waveform Appropriate 12/21/2019  6:00 AM   Art Line Interventions Zeroed and calibrated; Leveled; Flushed per protocol 12/20/2019 12:00 AM   Color/Movement/Sensation Capillary refill less than 3 sec 12/21/2019  6:00 AM   Dressing Status Clean;Dry; Intact 12/21/2019  6:00 AM   Dressing Type Anti-microbial patch;Transparent 12/21/2019  6:00 AM   Dressing Change Due 12/26/19 12/21/2019  6:00 AM   Number of days: 1       PICC          CVC       CVC Triple Lumen 12/18/19 Right Femoral (Active)   Continued need for line? Yes 12/21/2019  6:00 AM   Site Assessment Clean;Dry; Intact 12/21/2019  6:00 AM   Proximal Lumen

## 2019-12-21 NOTE — PROGRESS NOTES
[facility] [phone]                                     Daily Progress Note                                                         Pt Name: Mary León  Medical Record Number: [id number]  Date of Birth 1960   Today's Date: 12/21/2019  Chief Complaint   Patient presents with    Shortness of Breath     onset - 1530 today    Altered Mental Status     pt lethargic, unsteady        ASSESSMENT/PLAN  Small bowel obstruction, hypovolemic shock  -12/18 exploratory laparotomy with RITESH, reduction of closed loop SBO  -Intubated, continue NG.  -On pressors, Bicarb drip   -LA up to 4 at 1307 12/19, down to 3.3 at recheck 1630.  -Cr trending down, Hypokalemia  -On propofol and fentanyl. Did open eyes to stimulus.    -continue antibiotics    -12/18 Right femoral TLC  -Appreciate ICU assistance with care  -Midline incision site with two open areas. Dressing and packing changed. Serous drainage on bandage     Discharge Planning: continue ICU care      Asa Bottoms has slightly improved from yesterday. He is intubated, answering yes no questions appropriately. Pain is well controlled. He has no nausea and no vomiting. He has not passed flatus and has not had a bowel movement. He is tolerating NPO. OBJECTIVE  VITALS:  height is 5' 6\" (1.676 m) and weight is 159 lb 9.8 oz (72.4 kg). His oral temperature is 99.5 °F (37.5 °C). His blood pressure is 120/50 (abnormal) and his pulse is 85. His respiration is 18 and oxygen saturation is 100%. INTAKE/OUTPUT:      Intake/Output Summary (Last 24 hours) at 12/21/2019 0735  Last data filed at 12/21/2019 4230  Gross per 24 hour   Intake 6790 ml   Output 1725 ml   Net 5065 ml     GENERAL: alert, no distress, intubated, answering yes no questions and following commands.   LUNGS: intubated  HEART: normal rate and regular rhythm  ABDOMEN: soft, appropriately-tender, non-distended and bowel sounds present in all 4 quadrants. Dressing intact with no drainage. EXTREMITY: no cyanosis, clubbing or edema    I/O last 3 completed shifts: In: 6790 [I.V.:5980; NG/GT:90; IV Piggyback:720]  Out: 1101 [Urine:1425; Emesis/NG output:300]      LABS  Recent Labs     12/18/19  1703  12/19/19  1633  12/21/19  0540   WBC 15.5*   < >  --    < > 8.9   HGB 17.0   < >  --    < > 9.4*   HCT 51.6   < >  --    < > 26.9*      < >  --    < > 84*   *   < >  --    < > 133*   K 4.7   < >  --    < > 3.3*   CL 90*   < >  --    < > 94*   CO2 17*   < >  --    < > 26   BUN 42*   < >  --    < > 26*   CREATININE 3.1*   < >  --    < > 1.5*   MG  --    < >  --    < > 2.20   PHOS  --    < >  --    < > 1.4*   CALCIUM 10.6   < >  --    < > 7.4*   INR 1.15*  --   --   --   --    AST 81*  --   --    < > 119*   ALT 93*  --   --    < > 206*   BILITOT 1.2*  --   --    < > 1.2*   BILIDIR 0.3  --   --    < > 0.6*   NITRU  --    < > Negative  --   --    COLORU  --    < > DK YELLOW  --   --     < > = values in this interval not displayed. EDUCATION  Patient educated about Disease Process, Medications, Smoking Cessation, Oxygenation, Incentive Spirometry and Deep Breath and Cough, Diabetes, Hyperlipidemia, Smoking Cessation, Nutrition, Exercise and Hypertension    Electronically signed by EDGAR Goncalves on 12/21/2019 at 7:35 AM      [facility]   [phone] Office  [phone] Cell       Surgery Staff  I have examined this patient and read and agree with the note by Katarzyna Soriano PA-C from today. Slowly improving clinically. Epi off. WBC and Cr trending down  Continue supportive care.   NG to remain until return of GI function  Electronically signed by Juhi Zhao MD on 12/21/2019 at 11:27 AM

## 2019-12-21 NOTE — PROGRESS NOTES
Pulmonary Progress Note    Date of Admission: 12/18/2019   LOS: 3 days       CC:  Shock    Subjective:    On ventilator. ROS:  Unable to assess        PHYSICAL EXAM:    Blood pressure (!) 120/50, pulse 84, temperature 99.1 °F (37.3 °C), temperature source Oral, resp. rate 19, height 5' 6\" (1.676 m), weight 159 lb 9.8 oz (72.4 kg), SpO2 100 %.'  Gen: No acute distress  Eyes: PERRL. No sclera icterus. No conjunctival injection. ENT: No discharge. Pharynx clear. External appearance of ears and nose normal. ett  Neck: Trachea midline. No obvious mass. Resp: No accessory muscle use. Positive crackles. No wheezes. No rhonchi. On vent. CV: tachy rate. Regular rhythm. No murmur or rub. No edema. GI: No tender. Slight distended. No hernia. Staples. Skin: Warm, dry, normal texture and turgor. No nodule on exposed extremities. Lymph: No cervical LAD. No supraclavicular LAD. M/S: No cyanosis. No clubbing. No joint deformity. Neuro:  Psych: Awake on ventilator.       Medications:    Scheduled Meds:   sodium chloride  1,000 mL Intravenous Once    albumin human  12.5 g Intravenous Q8H    And    albumin human  12.5 g Intravenous Q8H    pantoprazole  40 mg Intravenous Daily    And    sodium chloride (PF)  10 mL Intravenous Daily    cefepime (MAXIPIME) 2 g IVPB minibag  2 g Intravenous Q24H    sodium chloride flush  10 mL Intravenous 2 times per day    metroNIDAZOLE  500 mg Intravenous Q8H    chlorhexidine  15 mL Mouth/Throat BID    mupirocin   Topical BID    insulin lispro  0-6 Units Subcutaneous Q4H       Continuous Infusions:   propofol 15 mcg/kg/min (12/21/19 0845)    vasopressin (Septic Shock) infusion 0.04 Units/min (12/21/19 0504)    norepinephrine 14 mcg/min (12/21/19 1018)    sodium bicarbonate infusion 100 mL/hr at 12/21/19 0940    fentaNYL 50 mcg/hr (12/21/19 0458)    dextrose      EPINEPHrine infusion Stopped (12/21/19 0008)       PRN Meds:  sodium chloride flush, ondansetron, acetaminophen, fentanNYL, glucose, dextrose, glucagon (rDNA), dextrose, dextran 70-hypromellose    Labs reviewed:  CBC:   Recent Labs     12/19/19  0546 12/20/19  0509 12/21/19  0540   WBC 11.8* 12.4* 8.9   HGB 15.3 12.6* 9.4*   HCT 45.5 37.4* 26.9*   .0* 103.8* 101.6*    125* 84*     BMP:   Recent Labs     12/19/19  0526 12/19/19  0546 12/20/19  0509 12/21/19  0540   NA  --  137 134* 133*   K  --  5.5* 4.6 3.3*   CL  --  103 99 94*   CO2  --  18* 23 26   PHOS 3.2  --  3.6 1.4*   BUN  --  41* 41* 26*   CREATININE  --  2.4* 2.3* 1.5*     LIVER PROFILE:   Recent Labs     12/18/19  1703 12/20/19  0509 12/21/19  0540   AST 81* 212* 119*   ALT 93* 333* 206*   LIPASE 9.0*  --   --    BILIDIR 0.3 0.3 0.6*   BILITOT 1.2* 0.5 1.2*   ALKPHOS 66 41 39*     PT/INR:   Recent Labs     12/18/19  1703   PROTIME 13.4*   INR 1.15*     APTT:   Recent Labs     12/18/19  1703   APTT 24.8     UA:  Recent Labs     12/19/19  1633   COLORU DK YELLOW   PHUR 5.0   WBCUA 2   RBCUA 20-50*   CLARITYU Clear   SPECGRAV 1.023   LEUKOCYTESUR Negative   UROBILINOGEN 0.2   BILIRUBINUR Negative   BLOODU MODERATE*   GLUCOSEU Negative     No results for input(s): PH, PCO2, PO2 in the last 72 hours. Cx:      Films:  Radiology Review:  Pertinent images / reports were reviewed as a part of this visit. CT Chest w/ contrast: No results found for this or any previous visit. CT Chest w/o contrast: No results found for this or any previous visit. CTPA: No results found for this or any previous visit. CXR PA/LAT:   Results for orders placed during the hospital encounter of 01/24/19   XR CHEST STANDARD (2 VW)    Narrative EXAMINATION:  TWO VIEWS OF THE CHEST    1/24/2019 9:42 am    COMPARISON:  01/12/2019.     HISTORY:  ORDERING SYSTEM PROVIDED HISTORY: Pacemaker lead malfunction, initial  encounter  TECHNOLOGIST PROVIDED HISTORY:  Reason for exam:->possible pacemaker lead dislodgement  Ordering Physician Provided Reason for Exam:

## 2019-12-21 NOTE — PROGRESS NOTES
4 Eyes Skin Assessment     The patient is being assess for  Shift Handoff    I agree that 2 RN's have performed a thorough Head to Toe Skin Assessment on the patient. ALL assessment sites listed below have been assessed. Areas assessed by both nurses:yes  [x]   Head, Face, and Ears   [x]   Shoulders, Back, and Chest  [x]   Arms, Elbows, and Hands   [x]   Coccyx, Sacrum, and IschIum  [x]   Legs, Feet, and Heels        Does the Patient have Skin Breakdown?   Yes LDA WOUND CARE was Initiated documentation include the Debra-wound, Wound Assessment, Measurements, Dressing Treatment, Drainage, and Color\",         Stuart Prevention initiated:  Yes   Wound Care Orders initiated:  No      WOC nurse consulted for Pressure Injury (Stage 3,4, Unstageable, DTI, NWPT, and Complex wounds), New and Established Ostomies:  No      Nurse 1 eSignature: Electronically signed by Rusty Cevallos RN on 12/21/19 at 7:49 AM    **SHARE this note so that the co-signing nurse is able to place an eSignature**    Nurse 2 eSignature: Electronically signed by Leslye Renteria RN on 12/21/19 at 8:16 AM

## 2019-12-21 NOTE — PROGRESS NOTES
[facility]                                                                        [address] [address]                                                                 Laura Bynum                                                         Phone Number: [phone]                                                           Fax Number: [phone]                                                             Nephrology Progress Note    Chief Complaint: Diarrhea for the last few days, abdominal distention, one episode of vomiting    History of Present Illness: We are following this patient for ANISH, Hyperkalemia and acidosis. The patient was admitted with SBO - Taken to OR on 19 for Ex Lap and RITESH. Today he is doing better. Pressor requirement is decreasing. ROS: Limited d/t pt factors. Family at bedside    Subjective:      Scheduled Meds:   potassium phosphate IVPB  30 mmol Intravenous Once    pantoprazole  40 mg Intravenous Daily    And    sodium chloride (PF)  10 mL Intravenous Daily    cefepime (MAXIPIME) 2 g IVPB minibag  2 g Intravenous Q24H    sodium chloride flush  10 mL Intravenous 2 times per day    metroNIDAZOLE  500 mg Intravenous Q8H    chlorhexidine  15 mL Mouth/Throat BID    mupirocin   Topical BID    insulin lispro  0-6 Units Subcutaneous Q4H        propofol 15 mcg/kg/min (19 0845)    vasopressin (Septic Shock) infusion 0.04 Units/min (19 0504)    norepinephrine 14 mcg/min (19 1018)    fentaNYL 50 mcg/hr (19 7688)    dextrose         PRN Meds:.sodium chloride flush, ondansetron, acetaminophen, fentanNYL, glucose, dextrose, glucagon (rDNA), dextrose, dextran 70-hypromellose    Physical Exam:    TEMPERATURE:  Current - Temp: 99.1 °F (37.3 °C);  Max - Temp  Av.6 °F (37.6 °C)  Min: 99.1 °F (37.3 °C)  Max: 100.1 °F (37.8

## 2019-12-21 NOTE — PLAN OF CARE
Problem: Restraint Use - Nonviolent/Non-Self-Destructive Behavior:  Goal: Absence of restraint indications  Description  Absence of restraint indications  Outcome: Ongoing  Note:   Upper extremity soft wrist restraints intact. No S/S of restraint related injury . ROM performed Q2HR.        Problem: Nutrition  Goal: Optimal nutrition therapy  Outcome: Ongoing

## 2019-12-22 ENCOUNTER — APPOINTMENT (OUTPATIENT)
Dept: GENERAL RADIOLOGY | Age: 59
DRG: 853 | End: 2019-12-22
Payer: COMMERCIAL

## 2019-12-22 LAB
ALBUMIN SERPL-MCNC: 2.3 G/DL (ref 3.4–5)
ALP BLD-CCNC: 49 U/L (ref 40–129)
ALT SERPL-CCNC: 137 U/L (ref 10–40)
ANION GAP SERPL CALCULATED.3IONS-SCNC: 13 MMOL/L (ref 3–16)
ANISOCYTOSIS: ABNORMAL
AST SERPL-CCNC: 77 U/L (ref 15–37)
ATYPICAL LYMPHOCYTE RELATIVE PERCENT: 2 % (ref 0–6)
BANDED NEUTROPHILS RELATIVE PERCENT: 28 % (ref 0–7)
BASE EXCESS ARTERIAL: 7.4 MMOL/L (ref -3–3)
BASOPHILS ABSOLUTE: 0.1 K/UL (ref 0–0.2)
BASOPHILS RELATIVE PERCENT: 1 %
BILIRUB SERPL-MCNC: 1.3 MG/DL (ref 0–1)
BILIRUBIN DIRECT: 0.8 MG/DL (ref 0–0.3)
BILIRUBIN, INDIRECT: 0.5 MG/DL (ref 0–1)
BLOOD CULTURE, ROUTINE: NORMAL
BUN BLDV-MCNC: 19 MG/DL (ref 7–20)
CALCIUM SERPL-MCNC: 7.4 MG/DL (ref 8.3–10.6)
CARBOXYHEMOGLOBIN ARTERIAL: 1.6 % (ref 0–1.5)
CHLORIDE BLD-SCNC: 92 MMOL/L (ref 99–110)
CO2: 28 MMOL/L (ref 21–32)
CREAT SERPL-MCNC: 1.1 MG/DL (ref 0.9–1.3)
CULTURE, BLOOD 2: NORMAL
DOHLE BODIES: PRESENT
EOSINOPHILS ABSOLUTE: 0 K/UL (ref 0–0.6)
EOSINOPHILS RELATIVE PERCENT: 0 %
GFR AFRICAN AMERICAN: >60
GFR NON-AFRICAN AMERICAN: >60
GLUCOSE BLD-MCNC: 100 MG/DL (ref 70–99)
GLUCOSE BLD-MCNC: 77 MG/DL (ref 70–99)
GLUCOSE BLD-MCNC: 80 MG/DL (ref 70–99)
GLUCOSE BLD-MCNC: 82 MG/DL (ref 70–99)
GLUCOSE BLD-MCNC: 84 MG/DL (ref 70–99)
GLUCOSE BLD-MCNC: 84 MG/DL (ref 70–99)
GLUCOSE BLD-MCNC: 96 MG/DL (ref 70–99)
HCO3 ARTERIAL: 32.3 MMOL/L (ref 21–29)
HCT VFR BLD CALC: 30 % (ref 40.5–52.5)
HEMOGLOBIN, ART, EXTENDED: 10.2 G/DL (ref 13.5–17.5)
HEMOGLOBIN: 10.3 G/DL (ref 13.5–17.5)
HYPOCHROMIA: ABNORMAL
LYMPHOCYTES ABSOLUTE: 0.8 K/UL (ref 1–5.1)
LYMPHOCYTES RELATIVE PERCENT: 5 %
MAGNESIUM: 2 MG/DL (ref 1.8–2.4)
MCH RBC QN AUTO: 34.9 PG (ref 26–34)
MCHC RBC AUTO-ENTMCNC: 34.4 G/DL (ref 31–36)
MCV RBC AUTO: 101.6 FL (ref 80–100)
METHEMOGLOBIN ARTERIAL: 1.1 %
MONOCYTES ABSOLUTE: 1.3 K/UL (ref 0–1.3)
MONOCYTES RELATIVE PERCENT: 12 %
NEUTROPHILS ABSOLUTE: 8.7 K/UL (ref 1.7–7.7)
NEUTROPHILS RELATIVE PERCENT: 52 %
O2 CONTENT ARTERIAL: 13 ML/DL
O2 SAT, ARTERIAL: 91.9 %
O2 THERAPY: ABNORMAL
PCO2 ARTERIAL: 49.7 MMHG (ref 35–45)
PDW BLD-RTO: 13.9 % (ref 12.4–15.4)
PERFORMED ON: ABNORMAL
PERFORMED ON: NORMAL
PH ARTERIAL: 7.43 (ref 7.35–7.45)
PHOSPHORUS: 2.6 MG/DL (ref 2.5–4.9)
PLATELET # BLD: 85 K/UL (ref 135–450)
PLATELET SLIDE REVIEW: ABNORMAL
PMV BLD AUTO: 9.7 FL (ref 5–10.5)
PO2 ARTERIAL: 57.7 MMHG (ref 75–108)
POLYCHROMASIA: ABNORMAL
POTASSIUM SERPL-SCNC: 3.6 MMOL/L (ref 3.5–5.1)
RBC # BLD: 2.95 M/UL (ref 4.2–5.9)
SLIDE REVIEW: ABNORMAL
SODIUM BLD-SCNC: 133 MMOL/L (ref 136–145)
TCO2 ARTERIAL: 33.9 MMOL/L
TOTAL PROTEIN: 4.6 G/DL (ref 6.4–8.2)
TOXIC GRANULATION: PRESENT
TROPONIN: 0.01 NG/ML
VACUOLATED NEUTROPHILS: PRESENT
WBC # BLD: 10.9 K/UL (ref 4–11)

## 2019-12-22 PROCEDURE — 2580000003 HC RX 258: Performed by: NURSE PRACTITIONER

## 2019-12-22 PROCEDURE — 83735 ASSAY OF MAGNESIUM: CPT

## 2019-12-22 PROCEDURE — 99024 POSTOP FOLLOW-UP VISIT: CPT | Performed by: SURGERY

## 2019-12-22 PROCEDURE — 84484 ASSAY OF TROPONIN QUANT: CPT

## 2019-12-22 PROCEDURE — 2500000003 HC RX 250 WO HCPCS: Performed by: SURGERY

## 2019-12-22 PROCEDURE — 6360000002 HC RX W HCPCS: Performed by: NURSE PRACTITIONER

## 2019-12-22 PROCEDURE — 2000000000 HC ICU R&B

## 2019-12-22 PROCEDURE — 80076 HEPATIC FUNCTION PANEL: CPT

## 2019-12-22 PROCEDURE — 94003 VENT MGMT INPAT SUBQ DAY: CPT

## 2019-12-22 PROCEDURE — 85025 COMPLETE CBC W/AUTO DIFF WBC: CPT

## 2019-12-22 PROCEDURE — 6370000000 HC RX 637 (ALT 250 FOR IP): Performed by: INTERNAL MEDICINE

## 2019-12-22 PROCEDURE — 94761 N-INVAS EAR/PLS OXIMETRY MLT: CPT

## 2019-12-22 PROCEDURE — 2580000003 HC RX 258: Performed by: INTERNAL MEDICINE

## 2019-12-22 PROCEDURE — 6360000002 HC RX W HCPCS: Performed by: INTERNAL MEDICINE

## 2019-12-22 PROCEDURE — 82803 BLOOD GASES ANY COMBINATION: CPT

## 2019-12-22 PROCEDURE — C9113 INJ PANTOPRAZOLE SODIUM, VIA: HCPCS | Performed by: NURSE PRACTITIONER

## 2019-12-22 PROCEDURE — 94750 HC PULMONARY COMPLIANCE STUDY: CPT

## 2019-12-22 PROCEDURE — 71045 X-RAY EXAM CHEST 1 VIEW: CPT

## 2019-12-22 PROCEDURE — 2700000000 HC OXYGEN THERAPY PER DAY

## 2019-12-22 PROCEDURE — 80069 RENAL FUNCTION PANEL: CPT

## 2019-12-22 PROCEDURE — 99291 CRITICAL CARE FIRST HOUR: CPT | Performed by: INTERNAL MEDICINE

## 2019-12-22 PROCEDURE — 2580000003 HC RX 258: Performed by: SURGERY

## 2019-12-22 PROCEDURE — 2500000003 HC RX 250 WO HCPCS: Performed by: NURSE PRACTITIONER

## 2019-12-22 RX ORDER — FUROSEMIDE 10 MG/ML
40 INJECTION INTRAMUSCULAR; INTRAVENOUS 2 TIMES DAILY
Status: DISCONTINUED | OUTPATIENT
Start: 2019-12-22 | End: 2019-12-24

## 2019-12-22 RX ADMIN — SODIUM CHLORIDE, PRESERVATIVE FREE 10 ML: 5 INJECTION INTRAVENOUS at 08:25

## 2019-12-22 RX ADMIN — Medication 50 MCG/HR: at 01:19

## 2019-12-22 RX ADMIN — CEFEPIME HYDROCHLORIDE 2 G: 2 INJECTION, POWDER, FOR SOLUTION INTRAVENOUS at 21:01

## 2019-12-22 RX ADMIN — SODIUM CHLORIDE, PRESERVATIVE FREE 10 ML: 5 INJECTION INTRAVENOUS at 21:01

## 2019-12-22 RX ADMIN — METRONIDAZOLE 500 MG: 500 INJECTION, SOLUTION INTRAVENOUS at 05:35

## 2019-12-22 RX ADMIN — CHLORHEXIDINE GLUCONATE 15 ML: 1.2 RINSE ORAL at 21:02

## 2019-12-22 RX ADMIN — PROPOFOL 10 MCG/KG/MIN: 10 INJECTION, EMULSION INTRAVENOUS at 05:19

## 2019-12-22 RX ADMIN — VASOPRESSIN 0.04 UNITS/MIN: 20 INJECTION INTRAVENOUS at 06:12

## 2019-12-22 RX ADMIN — METRONIDAZOLE 500 MG: 500 INJECTION, SOLUTION INTRAVENOUS at 21:42

## 2019-12-22 RX ADMIN — MUPIROCIN: 20 OINTMENT TOPICAL at 08:26

## 2019-12-22 RX ADMIN — FUROSEMIDE 40 MG: 10 INJECTION, SOLUTION INTRAMUSCULAR; INTRAVENOUS at 17:26

## 2019-12-22 RX ADMIN — PANTOPRAZOLE SODIUM 40 MG: 40 INJECTION, POWDER, FOR SOLUTION INTRAVENOUS at 08:19

## 2019-12-22 RX ADMIN — FUROSEMIDE 40 MG: 10 INJECTION, SOLUTION INTRAMUSCULAR; INTRAVENOUS at 10:21

## 2019-12-22 RX ADMIN — VASOPRESSIN 0.04 UNITS/MIN: 20 INJECTION INTRAVENOUS at 23:58

## 2019-12-22 RX ADMIN — PROPOFOL 10 MCG/KG/MIN: 10 INJECTION, EMULSION INTRAVENOUS at 15:32

## 2019-12-22 RX ADMIN — METRONIDAZOLE 500 MG: 500 INJECTION, SOLUTION INTRAVENOUS at 13:12

## 2019-12-22 RX ADMIN — CEFEPIME HYDROCHLORIDE 2 G: 2 INJECTION, POWDER, FOR SOLUTION INTRAVENOUS at 08:19

## 2019-12-22 RX ADMIN — CHLORHEXIDINE GLUCONATE 15 ML: 1.2 RINSE ORAL at 08:28

## 2019-12-22 RX ADMIN — Medication 10 ML: at 08:25

## 2019-12-22 RX ADMIN — VASOPRESSIN 0.04 UNITS/MIN: 20 INJECTION INTRAVENOUS at 14:58

## 2019-12-22 RX ADMIN — Medication 50 MCG/HR: at 09:00

## 2019-12-22 RX ADMIN — Medication 15 MCG/MIN: at 03:25

## 2019-12-22 RX ADMIN — MUPIROCIN: 20 OINTMENT TOPICAL at 21:02

## 2019-12-22 RX ADMIN — Medication 50 MCG/HR: at 18:56

## 2019-12-22 ASSESSMENT — PULMONARY FUNCTION TESTS
PIF_VALUE: 24
PIF_VALUE: 25
PIF_VALUE: 22
PIF_VALUE: 23
PIF_VALUE: 26
PIF_VALUE: 25
PIF_VALUE: 25
PIF_VALUE: 26
PIF_VALUE: 24
PIF_VALUE: 23
PIF_VALUE: 23
PIF_VALUE: 25
PIF_VALUE: 23
PIF_VALUE: 23
PIF_VALUE: 24
PIF_VALUE: 24
PIF_VALUE: 25
PIF_VALUE: 23
PIF_VALUE: 24
PIF_VALUE: 26
PIF_VALUE: 23
PIF_VALUE: 24
PIF_VALUE: 23
PIF_VALUE: 25
PIF_VALUE: 25
PIF_VALUE: 23
PIF_VALUE: 25
PIF_VALUE: 25
PIF_VALUE: 23
PIF_VALUE: 24
PIF_VALUE: 24
PIF_VALUE: 26
PIF_VALUE: 23
PIF_VALUE: 26
PIF_VALUE: 23
PIF_VALUE: 24
PIF_VALUE: 24
PIF_VALUE: 23
PIF_VALUE: 22
PIF_VALUE: 26
PIF_VALUE: 25
PIF_VALUE: 23
PIF_VALUE: 23
PIF_VALUE: 22
PIF_VALUE: 22
PIF_VALUE: 25
PIF_VALUE: 23
PIF_VALUE: 26
PIF_VALUE: 23
PIF_VALUE: 22
PIF_VALUE: 25
PIF_VALUE: 24
PIF_VALUE: 25
PIF_VALUE: 24
PIF_VALUE: 24
PIF_VALUE: 23
PIF_VALUE: 24
PIF_VALUE: 23
PIF_VALUE: 24
PIF_VALUE: 23
PIF_VALUE: 23
PIF_VALUE: 26

## 2019-12-22 ASSESSMENT — PAIN SCALES - GENERAL
PAINLEVEL_OUTOF10: 0

## 2019-12-22 NOTE — PROGRESS NOTES
Pulmonary Progress Note    Date of Admission: 12/18/2019   LOS: 4 days       CC:  Shock    Subjective:  On vent. ROS:  Unable to assess        PHYSICAL EXAM:    Blood pressure (!) 116/55, pulse 85, temperature 98.9 °F (37.2 °C), temperature source Axillary, resp. rate 12, height 5' 6\" (1.676 m), weight 162 lb 11.2 oz (73.8 kg), SpO2 96 %.'  Gen: No acute distress  Eyes: PERRL. No sclera icterus. No conjunctival injection. ENT: No discharge. Pharynx clear. External appearance of ears and nose normal. ett  Neck: Trachea midline. No obvious mass. Resp: No accessory muscle use. Positive crackles. No wheezes. No rhonchi. On vent. CV: tachy rate. Regular rhythm. No murmur or rub. No edema. GI: No tender. Slight distended. No hernia. Staples. Skin: Warm, dry, normal texture and turgor. No nodule on exposed extremities. Lymph: No cervical LAD. No supraclavicular LAD. M/S: No cyanosis. No clubbing. No joint deformity. Neuro:  Psych: Awake on ventilator.       Medications:    Scheduled Meds:   furosemide  40 mg Intravenous BID    cefepime  2 g Intravenous Q12H    pantoprazole  40 mg Intravenous Daily    And    sodium chloride (PF)  10 mL Intravenous Daily    sodium chloride flush  10 mL Intravenous 2 times per day    metroNIDAZOLE  500 mg Intravenous Q8H    chlorhexidine  15 mL Mouth/Throat BID    mupirocin   Topical BID    insulin lispro  0-6 Units Subcutaneous Q4H       Continuous Infusions:   propofol 10 mcg/kg/min (12/22/19 0519)    vasopressin (Septic Shock) infusion 0.04 Units/min (12/22/19 0612)    norepinephrine 7 mcg/min (12/22/19 0922)    fentaNYL 50 mcg/hr (12/22/19 0900)    dextrose         PRN Meds:  sodium chloride flush, ondansetron, acetaminophen, fentanNYL, glucose, dextrose, glucagon (rDNA), dextrose, dextran 70-hypromellose    Labs reviewed:  CBC:   Recent Labs     12/20/19  0509 12/21/19  0540 12/22/19  0545   WBC 12.4* 8.9 10.9   HGB 12.6* 9.4* 10.3*   HCT 37.4* bilaterally. No pneumothoraces are noted. There is a left subclavian  AICD. Impression 1. No active pulmonary disease. CXR portable:   Results for orders placed during the hospital encounter of 12/18/19   XR CHEST PORTABLE    Narrative EXAMINATION:  ONE XRAY VIEW OF THE CHEST    12/22/2019 5:22 am    COMPARISON:  12/21/2019    HISTORY:  ORDERING SYSTEM PROVIDED HISTORY: intubated  TECHNOLOGIST PROVIDED HISTORY:  Reason for exam:->intubated  Reason for Exam: intubated    FINDINGS:  Support hardware is stable in position. The heart is enlarged with interval  increase in central pulmonary venous congestion. Bilateral perihilar and  lower lobe airspace disease has also increased along with small bilateral  pleural effusions. Bipolar cardiac pacemaker is stable in position. Impression Worsening CHF/pulmonary edema. Assessment:      Aspiration pneumonia   Small bowel obstruction status post ex lap 12/18/2019 with reduction of closed-loop and lysis of adhesions  History of Down syndrome, seizure disorder, colon cancer  Acute kidney injury  Lactic acidosis  Shock        Plan:      Mechanical Ventilation  - AC , 10  - not able to wean PEEP secondary to pulmonary edema    Small bowel obstruction  -Status post operating room 12/18  -Surgery following  -Improving       Sedation  -  fentanyl   -  propofol      Shock  -Normalized lactic acid  -Levophed  and vasopressin 0.04.  -Wean vasopressors to map 65        Acid-base  -Resolved. Bicarb drip stopped.        Acute kidney injury  -Resolved. Currently 10 L positive. Start Lasix.             Prophylaxis  - GI - pepcid  - DVT -scd  - VAP - peridex  - Nasal Decolonization - Bactroban    Nutrition  - Diet NPO Effective Now    Intake/Output Summary (Last 24 hours) at 12/22/2019 1031  Last data filed at 12/22/2019 1018  Gross per 24 hour   Intake 2214.03 ml   Output 2483 ml   Net -268.97 ml          Access  Arterial   Arterial Line 12/19/19 Left Femoral (Active)   Continued need for line? Yes 12/22/2019  8:00 AM   Site Assessment Clean;Dry; Intact 12/22/2019  8:00 AM   Line Status Arterial fluids per protocol; Intact and in place; Blood return noted 12/22/2019  8:00 AM   Art Line Waveform Appropriate;Square wave test performed; Whip 12/22/2019  8:00 AM   Art Line Interventions Zeroed and calibrated; Leveled; Connections checked and tightened;Flushed per protocol 12/22/2019  8:00 AM   Color/Movement/Sensation Capillary refill less than 3 sec 12/22/2019  8:00 AM   Dressing Status Clean;Dry; Intact 12/22/2019  8:00 AM   Dressing Type Transparent; Anti-microbial patch 12/22/2019  8:00 AM   Dressing Change Due 12/26/19 12/22/2019  8:00 AM   Number of days: 2       PICC          CVC       CVC Triple Lumen 12/18/19 Right Femoral (Active)   Continued need for line? Yes 12/22/2019  8:00 AM   Site Assessment Clean;Dry; Intact 12/22/2019  8:00 AM   Proximal Lumen Status Infusing;Brisk blood return;Flushed 12/22/2019  8:00 AM   Medial Lumen Status Infusing;Brisk blood return;Flushed 12/22/2019  8:00 AM   Distal Lumen Status Infusing;Capped;Flushed 12/22/2019  8:00 AM   Dressing Type Transparent; Anti-microbial patch 12/22/2019  8:00 AM   Dressing Status Clean;Dry; Intact; Changed 12/22/2019  8:00 AM   Dressing Intervention New;Dressing changed 12/22/2019  8:00 AM   Dressing Change Due 12/25/19 12/22/2019  8:00 AM   Number of days: 3            Brother at the bedside. Updated. I spent 31 minutes of critical care time with this patient excluding any procedures. This note was transcribed using [id number] [employer]. Please disregard any translational errors.       [facility] [phone]

## 2019-12-22 NOTE — PROGRESS NOTES
Min: 8  Max: 28  PULSE RANGE: Pulse  Av.8  Min: 75  Max: 93  BLOOD PRESSURE RANGE:  Systolic (22YFV), YDT:063 , Min:84 , WGW:949   ; Diastolic (92KYG), SIW:70, Min:48, Max:55    24HR INTAKE/OUTPUT:      Intake/Output Summary (Last 24 hours) at 2019 1108  Last data filed at 2019 1018  Gross per 24 hour   Intake 2080.7 ml   Output 2483 ml   Net -402.3 ml       Patient Vitals for the past 96 hrs (Last 3 readings):   Weight   19 0100 162 lb 11.2 oz (73.8 kg)   19 0503 159 lb 9.8 oz (72.4 kg)   19 0600 141 lb 1.5 oz (64 kg)       GENERAL:  Sedated, intubated, in ICU. HEENT:  Ears and nose appear externally without deformity. Normocephalic, atraumatic. Mucous membranes are moist.  NECK:  Supple. No thyromegaly. CHEST:  +vent, +rhonchi. CARDIOVASCULAR: RRR no S3.  ABDOMEN:  Surgical dressing intact, no drainage, soft. No organomegaly. EXTREMITIES:  Lower extremities:  Trace lower extremity edema. No extremity lymphadenopathy.  +sacral/dependent edema. SKIN:  Normal texture and turgor. No rash. : deleon in place; no hematuria noted    Allergies:   Allergies   Allergen Reactions    Penicillins      rash          LAB DATA:    CBC:   Lab Results   Component Value Date    WBC 10.9 2019    RBC 2.95 2019    RBC 4.68 2017    HGB 10.3 2019    HCT 30.0 2019    .6 2019    MCH 34.9 2019    MCHC 34.4 2019    RDW 13.9 2019    PLT 85 2019    MPV 9.7 2019     BMP:    Lab Results   Component Value Date     2019    K 3.6 2019    K 5.5 2019    CL 92 2019    CO2 28 2019    BUN 19 2019    CREATININE 1.1 2019    CALCIUM 7.4 2019    GFRAA >60 2019    GFRAA >60 2013    LABGLOM >60 2019    GLUCOSE 84 2019    GLUCOSE 115 2017     Ionized Calcium:  No results found for: IONCA  Magnesium:    Lab Results   Component Value Date    MG 2.00 2019

## 2019-12-22 NOTE — PROGRESS NOTES
4 Eyes Skin Assessment     The patient is being assess for  Shift Handoff    I agree that 2 RN's have performed a thorough Head to Toe Skin Assessment on the patient. ALL assessment sites listed below have been assessed. Areas assessed by both nurses:   [x]   Head, Face, and Ears   [x]   Shoulders, Back, and Chest  [x]   Arms, Elbows, and Hands   [x]   Coccyx, Sacrum, and IschIum  [x]   Legs, Feet, and Heels        Does the Patient have Skin Breakdown?   No         Stuart Prevention initiated:  Yes   Wound Care Orders initiated:  No      [facility] nurse consulted for Pressure Injury (Stage 3,4, Unstageable, DTI, NWPT, and Complex wounds), New and Established Ostomies:  NA      Nurse 1 eSignature: Electronically signed by Hallie Mohs, RN on 12/22/19 at 7:39 AM    **SHARE this note so that the co-signing nurse is able to place an eSignature**    Nurse 2 eSignature: Electronically signed by Margret Hutton RN on 12/22/19 at 9:13 AM

## 2019-12-22 NOTE — PROGRESS NOTES
4 Eyes Skin Assessment     The patient is being assess for  Shift Handoff    I agree that 2 RN's have performed a thorough Head to Toe Skin Assessment on the patient. ALL assessment sites listed below have been assessed. Areas assessed by both nurses: yes  [x]   Head, Face, and Ears   [x]   Shoulders, Back, and Chest  [x]   Arms, Elbows, and Hands   [x]   Coccyx, Sacrum, and IschIum  [x]   Legs, Feet, and Heels        Does the Patient have Skin Breakdown?   Yes LDA WOUND CARE was Initiated documentation include the Debra-wound, Wound Assessment, Measurements, Dressing Treatment, Drainage, and Color\",         Stuart Prevention initiated:  Yes   Wound Care Orders initiated:  Yes      [id number]  nurse consulted for Pressure Injury (Stage 3,4, Unstageable, DTI, NWPT, and Complex wounds), New and Established Ostomies:  No      Nurse 1 eSignature: Electronically signed by Liam Rivers RN on 12/21/19 at 7:49 PM    **SHARE this note so that the co-signing nurse is able to place an eSignature**    Nurse 2 eSignature: Electronically signed by Ryan Giraldo RN on 12/21/19 at 11:05 PM

## 2019-12-22 NOTE — PROGRESS NOTES
CREATININE  --    < > 1.1   MG  --    < > 2.00   PHOS  --    < > 2.6   CALCIUM  --    < > 7.4*   AST  --    < > 77*   ALT  --    < > 137*   BILITOT  --    < > 1.3*   BILIDIR  --    < > 0.8*   NITRU Negative  --   --    COLORU DK YELLOW  --   --     < > = values in this interval not displayed.        Electronically signed by Annika Joseph MD on 12/22/2019 at 9:38 AM

## 2019-12-22 NOTE — PROGRESS NOTES
0800: Assessment complete. Pt remains on vent. Able to follow all commands. Levophed titrated as needed for BP support  0930: Family @ bedside. Update given  1700: Pt awake & alert on vent.  UE restraints remain intact for safety  Electronically signed by Rashida Foster RN on 12/22/2019 at 5:08 PM

## 2019-12-23 ENCOUNTER — APPOINTMENT (OUTPATIENT)
Dept: GENERAL RADIOLOGY | Age: 59
DRG: 853 | End: 2019-12-23
Payer: COMMERCIAL

## 2019-12-23 LAB
ALBUMIN SERPL-MCNC: 2.3 G/DL (ref 3.4–5)
ALP BLD-CCNC: 60 U/L (ref 40–129)
ALT SERPL-CCNC: 102 U/L (ref 10–40)
ANION GAP SERPL CALCULATED.3IONS-SCNC: 16 MMOL/L (ref 3–16)
AST SERPL-CCNC: 66 U/L (ref 15–37)
BASE EXCESS ARTERIAL: 7.3 MMOL/L (ref -3–3)
BASE EXCESS ARTERIAL: 7.9 MMOL/L (ref -3–3)
BASOPHILS ABSOLUTE: 0 K/UL (ref 0–0.2)
BASOPHILS RELATIVE PERCENT: 0.3 %
BILIRUB SERPL-MCNC: 1.4 MG/DL (ref 0–1)
BILIRUBIN DIRECT: 0.9 MG/DL (ref 0–0.3)
BILIRUBIN, INDIRECT: 0.5 MG/DL (ref 0–1)
BUN BLDV-MCNC: 18 MG/DL (ref 7–20)
CALCIUM SERPL-MCNC: 7.4 MG/DL (ref 8.3–10.6)
CARBOXYHEMOGLOBIN ARTERIAL: 1.4 % (ref 0–1.5)
CARBOXYHEMOGLOBIN ARTERIAL: 1.5 % (ref 0–1.5)
CHLORIDE BLD-SCNC: 89 MMOL/L (ref 99–110)
CO2: 29 MMOL/L (ref 21–32)
CREAT SERPL-MCNC: 1 MG/DL (ref 0.9–1.3)
EOSINOPHILS ABSOLUTE: 0.1 K/UL (ref 0–0.6)
EOSINOPHILS RELATIVE PERCENT: 0.4 %
GFR AFRICAN AMERICAN: >60
GFR NON-AFRICAN AMERICAN: >60
GLUCOSE BLD-MCNC: 100 MG/DL (ref 70–99)
GLUCOSE BLD-MCNC: 111 MG/DL (ref 70–99)
GLUCOSE BLD-MCNC: 82 MG/DL (ref 70–99)
GLUCOSE BLD-MCNC: 83 MG/DL (ref 70–99)
GLUCOSE BLD-MCNC: 92 MG/DL (ref 70–99)
GLUCOSE BLD-MCNC: 96 MG/DL (ref 70–99)
GLUCOSE BLD-MCNC: 99 MG/DL (ref 70–99)
HCO3 ARTERIAL: 32.4 MMOL/L (ref 21–29)
HCO3 ARTERIAL: 32.9 MMOL/L (ref 21–29)
HCT VFR BLD CALC: 28.6 % (ref 40.5–52.5)
HEMOGLOBIN, ART, EXTENDED: 10.5 G/DL (ref 13.5–17.5)
HEMOGLOBIN, ART, EXTENDED: 9.9 G/DL (ref 13.5–17.5)
HEMOGLOBIN: 9.9 G/DL (ref 13.5–17.5)
LACTIC ACID: 1.1 MMOL/L (ref 0.4–2)
LYMPHOCYTES ABSOLUTE: 0.5 K/UL (ref 1–5.1)
LYMPHOCYTES RELATIVE PERCENT: 3.7 %
MAGNESIUM: 1.7 MG/DL (ref 1.8–2.4)
MCH RBC QN AUTO: 35.2 PG (ref 26–34)
MCHC RBC AUTO-ENTMCNC: 34.7 G/DL (ref 31–36)
MCV RBC AUTO: 101.3 FL (ref 80–100)
METHEMOGLOBIN ARTERIAL: 0.8 %
METHEMOGLOBIN ARTERIAL: 0.9 %
MONOCYTES ABSOLUTE: 1.1 K/UL (ref 0–1.3)
MONOCYTES RELATIVE PERCENT: 8.1 %
NEUTROPHILS ABSOLUTE: 12.3 K/UL (ref 1.7–7.7)
NEUTROPHILS RELATIVE PERCENT: 87.5 %
O2 CONTENT ARTERIAL: 13 ML/DL
O2 CONTENT ARTERIAL: 14 ML/DL
O2 SAT, ARTERIAL: 96.4 %
O2 SAT, ARTERIAL: 98.9 %
O2 THERAPY: ABNORMAL
O2 THERAPY: ABNORMAL
PCO2 ARTERIAL: 50.6 MMHG (ref 35–45)
PCO2 ARTERIAL: 50.8 MMHG (ref 35–45)
PDW BLD-RTO: 14 % (ref 12.4–15.4)
PERFORMED ON: ABNORMAL
PERFORMED ON: ABNORMAL
PERFORMED ON: NORMAL
PH ARTERIAL: 7.42 (ref 7.35–7.45)
PH ARTERIAL: 7.43 (ref 7.35–7.45)
PHOSPHORUS: 3 MG/DL (ref 2.5–4.9)
PLATELET # BLD: 114 K/UL (ref 135–450)
PMV BLD AUTO: 10.4 FL (ref 5–10.5)
PO2 ARTERIAL: 103 MMHG (ref 75–108)
PO2 ARTERIAL: 76.6 MMHG (ref 75–108)
POTASSIUM SERPL-SCNC: 3 MMOL/L (ref 3.5–5.1)
POTASSIUM SERPL-SCNC: 4 MMOL/L (ref 3.5–5.1)
RBC # BLD: 2.83 M/UL (ref 4.2–5.9)
SODIUM BLD-SCNC: 134 MMOL/L (ref 136–145)
TCO2 ARTERIAL: 34 MMOL/L
TCO2 ARTERIAL: 34.5 MMOL/L
TOTAL PROTEIN: 4.7 G/DL (ref 6.4–8.2)
TROPONIN: <0.01 NG/ML
WBC # BLD: 14 K/UL (ref 4–11)

## 2019-12-23 PROCEDURE — 71045 X-RAY EXAM CHEST 1 VIEW: CPT

## 2019-12-23 PROCEDURE — 83735 ASSAY OF MAGNESIUM: CPT

## 2019-12-23 PROCEDURE — 85025 COMPLETE CBC W/AUTO DIFF WBC: CPT

## 2019-12-23 PROCEDURE — 99232 SBSQ HOSP IP/OBS MODERATE 35: CPT | Performed by: INTERNAL MEDICINE

## 2019-12-23 PROCEDURE — 2580000003 HC RX 258: Performed by: NURSE PRACTITIONER

## 2019-12-23 PROCEDURE — 2000000000 HC ICU R&B

## 2019-12-23 PROCEDURE — 2500000003 HC RX 250 WO HCPCS: Performed by: NURSE PRACTITIONER

## 2019-12-23 PROCEDURE — 2580000003 HC RX 258: Performed by: INTERNAL MEDICINE

## 2019-12-23 PROCEDURE — 83605 ASSAY OF LACTIC ACID: CPT

## 2019-12-23 PROCEDURE — 2580000003 HC RX 258: Performed by: SURGERY

## 2019-12-23 PROCEDURE — 99024 POSTOP FOLLOW-UP VISIT: CPT | Performed by: PHYSICIAN ASSISTANT

## 2019-12-23 PROCEDURE — 2700000000 HC OXYGEN THERAPY PER DAY

## 2019-12-23 PROCEDURE — 82803 BLOOD GASES ANY COMBINATION: CPT

## 2019-12-23 PROCEDURE — 80069 RENAL FUNCTION PANEL: CPT

## 2019-12-23 PROCEDURE — APPNB15 APP NON BILLABLE TIME 0-15 MINS: Performed by: NURSE PRACTITIONER

## 2019-12-23 PROCEDURE — 80076 HEPATIC FUNCTION PANEL: CPT

## 2019-12-23 PROCEDURE — 94760 N-INVAS EAR/PLS OXIMETRY 1: CPT

## 2019-12-23 PROCEDURE — APPSS15 APP SPLIT SHARED TIME 0-15 MINUTES: Performed by: PHYSICIAN ASSISTANT

## 2019-12-23 PROCEDURE — APPNB30 APP NON BILLABLE TIME 0-30 MINS: Performed by: PHYSICIAN ASSISTANT

## 2019-12-23 PROCEDURE — 94003 VENT MGMT INPAT SUBQ DAY: CPT

## 2019-12-23 PROCEDURE — 94750 HC PULMONARY COMPLIANCE STUDY: CPT

## 2019-12-23 PROCEDURE — C9113 INJ PANTOPRAZOLE SODIUM, VIA: HCPCS | Performed by: NURSE PRACTITIONER

## 2019-12-23 PROCEDURE — 2500000003 HC RX 250 WO HCPCS: Performed by: SURGERY

## 2019-12-23 PROCEDURE — 99291 CRITICAL CARE FIRST HOUR: CPT | Performed by: INTERNAL MEDICINE

## 2019-12-23 PROCEDURE — 84484 ASSAY OF TROPONIN QUANT: CPT

## 2019-12-23 PROCEDURE — 2500000003 HC RX 250 WO HCPCS

## 2019-12-23 PROCEDURE — 6360000002 HC RX W HCPCS: Performed by: NURSE PRACTITIONER

## 2019-12-23 PROCEDURE — 37799 UNLISTED PX VASCULAR SURGERY: CPT

## 2019-12-23 PROCEDURE — 84132 ASSAY OF SERUM POTASSIUM: CPT

## 2019-12-23 PROCEDURE — 6370000000 HC RX 637 (ALT 250 FOR IP): Performed by: INTERNAL MEDICINE

## 2019-12-23 PROCEDURE — 99024 POSTOP FOLLOW-UP VISIT: CPT | Performed by: SURGERY

## 2019-12-23 PROCEDURE — 6360000002 HC RX W HCPCS: Performed by: INTERNAL MEDICINE

## 2019-12-23 RX ORDER — POTASSIUM CHLORIDE 29.8 MG/ML
20 INJECTION INTRAVENOUS
Status: COMPLETED | OUTPATIENT
Start: 2019-12-23 | End: 2019-12-23

## 2019-12-23 RX ORDER — MAGNESIUM SULFATE IN WATER 40 MG/ML
2 INJECTION, SOLUTION INTRAVENOUS ONCE
Status: COMPLETED | OUTPATIENT
Start: 2019-12-23 | End: 2019-12-23

## 2019-12-23 RX ADMIN — ENOXAPARIN SODIUM 40 MG: 40 INJECTION SUBCUTANEOUS at 10:42

## 2019-12-23 RX ADMIN — PROPOFOL 10 MCG/KG/MIN: 10 INJECTION, EMULSION INTRAVENOUS at 05:25

## 2019-12-23 RX ADMIN — MAGNESIUM SULFATE HEPTAHYDRATE 2 G: 40 INJECTION, SOLUTION INTRAVENOUS at 09:48

## 2019-12-23 RX ADMIN — MUPIROCIN: 20 OINTMENT TOPICAL at 08:53

## 2019-12-23 RX ADMIN — VASOPRESSIN 0.04 UNITS/MIN: 20 INJECTION INTRAVENOUS at 15:35

## 2019-12-23 RX ADMIN — CEFEPIME HYDROCHLORIDE 2 G: 2 INJECTION, POWDER, FOR SOLUTION INTRAVENOUS at 21:03

## 2019-12-23 RX ADMIN — METRONIDAZOLE 500 MG: 500 INJECTION, SOLUTION INTRAVENOUS at 22:24

## 2019-12-23 RX ADMIN — POTASSIUM CHLORIDE 20 MEQ: 29.8 INJECTION, SOLUTION INTRAVENOUS at 11:03

## 2019-12-23 RX ADMIN — FUROSEMIDE 40 MG: 10 INJECTION, SOLUTION INTRAMUSCULAR; INTRAVENOUS at 17:21

## 2019-12-23 RX ADMIN — PANTOPRAZOLE SODIUM 40 MG: 40 INJECTION, POWDER, FOR SOLUTION INTRAVENOUS at 07:32

## 2019-12-23 RX ADMIN — METRONIDAZOLE 500 MG: 500 INJECTION, SOLUTION INTRAVENOUS at 13:47

## 2019-12-23 RX ADMIN — FENTANYL CITRATE 25 MCG: 50 INJECTION, SOLUTION INTRAMUSCULAR; INTRAVENOUS at 17:46

## 2019-12-23 RX ADMIN — Medication 50 MCG/HR: at 04:51

## 2019-12-23 RX ADMIN — CHLORHEXIDINE GLUCONATE 15 ML: 1.2 RINSE ORAL at 21:04

## 2019-12-23 RX ADMIN — CEFEPIME HYDROCHLORIDE 2 G: 2 INJECTION, POWDER, FOR SOLUTION INTRAVENOUS at 07:32

## 2019-12-23 RX ADMIN — CALCIUM GLUCONATE 1 G: 98 INJECTION, SOLUTION INTRAVENOUS at 11:03

## 2019-12-23 RX ADMIN — SODIUM CHLORIDE, PRESERVATIVE FREE 10 ML: 5 INJECTION INTRAVENOUS at 21:04

## 2019-12-23 RX ADMIN — SODIUM CHLORIDE, PRESERVATIVE FREE 10 ML: 5 INJECTION INTRAVENOUS at 07:33

## 2019-12-23 RX ADMIN — POTASSIUM CHLORIDE 20 MEQ: 29.8 INJECTION, SOLUTION INTRAVENOUS at 12:12

## 2019-12-23 RX ADMIN — MUPIROCIN: 20 OINTMENT TOPICAL at 21:06

## 2019-12-23 RX ADMIN — POTASSIUM CHLORIDE 20 MEQ: 29.8 INJECTION, SOLUTION INTRAVENOUS at 09:44

## 2019-12-23 RX ADMIN — METRONIDAZOLE 500 MG: 500 INJECTION, SOLUTION INTRAVENOUS at 05:32

## 2019-12-23 RX ADMIN — FUROSEMIDE 40 MG: 10 INJECTION, SOLUTION INTRAMUSCULAR; INTRAVENOUS at 07:32

## 2019-12-23 RX ADMIN — Medication 10 ML: at 07:33

## 2019-12-23 RX ADMIN — Medication 100 MCG/HR: at 19:44

## 2019-12-23 RX ADMIN — VASOPRESSIN 0.04 UNITS/MIN: 20 INJECTION INTRAVENOUS at 09:18

## 2019-12-23 RX ADMIN — CHLORHEXIDINE GLUCONATE 15 ML: 1.2 RINSE ORAL at 07:35

## 2019-12-23 ASSESSMENT — PULMONARY FUNCTION TESTS
PIF_VALUE: 18
PIF_VALUE: 23
PIF_VALUE: 21
PIF_VALUE: 16
PIF_VALUE: 19
PIF_VALUE: 21
PIF_VALUE: 23
PIF_VALUE: 16
PIF_VALUE: 15
PIF_VALUE: 15
PIF_VALUE: 30
PIF_VALUE: 15
PIF_VALUE: 20
PIF_VALUE: 18
PIF_VALUE: 29
PIF_VALUE: 31
PIF_VALUE: 30
PIF_VALUE: 19
PIF_VALUE: 11
PIF_VALUE: 21
PIF_VALUE: 16
PIF_VALUE: 18
PIF_VALUE: 16
PIF_VALUE: 25
PIF_VALUE: 31
PIF_VALUE: 30
PIF_VALUE: 32
PIF_VALUE: 17
PIF_VALUE: 15
PIF_VALUE: 22
PIF_VALUE: 27
PIF_VALUE: 26
PIF_VALUE: 23
PIF_VALUE: 33
PIF_VALUE: 30
PIF_VALUE: 24
PIF_VALUE: 19
PIF_VALUE: 17
PIF_VALUE: 19
PIF_VALUE: 15
PIF_VALUE: 21
PIF_VALUE: 20
PIF_VALUE: 33
PIF_VALUE: 20
PIF_VALUE: 24
PIF_VALUE: 24
PIF_VALUE: 33
PIF_VALUE: 19
PIF_VALUE: 31
PIF_VALUE: 30
PIF_VALUE: 29
PIF_VALUE: 15
PIF_VALUE: 15
PIF_VALUE: 28
PIF_VALUE: 32
PIF_VALUE: 10
PIF_VALUE: 25
PIF_VALUE: 28
PIF_VALUE: 15
PIF_VALUE: 22
PIF_VALUE: 21
PIF_VALUE: 15
PIF_VALUE: 28
PIF_VALUE: 15
PIF_VALUE: 21
PIF_VALUE: 26
PIF_VALUE: 22
PIF_VALUE: 10
PIF_VALUE: 23
PIF_VALUE: 31
PIF_VALUE: 29
PIF_VALUE: 26
PIF_VALUE: 30
PIF_VALUE: 15
PIF_VALUE: 33
PIF_VALUE: 22
PIF_VALUE: 20
PIF_VALUE: 17
PIF_VALUE: 15
PIF_VALUE: 29
PIF_VALUE: 26
PIF_VALUE: 18
PIF_VALUE: 27
PIF_VALUE: 21
PIF_VALUE: 28
PIF_VALUE: 23

## 2019-12-23 ASSESSMENT — PAIN SCALES - GENERAL
PAINLEVEL_OUTOF10: 0
PAINLEVEL_OUTOF10: 0
PAINLEVEL_OUTOF10: 4
PAINLEVEL_OUTOF10: 0

## 2019-12-23 NOTE — PROGRESS NOTES
Nutrition Assessment    Type and Reason for Visit: Reassess    Nutrition Recommendations:   Monitor for start of nutrition    Nutrition Assessment: Pt with improved status. Remains intubated on pressor support. NG to LWS. Pt with + BM this date. Noted plan for possible sbt on 12/24. Malnutrition Assessment:  · Malnutrition Status: At risk for malnutrition  · Context: Acute illness or injury  · Findings of the 6 clinical characteristics of malnutrition (Minimum of 2 out of 6 clinical characteristics is required to make the diagnosis of moderate or severe Protein Calorie Malnutrition based on AND/ASPEN Guidelines):  1. Energy Intake-Unable to assess,      2. Weight Loss-No significant weight loss,    3. Fat Loss-No significant subcutaneous fat loss,    4. Muscle Loss-No significant muscle mass loss,    5. Fluid Accumulation-No significant fluid accumulation,    6.  Strength-Not measured    Nutrition Risk Level: High    Nutrient Needs:  · Estimated Daily Total Kcal: 7825-2862 (20-25 x ABW 64 kg)  · Estimated Daily Protein (g): 77-96 (1.2-1.5 x ABW)  · Estimated Daily Total Fluid (ml/day): 1 ml per kcal    Nutrition Diagnosis:   · Problem: Inadequate oral intake  · Etiology: related to Alteration in GI function     Signs and symptoms:  as evidenced by NPO status due to medical condition    Objective Information:  · Nutrition-Focused Physical Findings: Noted +2 generalized edema. Noted BM 12/23. · Wound Type: (Noted no issues to abd SI.  Noted red stanislav area.)  · Current Nutrition Therapies:  · Oral Diet Orders: NPO   · Oral Diet intake: NPO  · Oral Nutrition Supplement (ONS) Orders: None  · ONS intake: NPO  · Anthropometric Measures:  · Ht: 5' 6\" (167.6 cm)   · Current Body Wt: 157 lb (71.2 kg)  · Usual Body Wt: (per records, wt looks to be high 120s - low 130s)  · BMI Classification: BMI 25.0 - 29.9 Overweight   ·  lb    Nutrition Interventions:   Continue NPO(monitor for start of

## 2019-12-23 NOTE — PROGRESS NOTES
3992: Dr. Shreya Siegel @ bedside  2776: Pt on SBT @ this time  [phone]: Critical Care team aware of recent ABG  1535: Pt not pulling tidal volumes < 300, PS 10/5. RT aware to place patient back on [facility].  Fentanyl restarted  Electronically signed by Luis Alberto Torres RN on 12/23/2019 at 6:07 PM

## 2019-12-23 NOTE — PLAN OF CARE
Problem: Restraint Use - Nonviolent/Non-Self-Destructive Behavior:  Goal: Absence of restraint indications  Description  Absence of restraint indications  12/22/2019 2251 by Hallie Mohs, RN  Outcome: Ongoing  12/22/2019 1716 by Margret Hutton RN  Outcome: Ongoing  Goal: Absence of restraint-related injury  Description  Absence of restraint-related injury  12/22/2019 2251 by Hallie Mohs, RN  Outcome: Ongoing  Note:   Restraints checked hourly with passive range of motion performed. 12/22/2019 1716 by Margret Hutton RN  Outcome: Ongoing     Problem: Nutrition  Goal: Optimal nutrition therapy  12/22/2019 1716 by Margret Hutton RN  Outcome: Ongoing     Problem: Risk for Impaired Skin Integrity  Goal: Tissue integrity - skin and mucous membranes  Description  Structural intactness and normal physiological function of skin and  mucous membranes. 12/22/2019 2251 by Hallie Mohs, RN  Outcome: Ongoing  Note:   Pt turned Q2H, preventive mepilex border on sacrum, oral care done Q4H and as needed. 12/22/2019 1716 by Margret Hutton RN  Outcome: Ongoing     Problem: Falls - Risk of:  Goal: Will remain free from falls  Description  Will remain free from falls  12/22/2019 2251 by Hallie Mohs, RN  Outcome: Ongoing  Note:   Pt's bed left in lowest position, 3/4 side-rails up, wheels locked. Carter wrist restraints in place for protection of airway.    12/22/2019 1716 by Margret Hutton RN  Outcome: Ongoing  Goal: Absence of physical injury  Description  Absence of physical injury  12/22/2019 1716 by Margret Hutton RN  Outcome: Ongoing

## 2019-12-23 NOTE — PROGRESS NOTES
LAB DATA:    CBC:   Lab Results   Component Value Date    WBC 14.0 12/23/2019    RBC 2.83 12/23/2019    RBC 4.68 03/13/2017    HGB 9.9 12/23/2019    HCT 28.6 12/23/2019    .3 12/23/2019    MCH 35.2 12/23/2019    MCHC 34.7 12/23/2019    RDW 14.0 12/23/2019     12/23/2019    MPV 10.4 12/23/2019     BMP:    Lab Results   Component Value Date     12/23/2019    K 3.0 12/23/2019    K 5.5 12/19/2019    CL 89 12/23/2019    CO2 29 12/23/2019    BUN 18 12/23/2019    CREATININE 1.0 12/23/2019    CALCIUM 7.4 12/23/2019    GFRAA >60 12/23/2019    GFRAA >60 04/09/2013    LABGLOM >60 12/23/2019    GLUCOSE 83 12/23/2019    GLUCOSE 115 03/13/2017     Ionized Calcium:  No results found for: IONCA  Magnesium:    Lab Results   Component Value Date    MG 1.70 12/23/2019     Phosphorus:    Lab Results   Component Value Date    PHOS 3.0 12/23/2019     U/A:    Lab Results   Component Value Date    COLORU DK YELLOW 12/19/2019    PHUR 5.0 12/19/2019    WBCUA 2 12/19/2019    RBCUA 20-50 12/19/2019    CLARITYU Clear 12/19/2019    SPECGRAV 1.023 12/19/2019    LEUKOCYTESUR Negative 12/19/2019    UROBILINOGEN 0.2 12/19/2019    BILIRUBINUR Negative 12/19/2019    BLOODU MODERATE 12/19/2019    GLUCOSEU Negative 12/19/2019         IMPRESSION/RECOMMENDATIONS:      1. ANISH (non oliguric) - in the setting of hypotension, vomiting, diarrhea, currently, required pressor support and getting aggressive volume resuscitation. Prerenal etiology    - resolved    2. Hyperkalemia - resolved    3. Acidosis: Better. Off IV bicarb. 4.  Respiratory failure requiring Ventilator support  - per pulmonary  - on lasix IV until tomorrow    5.   Shock:  - off pressors

## 2019-12-23 NOTE — PROGRESS NOTES
Pulmonary Progress Note    CC: Septic shock  Small bowel obstruction  Acute hypoxic respiratory failure   Aspiration pneumonia  Pleural effusions  Ischemic hepatitis  Thrombocytopenia       24 hr events: On mechanical ventilation. Received lasix. Had 3.7L of urine output in the past 24 hours. PHYSICAL EXAM:  Blood pressure (!) 107/48, pulse 84, temperature 98.9 °F (37.2 °C), temperature source Oral, resp. rate 12, height 5' 6\" (1.676 m), weight 157 lb 6.5 oz (71.4 kg), SpO2 96 %. on [facility] 12/400/40%/8    Intake/Output Summary (Last 24 hours) at 12/23/2019 0733  Last data filed at 12/23/2019 4871  Gross per 24 hour   Intake 1128.8 ml   Output 3674 ml   Net -2545.2 ml       Gen: Well developed; well nourished  Eyes: No scleral icterus. No conjunctival injection. ENT:  Oropharynx with ETT. External appearance of ears and nose normal.  Neck: Trachea midline. No obvious mass. No visible thyroid enlargement    Respiratory: Decreased breath sounds over bilateral lower lung zones, no accessory muscle use  Cardiovascular: Regular rate and rhythm, no appreciable murmurs. Edema of BUE  Gastrointestinal: Soft, abdominal dressing c/d/i  Skin: Warm and dry. No rashes or ulcers on visible areas. Normal texture and turgor  Musculoskeletal: No cyanosis, clubbing or joint deformity.     Psychiatric: Intubated, but awake and interactive     Medications:  Current Facility-Administered Medications: furosemide (LASIX) injection 40 mg, 40 mg, Intravenous, BID  cefepime (MAXIPIME) 2 g IVPB minibag, 2 g, Intravenous, Q12H  pantoprazole (PROTONIX) injection 40 mg, 40 mg, Intravenous, Daily **AND** sodium chloride (PF) 0.9 % injection 10 mL, 10 mL, Intravenous, Daily  sodium chloride flush 0.9 % injection 10 mL, 10 mL, Intravenous, 2 times per day  sodium chloride flush 0.9 % injection 10 mL, 10 mL, Intravenous, PRN  ondansetron (ZOFRAN) injection 4 mg, 4 mg, Intravenous, Q6H PRN  acetaminophen (TYLENOL) suppository 650 mg, 650 mg, (12/18): Negative  Sputum culture (12/19): Normal lio   Nasal MRSA probe: Negative      Films:  Chest images and reports were reviewed by me. My interpretation is:  CXR (12/23/19): Decreased bilateral pleuro-parenchymal disease; ETT, gastric tube and pacemaker in place      Assessment:     Septic shock  Small bowel obstruction  Acute hypoxic respiratory failure  Aspiration pneumonia  Pleural effusions  Ischemic hepatitis  Thrombocytopenia       Plan:    Septic shock  - Due to bowel ischemia from closed loop obstruction and aspiration pneumonia  - Levophed and vasopressin to keep MAP>65  - Cefepime and flagyl (day #5)    Small bowel obstruction  - s/p ex-laparotomy and lysis of adhesions on 12/18  - Per general surgery    Acute hypoxic respiratory failure  - On mechanical ventilation. Decrease PEEP and try SBT  - Continue diuresis    Aspiration pneumonia  - Cefepime and flagyl (day #5)    Pleural effusions   - Lasix twice daily  - Strict I/Os    Ischemic hepatitis  - Keep MAP>65  - Check daily labs    Thrombocytopenia   - Likely due to sepsis. Antibiotics as above  - Check labs in a.m. Prophylaxis   DVT- lovenox  GI- protonix  VAP- peridex    I spent 40 minutes of critical care time with this patient excluding procedures.     Leona Gamboa MD  [facility] Pulmonary, Critical Care and Sleep

## 2019-12-23 NOTE — PROGRESS NOTES
4 Eyes Skin Assessment     The patient is being assess for  Shift Handoff    I agree that 2 RN's have performed a thorough Head to Toe Skin Assessment on the patient. ALL assessment sites listed below have been assessed. Areas assessed by both nurses: Cyndi/Zaki  [x]   Head, Face, and Ears   [x]   Shoulders, Back, and Chest  [x]   Arms, Elbows, and Hands   [x]   Coccyx, Sacrum, and IschIum  [x]   Legs, Feet, and Heels        Does the Patient have Skin Breakdown? No         Stuart Prevention initiated:  Yes   Wound Care Orders initiated:  NA      [facility] nurse consulted for Pressure Injury (Stage 3,4, Unstageable, DTI, NWPT, and Complex wounds), New and Established Ostomies:  NA      Nurse 1 eSignature: Electronically signed by Margret Hutton RN on 12/23/19 at 4:21 PM    **SHARE this note so that the co-signing nurse is able to place an eSignature**    Nurse 2 eSignature: Electronically signed by Ethelene Osler. Barth Roles on 12/24/19 at 11:22 PM

## 2019-12-24 ENCOUNTER — APPOINTMENT (OUTPATIENT)
Dept: GENERAL RADIOLOGY | Age: 59
DRG: 853 | End: 2019-12-24
Payer: COMMERCIAL

## 2019-12-24 LAB
ALBUMIN SERPL-MCNC: 2.1 G/DL (ref 3.4–5)
ALP BLD-CCNC: 71 U/L (ref 40–129)
ALT SERPL-CCNC: 83 U/L (ref 10–40)
ANION GAP SERPL CALCULATED.3IONS-SCNC: 17 MMOL/L (ref 3–16)
AST SERPL-CCNC: 65 U/L (ref 15–37)
BASE EXCESS ARTERIAL: 8.8 MMOL/L (ref -3–3)
BASOPHILS ABSOLUTE: 0 K/UL (ref 0–0.2)
BASOPHILS RELATIVE PERCENT: 0.2 %
BILIRUB SERPL-MCNC: 1.4 MG/DL (ref 0–1)
BILIRUBIN DIRECT: 0.9 MG/DL (ref 0–0.3)
BILIRUBIN, INDIRECT: 0.5 MG/DL (ref 0–1)
BUN BLDV-MCNC: 13 MG/DL (ref 7–20)
CALCIUM SERPL-MCNC: 7.5 MG/DL (ref 8.3–10.6)
CARBOXYHEMOGLOBIN ARTERIAL: 1.5 % (ref 0–1.5)
CHLORIDE BLD-SCNC: 84 MMOL/L (ref 99–110)
CO2: 31 MMOL/L (ref 21–32)
CREAT SERPL-MCNC: 1 MG/DL (ref 0.9–1.3)
EOSINOPHILS ABSOLUTE: 0.1 K/UL (ref 0–0.6)
EOSINOPHILS RELATIVE PERCENT: 0.6 %
GFR AFRICAN AMERICAN: >60
GFR NON-AFRICAN AMERICAN: >60
GLUCOSE BLD-MCNC: 106 MG/DL (ref 70–99)
GLUCOSE BLD-MCNC: 109 MG/DL (ref 70–99)
GLUCOSE BLD-MCNC: 112 MG/DL (ref 70–99)
GLUCOSE BLD-MCNC: 117 MG/DL (ref 70–99)
GLUCOSE BLD-MCNC: 118 MG/DL (ref 70–99)
GLUCOSE BLD-MCNC: 118 MG/DL (ref 70–99)
GLUCOSE BLD-MCNC: 119 MG/DL (ref 70–99)
HCO3 ARTERIAL: 33.9 MMOL/L (ref 21–29)
HCT VFR BLD CALC: 28.8 % (ref 40.5–52.5)
HEMOGLOBIN, ART, EXTENDED: 9.8 G/DL (ref 13.5–17.5)
HEMOGLOBIN: 9.8 G/DL (ref 13.5–17.5)
LACTIC ACID: 0.9 MMOL/L (ref 0.4–2)
LYMPHOCYTES ABSOLUTE: 0.6 K/UL (ref 1–5.1)
LYMPHOCYTES RELATIVE PERCENT: 3.6 %
MAGNESIUM: 1.7 MG/DL (ref 1.8–2.4)
MCH RBC QN AUTO: 34.4 PG (ref 26–34)
MCHC RBC AUTO-ENTMCNC: 33.9 G/DL (ref 31–36)
MCV RBC AUTO: 101.3 FL (ref 80–100)
METHEMOGLOBIN ARTERIAL: 1 %
MONOCYTES ABSOLUTE: 1.2 K/UL (ref 0–1.3)
MONOCYTES RELATIVE PERCENT: 7.2 %
NEUTROPHILS ABSOLUTE: 14.1 K/UL (ref 1.7–7.7)
NEUTROPHILS RELATIVE PERCENT: 88.4 %
O2 CONTENT ARTERIAL: 13 ML/DL
O2 SAT, ARTERIAL: 97.8 %
O2 THERAPY: ABNORMAL
PCO2 ARTERIAL: 51.5 MMHG (ref 35–45)
PDW BLD-RTO: 13.9 % (ref 12.4–15.4)
PERFORMED ON: ABNORMAL
PH ARTERIAL: 7.43 (ref 7.35–7.45)
PHOSPHORUS: 2.6 MG/DL (ref 2.5–4.9)
PLATELET # BLD: 149 K/UL (ref 135–450)
PMV BLD AUTO: 10.5 FL (ref 5–10.5)
PO2 ARTERIAL: 90.5 MMHG (ref 75–108)
POTASSIUM SERPL-SCNC: 3 MMOL/L (ref 3.5–5.1)
POTASSIUM SERPL-SCNC: 3.7 MMOL/L (ref 3.5–5.1)
RBC # BLD: 2.84 M/UL (ref 4.2–5.9)
SODIUM BLD-SCNC: 132 MMOL/L (ref 136–145)
TCO2 ARTERIAL: 35.5 MMOL/L
TOTAL PROTEIN: 4.9 G/DL (ref 6.4–8.2)
TROPONIN: <0.01 NG/ML
WBC # BLD: 16 K/UL (ref 4–11)

## 2019-12-24 PROCEDURE — 2580000003 HC RX 258: Performed by: INTERNAL MEDICINE

## 2019-12-24 PROCEDURE — 84132 ASSAY OF SERUM POTASSIUM: CPT

## 2019-12-24 PROCEDURE — C1751 CATH, INF, PER/CENT/MIDLINE: HCPCS

## 2019-12-24 PROCEDURE — 6370000000 HC RX 637 (ALT 250 FOR IP): Performed by: INTERNAL MEDICINE

## 2019-12-24 PROCEDURE — 82803 BLOOD GASES ANY COMBINATION: CPT

## 2019-12-24 PROCEDURE — 83605 ASSAY OF LACTIC ACID: CPT

## 2019-12-24 PROCEDURE — 6360000002 HC RX W HCPCS: Performed by: INTERNAL MEDICINE

## 2019-12-24 PROCEDURE — 80069 RENAL FUNCTION PANEL: CPT

## 2019-12-24 PROCEDURE — 6360000002 HC RX W HCPCS: Performed by: NURSE PRACTITIONER

## 2019-12-24 PROCEDURE — 2700000000 HC OXYGEN THERAPY PER DAY

## 2019-12-24 PROCEDURE — 2580000003 HC RX 258: Performed by: SURGERY

## 2019-12-24 PROCEDURE — 85025 COMPLETE CBC W/AUTO DIFF WBC: CPT

## 2019-12-24 PROCEDURE — 02HV33Z INSERTION OF INFUSION DEVICE INTO SUPERIOR VENA CAVA, PERCUTANEOUS APPROACH: ICD-10-PCS | Performed by: INTERNAL MEDICINE

## 2019-12-24 PROCEDURE — 36569 INSJ PICC 5 YR+ W/O IMAGING: CPT

## 2019-12-24 PROCEDURE — 2500000003 HC RX 250 WO HCPCS: Performed by: INTERNAL MEDICINE

## 2019-12-24 PROCEDURE — 3E05317 INTRODUCTION OF OTHER THROMBOLYTIC INTO PERIPHERAL ARTERY, PERCUTANEOUS APPROACH: ICD-10-PCS | Performed by: INTERNAL MEDICINE

## 2019-12-24 PROCEDURE — C9113 INJ PANTOPRAZOLE SODIUM, VIA: HCPCS | Performed by: NURSE PRACTITIONER

## 2019-12-24 PROCEDURE — 84484 ASSAY OF TROPONIN QUANT: CPT

## 2019-12-24 PROCEDURE — 99291 CRITICAL CARE FIRST HOUR: CPT | Performed by: INTERNAL MEDICINE

## 2019-12-24 PROCEDURE — 80076 HEPATIC FUNCTION PANEL: CPT

## 2019-12-24 PROCEDURE — 99024 POSTOP FOLLOW-UP VISIT: CPT | Performed by: SURGERY

## 2019-12-24 PROCEDURE — 2580000003 HC RX 258: Performed by: NURSE PRACTITIONER

## 2019-12-24 PROCEDURE — APPSS15 APP SPLIT SHARED TIME 0-15 MINUTES: Performed by: PHYSICIAN ASSISTANT

## 2019-12-24 PROCEDURE — 99024 POSTOP FOLLOW-UP VISIT: CPT | Performed by: PHYSICIAN ASSISTANT

## 2019-12-24 PROCEDURE — 94750 HC PULMONARY COMPLIANCE STUDY: CPT

## 2019-12-24 PROCEDURE — 94760 N-INVAS EAR/PLS OXIMETRY 1: CPT

## 2019-12-24 PROCEDURE — APPNB30 APP NON BILLABLE TIME 0-30 MINS: Performed by: PHYSICIAN ASSISTANT

## 2019-12-24 PROCEDURE — 2500000003 HC RX 250 WO HCPCS: Performed by: SURGERY

## 2019-12-24 PROCEDURE — 94003 VENT MGMT INPAT SUBQ DAY: CPT

## 2019-12-24 PROCEDURE — 2500000003 HC RX 250 WO HCPCS: Performed by: NURSE PRACTITIONER

## 2019-12-24 PROCEDURE — 2000000000 HC ICU R&B

## 2019-12-24 PROCEDURE — 99232 SBSQ HOSP IP/OBS MODERATE 35: CPT | Performed by: INTERNAL MEDICINE

## 2019-12-24 PROCEDURE — 76937 US GUIDE VASCULAR ACCESS: CPT

## 2019-12-24 PROCEDURE — 83735 ASSAY OF MAGNESIUM: CPT

## 2019-12-24 PROCEDURE — 71045 X-RAY EXAM CHEST 1 VIEW: CPT

## 2019-12-24 RX ORDER — MAGNESIUM SULFATE 1 G/100ML
1 INJECTION INTRAVENOUS PRN
Status: DISCONTINUED | OUTPATIENT
Start: 2019-12-24 | End: 2020-01-14 | Stop reason: HOSPADM

## 2019-12-24 RX ORDER — LIDOCAINE HYDROCHLORIDE 10 MG/ML
5 INJECTION, SOLUTION EPIDURAL; INFILTRATION; INTRACAUDAL; PERINEURAL ONCE
Status: DISCONTINUED | OUTPATIENT
Start: 2019-12-24 | End: 2019-12-26

## 2019-12-24 RX ORDER — MAGNESIUM SULFATE IN WATER 40 MG/ML
2 INJECTION, SOLUTION INTRAVENOUS ONCE
Status: COMPLETED | OUTPATIENT
Start: 2019-12-24 | End: 2019-12-24

## 2019-12-24 RX ORDER — SODIUM CHLORIDE 0.9 % (FLUSH) 0.9 %
10 SYRINGE (ML) INJECTION EVERY 12 HOURS SCHEDULED
Status: DISCONTINUED | OUTPATIENT
Start: 2019-12-24 | End: 2020-01-14 | Stop reason: HOSPADM

## 2019-12-24 RX ORDER — SODIUM CHLORIDE 0.9 % (FLUSH) 0.9 %
10 SYRINGE (ML) INJECTION PRN
Status: DISCONTINUED | OUTPATIENT
Start: 2019-12-24 | End: 2020-01-14 | Stop reason: HOSPADM

## 2019-12-24 RX ORDER — POTASSIUM CHLORIDE 29.8 MG/ML
20 INJECTION INTRAVENOUS PRN
Status: DISCONTINUED | OUTPATIENT
Start: 2019-12-24 | End: 2020-01-14 | Stop reason: HOSPADM

## 2019-12-24 RX ADMIN — FUROSEMIDE 40 MG: 10 INJECTION, SOLUTION INTRAMUSCULAR; INTRAVENOUS at 08:36

## 2019-12-24 RX ADMIN — METRONIDAZOLE 500 MG: 500 INJECTION, SOLUTION INTRAVENOUS at 13:50

## 2019-12-24 RX ADMIN — POTASSIUM CHLORIDE 20 MEQ: 29.8 INJECTION, SOLUTION INTRAVENOUS at 13:03

## 2019-12-24 RX ADMIN — MAGNESIUM SULFATE HEPTAHYDRATE 2 G: 40 INJECTION, SOLUTION INTRAVENOUS at 10:32

## 2019-12-24 RX ADMIN — Medication 10 ML: at 08:36

## 2019-12-24 RX ADMIN — SODIUM CHLORIDE, PRESERVATIVE FREE 10 ML: 5 INJECTION INTRAVENOUS at 13:05

## 2019-12-24 RX ADMIN — POTASSIUM CHLORIDE 20 MEQ: 29.8 INJECTION, SOLUTION INTRAVENOUS at 10:32

## 2019-12-24 RX ADMIN — METRONIDAZOLE 500 MG: 500 INJECTION, SOLUTION INTRAVENOUS at 22:37

## 2019-12-24 RX ADMIN — SODIUM CHLORIDE, PRESERVATIVE FREE 10 ML: 5 INJECTION INTRAVENOUS at 21:00

## 2019-12-24 RX ADMIN — VASOPRESSIN 0.04 UNITS/MIN: 20 INJECTION INTRAVENOUS at 09:05

## 2019-12-24 RX ADMIN — VASOPRESSIN 0.04 UNITS/MIN: 20 INJECTION INTRAVENOUS at 00:25

## 2019-12-24 RX ADMIN — CHLORHEXIDINE GLUCONATE 15 ML: 1.2 RINSE ORAL at 08:41

## 2019-12-24 RX ADMIN — SODIUM CHLORIDE, PRESERVATIVE FREE 10 ML: 5 INJECTION INTRAVENOUS at 20:59

## 2019-12-24 RX ADMIN — PANTOPRAZOLE SODIUM 40 MG: 40 INJECTION, POWDER, FOR SOLUTION INTRAVENOUS at 08:36

## 2019-12-24 RX ADMIN — CHLORHEXIDINE GLUCONATE 15 ML: 1.2 RINSE ORAL at 20:59

## 2019-12-24 RX ADMIN — ENOXAPARIN SODIUM 40 MG: 40 INJECTION SUBCUTANEOUS at 08:35

## 2019-12-24 RX ADMIN — CEFEPIME HYDROCHLORIDE 2 G: 2 INJECTION, POWDER, FOR SOLUTION INTRAVENOUS at 20:58

## 2019-12-24 RX ADMIN — POTASSIUM CHLORIDE 20 MEQ: 29.8 INJECTION, SOLUTION INTRAVENOUS at 12:02

## 2019-12-24 RX ADMIN — METRONIDAZOLE 500 MG: 500 INJECTION, SOLUTION INTRAVENOUS at 06:18

## 2019-12-24 RX ADMIN — Medication 100 MCG/HR: at 05:43

## 2019-12-24 RX ADMIN — VASOPRESSIN 0.04 UNITS/MIN: 20 INJECTION INTRAVENOUS at 17:48

## 2019-12-24 RX ADMIN — CEFEPIME HYDROCHLORIDE 2 G: 2 INJECTION, POWDER, FOR SOLUTION INTRAVENOUS at 08:40

## 2019-12-24 RX ADMIN — SODIUM CHLORIDE, PRESERVATIVE FREE 10 ML: 5 INJECTION INTRAVENOUS at 08:38

## 2019-12-24 RX ADMIN — Medication 100 MCG/HR: at 00:45

## 2019-12-24 RX ADMIN — Medication 6 MCG/MIN: at 08:36

## 2019-12-24 ASSESSMENT — PULMONARY FUNCTION TESTS
PIF_VALUE: 16
PIF_VALUE: 15
PIF_VALUE: 16
PIF_VALUE: 17
PIF_VALUE: 15
PIF_VALUE: 15
PIF_VALUE: 20
PIF_VALUE: 15
PIF_VALUE: 15
PIF_VALUE: 11
PIF_VALUE: 18
PIF_VALUE: 15
PIF_VALUE: 11
PIF_VALUE: 19
PIF_VALUE: 15
PIF_VALUE: 21
PIF_VALUE: 13
PIF_VALUE: 15
PIF_VALUE: 15
PIF_VALUE: 18
PIF_VALUE: 16
PIF_VALUE: 16
PIF_VALUE: 18
PIF_VALUE: 11
PIF_VALUE: 19
PIF_VALUE: 15
PIF_VALUE: 15
PIF_VALUE: 13
PIF_VALUE: 15
PIF_VALUE: 20
PIF_VALUE: 19
PIF_VALUE: 11
PIF_VALUE: 15
PIF_VALUE: 18
PIF_VALUE: 11
PIF_VALUE: 15
PIF_VALUE: 15
PIF_VALUE: 19
PIF_VALUE: 15
PIF_VALUE: 15

## 2019-12-24 NOTE — PROGRESS NOTES
[facility] General Surgery [phone]                                     Daily Progress Note                                                         Pt Name: Melissa Decker  Medical Record Number: [id number]  Date of Birth 1960   Today's Date: 12/24/2019  Chief Complaint   Patient presents with    Shortness of Breath     onset - 1530 today    Altered Mental Status     pt lethargic, unsteady        ASSESSMENT/PLAN  Small bowel obstruction, hypovolemic shock  -12/18 exploratory laparotomy with RITESH, reduction of closed loop SBO  -More alert today  -Weaning pressors appropriately  -UOP improved  Leukocytosis: will monitor: 8.9-->10.9-->14.0-->16.0  -wean vent per pulm  -Continue NGT  -+BMs  -Midline incision site ok, dressing and packing changes      SUBJECTIVE  Lorenza Michele has slightly improved from yesterday. He is intubated, answering yes no questions appropriately. OBJECTIVE  VITALS:  height is 5' 6\" (1.676 m) and weight is 157 lb 6.5 oz (71.4 kg). His oral temperature is 98.7 °F (37.1 °C). His blood pressure is 110/45 (abnormal) and his pulse is 84. His respiration is 19 and oxygen saturation is 93%. INTAKE/OUTPUT:      Intake/Output Summary (Last 24 hours) at 12/24/2019 0857  Last data filed at 12/24/2019 0847  Gross per 24 hour   Intake 1173.54 ml   Output 4350 ml   Net -3176.46 ml     GENERAL: more alert today following commands, no distress, intubated,   LUNGS: intubated  HEART: normal rate and regular rhythm  ABDOMEN: soft, appropriately-tender, non-distended   EXTREMITY: no cyanosis, clubbing or edema    I/O last 3 completed shifts:   In: 1223.5 [I.V.:583.5; NG/GT:40; IV Piggyback:600]  Out: 5040 [Urine:4940; Emesis/NG output:100]      LABS  Recent Labs     12/24/19  0527   WBC 16.0*   HGB 9.8*   HCT 28.8*      *   K 3.0*   CL 84*   CO2 31   BUN 13   CREATININE 1.0   MG 1.70*   PHOS 2.6   CALCIUM 7.5*   AST 65*   ALT 83*

## 2019-12-24 NOTE — PROGRESS NOTES
LAB DATA:    CBC:   Lab Results   Component Value Date    WBC 16.0 12/24/2019    RBC 2.84 12/24/2019    RBC 4.68 03/13/2017    HGB 9.8 12/24/2019    HCT 28.8 12/24/2019    .3 12/24/2019    MCH 34.4 12/24/2019    MCHC 33.9 12/24/2019    RDW 13.9 12/24/2019     12/24/2019    MPV 10.5 12/24/2019     BMP:    Lab Results   Component Value Date     12/24/2019    K 3.0 12/24/2019    K 5.5 12/19/2019    CL 84 12/24/2019    CO2 31 12/24/2019    BUN 13 12/24/2019    CREATININE 1.0 12/24/2019    CALCIUM 7.5 12/24/2019    GFRAA >60 12/24/2019    GFRAA >60 04/09/2013    LABGLOM >60 12/24/2019    GLUCOSE 119 12/24/2019    GLUCOSE 115 03/13/2017     Ionized Calcium:  No results found for: IONCA  Magnesium:    Lab Results   Component Value Date    MG 1.70 12/24/2019     Phosphorus:    Lab Results   Component Value Date    PHOS 2.6 12/24/2019     U/A:    Lab Results   Component Value Date    COLORU DK YELLOW 12/19/2019    PHUR 5.0 12/19/2019    WBCUA 2 12/19/2019    RBCUA 20-50 12/19/2019    CLARITYU Clear 12/19/2019    SPECGRAV 1.023 12/19/2019    LEUKOCYTESUR Negative 12/19/2019    UROBILINOGEN 0.2 12/19/2019    BILIRUBINUR Negative 12/19/2019    BLOODU MODERATE 12/19/2019    GLUCOSEU Negative 12/19/2019         IMPRESSION/RECOMMENDATIONS:      1. ANISH (non oliguric) - in the setting of hypotension, vomiting, diarrhea, currently, required pressor support and getting aggressive volume resuscitation. Prerenal etiology    - resolved  - PICC line is fine    2. Hyperkalemia - resolved    3. Acid/base  - metabolic acidosis resolved  - now, he likely has contraction alkalosis    4. Respiratory failure requiring Ventilator support  - per pulmonary  - hold lasix    5.   Shock:  - weaning pressors

## 2019-12-24 NOTE — PROGRESS NOTES
INTESTINE SURGERY N/A 12/18/2019    EXPLORATORY LAPAROTOMY, LYSIS OF ADHESIONS performed by Alec Cervantes MD at [address] History   Problem Relation Age of Onset    Diabetes Brother     Hypertension Brother     Stroke Brother     High Cholesterol Brother     Cancer Mother         melanoma    Other Father         cerebral aneurysm     Social History     Tobacco Use    Smoking status: Never Smoker    Smokeless tobacco: Never Used   Substance Use Topics    Alcohol use: No    Drug use: No       Prior to Admission medications    Medication Sig Start Date End Date Taking?  Authorizing Provider   doxycycline (VIBRAMYCIN) 50 MG capsule Take 50 mg by mouth 2 times daily 11/26/19  Yes Historical Provider, MD   simvastatin (ZOCOR) 20 MG tablet Take 1 tablet by mouth daily 12/10/19  Yes Indiana Betancur MD   allopurinol (ZYLOPRIM) 100 MG tablet Take 1 tablet by mouth daily 12/9/19  Yes Indiana Betancur MD   donepezil (ARICEPT) 5 MG tablet Take 1 tablet by mouth nightly 12/9/19  Yes Indiana Betancur MD   Multiple Vitamins-Minerals (THERAPEUTIC MULTIVITAMIN-MINERALS) tablet Take 1 tablet by mouth daily   Yes Historical Provider, MD   Azelaic Acid 15 % GEL Apply topically daily 12/2/19   Historical Provider, MD   hydrocortisone (HYTONE) 2.5 % lotion Apply topically daily 11/26/19   Historical Provider, MD   metroNIDAZOLE (METROCREAM) 0.75 % cream Apply topically daily 11/26/19   Historical Provider, MD         ROS:  A comprehensive review of systems was negative except for: vomiting and diararhea     OBJECTIVE:      PHYSICAL:  Intake/Output:   Patient Vitals for the past 8 hrs:   BP Temp Temp src Pulse Resp SpO2   12/24/19 1229 -- -- -- 75 29 97 %   12/24/19 1224 -- -- -- 70 14 97 %   12/24/19 1206 (!) 117/50 97.9 °F (36.6 °C) Oral 80 15 97 %   12/24/19 1036 -- -- -- -- 12 --   12/24/19 1035 -- -- -- 71 12 95 %   12/24/19 1015 -- -- -- 78 24 94 %   12/24/19 0900 -- -- -- 82 16 90 %   12/24/19 0852 -- -- -- 84 19 93 % Ca/Mg/Phos:   Recent Labs     12/22/19  0545 12/23/19  0550 12/24/19  0527   CALCIUM 7.4* 7.4* 7.5*   MG 2.00 1.70* 1.70*   PHOS 2.6 3.0 2.6     Hepatic:   Recent Labs     12/22/19  0545 12/23/19  0550 12/24/19  0527   AST 77* 66* 65*   * 102* 83*   BILITOT 1.3* 1.4* 1.4*   ALKPHOS 49 60 71     Troponin:   Recent Labs     12/22/19  0545 12/23/19  0550 12/24/19  0527   TROPONINI 0.01 <0.01 <0.01     ProBNP:   No results for input(s): PROBNP in the last 72 hours. Lipids:   No results for input(s): CHOL, TRIG, HDL, LDLCALC, LABVLDL in the last 72 hours. ABGs:   Recent Labs     12/24/19  0520   PHART 7.435   PO2ART 90.5   [id number] 51.5*     PT/INR:   No results for input(s): PROTIME, INR in the last 72 hours. APTT:   No results for input(s): APTT in the last 72 hours. Lactic acid 10.4 on presentation 3.3 today, 3.4 yesterday    EKG: ST and sinus arrhythmia, t wave inversion inferiorally    DIAGNOSTICS:  Ct Head Wo Contrast    Result Date: 12/18/2019  No acute intracranial abnormality. Xr Chest Portable    Result Date: 12/20/2019  1. Recommend advancement of endotracheal tube 1.0 cm. 2. Bibasilar subsegmental atelectasis plus or minus mild pleural effusions, unchanged. Xr Chest Portable    Result Date: 12/19/2019  Nasogastric tube projects in intragastric position. Bibasilar atelectasis. Pneumonia or aspiration pneumonitis are differential considerations. Nonspecific bowel gas pattern. Xr Chest Portable    Result Date: 12/19/2019  Small bilateral pleural effusions with adjacent atelectasis. Xr Chest Portable    Result Date: 12/18/2019  Low lung volume due to inspiratory effort. Mild bibasilar atelectasis with no other significant finding in the chest.     Ct Chest Abdomen Pelvis Wo Contrast    Result Date: 12/18/2019  Findings compatible with small bowel obstruction with transition point in the right mid abdomen, likely in mid to distal ileum. No obvious etiology identified.  Small volume ascites, likely reactive. Areas of consolidation to the lungs bilaterally which may relate to atelectasis although multifocal pneumonia or aspiration pneumonitis could have a similar appearance. Patulous/distended appearance to the esophagus with fluid attenuation material within nearly the entirety of the esophagus, possibly secondary to the bowel obstruction and refluxed gastric contents. Patient may be at risk for aspiration. Trace bilateral pleural effusions.          ASSESSMENT AND PLAN    Active Problems:    Small bowel obstruction (HCC)  Plan: pod #6  still on vent looks like to be weaned soon, surgery requesting more diuresis first    Aspiration pneumonia of both lower lobes ([address]  Plan: on treatment with cefepime and flagyl should be adequate coverage for aspiration    Hypoxia  Plan: , management per ICU team    Septicemia [facility]) greatly improved  Plan: continue supportive aggressive care    Septic shock ([address]  Plan:backing off on pressors, to wean soon  ANISH  resolved  Continued treatment plan per nephrology dialysis not needed  Abnormal ekg and slight elevation troponin  Went to normal on repeat  Cognitive impairment likely alzheimers  Discussed my concern with Nursing and family that he may take longer to clear from narcotics and sedatives and might have worse confusion    Appropriate diagnostic studies and consults ordered  Nasim Costa MD

## 2019-12-24 NOTE — PROGRESS NOTES
at bedside rounding, updates provided and new orders noted including PICCLINE insertion. Pt's creatine is WNL no need to call nephro per Dr. Damion Mckinney. PICC RN called and notified.

## 2019-12-24 NOTE — PROGRESS NOTES
Intravenous, Q6H PRN  acetaminophen (TYLENOL) suppository 650 mg, 650 mg, Rectal, Q4H PRN  metronidazole (FLAGYL) 500 mg in NaCl 100 mL IVPB premix, 500 mg, Intravenous, Q8H  vasopressin 20 Units in dextrose 5 % 100 mL infusion, 0.04 Units/min, Intravenous, Continuous  norepinephrine (LEVOPHED) 16 mg in dextrose 5% 250 mL infusion, 2 mcg/min, Intravenous, Continuous  chlorhexidine (PERIDEX) 0.12 % solution 15 mL, 15 mL, Mouth/Throat, BID  fentaNYL 10 mcg/mL infusion, 50 mcg/hr, Intravenous, Continuous  fentaNYL (SUBLIMAZE) injection 25 mcg, 25 mcg, Intravenous, Q1H PRN  glucose (GLUTOSE) 40 % oral gel 15 g, 15 g, Oral, PRN  dextrose 50 % IV solution, 12.5 g, Intravenous, PRN  glucagon (rDNA) injection 1 mg, 1 mg, Intramuscular, PRN  dextrose 5 % solution, 100 mL/hr, Intravenous, PRN  insulin lispro (HUMALOG) injection vial 0-6 Units, 0-6 Units, Subcutaneous, Q4H  dextran 70-hypromellose (TEARS NATURALE) 0.1-0.3 % opthalmic solution 1 drop, 1 drop, Both Eyes, Q4H PRN  Facility-Administered Medications Ordered in Other Encounters: iopamidol (ISOVUE-370) 76 % injection 10 mL, 10 mL, Other, ONCE PRN    Data reviewed:  Labs:  CBC:   Recent Labs     19  0545 19  0550 19  0527   WBC 10.9 14.0* 16.0*   HGB 10.3* 9.9* 9.8*   HCT 30.0* 28.6* 28.8*   .6* 101.3* 101.3*   PLT 85* 114* 149     BMP:   Recent Labs     19  0545 19  0550 19  1400 19  0527   * 134*  --  132*   K 3.6 3.0* 4.0 3.0*   CL 92* 89*  --  84*   CO2 28 29  --  31   PHOS 2.6 3.0  --  2.6   BUN 19 18  --  13   CREATININE 1.1 1.0  --  1.0     LIVER PROFILE:   Recent Labs     19  0545 19  0550 19  0527   AST 77* 66* 65*   * 102* 83*   BILIDIR 0.8* 0.9* 0.9*   BILITOT 1.3* 1.4* 1.4*   ALKPHOS 49 60 71     PT/INR: No results for input(s): PROTIME, INR in the last 72 hours. APTT: No results for input(s): APTT in the last 72 hours.     AB/23- 7.42/ (on 40%)  - 7.44/52/91 (on 40%)    Cultures:   Blood culture (12/18): Negative  Sputum culture (12/19): Normal lio   Nasal MRSA probe: Negative      Films:  Chest images and reports were reviewed by me. My interpretation is:  CXR (12/24/19): Stable bilateral pleuro-parenchymal disease; ETT, gastric tube and pacemaker in place      Assessment:     Septic shock  Small bowel obstruction  Acute hypoxic respiratory failure  Aspiration pneumonia  Pleural effusions  Ischemic hepatitis  Thrombocytopenia       Plan:    Septic shock  - Due to bowel ischemia from closed loop obstruction and aspiration pneumonia  - Levophed and vasopressin to keep MAP>65  - Cefepime and flagyl (day #6)    Small bowel obstruction  - s/p ex-laparotomy and lysis of adhesions on 12/18  - Per general surgery    Acute hypoxic respiratory failure  - On SBT, but has rapid and shallow breathing. Try pressure support wean  - Lasix twice daily     Aspiration pneumonia  - Cefepime and flagyl (day #6)    Pleural effusions   - Lasix twice daily  - Strict I/Os    Ischemic hepatitis  - Keep MAP>65  - Check labs in a.m. Thrombocytopenia   - Likely due to sepsis. Antibiotics as above      Prophylaxis   DVT- lovenox  GI- protonix  VAP- peridex    I spent 35 minutes of critical care time with this patient excluding procedures.     David Guerrero MD  [facility] Pulmonary, Critical Care and Sleep

## 2019-12-24 NOTE — PROGRESS NOTES
Department of Internal Medicine  General Internal Medicine  Attending Progress Note    Chief Complaint:originally vomiting and diarrhea      HPI:   SUBJECTIVE:  63 yo male patient with a history of down's syndrome, colon cancer (resected),  Pacemaker for chb who presented with SBO and septic shock. He is awake currently and able to answer yes and no appropriately. He has been weaned off one of his pressors and his bicarb drip is stopped. His brother, a retired inpt pharmacist is at his side. Juanita Henry did have a bm this morning. He has not been fed in any way yet.       Past Medical History:   Diagnosis Date    Acne rosacea     Cognitive impairment     sees Dr Agustin Gr, started Aricept 12/2019    Colon cancer ([facility] [address] 1/7/2014    ileocecal valve/Leuenberger/yrly surveillance    Down syndrome     Gout     HIGH CHOLESTEROL     2017 3.1 % framingham risk score    History of diarrhea     since childhood, worse since partial colectomy resolved after a few months    Macrocytosis     followed by Dr Christian Calvillo and released    Seborrheic dermatitis     Seizure disorder ([facility] [address] 2019    Status post biventricular cardiac pacemaker insertion 01/2019    for long sinus pauses with syncopal willis    Status post partial colectomy 2016    diarrhea    Syncope and collapse 01/2019    recurrent syncope, found seizures and sinus pauses    Weight loss 2015    since 2014 at the time of partial colectomy lost 25 lbs     Past Surgical History:   Procedure Laterality Date    CATARACT REMOVAL WITH IMPLANT  2007    Bilateral    COLON SURGERY  1/7//2014    for removal colon cancer    COLONOSCOPY  02/27/2017    Dr Kristin Zhou N/A 8/21/2019    COLONOSCOPY WITH BIOPSY performed by Teri Osler, MD at [address] N/A 12/18/2019    EXPLORATORY LAPAROTOMY, LYSIS OF ADHESIONS performed by Ron Tomlinson MD at [address] [place] [address] History   Problem Relation Age of Onset    Diabetes Brother     Hypertension Brother     Stroke Brother     High Cholesterol Brother     Cancer Mother         melanoma    Other Father         cerebral aneurysm     Social History     Tobacco Use    Smoking status: Never Smoker    Smokeless tobacco: Never Used   Substance Use Topics    Alcohol use: No    Drug use: No       Prior to Admission medications    Medication Sig Start Date End Date Taking?  Authorizing Provider   doxycycline (VIBRAMYCIN) 50 MG capsule Take 50 mg by mouth 2 times daily 11/26/19  Yes Historical Provider, MD   simvastatin (ZOCOR) 20 MG tablet Take 1 tablet by mouth daily 12/10/19  Yes Juventino Rene MD   allopurinol (ZYLOPRIM) 100 MG tablet Take 1 tablet by mouth daily 12/9/19  Yes Juventino Rene MD   donepezil (ARICEPT) 5 MG tablet Take 1 tablet by mouth nightly 12/9/19  Yes Juventino Rene MD   Multiple Vitamins-Minerals (THERAPEUTIC MULTIVITAMIN-MINERALS) tablet Take 1 tablet by mouth daily   Yes Historical Provider, MD   Azelaic Acid 15 % GEL Apply topically daily 12/2/19   Historical Provider, MD   hydrocortisone (HYTONE) 2.5 % lotion Apply topically daily 11/26/19   Historical Provider, MD   metroNIDAZOLE (METROCREAM) 0.75 % cream Apply topically daily 11/26/19   Historical Provider, MD         ROS:  A comprehensive review of systems was negative except for: vomiting and diararhea     OBJECTIVE:      PHYSICAL:  Intake/Output:   Patient Vitals for the past 8 hrs:   BP Temp Temp src Pulse Resp SpO2   12/23/19 2000 -- 98.4 °F (36.9 °C) Axillary 76 12 94 %   12/23/19 1944 -- -- -- 77 12 95 %   12/23/19 1900 -- -- -- 85 19 97 %   12/23/19 1845 -- -- -- 80 18 96 %   12/23/19 1830 -- -- -- 125 25 96 %   12/23/19 1815 -- -- -- 82 14 96 %   12/23/19 1800 -- -- -- 100 17 95 %   12/23/19 1745 -- -- -- 89 14 96 %   12/23/19 1730 -- -- -- 83 14 96 %   12/23/19 1715 -- -- -- 89 24 95 %   12/23/19 1700 -- -- -- 82 13 96 %   12/23/19 1645 -- -- -- 80 20 95 %   12/23/19 1630 -- -- -- 88 20 96 %   12/23/19 1615 -- -- -- 94 20 94 %   12/23/19 1600 (!) 115/50 98.6 °F (37 °C) Axillary 95 23 97 %   12/23/19 1545 -- -- -- 81 16 95 %   12/23/19 1530 -- -- -- 86 23 95 %   12/23/19 1515 -- -- -- 91 21 97 %   12/23/19 1500 -- -- -- 87 21 95 %   12/23/19 1445 -- -- -- 88 18 95 %   12/23/19 1430 -- -- -- 85 20 96 %   12/23/19 1415 -- -- -- 85 21 95 %   12/23/19 1400 -- -- -- 86 20 95 %   12/23/19 1345 -- -- -- 84 24 94 %   12/23/19 1330 -- -- -- 89 22 95 %   12/23/19 1315 -- -- -- 86 23 95 %     I/O last 3 completed shifts:   In: 1359.9 [I.V.:649.9; NG/GT:10; IV Piggyback:700]  Out: 4385 [Urine:4385]  I/O this shift:  In: -   Out: 765 [Urine:765]  VITALS:    BP (!) 115/50   Pulse 76   Temp 98.4 °F (36.9 °C) (Axillary)   Resp 12   Ht 5' 6\" (1.676 m)   Wt 157 lb 6.5 oz (71.4 kg)   SpO2 94%   BMI 25.41 kg/m²     General Appearance: awake and comfortable appearing on vent  Skin: warm and dry, no rash or erythema  Head: normocephalic and atraumatic  Eyes: conjunctivae normal and sclera anicteric  ENT: ng tube in place very decreased output (0)  Neck: neck supple and non tender without mass, no thyromegaly or thyroid nodules, no cervical lymphadenopathy   Pulmonary/Chest: clear to auscultation bilaterally- upper lobes symmetric bs Cardiovascular: normal rate, normal S1 and S2, no gallops and no carotid bruits  Abdomen: soft bs  Extremities: no cyanosis, clubbing or edema  Musculoskeletal: no swollen joints and no tender joints,  Neurologic: following commands moves all 4    DATA:   Labs:  CBC:   Recent Labs     12/21/19  0540 12/22/19  0545 12/23/19  0550   WBC 8.9 10.9 14.0*   HGB 9.4* 10.3* 9.9*   PLT 84* 85* 114*     BMP:    Recent Labs     12/21/19  0540 12/22/19  0545 12/23/19  0550 12/23/19  1400   * 133* 134*  --    K 3.3* 3.6 3.0* 4.0   CL 94* 92* 89*  --    CO2 26 28 29  --    BUN 26* 19 18  --    CREATININE 1.5* 1.1 1.0  --    GLUCOSE 139* 84 83  --      POC GLUCOSE:    Recent Labs in mid to distal ileum. No obvious etiology identified. Small volume ascites, likely reactive. Areas of consolidation to the lungs bilaterally which may relate to atelectasis although multifocal pneumonia or aspiration pneumonitis could have a similar appearance. Patulous/distended appearance to the esophagus with fluid attenuation material within nearly the entirety of the esophagus, possibly secondary to the bowel obstruction and refluxed gastric contents. Patient may be at risk for aspiration. Trace bilateral pleural effusions.          ASSESSMENT AND PLAN    Active Problems:    Small bowel obstruction (HCC)  Plan: pod #5, still on vent looks like to be weaned soon, surgery requesting more diuresis first    Aspiration pneumonia of both lower lobes ([facility] [address]  Plan: on treatment with cefepime and flagyl should be adequate coverage for aspiration    Hypoxia  Plan: requiring high peep and FIO2, management per ICU team    Septicemia [facility]) greatly improved  Plan: continue supportive aggressive care    Septic shock ([facility] [address]  Plan:backing off on pressors, to wean soon  ANISH  resolved  Continued treatment plan per nephrology dialysis not needed  Abnormal ekg and slight elevation troponin  Went to normal on repeat      Appropriate diagnostic studies and consults ordered  Sony Crespo MD

## 2019-12-24 NOTE — PROGRESS NOTES
Pt on vent, VSS with Vaso and Levo gtt infusing, see EMAR for rates. Pt follows commands and nods head appropriately to yes/no QA.  Fentanyl gtt stopped for possible SBT this am.

## 2019-12-25 ENCOUNTER — APPOINTMENT (OUTPATIENT)
Dept: GENERAL RADIOLOGY | Age: 59
DRG: 853 | End: 2019-12-25
Payer: COMMERCIAL

## 2019-12-25 LAB
ALBUMIN SERPL-MCNC: 2.2 G/DL (ref 3.4–5)
ALP BLD-CCNC: 78 U/L (ref 40–129)
ALT SERPL-CCNC: 76 U/L (ref 10–40)
ANION GAP SERPL CALCULATED.3IONS-SCNC: 18 MMOL/L (ref 3–16)
AST SERPL-CCNC: 64 U/L (ref 15–37)
BASE EXCESS ARTERIAL: 8.2 MMOL/L (ref -3–3)
BASE EXCESS ARTERIAL: 8.5 MMOL/L (ref -3–3)
BASOPHILS ABSOLUTE: 0.1 K/UL (ref 0–0.2)
BASOPHILS RELATIVE PERCENT: 0.5 %
BILIRUB SERPL-MCNC: 1.3 MG/DL (ref 0–1)
BILIRUBIN DIRECT: 0.9 MG/DL (ref 0–0.3)
BILIRUBIN, INDIRECT: 0.4 MG/DL (ref 0–1)
BUN BLDV-MCNC: 15 MG/DL (ref 7–20)
CALCIUM SERPL-MCNC: 7.8 MG/DL (ref 8.3–10.6)
CARBOXYHEMOGLOBIN ARTERIAL: 1.3 % (ref 0–1.5)
CARBOXYHEMOGLOBIN ARTERIAL: 1.4 % (ref 0–1.5)
CHLORIDE BLD-SCNC: 88 MMOL/L (ref 99–110)
CO2: 29 MMOL/L (ref 21–32)
CREAT SERPL-MCNC: 0.8 MG/DL (ref 0.9–1.3)
EOSINOPHILS ABSOLUTE: 0.1 K/UL (ref 0–0.6)
EOSINOPHILS RELATIVE PERCENT: 0.5 %
GFR AFRICAN AMERICAN: >60
GFR NON-AFRICAN AMERICAN: >60
GLUCOSE BLD-MCNC: 102 MG/DL (ref 70–99)
GLUCOSE BLD-MCNC: 104 MG/DL (ref 70–99)
GLUCOSE BLD-MCNC: 112 MG/DL (ref 70–99)
GLUCOSE BLD-MCNC: 113 MG/DL (ref 70–99)
GLUCOSE BLD-MCNC: 119 MG/DL (ref 70–99)
GLUCOSE BLD-MCNC: 139 MG/DL (ref 70–99)
GLUCOSE BLD-MCNC: 143 MG/DL (ref 70–99)
HCO3 ARTERIAL: 32.4 MMOL/L (ref 21–29)
HCO3 ARTERIAL: 32.6 MMOL/L (ref 21–29)
HCT VFR BLD CALC: 27.9 % (ref 40.5–52.5)
HEMOGLOBIN, ART, EXTENDED: 9.5 G/DL (ref 13.5–17.5)
HEMOGLOBIN, ART, EXTENDED: 9.9 G/DL (ref 13.5–17.5)
HEMOGLOBIN: 9.6 G/DL (ref 13.5–17.5)
LYMPHOCYTES ABSOLUTE: 0.8 K/UL (ref 1–5.1)
LYMPHOCYTES RELATIVE PERCENT: 5.3 %
MAGNESIUM: 1.9 MG/DL (ref 1.8–2.4)
MCH RBC QN AUTO: 34.3 PG (ref 26–34)
MCHC RBC AUTO-ENTMCNC: 34.3 G/DL (ref 31–36)
MCV RBC AUTO: 100.1 FL (ref 80–100)
METHEMOGLOBIN ARTERIAL: 0.8 %
METHEMOGLOBIN ARTERIAL: 0.9 %
MONOCYTES ABSOLUTE: 1.1 K/UL (ref 0–1.3)
MONOCYTES RELATIVE PERCENT: 7.2 %
NEUTROPHILS ABSOLUTE: 13.2 K/UL (ref 1.7–7.7)
NEUTROPHILS RELATIVE PERCENT: 86.5 %
O2 CONTENT ARTERIAL: 13 ML/DL
O2 CONTENT ARTERIAL: 14 ML/DL
O2 SAT, ARTERIAL: 97.5 %
O2 SAT, ARTERIAL: 99.2 %
O2 THERAPY: ABNORMAL
O2 THERAPY: ABNORMAL
PCO2 ARTERIAL: 44.1 MMHG (ref 35–45)
PCO2 ARTERIAL: 44.9 MMHG (ref 35–45)
PDW BLD-RTO: 13.3 % (ref 12.4–15.4)
PERFORMED ON: ABNORMAL
PH ARTERIAL: 7.47 (ref 7.35–7.45)
PH ARTERIAL: 7.48 (ref 7.35–7.45)
PHOSPHORUS: 2.3 MG/DL (ref 2.5–4.9)
PLATELET # BLD: 170 K/UL (ref 135–450)
PMV BLD AUTO: 10.2 FL (ref 5–10.5)
PO2 ARTERIAL: 150 MMHG (ref 75–108)
PO2 ARTERIAL: 77.8 MMHG (ref 75–108)
POTASSIUM SERPL-SCNC: 3.3 MMOL/L (ref 3.5–5.1)
RBC # BLD: 2.79 M/UL (ref 4.2–5.9)
SODIUM BLD-SCNC: 135 MMOL/L (ref 136–145)
TCO2 ARTERIAL: 33.8 MMOL/L
TCO2 ARTERIAL: 33.9 MMOL/L
TOTAL PROTEIN: 5.1 G/DL (ref 6.4–8.2)
WBC # BLD: 15.2 K/UL (ref 4–11)

## 2019-12-25 PROCEDURE — 99024 POSTOP FOLLOW-UP VISIT: CPT | Performed by: SURGERY

## 2019-12-25 PROCEDURE — 2500000003 HC RX 250 WO HCPCS: Performed by: NURSE PRACTITIONER

## 2019-12-25 PROCEDURE — 80076 HEPATIC FUNCTION PANEL: CPT

## 2019-12-25 PROCEDURE — 99291 CRITICAL CARE FIRST HOUR: CPT | Performed by: INTERNAL MEDICINE

## 2019-12-25 PROCEDURE — 80069 RENAL FUNCTION PANEL: CPT

## 2019-12-25 PROCEDURE — 2580000003 HC RX 258: Performed by: INTERNAL MEDICINE

## 2019-12-25 PROCEDURE — 83735 ASSAY OF MAGNESIUM: CPT

## 2019-12-25 PROCEDURE — 94003 VENT MGMT INPAT SUBQ DAY: CPT

## 2019-12-25 PROCEDURE — 2580000003 HC RX 258: Performed by: NURSE PRACTITIONER

## 2019-12-25 PROCEDURE — 82803 BLOOD GASES ANY COMBINATION: CPT

## 2019-12-25 PROCEDURE — 2580000003 HC RX 258: Performed by: SURGERY

## 2019-12-25 PROCEDURE — 2000000000 HC ICU R&B

## 2019-12-25 PROCEDURE — 2500000003 HC RX 250 WO HCPCS: Performed by: SURGERY

## 2019-12-25 PROCEDURE — 94761 N-INVAS EAR/PLS OXIMETRY MLT: CPT

## 2019-12-25 PROCEDURE — 94750 HC PULMONARY COMPLIANCE STUDY: CPT

## 2019-12-25 PROCEDURE — 2700000000 HC OXYGEN THERAPY PER DAY

## 2019-12-25 PROCEDURE — 6370000000 HC RX 637 (ALT 250 FOR IP): Performed by: NURSE PRACTITIONER

## 2019-12-25 PROCEDURE — C9113 INJ PANTOPRAZOLE SODIUM, VIA: HCPCS | Performed by: NURSE PRACTITIONER

## 2019-12-25 PROCEDURE — 99232 SBSQ HOSP IP/OBS MODERATE 35: CPT | Performed by: INTERNAL MEDICINE

## 2019-12-25 PROCEDURE — 2500000003 HC RX 250 WO HCPCS: Performed by: INTERNAL MEDICINE

## 2019-12-25 PROCEDURE — 6360000002 HC RX W HCPCS: Performed by: INTERNAL MEDICINE

## 2019-12-25 PROCEDURE — 6360000002 HC RX W HCPCS: Performed by: NURSE PRACTITIONER

## 2019-12-25 PROCEDURE — 71045 X-RAY EXAM CHEST 1 VIEW: CPT

## 2019-12-25 PROCEDURE — 85025 COMPLETE CBC W/AUTO DIFF WBC: CPT

## 2019-12-25 PROCEDURE — 6370000000 HC RX 637 (ALT 250 FOR IP): Performed by: INTERNAL MEDICINE

## 2019-12-25 RX ORDER — FUROSEMIDE 10 MG/ML
40 INJECTION INTRAMUSCULAR; INTRAVENOUS EVERY 12 HOURS
Status: DISCONTINUED | OUTPATIENT
Start: 2019-12-25 | End: 2019-12-27

## 2019-12-25 RX ORDER — FUROSEMIDE 10 MG/ML
40 INJECTION INTRAMUSCULAR; INTRAVENOUS ONCE
Status: DISCONTINUED | OUTPATIENT
Start: 2019-12-25 | End: 2019-12-25

## 2019-12-25 RX ADMIN — Medication 10 ML: at 08:19

## 2019-12-25 RX ADMIN — METRONIDAZOLE 500 MG: 500 INJECTION, SOLUTION INTRAVENOUS at 06:15

## 2019-12-25 RX ADMIN — SODIUM CHLORIDE, PRESERVATIVE FREE 10 ML: 5 INJECTION INTRAVENOUS at 08:20

## 2019-12-25 RX ADMIN — METRONIDAZOLE 500 MG: 500 INJECTION, SOLUTION INTRAVENOUS at 15:39

## 2019-12-25 RX ADMIN — FUROSEMIDE 40 MG: 10 INJECTION, SOLUTION INTRAMUSCULAR; INTRAVENOUS at 11:26

## 2019-12-25 RX ADMIN — CHLORHEXIDINE GLUCONATE 15 ML: 1.2 RINSE ORAL at 08:19

## 2019-12-25 RX ADMIN — ENOXAPARIN SODIUM 40 MG: 40 INJECTION SUBCUTANEOUS at 08:18

## 2019-12-25 RX ADMIN — CHLORHEXIDINE GLUCONATE 15 ML: 1.2 RINSE ORAL at 20:58

## 2019-12-25 RX ADMIN — FUROSEMIDE 40 MG: 10 INJECTION, SOLUTION INTRAMUSCULAR; INTRAVENOUS at 20:40

## 2019-12-25 RX ADMIN — CEFEPIME HYDROCHLORIDE 2 G: 2 INJECTION, POWDER, FOR SOLUTION INTRAVENOUS at 20:39

## 2019-12-25 RX ADMIN — PANTOPRAZOLE SODIUM 40 MG: 40 INJECTION, POWDER, FOR SOLUTION INTRAVENOUS at 08:19

## 2019-12-25 RX ADMIN — VASOPRESSIN 0.04 UNITS/MIN: 20 INJECTION INTRAVENOUS at 12:48

## 2019-12-25 RX ADMIN — CEFEPIME HYDROCHLORIDE 2 G: 2 INJECTION, POWDER, FOR SOLUTION INTRAVENOUS at 08:19

## 2019-12-25 RX ADMIN — POTASSIUM CHLORIDE: 2 INJECTION, SOLUTION, CONCENTRATE INTRAVENOUS at 18:09

## 2019-12-25 RX ADMIN — INSULIN LISPRO 1 UNITS: 100 INJECTION, SOLUTION INTRAVENOUS; SUBCUTANEOUS at 20:39

## 2019-12-25 RX ADMIN — VASOPRESSIN 0.04 UNITS/MIN: 20 INJECTION INTRAVENOUS at 02:15

## 2019-12-25 RX ADMIN — METRONIDAZOLE 500 MG: 500 INJECTION, SOLUTION INTRAVENOUS at 22:22

## 2019-12-25 RX ADMIN — SODIUM CHLORIDE, PRESERVATIVE FREE 10 ML: 5 INJECTION INTRAVENOUS at 20:40

## 2019-12-25 RX ADMIN — VASOPRESSIN 0.04 UNITS/MIN: 20 INJECTION INTRAVENOUS at 19:18

## 2019-12-25 RX ADMIN — POTASSIUM PHOSPHATE, MONOBASIC AND POTASSIUM PHOSPHATE, DIBASIC 20 MMOL: 224; 236 INJECTION, SOLUTION, CONCENTRATE INTRAVENOUS at 11:26

## 2019-12-25 ASSESSMENT — PULMONARY FUNCTION TESTS
PIF_VALUE: 15
PIF_VALUE: 15
PIF_VALUE: 10
PIF_VALUE: 17
PIF_VALUE: 14
PIF_VALUE: 13
PIF_VALUE: 19
PIF_VALUE: 15
PIF_VALUE: 20
PIF_VALUE: 15
PIF_VALUE: 24
PIF_VALUE: 40
PIF_VALUE: 18
PIF_VALUE: 15
PIF_VALUE: 10
PIF_VALUE: 20
PIF_VALUE: 21
PIF_VALUE: 15
PIF_VALUE: 15
PIF_VALUE: 17
PIF_VALUE: 20
PIF_VALUE: 26
PIF_VALUE: 16
PIF_VALUE: 17
PIF_VALUE: 40
PIF_VALUE: 26
PIF_VALUE: 18

## 2019-12-25 ASSESSMENT — PAIN SCALES - GENERAL: PAINLEVEL_OUTOF10: 0

## 2019-12-25 NOTE — PROGRESS NOTES
[facility] [phone]                                     Daily Progress Note                                                         Pt Name: Bozena Montenegro  Medical Record Number: [id number]  Date of Birth 1960   Today's Date: 12/25/2019  Chief Complaint   Patient presents with    Shortness of Breath     onset - 1530 today    Altered Mental Status     pt lethargic, unsteady        ASSESSMENT/PLAN  Small bowel obstruction, hypovolemic shock  -12/18 exploratory laparotomy with RITESH, reduction of closed loop SBO    -pressors off  -WBC slightly better  -BMs yesterday  -weaning vent slowly. Hopefully able to extubate today  -Likely D/C NG and start clears if able to extubate      Blanka Quesada has slightly improved from yesterday. He is intubated, answering yes no questions appropriately. OBJECTIVE  VITALS:  height is 5' 6\" (1.676 m) and weight is 157 lb 6.5 oz (71.4 kg). His axillary temperature is 98.4 °F (36.9 °C). His blood pressure is 117/52 (abnormal) and his pulse is 60. His respiration is 19 and oxygen saturation is 100%. INTAKE/OUTPUT:      Intake/Output Summary (Last 24 hours) at 12/25/2019 7655  Last data filed at 12/25/2019 0400  Gross per 24 hour   Intake 584.07 ml   Output 2340 ml   Net -1755.93 ml     GENERAL: more alert today following commands, no distress, intubated,   LUNGS: intubated  HEART: normal rate and regular rhythm  ABDOMEN: soft, appropriately-tender, non-distended   EXTREMITY: no cyanosis, clubbing or edema    I/O last 3 completed shifts:   In: 968.7 [I.V.:588.7; NG/GT:30; IV Piggyback:350]  Out: 7558 [Urine:2730; Emesis/NG output:200]      LABS  Recent Labs     12/24/19  0527 12/24/19  1523 12/25/19  0537   WBC 16.0*  --  15.2*   HGB 9.8*  --  9.6*   HCT 28.8*  --  27.9*     --  170   *  --   --    K 3.0* 3.7  --    CL 84*  --   --    CO2 31  --   --    BUN 13  --   -- CREATININE 1.0  --   --    MG 1.70*  --   --    PHOS 2.6  --   --    CALCIUM 7.5*  --   --    AST 65*  --   --    ALT 83*  --   --    BILITOT 1.4*  --   --    BILIDIR 0.9*  --   --        Electronically signed by Sanchez Nina MD on 12/25/2019 at 6:11 AM

## 2019-12-25 NOTE — PROGRESS NOTES
[address]                                                                        [id number]                                                                 Laura Bynum                                                         Phone Number: [phone]                                                           Fax Number: [phone]                                                             Nephrology Progress Note    Chief Complaint: Diarrhea for the last few days, abdominal distention, one episode of vomiting    We are following this patient for ANISH, Hyperkalemia and acidosis. The patient was admitted with SBO - Taken to OR on 12/18/19 for Ex Lap and RITESH. Interval History:  -No acute events overnight  -Cr continues to improve daily      ROS: No new or active complaints  SHx: Brother at bedside. All questions answered        Physical Exam:    BP (!) 117/52   Pulse 79   Temp 99.3 °F (37.4 °C) (Oral)   Resp 15   Ht 5' 6\" (1.676 m)   Wt 157 lb 13.6 oz (71.6 kg)   SpO2 96%   BMI 25.48 kg/m²     24HR INTAKE/OUTPUT:      Intake/Output Summary (Last 24 hours) at 12/25/2019 1324  Last data filed at 12/25/2019 1227  Gross per 24 hour   Intake 1100.19 ml   Output 2130 ml   Net -1029.81 ml       Patient Vitals for the past 96 hrs (Last 3 readings):   Weight   12/25/19 0600 157 lb 13.6 oz (71.6 kg)   12/23/19 0030 157 lb 6.5 oz (71.4 kg)   12/22/19 0100 162 lb 11.2 oz (73.8 kg)     Gen: NAD  HEENT: Sclera anicteric, (+) ET tube, (+) OGT  CV: RRR, S1/S2  Pulm: Mechanical breath sounds  Abd: Soft, ND, (+) Dressing  Ext: No edema in B/L LE, Trace sacral edema  : (+) Power  Neuro: Awake/Alert, Nods head to questions  Skin: Warm. No visible rashes appreciated.  Dressings C/D/I       LAB DATA:    CBC:   Lab Results   Component Value Date    WBC 15.2 12/25/2019    RBC 2.79 12/25/2019    RBC 4.68 03/13/2017    HGB 9.6 12/25/2019    HCT 27.9 12/25/2019    .1 12/25/2019    MCH 34.3 12/25/2019    MCHC 34.3 12/25/2019    RDW 13.3 12/25/2019     12/25/2019    MPV 10.2 12/25/2019     BMP:    Lab Results   Component Value Date     12/25/2019    K 3.3 12/25/2019    K 5.5 12/19/2019    CL 88 12/25/2019    CO2 29 12/25/2019    BUN 15 12/25/2019    CREATININE 0.8 12/25/2019    CALCIUM 7.8 12/25/2019    GFRAA >60 12/25/2019    GFRAA >60 04/09/2013    LABGLOM >60 12/25/2019    GLUCOSE 112 12/25/2019    GLUCOSE 115 03/13/2017     Ionized Calcium:  No results found for: IONCA  Magnesium:    Lab Results   Component Value Date    MG 1.90 12/25/2019     Phosphorus:    Lab Results   Component Value Date    PHOS 2.3 12/25/2019     U/A:    Lab Results   Component Value Date    COLORU DK YELLOW 12/19/2019    PHUR 5.0 12/19/2019    WBCUA 2 12/19/2019    RBCUA 20-50 12/19/2019    CLARITYU Clear 12/19/2019    SPECGRAV 1.023 12/19/2019    LEUKOCYTESUR Negative 12/19/2019    UROBILINOGEN 0.2 12/19/2019    BILIRUBINUR Negative 12/19/2019    BLOODU MODERATE 12/19/2019    GLUCOSEU Negative 12/19/2019         IMPRESSION/RECOMMENDATIONS:      1. ANISH (non oliguric) - in the setting of hypotension, vomiting, diarrhea, currently, required pressor support and getting aggressive volume resuscitation. Prerenal etiology    -Cr continues to improve daily  -Continue lasix 40mg IV BID for now    2. Hyperkalemia - resolved  -Now developing hypokalemia  -Replete K+ as KPhos today    3. Alkalosis:  - metabolic acidosis resolved. Now with contraction alkalosis  -Acceptable given diuresis    4. Respiratory failure requiring Ventilator support  -Per pulmonary    5. Shock:  -Weaned off pressors  -Per ICU Team    6.  Hypophosphatemia: Replete Phos as KPhos today

## 2019-12-25 NOTE — PLAN OF CARE
Problem: Nutrition  Goal: Optimal nutrition therapy  Outcome: Ongoing  Note:   Dr. Abdifatah Qiu concerned about nutritional status. Dr. Chiquis Jones paged for nutritional orders. Orders given for TPN as pt is still on vasopressor. TPN started through PICC line. Problem: Risk for Impaired Skin Integrity  Goal: Tissue integrity - skin and mucous membranes  Description  Structural intactness and normal physiological function of skin and  mucous membranes. Intervention: TURN PATIENT  Note:   Pt is at risk for skin breakdown. Full skin assessment completed. Pt turned Q2H to relieve pressure from bony prominences. Heels elevated off bed. Problem: Restraint Use - Nonviolent/Non-Self-Destructive Behavior:  Intervention: Assess potential safety hazards  Note:   Pt is at risk for falls. Bed wheels locked and bed in lowest position. Pt reminded to call before getting out of bed. Call light within reach. Falling star system in place. Problem: Falls - Risk of: Intervention: Assess potential safety hazards  Note:   Pt is at risk for falls. Bed wheels locked and bed in lowest position. Pt reminded to call before getting out of bed. Call light within reach. Falling star system in place.

## 2019-12-25 NOTE — PROGRESS NOTES
4 Eyes Skin Assessment     The patient is being assess for  Shift Handoff    I agree that 2 RN's have performed a thorough Head to Toe Skin Assessment on the patient. ALL assessment sites listed below have been assessed. Areas assessed by both nurses:   [x]   Head, Face, and Ears   [x]   Shoulders, Back, and Chest  [x]   Arms, Elbows, and Hands   [x]   Coccyx, Sacrum, and IschIum  [x]   Legs, Feet, and Heels        Does the Patient have Skin Breakdown? No         Stuart Prevention initiated:  Yes   Wound Care Orders initiated:  No      [facility] nurse consulted for Pressure Injury (Stage 3,4, Unstageable, DTI, NWPT, and Complex wounds), New and Established Ostomies:  No      Nurse 1 eSignature: Electronically signed by Ja Rascon RN on 12/24/19 at 7:21 PM    **SHARE this note so that the co-signing nurse is able to place an eSignature**    Nurse 2 eSignature: Electronically signed by Sue Garcia.  Vinay Justin on 12/24/19 at 11:22 PM

## 2019-12-25 NOTE — PROGRESS NOTES
Department of Internal Medicine  General Internal Medicine  Attending Progress Note    Chief Complaint:originally vomiting and diarrhea      HPI:   SUBJECTIVE:  65 yo male patient with a history of down's syndrome, colon cancer (resected),  Pacemaker for chb who presented with SBO and septic shock. He is awake currently and able to answer yes and no appropriately. He has been weaned off one of his pressors and his bicarb drip is stopped. His brother, a retired inpt pharmacist is at his side. Sheeba Daniel did have a bm this morning. He has not been fed in any way yet. Today wide awake off the Fentanyl since earlier this morning, to have weaning parameters rechecked and a second attempt at pressure support. Yesterday Vt was too low   He is still having trouble getting off the vent despite being off all sedatives and narcs and looking much better vt only 200 and rr goes up 40 with trials.     Has not eaten yet    Past Medical History:   Diagnosis Date    Acne rosacea     Cognitive impairment     sees Dr Jenise Ramirez, started Aricept 12/2019    Colon cancer ([facility] [address] 1/7/2014    ileocecal valve/Leuenberger/yrly surveillance    Down syndrome     Gout     HIGH CHOLESTEROL     2017 3.1 % framingham risk score    History of diarrhea     since childhood, worse since partial colectomy resolved after a few months    Macrocytosis     followed by Dr Meseret Leone and released    Seborrheic dermatitis     Seizure disorder ([address] 2019    Status post biventricular cardiac pacemaker insertion 01/2019    for long sinus pauses with syncopal willis    Status post partial colectomy 2016    diarrhea    Syncope and collapse 01/2019    recurrent syncope, found seizures and sinus pauses    Weight loss 2015    since 2014 at the time of partial colectomy lost 25 lbs     Past Surgical History:   Procedure Laterality Date    CATARACT REMOVAL WITH IMPLANT  2007    Bilateral    COLON SURGERY  1/7//2014    for removal colon cancer    COLONOSCOPY  02/27/2017    Dr Aime Rodriguez 8/21/2019    COLONOSCOPY WITH BIOPSY performed by Erendira Baum MD at [address] N/A 12/18/2019    EXPLORATORY LAPAROTOMY, LYSIS OF ADHESIONS performed by Chris Martinez MD at [address] [facility] History   Problem Relation Age of Onset    Diabetes Brother     Hypertension Brother     Stroke Brother     High Cholesterol Brother     Cancer Mother         melanoma    Other Father         cerebral aneurysm     Social History     Tobacco Use    Smoking status: Never Smoker    Smokeless tobacco: Never Used   Substance Use Topics    Alcohol use: No    Drug use: No       Prior to Admission medications    Medication Sig Start Date End Date Taking?  Authorizing Provider   doxycycline (VIBRAMYCIN) 50 MG capsule Take 50 mg by mouth 2 times daily 11/26/19  Yes Historical Provider, MD   simvastatin (ZOCOR) 20 MG tablet Take 1 tablet by mouth daily 12/10/19  Yes Anna Wheat MD   allopurinol (ZYLOPRIM) 100 MG tablet Take 1 tablet by mouth daily 12/9/19  Yes Anna Wheat MD   donepezil (ARICEPT) 5 MG tablet Take 1 tablet by mouth nightly 12/9/19  Yes Anna Wheat MD   Multiple Vitamins-Minerals (THERAPEUTIC MULTIVITAMIN-MINERALS) tablet Take 1 tablet by mouth daily   Yes Historical Provider, MD   Azelaic Acid 15 % GEL Apply topically daily 12/2/19   Historical Provider, MD   hydrocortisone (HYTONE) 2.5 % lotion Apply topically daily 11/26/19   Historical Provider, MD   metroNIDAZOLE (METROCREAM) 0.75 % cream Apply topically daily 11/26/19   Historical Provider, MD         ROS:  A comprehensive review of systems was negative except for: weakness , sob     OBJECTIVE:      PHYSICAL:  Intake/Output:   Patient Vitals for the past 8 hrs:   Temp Temp src Pulse Resp SpO2   12/25/19 1242 -- -- 79 15 96 %   12/25/19 1200 99.3 °F (37.4 °C) Oral 79 21 97 %   12/25/19 1100 -- -- 84 10 95 %   12/25/19 1000 -- -- 72 14 98 CALCIUM 7.4* 7.5* 7.8*   MG 1.70* 1.70* 1.90   PHOS 3.0 2.6 2.3*     Hepatic:   Recent Labs     12/23/19  0550 12/24/19 0527 12/25/19  0537   AST 66* 65* 64*   * 83* 76*   BILITOT 1.4* 1.4* 1.3*   ALKPHOS 60 71 78     Troponin:   Recent Labs     12/23/19  0550 12/24/19 0527   TROPONINI <0.01 <0.01     ProBNP:   No results for input(s): PROBNP in the last 72 hours. Lipids:   No results for input(s): CHOL, TRIG, HDL, LDLCALC, LABVLDL in the last 72 hours. ABGs:   Recent Labs     12/25/19  0952   PHART 7.472*   PO2ART 77.8   GDS4NID 44.9     PT/INR:   No results for input(s): PROTIME, INR in the last 72 hours. APTT:   No results for input(s): APTT in the last 72 hours. Lactic acid 10.4 on presentation 3.3 today, 3.4 yesterday    EKG: ST and sinus arrhythmia, t wave inversion inferiorally    DIAGNOSTICS:  Ct Head Wo Contrast    Result Date: 12/18/2019  No acute intracranial abnormality. Xr Chest Portable    Result Date: 12/20/2019  1. Recommend advancement of endotracheal tube 1.0 cm. 2. Bibasilar subsegmental atelectasis plus or minus mild pleural effusions, unchanged. Xr Chest Portable    Result Date: 12/19/2019  Nasogastric tube projects in intragastric position. Bibasilar atelectasis. Pneumonia or aspiration pneumonitis are differential considerations. Nonspecific bowel gas pattern. Xr Chest Portable    Result Date: 12/19/2019  Small bilateral pleural effusions with adjacent atelectasis. Xr Chest Portable    Result Date: 12/18/2019  Low lung volume due to inspiratory effort. Mild bibasilar atelectasis with no other significant finding in the chest.     Ct Chest Abdomen Pelvis Wo Contrast    Result Date: 12/18/2019  Findings compatible with small bowel obstruction with transition point in the right mid abdomen, likely in mid to distal ileum. No obvious etiology identified. Small volume ascites, likely reactive.  Areas of consolidation to the lungs bilaterally which may relate to

## 2019-12-25 NOTE — PROGRESS NOTES
Pulmonary Progress Note    CC: Septic shock  Small bowel obstruction  Acute hypoxic respiratory failure   Aspiration pneumonia  Pleural effusions  Ischemic hepatitis  Thrombocytopenia       24 hr events:  Weaned off levophed this morning. On vasopressin. Had 2.3L of urine output in the past 24 hours. On SBT this morning. PHYSICAL EXAM:  Blood pressure (!) 117/52, pulse 67, temperature 98.4 °F (36.9 °C), temperature source Axillary, resp. rate 14, height 5' 6\" (1.676 m), weight 157 lb 13.6 oz (71.6 kg), SpO2 100 %. on PS 5/5, 40%    Intake/Output Summary (Last 24 hours) at 12/25/2019 9427  Last data filed at 12/25/2019 0615  Gross per 24 hour   Intake 1036.49 ml   Output 2815 ml   Net -1778.51 ml       Gen: Well developed; well nourished  Eyes: No scleral icterus. No conjunctival injection. ENT:  Oropharynx with ETT. External appearance of ears and nose normal.  Neck: Trachea midline. No obvious mass. No visible thyroid enlargement    Respiratory: Decreased breath sounds over bilateral lower lung zones, no accessory muscle use  Cardiovascular: Regular rate and rhythm, no appreciable murmurs. Edema of BUE  Gastrointestinal: Soft, abdominal dressing c/d/i  Skin: Warm and dry. No rashes or ulcers on visible areas. Normal texture and turgor  Musculoskeletal: No cyanosis, clubbing or joint deformity.     Psychiatric: Intubated, but awake and interactive     Medications:  Current Facility-Administered Medications: lidocaine PF 1 % injection 5 mL, 5 mL, Intradermal, Once  sodium chloride flush 0.9 % injection 10 mL, 10 mL, Intravenous, 2 times per day  sodium chloride flush 0.9 % injection 10 mL, 10 mL, Intravenous, PRN  magnesium sulfate 1 g in dextrose 5% 100 mL IVPB, 1 g, Intravenous, PRN  potassium chloride 20 mEq/50 mL IVPB (Central Line), 20 mEq, Intravenous, PRN  enoxaparin (LOVENOX) injection 40 mg, 40 mg, Subcutaneous, Daily  cefepime (MAXIPIME) 2 g IVPB minibag, 2 g, Intravenous, Q12H  pantoprazole (PROTONIX) injection 40 mg, 40 mg, Intravenous, Daily **AND** sodium chloride (PF) 0.9 % injection 10 mL, 10 mL, Intravenous, Daily  sodium chloride flush 0.9 % injection 10 mL, 10 mL, Intravenous, 2 times per day  sodium chloride flush 0.9 % injection 10 mL, 10 mL, Intravenous, PRN  ondansetron (ZOFRAN) injection 4 mg, 4 mg, Intravenous, Q6H PRN  acetaminophen (TYLENOL) suppository 650 mg, 650 mg, Rectal, Q4H PRN  metronidazole (FLAGYL) 500 mg in NaCl 100 mL IVPB premix, 500 mg, Intravenous, Q8H  vasopressin 20 Units in dextrose 5 % 100 mL infusion, 0.04 Units/min, Intravenous, Continuous  norepinephrine (LEVOPHED) 16 mg in dextrose 5% 250 mL infusion, 2 mcg/min, Intravenous, Continuous  chlorhexidine (PERIDEX) 0.12 % solution 15 mL, 15 mL, Mouth/Throat, BID  fentaNYL 10 mcg/mL infusion, 50 mcg/hr, Intravenous, Continuous  fentaNYL (SUBLIMAZE) injection 25 mcg, 25 mcg, Intravenous, Q1H PRN  glucose (GLUTOSE) 40 % oral gel 15 g, 15 g, Oral, PRN  dextrose 50 % IV solution, 12.5 g, Intravenous, PRN  glucagon (rDNA) injection 1 mg, 1 mg, Intramuscular, PRN  dextrose 5 % solution, 100 mL/hr, Intravenous, PRN  insulin lispro (HUMALOG) injection vial 0-6 Units, 0-6 Units, Subcutaneous, Q4H  dextran 70-hypromellose (TEARS NATURALE) 0.1-0.3 % opthalmic solution 1 drop, 1 drop, Both Eyes, Q4H PRN  Facility-Administered Medications Ordered in Other Encounters: iopamidol (ISOVUE-370) 76 % injection 10 mL, 10 mL, Other, ONCE PRN    Data reviewed:  Labs:  CBC:   Recent Labs     12/23/19  0550 12/24/19  0527 12/25/19  0537   WBC 14.0* 16.0* 15.2*   HGB 9.9* 9.8* 9.6*   HCT 28.6* 28.8* 27.9*   .3* 101.3* 100.1*   * 149 170     BMP:   Recent Labs     12/23/19  0550  12/24/19  0527 12/24/19  1523 12/25/19  0537   *  --  132*  --  135*   K 3.0*   < > 3.0* 3.7 3.3*   CL 89*  --  84*  --  88*   CO2 29  --  31  --  29   PHOS 3.0  --  2.6  --  2.3*   BUN 18  --  13  --  15   CREATININE 1.0  --  1.0  --  0.8*    < > = values in this interval not displayed. LIVER PROFILE:   Recent Labs     19  0550 19  0527 19  0537   AST 66* 65* 64*   * 83* 76*   BILIDIR 0.9* 0.9* 0.9*   BILITOT 1.4* 1.4* 1.3*   ALKPHOS 60 71 78     PT/INR: No results for input(s): PROTIME, INR in the last 72 hours. APTT: No results for input(s): APTT in the last 72 hours. AB/23- 7.42/51/77 (on 40%)  - 7.44/52/91 (on 40%)  - 7.48/44/150 (on 40%)    Cultures:   Blood culture (): Negative  Sputum culture (): Normal lio   Nasal MRSA probe: Negative      Films:  Chest images and reports were reviewed by me. My interpretation is:  CXR (19): Slight improvement in bilateral pleuro-parenchymal disease; ETT, gastric tube and pacemaker in place      Assessment:     Septic shock  Small bowel obstruction  Acute hypoxic respiratory failure  Aspiration pneumonia  Pleural effusions  Ischemic hepatitis  Thrombocytopenia       Plan:    Septic shock  - Due to bowel ischemia from closed loop obstruction and aspiration pneumonia  - Discontinue vasopressin   - Cefepime and flagyl (day #7/)    Small bowel obstruction  - s/p ex-laparotomy and lysis of adhesions on   - Per general surgery    Acute hypoxic respiratory failure  - On SBT, but RR is in the 40s and TV in the 200s. Unable to extubate   - Resume lasix     Aspiration pneumonia  - Cefepime and flagyl (day #7/)    Pleural effusions   - Resume lasix   - Strict I/Os    Ischemic hepatitis  - Keep MAP>65  - Check labs in a.m. Thrombocytopenia   - Resolved       Prophylaxis   DVT- lovenox  GI- protonix  VAP- peridex    I spent 35 minutes of critical care time with this patient excluding procedures.     Tahir Riley MD  [facility] Pulmonary, Critical Care and Sleep

## 2019-12-26 ENCOUNTER — APPOINTMENT (OUTPATIENT)
Dept: GENERAL RADIOLOGY | Age: 59
DRG: 853 | End: 2019-12-26
Payer: COMMERCIAL

## 2019-12-26 ENCOUNTER — APPOINTMENT (OUTPATIENT)
Dept: ULTRASOUND IMAGING | Age: 59
DRG: 853 | End: 2019-12-26
Payer: COMMERCIAL

## 2019-12-26 ENCOUNTER — APPOINTMENT (OUTPATIENT)
Dept: CT IMAGING | Age: 59
DRG: 853 | End: 2019-12-26
Payer: COMMERCIAL

## 2019-12-26 LAB
ALBUMIN SERPL-MCNC: 2.4 G/DL (ref 3.4–5)
ALBUMIN SERPL-MCNC: ABNORMAL G/DL (ref 3.4–5)
ALP BLD-CCNC: 98 U/L (ref 40–129)
ALP BLD-CCNC: ABNORMAL U/L (ref 40–129)
ALT SERPL-CCNC: 64 U/L (ref 10–40)
ALT SERPL-CCNC: ABNORMAL U/L (ref 10–40)
ANION GAP SERPL CALCULATED.3IONS-SCNC: 13 MMOL/L (ref 3–16)
ANION GAP SERPL CALCULATED.3IONS-SCNC: ABNORMAL MMOL/L (ref 3–16)
AST SERPL-CCNC: 50 U/L (ref 15–37)
AST SERPL-CCNC: ABNORMAL U/L (ref 15–37)
BASE EXCESS ARTERIAL: 13.1 MMOL/L (ref -3–3)
BASE EXCESS ARTERIAL: ABNORMAL MMOL/L (ref -3–3)
BASOPHILS ABSOLUTE: 0.1 K/UL (ref 0–0.2)
BASOPHILS ABSOLUTE: ABNORMAL K/UL (ref 0–0.2)
BASOPHILS RELATIVE PERCENT: 0.5 %
BASOPHILS RELATIVE PERCENT: ABNORMAL %
BILIRUB SERPL-MCNC: 0.9 MG/DL (ref 0–1)
BILIRUB SERPL-MCNC: ABNORMAL MG/DL (ref 0–1)
BILIRUBIN DIRECT: 0.5 MG/DL (ref 0–0.3)
BILIRUBIN DIRECT: ABNORMAL MG/DL (ref 0–0.3)
BILIRUBIN, INDIRECT: 0.4 MG/DL (ref 0–1)
BILIRUBIN, INDIRECT: ABNORMAL MG/DL (ref 0–1)
BUN BLDV-MCNC: 17 MG/DL (ref 7–20)
BUN BLDV-MCNC: ABNORMAL MG/DL (ref 7–20)
CALCIUM SERPL-MCNC: 8 MG/DL (ref 8.3–10.6)
CALCIUM SERPL-MCNC: ABNORMAL MG/DL (ref 8.3–10.6)
CARBOXYHEMOGLOBIN ARTERIAL: 1 % (ref 0–1.5)
CARBOXYHEMOGLOBIN ARTERIAL: ABNORMAL % (ref 0–1.5)
CHLORIDE BLD-SCNC: 90 MMOL/L (ref 99–110)
CHLORIDE BLD-SCNC: ABNORMAL MMOL/L (ref 99–110)
CO2: 32 MMOL/L (ref 21–32)
CO2: ABNORMAL MMOL/L (ref 21–32)
CREAT SERPL-MCNC: 0.8 MG/DL (ref 0.9–1.3)
CREAT SERPL-MCNC: ABNORMAL MG/DL (ref 0.9–1.3)
EOSINOPHILS ABSOLUTE: 0 K/UL (ref 0–0.6)
EOSINOPHILS ABSOLUTE: ABNORMAL K/UL (ref 0–0.6)
EOSINOPHILS RELATIVE PERCENT: 0.4 %
EOSINOPHILS RELATIVE PERCENT: ABNORMAL %
FLUID TYPE: NORMAL
GFR AFRICAN AMERICAN: >60
GFR AFRICAN AMERICAN: ABNORMAL
GFR NON-AFRICAN AMERICAN: >60
GFR NON-AFRICAN AMERICAN: ABNORMAL
GLUCOSE BLD-MCNC: 166 MG/DL (ref 70–99)
GLUCOSE BLD-MCNC: 177 MG/DL (ref 70–99)
GLUCOSE BLD-MCNC: 183 MG/DL (ref 70–99)
GLUCOSE BLD-MCNC: 184 MG/DL (ref 70–99)
GLUCOSE BLD-MCNC: 199 MG/DL (ref 70–99)
GLUCOSE BLD-MCNC: 208 MG/DL (ref 70–99)
GLUCOSE BLD-MCNC: 211 MG/DL (ref 70–99)
GLUCOSE BLD-MCNC: 227 MG/DL (ref 70–99)
GLUCOSE BLD-MCNC: ABNORMAL MG/DL (ref 70–99)
GLUCOSE, FLUID: 185 MG/DL
HCO3 ARTERIAL: 37.5 MMOL/L (ref 21–29)
HCO3 ARTERIAL: ABNORMAL MMOL/L (ref 21–29)
HCT VFR BLD CALC: 26.7 % (ref 40.5–52.5)
HCT VFR BLD CALC: ABNORMAL % (ref 40.5–52.5)
HEMOGLOBIN, ART, EXTENDED: 8.9 G/DL (ref 13.5–17.5)
HEMOGLOBIN, ART, EXTENDED: ABNORMAL G/DL (ref 13.5–17.5)
HEMOGLOBIN: 9.1 G/DL (ref 13.5–17.5)
HEMOGLOBIN: ABNORMAL G/DL (ref 13.5–17.5)
LACTATE DEHYDROGENASE: 216 U/L (ref 100–190)
LD, FLUID: 164 U/L
LYMPHOCYTES ABSOLUTE: 0.6 K/UL (ref 1–5.1)
LYMPHOCYTES ABSOLUTE: ABNORMAL K/UL (ref 1–5.1)
LYMPHOCYTES RELATIVE PERCENT: 5.3 %
LYMPHOCYTES RELATIVE PERCENT: ABNORMAL %
MAGNESIUM: 1.8 MG/DL (ref 1.8–2.4)
MAGNESIUM: ABNORMAL MG/DL (ref 1.8–2.4)
MCH RBC QN AUTO: 34.1 PG (ref 26–34)
MCH RBC QN AUTO: ABNORMAL PG (ref 26–34)
MCHC RBC AUTO-ENTMCNC: 34 G/DL (ref 31–36)
MCHC RBC AUTO-ENTMCNC: ABNORMAL G/DL (ref 31–36)
MCV RBC AUTO: 100.1 FL (ref 80–100)
MCV RBC AUTO: ABNORMAL FL (ref 80–100)
METHEMOGLOBIN ARTERIAL: 0.4 %
METHEMOGLOBIN ARTERIAL: ABNORMAL %
MONOCYTES ABSOLUTE: 0.9 K/UL (ref 0–1.3)
MONOCYTES ABSOLUTE: ABNORMAL K/UL (ref 0–1.3)
MONOCYTES RELATIVE PERCENT: 7 %
MONOCYTES RELATIVE PERCENT: ABNORMAL %
NEUTROPHILS ABSOLUTE: 10.6 K/UL (ref 1.7–7.7)
NEUTROPHILS ABSOLUTE: ABNORMAL K/UL (ref 1.7–7.7)
NEUTROPHILS RELATIVE PERCENT: 86.8 %
NEUTROPHILS RELATIVE PERCENT: ABNORMAL %
O2 CONTENT ARTERIAL: 12 ML/DL
O2 CONTENT ARTERIAL: ABNORMAL ML/DL
O2 SAT, ARTERIAL: 93.6 %
O2 SAT, ARTERIAL: ABNORMAL %
O2 THERAPY: ABNORMAL
O2 THERAPY: ABNORMAL
PCO2 ARTERIAL: 47.2 MMHG (ref 35–45)
PCO2 ARTERIAL: ABNORMAL MMHG (ref 35–45)
PDW BLD-RTO: 14 % (ref 12.4–15.4)
PDW BLD-RTO: ABNORMAL % (ref 12.4–15.4)
PERFORMED ON: ABNORMAL
PH ARTERIAL: 7.51 (ref 7.35–7.45)
PH ARTERIAL: ABNORMAL (ref 7.35–7.45)
PH, BODY FLUID: 8.4
PHOSPHORUS: 2.6 MG/DL (ref 2.5–4.9)
PHOSPHORUS: ABNORMAL MG/DL (ref 2.5–4.9)
PLATELET # BLD: 176 K/UL (ref 135–450)
PLATELET # BLD: ABNORMAL K/UL (ref 135–450)
PMV BLD AUTO: 10.7 FL (ref 5–10.5)
PMV BLD AUTO: ABNORMAL FL (ref 5–10.5)
PO2 ARTERIAL: 63.2 MMHG (ref 75–108)
PO2 ARTERIAL: ABNORMAL MMHG (ref 75–108)
POTASSIUM SERPL-SCNC: 3.1 MMOL/L (ref 3.5–5.1)
POTASSIUM SERPL-SCNC: ABNORMAL MMOL/L (ref 3.5–5.1)
PROTEIN FLUID: 2.2 G/DL
RBC # BLD: 2.67 M/UL (ref 4.2–5.9)
RBC # BLD: ABNORMAL M/UL (ref 4.2–5.9)
SODIUM BLD-SCNC: 135 MMOL/L (ref 136–145)
SODIUM BLD-SCNC: ABNORMAL MMOL/L (ref 136–145)
TCO2 ARTERIAL: 39 MMOL/L
TCO2 ARTERIAL: ABNORMAL MMOL/L
TOTAL PROTEIN: 5.2 G/DL (ref 6.4–8.2)
TOTAL PROTEIN: 5.3 G/DL (ref 6.4–8.2)
TOTAL PROTEIN: ABNORMAL G/DL (ref 6.4–8.2)
TRIGL SERPL-MCNC: 141 MG/DL (ref 0–150)
TRIGL SERPL-MCNC: ABNORMAL MG/DL (ref 0–150)
WBC # BLD: 12.2 K/UL (ref 4–11)
WBC # BLD: ABNORMAL K/UL (ref 4–11)

## 2019-12-26 PROCEDURE — 6370000000 HC RX 637 (ALT 250 FOR IP): Performed by: INTERNAL MEDICINE

## 2019-12-26 PROCEDURE — 88184 FLOWCYTOMETRY/ TC 1 MARKER: CPT

## 2019-12-26 PROCEDURE — 6360000002 HC RX W HCPCS: Performed by: NURSE PRACTITIONER

## 2019-12-26 PROCEDURE — 88112 CYTOPATH CELL ENHANCE TECH: CPT

## 2019-12-26 PROCEDURE — 2580000003 HC RX 258: Performed by: NURSE PRACTITIONER

## 2019-12-26 PROCEDURE — 2500000003 HC RX 250 WO HCPCS: Performed by: INTERNAL MEDICINE

## 2019-12-26 PROCEDURE — 76604 US EXAM CHEST: CPT

## 2019-12-26 PROCEDURE — 82945 GLUCOSE OTHER FLUID: CPT

## 2019-12-26 PROCEDURE — 87205 SMEAR GRAM STAIN: CPT

## 2019-12-26 PROCEDURE — 2580000003 HC RX 258: Performed by: INTERNAL MEDICINE

## 2019-12-26 PROCEDURE — 99024 POSTOP FOLLOW-UP VISIT: CPT | Performed by: SURGERY

## 2019-12-26 PROCEDURE — 80069 RENAL FUNCTION PANEL: CPT

## 2019-12-26 PROCEDURE — 84157 ASSAY OF PROTEIN OTHER: CPT

## 2019-12-26 PROCEDURE — 71045 X-RAY EXAM CHEST 1 VIEW: CPT

## 2019-12-26 PROCEDURE — 99291 CRITICAL CARE FIRST HOUR: CPT | Performed by: INTERNAL MEDICINE

## 2019-12-26 PROCEDURE — 6360000002 HC RX W HCPCS: Performed by: INTERNAL MEDICINE

## 2019-12-26 PROCEDURE — 99231 SBSQ HOSP IP/OBS SF/LOW 25: CPT | Performed by: INTERNAL MEDICINE

## 2019-12-26 PROCEDURE — 2500000003 HC RX 250 WO HCPCS: Performed by: SURGERY

## 2019-12-26 PROCEDURE — 83986 ASSAY PH BODY FLUID NOS: CPT

## 2019-12-26 PROCEDURE — 2709999900 US THORACENTESIS

## 2019-12-26 PROCEDURE — 2580000003 HC RX 258: Performed by: SURGERY

## 2019-12-26 PROCEDURE — 36415 COLL VENOUS BLD VENIPUNCTURE: CPT

## 2019-12-26 PROCEDURE — 94750 HC PULMONARY COMPLIANCE STUDY: CPT

## 2019-12-26 PROCEDURE — 0W993ZZ DRAINAGE OF RIGHT PLEURAL CAVITY, PERCUTANEOUS APPROACH: ICD-10-PCS | Performed by: RADIOLOGY

## 2019-12-26 PROCEDURE — 94003 VENT MGMT INPAT SUBQ DAY: CPT

## 2019-12-26 PROCEDURE — 84478 ASSAY OF TRIGLYCERIDES: CPT

## 2019-12-26 PROCEDURE — 2700000000 HC OXYGEN THERAPY PER DAY

## 2019-12-26 PROCEDURE — 80076 HEPATIC FUNCTION PANEL: CPT

## 2019-12-26 PROCEDURE — 88185 FLOWCYTOMETRY/TC ADD-ON: CPT

## 2019-12-26 PROCEDURE — APPNB15 APP NON BILLABLE TIME 0-15 MINS: Performed by: NURSE PRACTITIONER

## 2019-12-26 PROCEDURE — 89051 BODY FLUID CELL COUNT: CPT

## 2019-12-26 PROCEDURE — 94761 N-INVAS EAR/PLS OXIMETRY MLT: CPT

## 2019-12-26 PROCEDURE — C9113 INJ PANTOPRAZOLE SODIUM, VIA: HCPCS | Performed by: NURSE PRACTITIONER

## 2019-12-26 PROCEDURE — 2000000000 HC ICU R&B

## 2019-12-26 PROCEDURE — 71250 CT THORAX DX C-: CPT

## 2019-12-26 PROCEDURE — 83615 LACTATE (LD) (LDH) ENZYME: CPT

## 2019-12-26 PROCEDURE — 6370000000 HC RX 637 (ALT 250 FOR IP): Performed by: NURSE PRACTITIONER

## 2019-12-26 PROCEDURE — 88305 TISSUE EXAM BY PATHOLOGIST: CPT

## 2019-12-26 PROCEDURE — 82803 BLOOD GASES ANY COMBINATION: CPT

## 2019-12-26 PROCEDURE — 83735 ASSAY OF MAGNESIUM: CPT

## 2019-12-26 PROCEDURE — 85025 COMPLETE CBC W/AUTO DIFF WBC: CPT

## 2019-12-26 PROCEDURE — 87070 CULTURE OTHR SPECIMN AEROBIC: CPT

## 2019-12-26 PROCEDURE — 84155 ASSAY OF PROTEIN SERUM: CPT

## 2019-12-26 RX ORDER — POTASSIUM CHLORIDE 29.8 MG/ML
20 INJECTION INTRAVENOUS
Status: COMPLETED | OUTPATIENT
Start: 2019-12-26 | End: 2019-12-26

## 2019-12-26 RX ORDER — MAGNESIUM SULFATE IN WATER 40 MG/ML
2 INJECTION, SOLUTION INTRAVENOUS ONCE
Status: COMPLETED | OUTPATIENT
Start: 2019-12-26 | End: 2019-12-26

## 2019-12-26 RX ORDER — FENTANYL CITRATE 50 UG/ML
25 INJECTION, SOLUTION INTRAMUSCULAR; INTRAVENOUS
Status: DISCONTINUED | OUTPATIENT
Start: 2019-12-26 | End: 2019-12-27

## 2019-12-26 RX ADMIN — MAGNESIUM SULFATE HEPTAHYDRATE 2 G: 40 INJECTION, SOLUTION INTRAVENOUS at 10:30

## 2019-12-26 RX ADMIN — INSULIN LISPRO 2 UNITS: 100 INJECTION, SOLUTION INTRAVENOUS; SUBCUTANEOUS at 08:55

## 2019-12-26 RX ADMIN — INSULIN LISPRO 2 UNITS: 100 INJECTION, SOLUTION INTRAVENOUS; SUBCUTANEOUS at 17:49

## 2019-12-26 RX ADMIN — Medication 10 ML: at 08:52

## 2019-12-26 RX ADMIN — SODIUM CHLORIDE, PRESERVATIVE FREE 10 ML: 5 INJECTION INTRAVENOUS at 08:55

## 2019-12-26 RX ADMIN — FENTANYL CITRATE 25 MCG: 50 INJECTION, SOLUTION INTRAMUSCULAR; INTRAVENOUS at 22:34

## 2019-12-26 RX ADMIN — CHLORHEXIDINE GLUCONATE 15 ML: 1.2 RINSE ORAL at 21:11

## 2019-12-26 RX ADMIN — FUROSEMIDE 40 MG: 10 INJECTION, SOLUTION INTRAMUSCULAR; INTRAVENOUS at 09:27

## 2019-12-26 RX ADMIN — CHLORHEXIDINE GLUCONATE 15 ML: 1.2 RINSE ORAL at 08:53

## 2019-12-26 RX ADMIN — POTASSIUM CHLORIDE 20 MEQ: 29.8 INJECTION, SOLUTION INTRAVENOUS at 11:27

## 2019-12-26 RX ADMIN — PANTOPRAZOLE SODIUM 40 MG: 40 INJECTION, POWDER, FOR SOLUTION INTRAVENOUS at 08:52

## 2019-12-26 RX ADMIN — INSULIN LISPRO 2 UNITS: 100 INJECTION, SOLUTION INTRAVENOUS; SUBCUTANEOUS at 12:33

## 2019-12-26 RX ADMIN — FUROSEMIDE 40 MG: 10 INJECTION, SOLUTION INTRAMUSCULAR; INTRAVENOUS at 22:11

## 2019-12-26 RX ADMIN — INSULIN LISPRO 2 UNITS: 100 INJECTION, SOLUTION INTRAVENOUS; SUBCUTANEOUS at 21:11

## 2019-12-26 RX ADMIN — ENOXAPARIN SODIUM 40 MG: 40 INJECTION SUBCUTANEOUS at 08:52

## 2019-12-26 RX ADMIN — INSULIN LISPRO 1 UNITS: 100 INJECTION, SOLUTION INTRAVENOUS; SUBCUTANEOUS at 04:59

## 2019-12-26 RX ADMIN — POTASSIUM CHLORIDE: 2 INJECTION, SOLUTION, CONCENTRATE INTRAVENOUS at 17:44

## 2019-12-26 RX ADMIN — POTASSIUM CHLORIDE 20 MEQ: 29.8 INJECTION, SOLUTION INTRAVENOUS at 10:26

## 2019-12-26 RX ADMIN — INSULIN LISPRO 1 UNITS: 100 INJECTION, SOLUTION INTRAVENOUS; SUBCUTANEOUS at 00:22

## 2019-12-26 RX ADMIN — SODIUM CHLORIDE, PRESERVATIVE FREE 10 ML: 5 INJECTION INTRAVENOUS at 21:10

## 2019-12-26 RX ADMIN — POTASSIUM CHLORIDE 20 MEQ: 29.8 INJECTION, SOLUTION INTRAVENOUS at 12:36

## 2019-12-26 RX ADMIN — VASOPRESSIN 0.04 UNITS/MIN: 20 INJECTION INTRAVENOUS at 03:45

## 2019-12-26 RX ADMIN — SODIUM CHLORIDE, PRESERVATIVE FREE 10 ML: 5 INJECTION INTRAVENOUS at 08:53

## 2019-12-26 ASSESSMENT — PULMONARY FUNCTION TESTS
PIF_VALUE: 13
PIF_VALUE: 17
PIF_VALUE: 13
PIF_VALUE: 14
PIF_VALUE: 16
PIF_VALUE: 20
PIF_VALUE: 18
PIF_VALUE: 17
PIF_VALUE: 28
PIF_VALUE: 15
PIF_VALUE: 24
PIF_VALUE: 14
PIF_VALUE: 13
PIF_VALUE: 23
PIF_VALUE: 18
PIF_VALUE: 25
PIF_VALUE: 13
PIF_VALUE: 13

## 2019-12-26 ASSESSMENT — PAIN SCALES - GENERAL: PAINLEVEL_OUTOF10: 5

## 2019-12-26 NOTE — PROGRESS NOTES
4 Eyes Skin Assessment     The patient is being assess for  Shift Handoff    I agree that 2 RN's have performed a thorough Head to Toe Skin Assessment on the patient. ALL assessment sites listed below have been assessed. Areas assessed by both nurses:   [x]   Head, Face, and Ears   [x]   Shoulders, Back, and Chest  [x]   Arms, Elbows, and Hands   [x]   Coccyx, Sacrum, and IschIum  [x]   Legs, Feet, and Heels        Does the Patient have Skin Breakdown? Yes LDA WOUND CARE was Initiated documentation include the Debra-wound, Wound Assessment, Measurements, Dressing Treatment, Drainage, and Color\",         Stuart Prevention initiated:  Yes   Wound Care Orders initiated:  Yes      [address]  nurse consulted for Pressure Injury (Stage 3,4, Unstageable, DTI, NWPT, and Complex wounds), New and Established Ostomies:  No      Nurse 1 eSignature: Electronically signed by Gabriella Ivan on 12/26/19 at 6:32 AM    **SHARE this note so that the co-signing nurse is able to place an eSignature**    Nurse 2 eSignature: {Esignature:[id number]}

## 2019-12-26 NOTE — PROGRESS NOTES
Harika Lyle is a 64 y.o. male patient. 1. Small bowel obstruction ([address]    2. Aspiration pneumonia of both lower lobes, unspecified aspiration pneumonia type ([facility] [address]    3. Hypoxia    4. Rectal bleeding    5. Septicemia [facility])      Past Medical History:   Diagnosis Date    Acne rosacea     Cognitive impairment     sees Dr Agustin Gr, started Aricept 12/2019    Colon cancer ([facility] [address] 1/7/2014    ileocecal valve/Leuenberger/yrly surveillance    Down syndrome     Gout     HIGH CHOLESTEROL     2017 3.1 % framingham risk score    History of diarrhea     since childhood, worse since partial colectomy resolved after a few months    Macrocytosis     followed by Dr Christian Calvillo and released    Seborrheic dermatitis     Seizure disorder ([facility] [address] 2019    Status post biventricular cardiac pacemaker insertion 01/2019    for long sinus pauses with syncopal willis    Status post partial colectomy 2016    diarrhea    Syncope and collapse 01/2019    recurrent syncope, found seizures and sinus pauses    Weight loss 2015    since 2014 at the time of partial colectomy lost 25 lbs     No past surgical history pertinent negatives on file.   Scheduled Meds:   potassium chloride  20 mEq Intravenous Q1H    magnesium sulfate  2 g Intravenous Once    insulin lispro  0-12 Units Subcutaneous Q4H    furosemide  40 mg Intravenous Q12H    sodium chloride flush  10 mL Intravenous 2 times per day    enoxaparin  40 mg Subcutaneous Daily    pantoprazole  40 mg Intravenous Daily    And    sodium chloride (PF)  10 mL Intravenous Daily    sodium chloride flush  10 mL Intravenous 2 times per day    chlorhexidine  15 mL Mouth/Throat BID     Continuous Infusions:   vasopressin (Septic Shock) infusion Stopped (12/26/19 1016)    PN-Adult Premix 5/20 - Central 40 mL/hr at 12/25/19 1809    dextrose       PRN Meds:fentanNYL, sodium chloride flush, magnesium sulfate, potassium chloride, sodium chloride flush, ondansetron, acetaminophen,

## 2019-12-26 NOTE — PROGRESS NOTES
input(s): PROTIME, INR in the last 72 hours. APTT: No results for input(s): APTT in the last 72 hours. AB/23- 7.42/51/77 (on 40%)  12- 7.44/52/91 (on 40%)  12- 7.48/44/150 (on 40%)  - 7.52/42/52 (on 40%)    Cultures:   Blood culture (): Negative  Sputum culture (): Normal lio   Nasal MRSA probe: Negative      Films:  Chest images and reports were reviewed by me. My interpretation is:  CXR (19): Stable bilateral pleuro-parenchymal disease; ETT, gastric tube and pacemaker in place      Assessment:     Septic shock  Small bowel obstruction  Acute hypoxic respiratory failure  Aspiration pneumonia  Pleural effusions  Ischemic hepatitis  Thrombocytopenia       Plan:    Septic shock  - Due to bowel ischemia from closed loop obstruction and aspiration pneumonia  - Discontinue vasopressin if SBP>90  - Completed 7 days of cefepime and flagyl    Small bowel obstruction  - s/p ex-laparotomy and lysis of adhesions on   - Per general surgery    Acute hypoxic respiratory failure  - On pressure support. He continues to have rapid shallow breathing if pressure support is less than 10. Will plan for right thoracentesis in an effort to wean him from the ventilator   - Continue lasix twice daily    Aspiration pneumonia  - Completed 7 days of cefepime and flagyl     Pleural effusions   - Lasix twice daily   - Strict I/Os  - Plan for right thoracentesis     Ischemic hepatitis  - Keep SBP>90  - Check labs in a.m. Thrombocytopenia   - Resolved       Prophylaxis   DVT- lovenox  GI- protonix  VAP- peridex    I spent 35 minutes of critical care time with this patient excluding procedures.     Mina Leiva MD  [facility] Pulmonary, Critical Care and Sleep

## 2019-12-26 NOTE — PROGRESS NOTES
MD at [address] History   Problem Relation Age of Onset    Diabetes Brother     Hypertension Brother     Stroke Brother     High Cholesterol Brother     Cancer Mother         melanoma    Other Father         cerebral aneurysm     Social History     Tobacco Use    Smoking status: Never Smoker    Smokeless tobacco: Never Used   Substance Use Topics    Alcohol use: No    Drug use: No       Prior to Admission medications    Medication Sig Start Date End Date Taking?  Authorizing Provider   doxycycline (VIBRAMYCIN) 50 MG capsule Take 50 mg by mouth 2 times daily 11/26/19  Yes Historical Provider, MD   simvastatin (ZOCOR) 20 MG tablet Take 1 tablet by mouth daily 12/10/19  Yes Indiana Betancur MD   allopurinol (ZYLOPRIM) 100 MG tablet Take 1 tablet by mouth daily 12/9/19  Yes Indiana Betancur MD   donepezil (ARICEPT) 5 MG tablet Take 1 tablet by mouth nightly 12/9/19  Yes Indiana Betancur MD   Multiple Vitamins-Minerals (THERAPEUTIC MULTIVITAMIN-MINERALS) tablet Take 1 tablet by mouth daily   Yes Historical Provider, MD   Azelaic Acid 15 % GEL Apply topically daily 12/2/19   Historical Provider, MD   hydrocortisone (HYTONE) 2.5 % lotion Apply topically daily 11/26/19   Historical Provider, MD   metroNIDAZOLE (METROCREAM) 0.75 % cream Apply topically daily 11/26/19   Historical Provider, MD         ROS:  A comprehensive review of systems was negative except for: vomiting and diararhea     OBJECTIVE:      PHYSICAL:  Intake/Output:   Patient Vitals for the past 8 hrs:   BP Temp Temp src Pulse Resp SpO2 Weight   12/26/19 0945 -- -- -- 66 19 98 % --   12/26/19 0930 -- -- -- 76 26 94 % --   12/26/19 0915 -- -- -- 69 24 95 % --   12/26/19 0900 -- -- -- 65 16 94 % --   12/26/19 0845 -- -- -- 64 15 95 % --   12/26/19 0831 -- -- -- 64 16 96 % --   12/26/19 0820 -- -- -- 66 13 91 % --   12/26/19 0800 -- -- -- 65 21 93 % --   12/26/19 0754 (!) 113/48 98.4 °F (36.9 °C) Axillary -- -- -- --   12/26/19 0745 -- -- -- 69 15 95 % --

## 2019-12-27 LAB
ALBUMIN SERPL-MCNC: 2.4 G/DL (ref 3.4–5)
ALP BLD-CCNC: 103 U/L (ref 40–129)
ALT SERPL-CCNC: 56 U/L (ref 10–40)
ANION GAP SERPL CALCULATED.3IONS-SCNC: 11 MMOL/L (ref 3–16)
APPEARANCE FLUID: CLEAR
AST SERPL-CCNC: 39 U/L (ref 15–37)
BASE EXCESS ARTERIAL: 12 MMOL/L (ref -3–3)
BASE EXCESS ARTERIAL: 13.1 MMOL/L (ref -3–3)
BASE EXCESS ARTERIAL: 9.7 MMOL/L (ref -3–3)
BASOPHILS ABSOLUTE: 0.1 K/UL (ref 0–0.2)
BASOPHILS RELATIVE PERCENT: 0.9 %
BILIRUB SERPL-MCNC: 0.5 MG/DL (ref 0–1)
BILIRUBIN DIRECT: 0.3 MG/DL (ref 0–0.3)
BILIRUBIN, INDIRECT: 0.2 MG/DL (ref 0–1)
BUN BLDV-MCNC: 15 MG/DL (ref 7–20)
CALCIUM SERPL-MCNC: 8 MG/DL (ref 8.3–10.6)
CARBOXYHEMOGLOBIN ARTERIAL: 1 % (ref 0–1.5)
CARBOXYHEMOGLOBIN ARTERIAL: 1.1 % (ref 0–1.5)
CARBOXYHEMOGLOBIN ARTERIAL: 1.1 % (ref 0–1.5)
CELL COUNT FLUID TYPE: NORMAL
CHLORIDE BLD-SCNC: 98 MMOL/L (ref 99–110)
CLOT EVALUATION: NORMAL
CO2: 35 MMOL/L (ref 21–32)
COLOR FLUID: YELLOW
CREAT SERPL-MCNC: 0.9 MG/DL (ref 0.9–1.3)
EOSINOPHILS ABSOLUTE: 0.1 K/UL (ref 0–0.6)
EOSINOPHILS RELATIVE PERCENT: 0.5 %
FLUID PATH CONSULT: NO
GFR AFRICAN AMERICAN: >60
GFR NON-AFRICAN AMERICAN: >60
GLUCOSE BLD-MCNC: 155 MG/DL (ref 70–99)
GLUCOSE BLD-MCNC: 157 MG/DL (ref 70–99)
GLUCOSE BLD-MCNC: 158 MG/DL (ref 70–99)
GLUCOSE BLD-MCNC: 161 MG/DL (ref 70–99)
GLUCOSE BLD-MCNC: 163 MG/DL (ref 70–99)
GLUCOSE BLD-MCNC: 186 MG/DL (ref 70–99)
GLUCOSE BLD-MCNC: 198 MG/DL (ref 70–99)
HCO3 ARTERIAL: 34 MMOL/L (ref 21–29)
HCO3 ARTERIAL: 37 MMOL/L (ref 21–29)
HCO3 ARTERIAL: 38.1 MMOL/L (ref 21–29)
HCT VFR BLD CALC: 31.7 % (ref 40.5–52.5)
HEMOGLOBIN, ART, EXTENDED: 10.9 G/DL (ref 13.5–17.5)
HEMOGLOBIN, ART, EXTENDED: 11 G/DL (ref 13.5–17.5)
HEMOGLOBIN, ART, EXTENDED: 11.4 G/DL (ref 13.5–17.5)
HEMOGLOBIN: 10.7 G/DL (ref 13.5–17.5)
LYMPHOCYTES ABSOLUTE: 0.9 K/UL (ref 1–5.1)
LYMPHOCYTES RELATIVE PERCENT: 6.3 %
LYMPHOCYTES, BODY FLUID: 56 %
MAGNESIUM: 2.3 MG/DL (ref 1.8–2.4)
MCH RBC QN AUTO: 34.1 PG (ref 26–34)
MCHC RBC AUTO-ENTMCNC: 33.7 G/DL (ref 31–36)
MCV RBC AUTO: 101.3 FL (ref 80–100)
MESOTHELIAL FLUID: 3 %
METHEMOGLOBIN ARTERIAL: 0.4 %
METHEMOGLOBIN ARTERIAL: 0.8 %
METHEMOGLOBIN ARTERIAL: 0.9 %
MONOCYTE, FLUID: 15 %
MONOCYTES ABSOLUTE: 1 K/UL (ref 0–1.3)
MONOCYTES RELATIVE PERCENT: 6.7 %
MONONUCLEAR UNIDENTIFIED CELLS FLUID: 1 %
NEUTROPHIL, FLUID: 25 %
NEUTROPHILS ABSOLUTE: 12.2 K/UL (ref 1.7–7.7)
NEUTROPHILS RELATIVE PERCENT: 85.6 %
NUCLEATED CELLS FLUID: 152 /CUMM
NUMBER OF CELLS COUNTED FLUID: 100
O2 CONTENT ARTERIAL: 14 ML/DL
O2 CONTENT ARTERIAL: 15 ML/DL
O2 CONTENT ARTERIAL: 15 ML/DL
O2 SAT, ARTERIAL: 93.2 %
O2 SAT, ARTERIAL: 94 %
O2 SAT, ARTERIAL: 97.7 %
O2 THERAPY: ABNORMAL
PCO2 ARTERIAL: 45 MMHG (ref 35–45)
PCO2 ARTERIAL: 50.2 MMHG (ref 35–45)
PCO2 ARTERIAL: 50.6 MMHG (ref 35–45)
PDW BLD-RTO: 13.9 % (ref 12.4–15.4)
PERFORMED ON: ABNORMAL
PH ARTERIAL: 7.48 (ref 7.35–7.45)
PH ARTERIAL: 7.49 (ref 7.35–7.45)
PH ARTERIAL: 7.49 (ref 7.35–7.45)
PHOSPHORUS: 3.7 MG/DL (ref 2.5–4.9)
PLATELET # BLD: 250 K/UL (ref 135–450)
PMV BLD AUTO: 10.3 FL (ref 5–10.5)
PO2 ARTERIAL: 63.4 MMHG (ref 75–108)
PO2 ARTERIAL: 64.4 MMHG (ref 75–108)
PO2 ARTERIAL: 89.4 MMHG (ref 75–108)
POTASSIUM SERPL-SCNC: 3.4 MMOL/L (ref 3.5–5.1)
POTASSIUM SERPL-SCNC: 3.9 MMOL/L (ref 3.5–5.1)
RBC # BLD: 3.13 M/UL (ref 4.2–5.9)
RBC FLUID: 823 /CUMM
SODIUM BLD-SCNC: 144 MMOL/L (ref 136–145)
TCO2 ARTERIAL: 35.3 MMOL/L
TCO2 ARTERIAL: 38.6 MMOL/L
TCO2 ARTERIAL: 39.6 MMOL/L
TOTAL PROTEIN: 5.5 G/DL (ref 6.4–8.2)
WBC # BLD: 14.2 K/UL (ref 4–11)

## 2019-12-27 PROCEDURE — 84132 ASSAY OF SERUM POTASSIUM: CPT

## 2019-12-27 PROCEDURE — 2580000003 HC RX 258

## 2019-12-27 PROCEDURE — 99024 POSTOP FOLLOW-UP VISIT: CPT | Performed by: SURGERY

## 2019-12-27 PROCEDURE — 6370000000 HC RX 637 (ALT 250 FOR IP): Performed by: NURSE PRACTITIONER

## 2019-12-27 PROCEDURE — 2700000000 HC OXYGEN THERAPY PER DAY

## 2019-12-27 PROCEDURE — 94750 HC PULMONARY COMPLIANCE STUDY: CPT

## 2019-12-27 PROCEDURE — 2500000003 HC RX 250 WO HCPCS: Performed by: SURGERY

## 2019-12-27 PROCEDURE — 6360000002 HC RX W HCPCS: Performed by: NURSE PRACTITIONER

## 2019-12-27 PROCEDURE — 99231 SBSQ HOSP IP/OBS SF/LOW 25: CPT | Performed by: INTERNAL MEDICINE

## 2019-12-27 PROCEDURE — 37799 UNLISTED PX VASCULAR SURGERY: CPT | Performed by: NURSE PRACTITIONER

## 2019-12-27 PROCEDURE — 6360000002 HC RX W HCPCS: Performed by: INTERNAL MEDICINE

## 2019-12-27 PROCEDURE — 2000000000 HC ICU R&B

## 2019-12-27 PROCEDURE — C9113 INJ PANTOPRAZOLE SODIUM, VIA: HCPCS | Performed by: NURSE PRACTITIONER

## 2019-12-27 PROCEDURE — 82803 BLOOD GASES ANY COMBINATION: CPT

## 2019-12-27 PROCEDURE — 94003 VENT MGMT INPAT SUBQ DAY: CPT

## 2019-12-27 PROCEDURE — 6370000000 HC RX 637 (ALT 250 FOR IP): Performed by: INTERNAL MEDICINE

## 2019-12-27 PROCEDURE — 99291 CRITICAL CARE FIRST HOUR: CPT | Performed by: INTERNAL MEDICINE

## 2019-12-27 PROCEDURE — 94760 N-INVAS EAR/PLS OXIMETRY 1: CPT

## 2019-12-27 PROCEDURE — 51702 INSERT TEMP BLADDER CATH: CPT | Performed by: NURSE PRACTITIONER

## 2019-12-27 PROCEDURE — 94761 N-INVAS EAR/PLS OXIMETRY MLT: CPT

## 2019-12-27 PROCEDURE — 31500 INSERT EMERGENCY AIRWAY: CPT | Performed by: NURSE PRACTITIONER

## 2019-12-27 PROCEDURE — APPNB15 APP NON BILLABLE TIME 0-15 MINS: Performed by: NURSE PRACTITIONER

## 2019-12-27 PROCEDURE — 80069 RENAL FUNCTION PANEL: CPT

## 2019-12-27 PROCEDURE — 80076 HEPATIC FUNCTION PANEL: CPT

## 2019-12-27 PROCEDURE — 2580000003 HC RX 258: Performed by: SURGERY

## 2019-12-27 PROCEDURE — 2580000003 HC RX 258: Performed by: NURSE PRACTITIONER

## 2019-12-27 PROCEDURE — 2580000003 HC RX 258: Performed by: INTERNAL MEDICINE

## 2019-12-27 PROCEDURE — 83735 ASSAY OF MAGNESIUM: CPT

## 2019-12-27 PROCEDURE — 85025 COMPLETE CBC W/AUTO DIFF WBC: CPT

## 2019-12-27 RX ORDER — ACETAZOLAMIDE 500 MG/5ML
500 INJECTION, POWDER, LYOPHILIZED, FOR SOLUTION INTRAVENOUS EVERY 8 HOURS
Status: COMPLETED | OUTPATIENT
Start: 2019-12-27 | End: 2019-12-28

## 2019-12-27 RX ORDER — FUROSEMIDE 10 MG/ML
20 INJECTION INTRAMUSCULAR; INTRAVENOUS EVERY 12 HOURS
Status: DISCONTINUED | OUTPATIENT
Start: 2019-12-27 | End: 2019-12-28

## 2019-12-27 RX ADMIN — ENOXAPARIN SODIUM 40 MG: 40 INJECTION SUBCUTANEOUS at 08:27

## 2019-12-27 RX ADMIN — SODIUM CHLORIDE, PRESERVATIVE FREE 10 ML: 5 INJECTION INTRAVENOUS at 08:27

## 2019-12-27 RX ADMIN — POTASSIUM CHLORIDE 20 MEQ: 29.8 INJECTION, SOLUTION INTRAVENOUS at 06:58

## 2019-12-27 RX ADMIN — POTASSIUM CHLORIDE: 2 INJECTION, SOLUTION, CONCENTRATE INTRAVENOUS at 18:03

## 2019-12-27 RX ADMIN — INSULIN LISPRO 2 UNITS: 100 INJECTION, SOLUTION INTRAVENOUS; SUBCUTANEOUS at 01:05

## 2019-12-27 RX ADMIN — POTASSIUM CHLORIDE 20 MEQ: 29.8 INJECTION, SOLUTION INTRAVENOUS at 08:26

## 2019-12-27 RX ADMIN — INSULIN LISPRO 3 UNITS: 100 INJECTION, SOLUTION INTRAVENOUS; SUBCUTANEOUS at 12:34

## 2019-12-27 RX ADMIN — FUROSEMIDE 20 MG: 10 INJECTION, SOLUTION INTRAMUSCULAR; INTRAVENOUS at 22:10

## 2019-12-27 RX ADMIN — FUROSEMIDE 40 MG: 10 INJECTION, SOLUTION INTRAMUSCULAR; INTRAVENOUS at 08:27

## 2019-12-27 RX ADMIN — SODIUM CHLORIDE, PRESERVATIVE FREE 10 ML: 5 INJECTION INTRAVENOUS at 20:09

## 2019-12-27 RX ADMIN — WATER 5 ML: 1 INJECTION INTRAMUSCULAR; INTRAVENOUS; SUBCUTANEOUS at 19:57

## 2019-12-27 RX ADMIN — ACETAZOLAMIDE 500 MG: 500 INJECTION, POWDER, LYOPHILIZED, FOR SOLUTION INTRAVENOUS at 19:51

## 2019-12-27 RX ADMIN — INSULIN LISPRO 3 UNITS: 100 INJECTION, SOLUTION INTRAVENOUS; SUBCUTANEOUS at 19:51

## 2019-12-27 RX ADMIN — ACETAZOLAMIDE 500 MG: 500 INJECTION, POWDER, LYOPHILIZED, FOR SOLUTION INTRAVENOUS at 12:34

## 2019-12-27 RX ADMIN — INSULIN LISPRO 2 UNITS: 100 INJECTION, SOLUTION INTRAVENOUS; SUBCUTANEOUS at 05:17

## 2019-12-27 RX ADMIN — CHLORHEXIDINE GLUCONATE 15 ML: 1.2 RINSE ORAL at 08:28

## 2019-12-27 RX ADMIN — Medication 10 ML: at 08:27

## 2019-12-27 RX ADMIN — INSULIN LISPRO 3 UNITS: 100 INJECTION, SOLUTION INTRAVENOUS; SUBCUTANEOUS at 17:05

## 2019-12-27 RX ADMIN — INSULIN LISPRO 3 UNITS: 100 INJECTION, SOLUTION INTRAVENOUS; SUBCUTANEOUS at 08:30

## 2019-12-27 RX ADMIN — PANTOPRAZOLE SODIUM 40 MG: 40 INJECTION, POWDER, FOR SOLUTION INTRAVENOUS at 08:27

## 2019-12-27 ASSESSMENT — PAIN SCALES - PAIN ASSESSMENT IN ADVANCED DEMENTIA (PAINAD)
BREATHING: 0
BODYLANGUAGE: 0
FACIALEXPRESSION: 0
NEGVOCALIZATION: 0
TOTALSCORE: 0
CONSOLABILITY: 0

## 2019-12-27 ASSESSMENT — PAIN SCALES - GENERAL
PAINLEVEL_OUTOF10: 0
PAINLEVEL_OUTOF10: 1

## 2019-12-27 ASSESSMENT — PULMONARY FUNCTION TESTS
PIF_VALUE: 14
PIF_VALUE: 13
PIF_VALUE: 10
PIF_VALUE: 13
PIF_VALUE: 14
PIF_VALUE: 15
PIF_VALUE: 10
PIF_VALUE: 14
PIF_VALUE: 14
PIF_VALUE: 13
PIF_VALUE: 16
PIF_VALUE: 13
PIF_VALUE: 14
PIF_VALUE: 11
PIF_VALUE: 13
PIF_VALUE: 10

## 2019-12-27 NOTE — PROGRESS NOTES
0730: Shift handoff completed with holger Walshhift RN. 4 eye skin assessment completed, see note. Repositioned pt.     0749: VSS on 40% vent except pt tachypneic. SR on tele. CPOT score 1/10. RASS -1.     5009: AM meds administered. Brother at  Bedside, update given. 0945: AM assessment completed. 1020: Dr Staci Pedersen met with pt and pt's brother. Update given. No new orders, wants PT/OT as soon as pt extubated. 1136: Critical care rounds completed with Dr Damion Mckinney. New orders received. Pt placed on SBT by MD. Brother updated at bedside. 1145: Restraint order renewed. 1208: Reassessment completed. VSS on 40% vent. SR on tele. No S&S of pain at rest. Pt repositioned. 1215: Dr Jose Charles met with pt and brother, update given. No new orders at this time, already adjusted lasix dosage this AM. Aware of Dr Damion Mckinney diamox order. 1220: ABG drawn and sent. 1243: Reported ABG result to Dr Damion Mckinney, wants to come see the pt first.     1300: Bill Ho, PICC RN, checked PICC dsg, will change today. PICC is out but secured about 5 cm. Drainage and soiled biopatch warrant dsg change. 1305: Dr Damion Mckinney assessed pt. Order given to Kvng Sumner RT to extubate to vapotherm. Dr Damion Mckinney updated me on plan. Updated Dr Damion Mckinney restraints will need to continue since pt reaches for NGT and deleon, order is active. 1315: Pt extubated by RT. Placed on vapotherm. Settings prior to leaving the room were 80% and 40L. Restraints in use. 1330: Family at bedside. 1355: Pt thought he had BM, checked, no BM, no impaction, soft stool at rectum. Debra care provided. 1357: Dr Swati Cotto met with pt. Update given. Aware 200 ml NGT output this AM. New orders received to remove NGT. 1400: Removed NGT without difficulty. Nothing PO yet since pt recently extubated. I&O completed. 1415: Family to bedside, discussed NGT.  Discussed restraints but continued need for deleon and pt shakes head no when asked if he will leave catheter alone if restraints

## 2019-12-27 NOTE — PROGRESS NOTES
Kalen Junior is a 64 y.o. male patient. 1. Small bowel obstruction ([facility] [address]    2. Aspiration pneumonia of both lower lobes, unspecified aspiration pneumonia type ([facility] [address]    3. Hypoxia    4. Rectal bleeding    5. Septicemia [facility])      Past Medical History:   Diagnosis Date    Acne rosacea     Cognitive impairment     sees Dr Morrell Section, started Aricept 12/2019    Colon cancer ([facility] [address] 1/7/2014    ileocecal valve/Leuenberger/yrly surveillance    Down syndrome     Gout     HIGH CHOLESTEROL     2017 3.1 % framingham risk score    History of diarrhea     since childhood, worse since partial colectomy resolved after a few months    Macrocytosis     followed by Dr Priscilla Lugo and released    Seborrheic dermatitis     Seizure disorder ([facility] [address] 2019    Status post biventricular cardiac pacemaker insertion 01/2019    for long sinus pauses with syncopal willis    Status post partial colectomy 2016    diarrhea    Syncope and collapse 01/2019    recurrent syncope, found seizures and sinus pauses    Weight loss 2015    since 2014 at the time of partial colectomy lost 25 lbs     No past surgical history pertinent negatives on file.   Scheduled Meds:   insulin lispro  0-18 Units Subcutaneous Q4H    furosemide  20 mg Intravenous Q12H    acetaZOLAMIDE  500 mg Intravenous Q8H    sodium chloride flush  10 mL Intravenous 2 times per day    enoxaparin  40 mg Subcutaneous Daily    pantoprazole  40 mg Intravenous Daily    And    sodium chloride (PF)  10 mL Intravenous Daily    sodium chloride flush  10 mL Intravenous 2 times per day     Continuous Infusions:   PN-Adult Premix 5/20 - Central      dextrose       PRN Meds:sodium chloride flush, magnesium sulfate, potassium chloride, sodium chloride flush, ondansetron, acetaminophen, glucose, dextrose, glucagon (rDNA), dextrose, dextran 70-hypromellose    Allergies   Allergen Reactions    Penicillins      rash     Active Problems:    SBO (small bowel obstruction) ([facility] [address] Aspiration pneumonia of both lower lobes (HCC)    Hypoxia    Septicemia ([facility] [address]    Septic shock (HCC)    Postprocedural hypotension    ANISH (acute kidney injury) ([address]    Abnormal EKG    Acute respiratory failure with hypoxia (HCC)    Pleural effusion    Ischemic hepatitis    Thrombocytopenia (HCC)  Resolved Problems:    * No resolved hospital problems. *    Blood pressure (!) 125/56, pulse 89, temperature 98.8 °F (37.1 °C), temperature source Axillary, resp. rate 24, height 5' 6\" (1.676 m), weight 132 lb 15 oz (60.3 kg), SpO2 91 %. Subjective:   Diet: NPO. Activity level: Returning to normal.    Pain control: Well controlled. Objective:  Vital signs (most recent): Blood pressure (!) 125/56, pulse 89, temperature 98.8 °F (37.1 °C), temperature source Axillary, resp. rate 24, height 5' 6\" (1.676 m), weight 132 lb 15 oz (60.3 kg), SpO2 91 %. General appearance: Comfortable and in no acute distress. Abdomen: Abdomen is soft. No distension. Tenderness: There is tenderness in the incisional area.      Assessment & Plan    Small bowel obstruction   Stable following reduction of closed loop   Off ventilator today   Off pressors   Bowels have been moving, NG out, sips today    Electronically signed by Mayra Cardona MD on 12/27/2019 at 6:05 PM    Mayra Cardona MD  12/27/2019

## 2019-12-27 NOTE — PROGRESS NOTES
Pulmonary Progress Note    CC: Septic shock  Small bowel obstruction  Acute hypoxic respiratory failure   Aspiration pneumonia  Pleural effusions  Ischemic hepatitis  Thrombocytopenia       24 hr events:  Off vasopressors. Had right thoracentesis yesterday. Had 3.8L of urine output in the past 24 hours. On pressure support 8/5. PHYSICAL EXAM:  Blood pressure (!) 113/48, pulse 79, temperature 98.5 °F (36.9 °C), temperature source Axillary, resp. rate 19, height 5' 6\" (1.676 m), weight 132 lb 15 oz (60.3 kg), SpO2 94 %. on PS 8/5, 40%    Intake/Output Summary (Last 24 hours) at 12/27/2019 0740  Last data filed at 12/27/2019 0555  Gross per 24 hour   Intake 719 ml   Output 4175 ml   Net -3456 ml       Gen: Well developed; well nourished  Eyes: No scleral icterus. No conjunctival injection. ENT:  Oropharynx with ETT. External appearance of ears and nose normal.  Neck: Trachea midline. No obvious mass. No visible thyroid enlargement    Respiratory: Clear to auscultation bilaterally on anterior exam, no accessory muscle use  Cardiovascular: Regular rate and rhythm, no appreciable murmurs. Edema of BUE  Gastrointestinal: Soft, abdominal dressing c/d/i  Skin: Warm and dry. No rashes or ulcers on visible areas. Normal texture and turgor  Musculoskeletal: No cyanosis, clubbing or joint deformity.     Psychiatric: Intubated, but awake and interactive     Medications:  Current Facility-Administered Medications: insulin lispro (HUMALOG) injection vial 0-12 Units, 0-12 Units, Subcutaneous, Q4H  fentaNYL (SUBLIMAZE) injection 25 mcg, 25 mcg, Intravenous, Q1H PRN  PN-Adult Premix 5/20 - Central, , Intravenous, Continuous TPN  furosemide (LASIX) injection 40 mg, 40 mg, Intravenous, Q12H  vasopressin 20 Units in dextrose 5 % 100 mL infusion, 0.04 Units/min, Intravenous, Continuous  sodium chloride flush 0.9 % injection 10 mL, 10 mL, Intravenous, 2 times per day  sodium chloride flush 0.9 % injection 10 mL, 10 mL, Intravenous, PRN  magnesium sulfate 1 g in dextrose 5% 100 mL IVPB, 1 g, Intravenous, PRN  potassium chloride 20 mEq/50 mL IVPB (Central Line), 20 mEq, Intravenous, PRN  enoxaparin (LOVENOX) injection 40 mg, 40 mg, Subcutaneous, Daily  pantoprazole (PROTONIX) injection 40 mg, 40 mg, Intravenous, Daily **AND** sodium chloride (PF) 0.9 % injection 10 mL, 10 mL, Intravenous, Daily  sodium chloride flush 0.9 % injection 10 mL, 10 mL, Intravenous, 2 times per day  sodium chloride flush 0.9 % injection 10 mL, 10 mL, Intravenous, PRN  ondansetron (ZOFRAN) injection 4 mg, 4 mg, Intravenous, Q6H PRN  acetaminophen (TYLENOL) suppository 650 mg, 650 mg, Rectal, Q4H PRN  chlorhexidine (PERIDEX) 0.12 % solution 15 mL, 15 mL, Mouth/Throat, BID  glucose (GLUTOSE) 40 % oral gel 15 g, 15 g, Oral, PRN  dextrose 50 % IV solution, 12.5 g, Intravenous, PRN  glucagon (rDNA) injection 1 mg, 1 mg, Intramuscular, PRN  dextrose 5 % solution, 100 mL/hr, Intravenous, PRN  dextran 70-hypromellose (TEARS NATURALE) 0.1-0.3 % opthalmic solution 1 drop, 1 drop, Both Eyes, Q4H PRN  Facility-Administered Medications Ordered in Other Encounters: iopamidol (ISOVUE-370) 76 % injection 10 mL, 10 mL, Other, ONCE PRN    Data reviewed:  Labs:  CBC:   Recent Labs     19  0440 19  0742 19  0507   WBC NA* 12.2* 14.2*   HGB NA* 9.1* 10.7*   HCT NA* 26.7* 31.7*   MCV NA* 100.1* 101.3*   PLT NA* 176 250     BMP:   Recent Labs     19  0440 19  0742 19  0507   NA NA* 135* 144   K NA* 3.1* 3.4*   CL NA* 90* 98*   CO2 NA* 32 35*   PHOS NA* 2.6 3.7   BUN NA* 17 15   CREATININE NA* 0.8* 0.9     LIVER PROFILE:   Recent Labs     19  0440 19  0742 19  0507   AST NA* 50* 39*   ALT NA* 64* 56*   BILIDIR NA* 0.5* 0.3   BILITOT NA* 0.9 0.5   ALKPHOS NA* 98 103     PT/INR: No results for input(s): PROTIME, INR in the last 72 hours. APTT: No results for input(s): APTT in the last 72 hours.     AB/23- 7.42/51/77 (on 40%)  - 7. 44/52/91 (on 40%)  12/25- 7.48/44/150 (on 40%)  12/26- 7.52/42/52 (on 40%)  12/27- 7.49/50/63 (on 40%)    Cultures:   Blood culture (12/18): Negative  Sputum culture (12/19): Normal lio   Pleural fluid (12/26): No WBCs or organisms   Nasal MRSA probe: Negative      Films:  Chest images and reports were reviewed by me. My interpretation is:  CXR (12/27/19): Bilateral pleuro-parenchymal disease (slightly improved compared to 12/26); ETT, gastric tube and pacemaker in place  Chest CT (12/26/19): No LAD; bilateral pleural effusions; infiltrate in the RUL; BLL consolidation       Assessment:     Septic shock  Small bowel obstruction  Acute hypoxic respiratory failure  Aspiration pneumonia  Pleural effusions  Ischemic hepatitis  Thrombocytopenia       Plan:    Septic shock  - Due to bowel ischemia from closed loop obstruction and aspiration pneumonia  - Completed 7 days of cefepime and flagyl  - Now off vasopressors     Small bowel obstruction  - s/p ex-laparotomy and lysis of adhesions on 12/18  - Per general surgery    Acute hypoxic respiratory failure  - On pressure support. Decrease to 5/5 and check ABG  - On lasix twice daily  - Diamox x24 hours     Aspiration pneumonia  - Completed 7 days of cefepime and flagyl     Pleural effusions   - On Lasix twice daily. Diamox x24 hours    - Strict I/Os  - s/p right thoracentesis on 12/26/19    Ischemic hepatitis  - Keep SBP>90    Thrombocytopenia   - Resolved       Prophylaxis   DVT- lovenox  GI- protonix  VAP- peridex    I spent 40 minutes of critical care time with this patient excluding procedures.     MD [facility] Pulmonary, Critical Care and Sleep

## 2019-12-27 NOTE — PROGRESS NOTES
Department of Internal Medicine  General Internal Medicine  Attending Progress Note    Chief Complaint:originally vomiting and diarrhea      HPI:   SUBJECTIVE:  65 yo male patient with a history of down's syndrome, colon cancer (resected),  Pacemaker for chb who presented with SBO and septic shock. He is awake currently and able to answer yes and no appropriately. He has been weaned off one of his pressors and his bicarb drip is stopped. His brother, a retired inpt pharmacist is at his side. Carlos Esquivel did have a bm this morning. He has not been fed in any way yet. Yesterday attempt at thoracentesis drained 250ml fluid. No complications. He is still trying to wean from the vent. Arms restrained, legs move a lot.       Past Medical History:   Diagnosis Date    Acne rosacea     Cognitive impairment     sees Dr Reji Baldwin, started Aricept 12/2019    Colon cancer ([facility] 1/7/2014    ileocecal valve/Leuenberger/yrly surveillance    Down syndrome     Gout     HIGH CHOLESTEROL     2017 3.1 % framingham risk score    History of diarrhea     since childhood, worse since partial colectomy resolved after a few months    Macrocytosis     followed by Dr Mark Wood and released    Seborrheic dermatitis     Seizure disorder ([facility] [facility] 2019    Status post biventricular cardiac pacemaker insertion 01/2019    for long sinus pauses with syncopal willis    Status post partial colectomy 2016    diarrhea    Syncope and collapse 01/2019    recurrent syncope, found seizures and sinus pauses    Weight loss 2015    since 2014 at the time of partial colectomy lost 25 lbs     Past Surgical History:   Procedure Laterality Date    CATARACT REMOVAL WITH IMPLANT  2007    Bilateral    COLON SURGERY  1/7//2014    for removal colon cancer    COLONOSCOPY  02/27/2017    Dr Shreyas Wilson N/A 8/21/2019    COLONOSCOPY WITH BIOPSY performed by Boubacar Mackenzie MD at [address] [facility]  12/27/19 0555 -- -- -- 78 20 93 % 132 lb 15 oz (60.3 kg)   12/27/19 0513 -- -- -- 85 23 93 % --   12/27/19 0500 -- -- -- 83 23 92 % --   12/27/19 0400 -- 98.5 °F (36.9 °C) Axillary 80 22 97 % --   12/27/19 0300 -- -- -- 80 18 95 % --     I/O last 3 completed shifts: In: 719 [I.V.:689; NG/GT:30]  Out: 4175 [Urine:3800; Emesis/NG output:375]  I/O this shift:   In: 79 [I.V.:50; NG/GT:20]  Out: 385 [Urine:385]  VITALS:    BP (!) 104/42   Pulse 86   Temp 98.5 °F (36.9 °C) (Axillary)   Resp 20   Ht 5' 6\" (1.676 m)   Wt 132 lb 15 oz (60.3 kg)   SpO2 92%   BMI 21.46 kg/m²     General Appearance: awake and comfortable appearing on vent  Skin: warm and dry, no rash or erythema  Head: normocephalic and atraumatic  Eyes: conjunctivae normal and sclera anicteric  ENT: ng tube in place   Neck: neck supple and non tender without mass, no thyromegaly or thyroid nodules, no cervical lymphadenopathy   Pulmonary/Chest: clear to auscultation bilaterally- upper lobes symmetric bs Cardiovascular: normal rate, normal S1 and S2, no gallops and no carotid bruits  Abdomen: soft bs pos nontender, dressing I place  Extremities: no cyanosis, clubbing or edema  Musculoskeletal: no swollen joints and no tender joints,  Neurologic: following commands moves all 4    DATA:   Labs:  CBC:   Recent Labs     12/26/19  0440 12/26/19  0742 12/27/19  0507   WBC NA* 12.2* 14.2*   HGB NA* 9.1* 10.7*   PLT NA* 176 250     BMP:    Recent Labs     12/26/19  0440 12/26/19  0742 12/27/19  0507   NA NA* 135* 144   K NA* 3.1* 3.4*   CL NA* 90* 98*   CO2 NA* 32 35*   BUN NA* 17 15   CREATININE NA* 0.8* 0.9   GLUCOSE NA* 227* 198*     POC GLUCOSE:    Recent Labs     12/26/19  1741 12/26/19  2104 12/27/19  0054 12/27/19  0509 12/27/19  0829   POCGLU 177* 166* 161* 186* 158*     Ca/Mg/Phos:   Recent Labs     12/26/19  0440 12/26/19  0742 12/27/19  0507   CALCIUM NA* 8.0* 8.0*   MG NA* 1.80 2.30   PHOS NA* 2.6 3.7     Hepatic:   Recent Labs     12/26/19  0440 material within nearly the entirety of the esophagus, possibly secondary to the bowel obstruction and refluxed gastric contents. Patient may be at risk for aspiration. Trace bilateral pleural effusions.          ASSESSMENT AND PLAN    Active Problems:    Small bowel obstruction (HCC)  Plan: still on vent looks like to be weaned soon,     Aspiration pneumonia of both lower lobes ([address] pleural fluid bilat not a lot  Plan: no longer on abx    Hypoxia  Plan: , management per ICU team    Septicemia ([address] greatly improved  Plan: continue supportive aggressive care    Septic shock ([address]  Plan : off pressors, resolved  ANISH  resolved  Continued treatment plan per nephrology dialysis not needed  Cognitive impairment likely alzheimers  Discussed my concern with Nursing and family that he may take longer to clear from narcotics and sedatives and might have worse confusion,  Although seems to be alert and follow commands easily    Appropriate diagnostic studies and consults ordered  Tj Melvin MD

## 2019-12-27 NOTE — PLAN OF CARE
Problem: Restraint Use - Nonviolent/Non-Self-Destructive Behavior:  Goal: Absence of restraint indications  Description  Absence of restraint indications  Outcome: Ongoing  Goal: Absence of restraint-related injury  Description  Absence of restraint-related injury  Outcome: Ongoing     Problem: Nutrition  Goal: Optimal nutrition therapy  Outcome: Ongoing     Problem: Risk for Impaired Skin Integrity  Goal: Tissue integrity - skin and mucous membranes  Description  Structural intactness and normal physiological function of skin and  mucous membranes.   Outcome: Ongoing     Problem: Falls - Risk of:  Goal: Will remain free from falls  Description  Will remain free from falls  Outcome: Ongoing  Goal: Absence of physical injury  Description  Absence of physical injury  Outcome: Ongoing

## 2019-12-28 ENCOUNTER — APPOINTMENT (OUTPATIENT)
Dept: GENERAL RADIOLOGY | Age: 59
DRG: 853 | End: 2019-12-28
Payer: COMMERCIAL

## 2019-12-28 LAB
ALBUMIN SERPL-MCNC: 2.5 G/DL (ref 3.4–5)
ALP BLD-CCNC: 99 U/L (ref 40–129)
ALT SERPL-CCNC: 46 U/L (ref 10–40)
ANION GAP SERPL CALCULATED.3IONS-SCNC: 12 MMOL/L (ref 3–16)
AST SERPL-CCNC: 32 U/L (ref 15–37)
BASE EXCESS ARTERIAL: 0.3 MMOL/L (ref -3–3)
BASOPHILS ABSOLUTE: 0.1 K/UL (ref 0–0.2)
BASOPHILS RELATIVE PERCENT: 0.4 %
BILIRUB SERPL-MCNC: 0.5 MG/DL (ref 0–1)
BILIRUBIN DIRECT: <0.2 MG/DL (ref 0–0.3)
BILIRUBIN, INDIRECT: ABNORMAL MG/DL (ref 0–1)
BUN BLDV-MCNC: 20 MG/DL (ref 7–20)
CALCIUM SERPL-MCNC: 8.2 MG/DL (ref 8.3–10.6)
CARBOXYHEMOGLOBIN ARTERIAL: 1.1 % (ref 0–1.5)
CHLORIDE BLD-SCNC: 106 MMOL/L (ref 99–110)
CO2: 27 MMOL/L (ref 21–32)
CREAT SERPL-MCNC: 0.9 MG/DL (ref 0.9–1.3)
EOSINOPHILS ABSOLUTE: 0.1 K/UL (ref 0–0.6)
EOSINOPHILS RELATIVE PERCENT: 0.6 %
GFR AFRICAN AMERICAN: >60
GFR NON-AFRICAN AMERICAN: >60
GLUCOSE BLD-MCNC: 140 MG/DL (ref 70–99)
GLUCOSE BLD-MCNC: 145 MG/DL (ref 70–99)
GLUCOSE BLD-MCNC: 157 MG/DL (ref 70–99)
GLUCOSE BLD-MCNC: 166 MG/DL (ref 70–99)
GLUCOSE BLD-MCNC: 166 MG/DL (ref 70–99)
GLUCOSE BLD-MCNC: 177 MG/DL (ref 70–99)
GLUCOSE BLD-MCNC: 205 MG/DL (ref 70–99)
HCO3 ARTERIAL: 24.9 MMOL/L (ref 21–29)
HCT VFR BLD CALC: 34 % (ref 40.5–52.5)
HEMOGLOBIN, ART, EXTENDED: 11.5 G/DL (ref 13.5–17.5)
HEMOGLOBIN: 11.5 G/DL (ref 13.5–17.5)
LYMPHOCYTES ABSOLUTE: 0.9 K/UL (ref 1–5.1)
LYMPHOCYTES RELATIVE PERCENT: 6.1 %
MAGNESIUM: 2.3 MG/DL (ref 1.8–2.4)
MCH RBC QN AUTO: 34.7 PG (ref 26–34)
MCHC RBC AUTO-ENTMCNC: 33.7 G/DL (ref 31–36)
MCV RBC AUTO: 102.9 FL (ref 80–100)
METHEMOGLOBIN ARTERIAL: 0.6 %
MONOCYTES ABSOLUTE: 1.1 K/UL (ref 0–1.3)
MONOCYTES RELATIVE PERCENT: 7.7 %
NEUTROPHILS ABSOLUTE: 12.3 K/UL (ref 1.7–7.7)
NEUTROPHILS RELATIVE PERCENT: 85.2 %
O2 CONTENT ARTERIAL: 15 ML/DL
O2 SAT, ARTERIAL: 96.2 %
O2 THERAPY: ABNORMAL
PCO2 ARTERIAL: 42.6 MMHG (ref 35–45)
PDW BLD-RTO: 14.5 % (ref 12.4–15.4)
PERFORMED ON: ABNORMAL
PH ARTERIAL: 7.38 (ref 7.35–7.45)
PHOSPHORUS: 3.6 MG/DL (ref 2.5–4.9)
PLATELET # BLD: 263 K/UL (ref 135–450)
PMV BLD AUTO: 10.6 FL (ref 5–10.5)
PO2 ARTERIAL: 79.7 MMHG (ref 75–108)
POTASSIUM SERPL-SCNC: 3.5 MMOL/L (ref 3.5–5.1)
POTASSIUM SERPL-SCNC: 3.9 MMOL/L (ref 3.5–5.1)
RBC # BLD: 3.31 M/UL (ref 4.2–5.9)
SODIUM BLD-SCNC: 145 MMOL/L (ref 136–145)
TCO2 ARTERIAL: 26.2 MMOL/L
TOTAL PROTEIN: 5.9 G/DL (ref 6.4–8.2)
WBC # BLD: 14.4 K/UL (ref 4–11)

## 2019-12-28 PROCEDURE — C9113 INJ PANTOPRAZOLE SODIUM, VIA: HCPCS | Performed by: NURSE PRACTITIONER

## 2019-12-28 PROCEDURE — 80076 HEPATIC FUNCTION PANEL: CPT

## 2019-12-28 PROCEDURE — 97110 THERAPEUTIC EXERCISES: CPT

## 2019-12-28 PROCEDURE — 6370000000 HC RX 637 (ALT 250 FOR IP): Performed by: NURSE PRACTITIONER

## 2019-12-28 PROCEDURE — 80069 RENAL FUNCTION PANEL: CPT

## 2019-12-28 PROCEDURE — 84132 ASSAY OF SERUM POTASSIUM: CPT

## 2019-12-28 PROCEDURE — 99232 SBSQ HOSP IP/OBS MODERATE 35: CPT | Performed by: INTERNAL MEDICINE

## 2019-12-28 PROCEDURE — 2580000003 HC RX 258: Performed by: SURGERY

## 2019-12-28 PROCEDURE — 71045 X-RAY EXAM CHEST 1 VIEW: CPT

## 2019-12-28 PROCEDURE — 97162 PT EVAL MOD COMPLEX 30 MIN: CPT

## 2019-12-28 PROCEDURE — 2580000003 HC RX 258: Performed by: NURSE PRACTITIONER

## 2019-12-28 PROCEDURE — 2000000000 HC ICU R&B

## 2019-12-28 PROCEDURE — 85025 COMPLETE CBC W/AUTO DIFF WBC: CPT

## 2019-12-28 PROCEDURE — 99024 POSTOP FOLLOW-UP VISIT: CPT | Performed by: SURGERY

## 2019-12-28 PROCEDURE — 82803 BLOOD GASES ANY COMBINATION: CPT

## 2019-12-28 PROCEDURE — 2580000003 HC RX 258: Performed by: INTERNAL MEDICINE

## 2019-12-28 PROCEDURE — 83735 ASSAY OF MAGNESIUM: CPT

## 2019-12-28 PROCEDURE — 94760 N-INVAS EAR/PLS OXIMETRY 1: CPT

## 2019-12-28 PROCEDURE — 6360000002 HC RX W HCPCS: Performed by: INTERNAL MEDICINE

## 2019-12-28 PROCEDURE — 99233 SBSQ HOSP IP/OBS HIGH 50: CPT | Performed by: INTERNAL MEDICINE

## 2019-12-28 PROCEDURE — 6360000002 HC RX W HCPCS: Performed by: NURSE PRACTITIONER

## 2019-12-28 PROCEDURE — 2500000003 HC RX 250 WO HCPCS: Performed by: PHARMACIST

## 2019-12-28 PROCEDURE — 2700000000 HC OXYGEN THERAPY PER DAY

## 2019-12-28 PROCEDURE — 97530 THERAPEUTIC ACTIVITIES: CPT

## 2019-12-28 RX ORDER — FUROSEMIDE 10 MG/ML
10 INJECTION INTRAMUSCULAR; INTRAVENOUS EVERY 12 HOURS
Status: DISCONTINUED | OUTPATIENT
Start: 2019-12-28 | End: 2019-12-29

## 2019-12-28 RX ADMIN — INSULIN LISPRO 6 UNITS: 100 INJECTION, SOLUTION INTRAVENOUS; SUBCUTANEOUS at 20:48

## 2019-12-28 RX ADMIN — INSULIN LISPRO 3 UNITS: 100 INJECTION, SOLUTION INTRAVENOUS; SUBCUTANEOUS at 12:31

## 2019-12-28 RX ADMIN — SODIUM CHLORIDE, PRESERVATIVE FREE 10 ML: 5 INJECTION INTRAVENOUS at 08:10

## 2019-12-28 RX ADMIN — INSULIN LISPRO 3 UNITS: 100 INJECTION, SOLUTION INTRAVENOUS; SUBCUTANEOUS at 08:05

## 2019-12-28 RX ADMIN — ENOXAPARIN SODIUM 40 MG: 40 INJECTION SUBCUTANEOUS at 08:52

## 2019-12-28 RX ADMIN — POTASSIUM CHLORIDE: 2 INJECTION, SOLUTION, CONCENTRATE INTRAVENOUS at 17:44

## 2019-12-28 RX ADMIN — POTASSIUM CHLORIDE 20 MEQ: 29.8 INJECTION, SOLUTION INTRAVENOUS at 09:37

## 2019-12-28 RX ADMIN — INSULIN LISPRO 3 UNITS: 100 INJECTION, SOLUTION INTRAVENOUS; SUBCUTANEOUS at 00:25

## 2019-12-28 RX ADMIN — FUROSEMIDE 10 MG: 10 INJECTION, SOLUTION INTRAMUSCULAR; INTRAVENOUS at 23:07

## 2019-12-28 RX ADMIN — SODIUM CHLORIDE, PRESERVATIVE FREE 10 ML: 5 INJECTION INTRAVENOUS at 21:05

## 2019-12-28 RX ADMIN — INSULIN LISPRO 3 UNITS: 100 INJECTION, SOLUTION INTRAVENOUS; SUBCUTANEOUS at 16:21

## 2019-12-28 RX ADMIN — PANTOPRAZOLE SODIUM 40 MG: 40 INJECTION, POWDER, FOR SOLUTION INTRAVENOUS at 08:10

## 2019-12-28 RX ADMIN — Medication 10 ML: at 08:10

## 2019-12-28 RX ADMIN — I.V. FAT EMULSION 250 ML: 20 EMULSION INTRAVENOUS at 17:38

## 2019-12-28 RX ADMIN — ACETAZOLAMIDE 500 MG: 500 INJECTION, POWDER, LYOPHILIZED, FOR SOLUTION INTRAVENOUS at 03:56

## 2019-12-28 RX ADMIN — INSULIN LISPRO 3 UNITS: 100 INJECTION, SOLUTION INTRAVENOUS; SUBCUTANEOUS at 03:56

## 2019-12-28 RX ADMIN — POTASSIUM CHLORIDE 20 MEQ: 29.8 INJECTION, SOLUTION INTRAVENOUS at 08:05

## 2019-12-28 RX ADMIN — FUROSEMIDE 20 MG: 10 INJECTION, SOLUTION INTRAMUSCULAR; INTRAVENOUS at 12:32

## 2019-12-28 ASSESSMENT — PAIN SCALES - PAIN ASSESSMENT IN ADVANCED DEMENTIA (PAINAD)
BREATHING: 0
CONSOLABILITY: 0
BODYLANGUAGE: 0
NEGVOCALIZATION: 0
BREATHING: 0
BREATHING: 0
FACIALEXPRESSION: 0
FACIALEXPRESSION: 0
CONSOLABILITY: 0
NEGVOCALIZATION: 0
FACIALEXPRESSION: 0
FACIALEXPRESSION: 0
BREATHING: 0
BODYLANGUAGE: 0
TOTALSCORE: 0
BREATHING: 0
NEGVOCALIZATION: 0
CONSOLABILITY: 0
BREATHING: 0
NEGVOCALIZATION: 0
FACIALEXPRESSION: 0
TOTALSCORE: 0
BODYLANGUAGE: 0
NEGVOCALIZATION: 0
BODYLANGUAGE: 0
BREATHING: 0
CONSOLABILITY: 0
BREATHING: 0
CONSOLABILITY: 0
TOTALSCORE: 0
NEGVOCALIZATION: 0
CONSOLABILITY: 0
BREATHING: 0
FACIALEXPRESSION: 0
BODYLANGUAGE: 0
TOTALSCORE: 0
FACIALEXPRESSION: 0
BODYLANGUAGE: 0
NEGVOCALIZATION: 0
NEGVOCALIZATION: 0
TOTALSCORE: 0
TOTALSCORE: 0
BODYLANGUAGE: 0
FACIALEXPRESSION: 0
TOTALSCORE: 0
BODYLANGUAGE: 0
TOTALSCORE: 0
NEGVOCALIZATION: 0
CONSOLABILITY: 0
BODYLANGUAGE: 0
TOTALSCORE: 0
CONSOLABILITY: 0
CONSOLABILITY: 0
FACIALEXPRESSION: 0

## 2019-12-28 ASSESSMENT — ENCOUNTER SYMPTOMS
VOMITING: 0
NAUSEA: 0

## 2019-12-28 ASSESSMENT — PAIN SCALES - GENERAL
PAINLEVEL_OUTOF10: 0

## 2019-12-28 NOTE — PROGRESS NOTES
12/26/19  0742 12/27/19  0507 12/27/19  1120 12/28/19  0550   * 144  --  145   K 3.1* 3.4* 3.9 3.5   CL 90* 98*  --  106   CO2 32 35*  --  27   BUN 17 15  --  20   CREATININE 0.8* 0.9  --  0.9   GLUCOSE 227* 198*  --  166*     Hepatic:   Recent Labs     12/26/19  0742 12/27/19  0507 12/28/19  0550   AST 50* 39* 32   ALT 64* 56* 46*   BILITOT 0.9 0.5 0.5   ALKPHOS 98 103 99     Troponin: No results for input(s): TROPONINI in the last 72 hours. BNP: No results for input(s): BNP in the last 72 hours. Lipids: No results for input(s): CHOL, HDL in the last 72 hours. Invalid input(s): LDLCALCU  ABGs:   Lab Results   Component Value Date    PHART 7.490 12/27/2019    PO2ART 64.4 12/27/2019    KPT2STM 45.0 12/27/2019     INR: No results for input(s): INR in the last 72 hours.     Objective:   Vitals: BP (!) 86/40   Pulse 89   Temp 97.7 °F (36.5 °C) (Axillary)   Resp (!) 33   Ht 5' 6\" (1.676 m)   Wt 128 lb 8.5 oz (58.3 kg)   SpO2 95%   BMI 20.74 kg/m²     General appearance: alert, appears stated age and cooperative  Lungs: exp wheez and ronchi  Heart: regular rate and rhythm  Abdomen: surgical dressing C/D/I  Extremities: extremities normal, atraumatic, no cyanosis or edema  Neurologic: Mental status: answers questions with non-verbal genstures    Assessment/Plan:     SBO (small bowel obstruction) ([address]  Plan: General surgery following   Completed 7 days of cefepime and flagyl  S/p exploratory lap    Aspiration pneumonia of both lower lobes (HCC)  Plan: Completed 7 days of cefepime and flagyl      ANISH (acute kidney injury) ([facility]  Plan: Nephrology following       Acute respiratory failure with hypoxia ([facility]  Plan: Pulmonology following   Now extubated, on Vapotherm saturating 100%      Pleural effusion  Plan: Lasix BID, Diamox q24      Ischemic hepatitis  Plan: cont to monitor    PPx: lovenox   Dispo: med/surg    Elisa Goldmann, MD  12:41 PM    Documentation was done using voice recognition dragon software. Every effort was made to ensure accuracy; however, inadvertent unintentional computerized transcription errors may be present.

## 2019-12-28 NOTE — PROGRESS NOTES
4 Eyes Skin Assessment     The patient is being assess for  Shift Handoff    I agree that 2 RN's have performed a thorough Head to Toe Skin Assessment on the patient. ALL assessment sites listed below have been assessed. Areas assessed by both nurses: Nicole/Cecy  [x]   Head, Face, and Ears   [x]   Shoulders, Back, and Chest  [x]   Arms, Elbows, and Hands   [x]   Coccyx, Sacrum, and IschIum  [x]   Legs, Feet, and Heels        Does the Patient have Skin Breakdown?   Yes LDA WOUND CARE was Initiated documentation include the Debra-wound, Wound Assessment, Measurements, Dressing Treatment, Drainage, and Color\",         Stuart Prevention initiated:  Yes   Wound Care Orders initiated:  Yes      [id number] nurse consulted for Pressure Injury (Stage 3,4, Unstageable, DTI, NWPT, and Complex wounds), New and Established Ostomies:  No      Nurse 1 eSignature: Electronically signed by Mo Padgett RN on 12/28/19 at 7:33 AM    **SHARE this note so that the co-signing nurse is able to place an eSignature**    Nurse 2 eSignature: Electronically signed by Sophia Babin RN on 12/28/19 at 7:56 AM

## 2019-12-28 NOTE — PROGRESS NOTES
Pulmonary Progress Note    CC: Septic shock  Small bowel obstruction  Acute hypoxic respiratory failure   Aspiration pneumonia  Pleural effusions  Ischemic hepatitis  Thrombocytopenia       24 hr events:  Extubated to vapotherm yesterday. Had ~3L of urine output in the past 24 hours. ROS:  No SOB  No cough  No vomiting      PHYSICAL EXAM:  Blood pressure 103/61, pulse 85, temperature 97.9 °F (36.6 °C), temperature source Oral, resp. rate 18, height 5' 6\" (1.676 m), weight 128 lb 8.5 oz (58.3 kg), SpO2 95 %. on vapotherm 40L, 40%    Intake/Output Summary (Last 24 hours) at 12/28/2019 0737  Last data filed at 12/28/2019 0610  Gross per 24 hour   Intake 1550.5 ml   Output 3212 ml   Net -1661.5 ml       Gen: Well developed; well nourished  Eyes: No scleral icterus. No conjunctival injection. ENT: External appearance of ears and nose normal.  Neck: Trachea midline. No obvious mass. No visible thyroid enlargement    Respiratory: Decreased breath sounds over BLL, no accessory muscle use  Cardiovascular: Regular rate and rhythm, no appreciable murmurs. Edema of BUE  Gastrointestinal: Soft, abdominal dressing c/d/i  Skin: Warm and dry. No rashes or ulcers on visible areas. Normal texture and turgor  Musculoskeletal: No cyanosis, clubbing or joint deformity.     Psychiatric: Normal mood and affect; unable to adequately assess insight and judgement     Medications:  Current Facility-Administered Medications: insulin lispro (HUMALOG) injection vial 0-18 Units, 0-18 Units, Subcutaneous, Q4H  furosemide (LASIX) injection 20 mg, 20 mg, Intravenous, Q12H  PN-Adult Premix 5/20 - Central, , Intravenous, Continuous TPN  sodium chloride flush 0.9 % injection 10 mL, 10 mL, Intravenous, 2 times per day  sodium chloride flush 0.9 % injection 10 mL, 10 mL, Intravenous, PRN  magnesium sulfate 1 g in dextrose 5% 100 mL IVPB, 1 g, Intravenous, PRN  potassium chloride 20 mEq/50 mL IVPB (Central Line), 20 mEq, Intravenous, PRN  enoxaparin

## 2019-12-28 NOTE — PROGRESS NOTES
Physical Therapy    Facility/Department: [address] ICU  Initial Assessment    NAME: Lisa Hayden  : 1960  MRN: [id number]    Date of Service: 2019    Discharge Recommendations:  Continue to assess pending progress   PT Equipment Recommendations  Other: Will monitor for potential equipt needs. Lisa Hayden scored a  on the AM-PAC short mobility form. Current research shows that an AM-PAC score of 17 or less is typically not associated with a discharge to the patient's home setting. Based on the patients AM-PAC score and their current functional mobility deficits, it is recommended that the patient have 3-5 vs 5-7 sessions per week of Physical Therapy at d/c to increase the patients independence. Assessment   Body structures, Functions, Activity limitations: Decreased functional mobility ; Decreased strength;Decreased safe awareness;Decreased cognition;Decreased endurance  Assessment: 65 y/o male admit 19 with SBO, Rectal Bleed, Pneumonia, Septicemia. -19 Pt Intubated. 19 S/P Exp Lap, Lysis of Adhesions with Reduction of Closed Loop Small Bowel Obstruction. PMH as noted including Colon Ca, Partial Colectomy, Syncope/Collapse, Pacemaker, Down's Syndrome, Gout. PTA pt living with family (brother, s-i-l) in trilevel home; independent with self care and functional mobility (without assist device) per family report. Family does report gradual onset forgetful/confusion over past yr. Unclear d/c plan at this time; suspect In-Pt Rehab vs SNF setting. Will monitor pt's progress as able to proceed EOB/OOB Activities. Prognosis: Fair;Good  Decision Making: Medium Complexity  History: 65 y/o male admit 19 with SBO, Rectal Bleed, Pneumonia, Septicemia. -19 Pt Intubated. 19 S/P Exp Lap, Lysis of Adhesions with Reduction of Closed Loop Small Bowel Obstruction.   PMH as noted including Colon Ca, Partial Colectomy, Syncope/Collapse, Pacemaker, Down's Syndrome, Gout. Exam: See above. Clinical Presentation: See above. Patient Education: Role of PT, POC, Need to call for assist.   Barriers to Learning: Cognitive. REQUIRES PT FOLLOW UP: Yes  Activity Tolerance  Activity Tolerance: Patient limited by endurance; Patient limited by cognitive status       Patient Diagnosis(es): The primary encounter diagnosis was Small bowel obstruction ([address] Diagnoses of Aspiration pneumonia of both lower lobes, unspecified aspiration pneumonia type ([address] Hypoxia, Rectal bleeding, and Septicemia ([address] were also pertinent to this visit. has a past medical history of Acne rosacea, Cognitive impairment, Colon cancer ([address] Down syndrome, Gout, HIGH CHOLESTEROL, History of diarrhea, Macrocytosis, Seborrheic dermatitis, Seizure disorder ([address] Status post biventricular cardiac pacemaker insertion, Status post partial colectomy, Syncope and collapse, and Weight loss. has a past surgical history that includes Cataract removal with implant (2007); Colon surgery (1/7//2014); Colonoscopy (02/27/2017); pacemaker placement; Colonoscopy (N/A, 8/21/2019); and Small intestine surgery (N/A, 12/18/2019). Restrictions  Restrictions/Precautions  Restrictions/Precautions: Fall Risk  Position Activity Restriction  Other position/activity restrictions: O2 40L via Vapotherm. Pt MRDD although independent care/mobility pta. NPO except Popscicles, Ice Chips    Vision/Hearing  Vision: Within Functional Limits  Hearing: Within functional limits(May be alittle [place]; difficult assess due to noise assoc with Vapotherm.  )     Subjective  General  Chart Reviewed: Yes  Patient assessed for rehabilitation services?: Yes  Additional Pertinent Hx: 65 y/o male admit 12/18/19 with SBO, Rectal Bleed, Pneumonia, Septicemia. 12/18-12/27/19 Pt Intubated. 12/18/19 S/P Exp Lap, Lysis of Adhesions with Reduction of Closed Loop Small Bowel Obstruction.   PMH as noted including Colon Ca, Partial Colectomy, Syncope/Collapse, Pacemaker, Down's Syndrome, Gout. Family / Caregiver Present: Yes(Brother (Himanshu). )  Referring Practitioner: Dr. Baljit Ayon  Diagnosis: SBO, Rectal Bleed, Pneumonia, Septicemia S/P Exp Lap, Lysis of Adhesions with Reduction of Closed Loop Small Bowel Obstruction. Other (Comment): Pt follows simple commands, delayed and inconsistent. Subjective  Subjective: Pt/family agreeable to PT Eval/Rx. Pain Screening  Patient Currently in Pain: No          Orientation  Orientation  Orientation Level: Oriented to person  Social/Functional History  Social/Functional History  Lives With: Family(Brother (Lorena Samuel), S-I-L. )  Type of Home: House  Home Layout: Multi-level, Bed/Bath upstairs, Laundry in basement(Trilevel. )  Home Access: Stairs to enter without rails(1+1 steps to enter.  )  Bathroom Shower/Tub: Tub/Shower unit  Bathroom Toilet: Standard  Bathroom Accessibility: Accessible  ADL Assistance: Independent  Ambulation Assistance: Independent(Without assist device pta. 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 Leisure & Hobbies: [employer], Watching TV. Additional Comments: Information provided from family (brother) at bedside. Family reports pt occassionally home alone when he/wife need to go out. Reports gradual progress of forgetful/confusion, \"dementia\" over past yr. Cognition   Cognition  Cognition Comment: Pt alert to self; otherwise confused. Follows few simple commands inconsistently/delayed. Objective     Observation/Palpation  Observation: Dressing intact Abdominal Region. AROM RLE (degrees)  RLE AROM: WFL  AROM LLE (degrees)  LLE AROM : WFL  AROM RUE (degrees)  RUE AROM : WFL  AROM LUE (degrees)  LUE AROM : WFL  Strength Other  Other: Appears at least 3/5; limited assess at this time.           Bed mobility  Rolling to Left: Dependent/Total  Rolling to Right:

## 2019-12-28 NOTE — PLAN OF CARE
Problem: Restraint Use - Nonviolent/Non-Self-Destructive Behavior:  Goal: Absence of restraint-related injury  Description  Absence of restraint-related injury  Outcome: Met This Shift  Note:   Continued use of restraints necessary. Pt reaches for deleon and vapotherm with release. Documentation Q1H. Daily needs met oral care. Turn Q2H. Order up to date for 24 hours. Problem: Falls - Risk of:  Goal: Absence of physical injury  Description  Absence of physical injury  Outcome: Met This Shift     Problem: Nutrition  Goal: Optimal nutrition therapy  Outcome: Ongoing  Note:   Patient receiving TPN through a PICC at 60 mL/hr. Problem: Risk for Impaired Skin Integrity  Goal: Tissue integrity - skin and mucous membranes  Description  Structural intactness and normal physiological function of skin and  mucous membranes. Outcome: Ongoing  Note:   Monitoring patient skin integrity for skin breakdown, turning and repositioning q2h per protocol. Barrier cream applied for MASD on buttocks. Mepliex borders placed on patient heels R shoulder where there is a rash and redness. Oral care performed, tongue scrubbed and lip moisturizer applied as needed.

## 2019-12-29 LAB
ALBUMIN SERPL-MCNC: 2.5 G/DL (ref 3.4–5)
ALP BLD-CCNC: 136 U/L (ref 40–129)
ALT SERPL-CCNC: 47 U/L (ref 10–40)
ANION GAP SERPL CALCULATED.3IONS-SCNC: 13 MMOL/L (ref 3–16)
AST SERPL-CCNC: 47 U/L (ref 15–37)
BASOPHILS ABSOLUTE: 0.2 K/UL (ref 0–0.2)
BASOPHILS RELATIVE PERCENT: 1.1 %
BILIRUB SERPL-MCNC: 0.6 MG/DL (ref 0–1)
BILIRUBIN DIRECT: 0.3 MG/DL (ref 0–0.3)
BILIRUBIN, INDIRECT: 0.3 MG/DL (ref 0–1)
BODY FLUID CULTURE, STERILE: NORMAL
BUN BLDV-MCNC: 26 MG/DL (ref 7–20)
CALCIUM SERPL-MCNC: 8.3 MG/DL (ref 8.3–10.6)
CHLORIDE BLD-SCNC: 105 MMOL/L (ref 99–110)
CO2: 21 MMOL/L (ref 21–32)
CREAT SERPL-MCNC: 0.8 MG/DL (ref 0.9–1.3)
EOSINOPHILS ABSOLUTE: 0.2 K/UL (ref 0–0.6)
EOSINOPHILS RELATIVE PERCENT: 1.3 %
GFR AFRICAN AMERICAN: >60
GFR NON-AFRICAN AMERICAN: >60
GLUCOSE BLD-MCNC: 160 MG/DL (ref 70–99)
GLUCOSE BLD-MCNC: 167 MG/DL (ref 70–99)
GLUCOSE BLD-MCNC: 171 MG/DL (ref 70–99)
GLUCOSE BLD-MCNC: 175 MG/DL (ref 70–99)
GLUCOSE BLD-MCNC: 182 MG/DL (ref 70–99)
GLUCOSE BLD-MCNC: 196 MG/DL (ref 70–99)
GLUCOSE BLD-MCNC: 201 MG/DL (ref 70–99)
GRAM STAIN RESULT: NORMAL
HCT VFR BLD CALC: 34.6 % (ref 40.5–52.5)
HEMOGLOBIN: 11.6 G/DL (ref 13.5–17.5)
LYMPHOCYTES ABSOLUTE: 1.1 K/UL (ref 1–5.1)
LYMPHOCYTES RELATIVE PERCENT: 7.2 %
MAGNESIUM: 2.1 MG/DL (ref 1.8–2.4)
MCH RBC QN AUTO: 34.5 PG (ref 26–34)
MCHC RBC AUTO-ENTMCNC: 33.4 G/DL (ref 31–36)
MCV RBC AUTO: 103.1 FL (ref 80–100)
MONOCYTES ABSOLUTE: 0.8 K/UL (ref 0–1.3)
MONOCYTES RELATIVE PERCENT: 5.4 %
NEUTROPHILS ABSOLUTE: 12.4 K/UL (ref 1.7–7.7)
NEUTROPHILS RELATIVE PERCENT: 85 %
PDW BLD-RTO: 14.9 % (ref 12.4–15.4)
PERFORMED ON: ABNORMAL
PHOSPHORUS: 2.9 MG/DL (ref 2.5–4.9)
PLATELET # BLD: 286 K/UL (ref 135–450)
PMV BLD AUTO: 10.3 FL (ref 5–10.5)
POTASSIUM SERPL-SCNC: 4.1 MMOL/L (ref 3.5–5.1)
RBC # BLD: 3.36 M/UL (ref 4.2–5.9)
SODIUM BLD-SCNC: 139 MMOL/L (ref 136–145)
TOTAL PROTEIN: 6.3 G/DL (ref 6.4–8.2)
WBC # BLD: 14.6 K/UL (ref 4–11)

## 2019-12-29 PROCEDURE — 6360000002 HC RX W HCPCS: Performed by: NURSE PRACTITIONER

## 2019-12-29 PROCEDURE — 83735 ASSAY OF MAGNESIUM: CPT

## 2019-12-29 PROCEDURE — 6370000000 HC RX 637 (ALT 250 FOR IP): Performed by: NURSE PRACTITIONER

## 2019-12-29 PROCEDURE — 99232 SBSQ HOSP IP/OBS MODERATE 35: CPT | Performed by: INTERNAL MEDICINE

## 2019-12-29 PROCEDURE — 94760 N-INVAS EAR/PLS OXIMETRY 1: CPT

## 2019-12-29 PROCEDURE — 2580000003 HC RX 258: Performed by: SURGERY

## 2019-12-29 PROCEDURE — 2700000000 HC OXYGEN THERAPY PER DAY

## 2019-12-29 PROCEDURE — 80069 RENAL FUNCTION PANEL: CPT

## 2019-12-29 PROCEDURE — 2500000003 HC RX 250 WO HCPCS: Performed by: PHARMACIST

## 2019-12-29 PROCEDURE — C9113 INJ PANTOPRAZOLE SODIUM, VIA: HCPCS | Performed by: NURSE PRACTITIONER

## 2019-12-29 PROCEDURE — 97530 THERAPEUTIC ACTIVITIES: CPT

## 2019-12-29 PROCEDURE — 99231 SBSQ HOSP IP/OBS SF/LOW 25: CPT | Performed by: INTERNAL MEDICINE

## 2019-12-29 PROCEDURE — 2580000003 HC RX 258: Performed by: NURSE PRACTITIONER

## 2019-12-29 PROCEDURE — 99024 POSTOP FOLLOW-UP VISIT: CPT | Performed by: SURGERY

## 2019-12-29 PROCEDURE — 2000000000 HC ICU R&B

## 2019-12-29 PROCEDURE — 6360000002 HC RX W HCPCS: Performed by: INTERNAL MEDICINE

## 2019-12-29 PROCEDURE — 97166 OT EVAL MOD COMPLEX 45 MIN: CPT

## 2019-12-29 PROCEDURE — 80076 HEPATIC FUNCTION PANEL: CPT

## 2019-12-29 PROCEDURE — 85025 COMPLETE CBC W/AUTO DIFF WBC: CPT

## 2019-12-29 PROCEDURE — 2580000003 HC RX 258: Performed by: INTERNAL MEDICINE

## 2019-12-29 RX ORDER — FUROSEMIDE 10 MG/ML
10 INJECTION INTRAMUSCULAR; INTRAVENOUS DAILY
Status: DISCONTINUED | OUTPATIENT
Start: 2019-12-30 | End: 2019-12-29

## 2019-12-29 RX ADMIN — PANTOPRAZOLE SODIUM 40 MG: 40 INJECTION, POWDER, FOR SOLUTION INTRAVENOUS at 08:19

## 2019-12-29 RX ADMIN — SODIUM CHLORIDE, PRESERVATIVE FREE 10 ML: 5 INJECTION INTRAVENOUS at 08:26

## 2019-12-29 RX ADMIN — ENOXAPARIN SODIUM 40 MG: 40 INJECTION SUBCUTANEOUS at 08:30

## 2019-12-29 RX ADMIN — FUROSEMIDE 10 MG: 10 INJECTION, SOLUTION INTRAMUSCULAR; INTRAVENOUS at 08:18

## 2019-12-29 RX ADMIN — Medication 10 ML: at 08:19

## 2019-12-29 RX ADMIN — POTASSIUM CHLORIDE: 2 INJECTION, SOLUTION, CONCENTRATE INTRAVENOUS at 18:10

## 2019-12-29 RX ADMIN — INSULIN LISPRO 3 UNITS: 100 INJECTION, SOLUTION INTRAVENOUS; SUBCUTANEOUS at 08:18

## 2019-12-29 RX ADMIN — INSULIN LISPRO 6 UNITS: 100 INJECTION, SOLUTION INTRAVENOUS; SUBCUTANEOUS at 04:41

## 2019-12-29 RX ADMIN — INSULIN LISPRO 3 UNITS: 100 INJECTION, SOLUTION INTRAVENOUS; SUBCUTANEOUS at 20:57

## 2019-12-29 RX ADMIN — INSULIN LISPRO 3 UNITS: 100 INJECTION, SOLUTION INTRAVENOUS; SUBCUTANEOUS at 00:30

## 2019-12-29 RX ADMIN — INSULIN LISPRO 3 UNITS: 100 INJECTION, SOLUTION INTRAVENOUS; SUBCUTANEOUS at 12:38

## 2019-12-29 RX ADMIN — INSULIN LISPRO 3 UNITS: 100 INJECTION, SOLUTION INTRAVENOUS; SUBCUTANEOUS at 16:45

## 2019-12-29 ASSESSMENT — PAIN SCALES - PAIN ASSESSMENT IN ADVANCED DEMENTIA (PAINAD)
BREATHING: 0
CONSOLABILITY: 0
FACIALEXPRESSION: 0
BODYLANGUAGE: 0
NEGVOCALIZATION: 0
NEGVOCALIZATION: 0
TOTALSCORE: 0
FACIALEXPRESSION: 0
FACIALEXPRESSION: 0
BODYLANGUAGE: 0
FACIALEXPRESSION: 0
NEGVOCALIZATION: 0
BODYLANGUAGE: 0
BODYLANGUAGE: 0
NEGVOCALIZATION: 0
CONSOLABILITY: 0
FACIALEXPRESSION: 0
BODYLANGUAGE: 0
NEGVOCALIZATION: 0
BREATHING: 0
BREATHING: 0
CONSOLABILITY: 0
BODYLANGUAGE: 0
CONSOLABILITY: 0
CONSOLABILITY: 0
NEGVOCALIZATION: 0
TOTALSCORE: 0
BREATHING: 0
BREATHING: 0
TOTALSCORE: 0
BODYLANGUAGE: 0
TOTALSCORE: 0
NEGVOCALIZATION: 0
FACIALEXPRESSION: 0
CONSOLABILITY: 0
TOTALSCORE: 0
FACIALEXPRESSION: 0
BREATHING: 0
TOTALSCORE: 0
TOTALSCORE: 0
CONSOLABILITY: 0
BREATHING: 0

## 2019-12-29 ASSESSMENT — PAIN SCALES - GENERAL
PAINLEVEL_OUTOF10: 0
PAINLEVEL_OUTOF10: 0

## 2019-12-29 ASSESSMENT — ENCOUNTER SYMPTOMS
VOMITING: 0
NAUSEA: 0

## 2019-12-29 NOTE — PROGRESS NOTES
Physical Therapy  Facility/Department: [address] ICU  Daily Treatment Note  NAME: Sebas Andre  : 1960  MRN: [id number]    Date of Service: 2019    Discharge Recommendations:  Continue to assess pending progress   PT Equipment Recommendations  Other: Will monitor for potential equipt needs. Assessment   Body structures, Functions, Activity limitations: Decreased functional mobility ; Decreased strength;Decreased safe awareness;Decreased cognition;Decreased endurance  Assessment: 63 y/o male admit 19 with SBO, Rectal Bleed, Pneumonia, Septicemia. -19 Pt Intubated. 19 S/P Exp Lap, Lysis of Adhesions with Reduction of Closed Loop Small Bowel Obstruction. PMH as noted including Colon Ca, Partial Colectomy, Syncope/Collapse, Pacemaker, Down's Syndrome, Gout. PTA pt living with family (brother, s-i-l) in [facility] home; independent with self care and functional mobility (without assist device) per family report. Family does report gradual onset forgetful/confusion over past yr. Pt edna EOB/Brief OOB via Stedy well. Defer OOB to chair due to current need UE restraints for maintain IV/lines. Anticipate improved as lines removed. Cont anticipate In-Pt Rehab setting at this time. Prognosis: Good  Decision Making: Medium Complexity  History: 63 y/o male admit 19 with SBO, Rectal Bleed, Pneumonia, Septicemia. -19 Pt Intubated. 19 S/P Exp Lap, Lysis of Adhesions with Reduction of Closed Loop Small Bowel Obstruction. PMH as noted including Colon Ca, Partial Colectomy, Syncope/Collapse, Pacemaker, Down's Syndrome, Gout. Exam: See above. Clinical Presentation: See above. Patient Education: Role of PT, POC, Need to call for assist, OOB via Stedy. Barriers to Learning: Cognitive.    REQUIRES PT FOLLOW UP: Yes  Activity Tolerance  Activity Tolerance: Patient Tolerated treatment well;Patient limited by cognitive status  Activity Tolerance: Unable to responses to stimuli  Following Commands: Inconsistently follows commands  Attention Span: Difficulty attending to directions; Difficulty dividing attention  Memory: Decreased short term memory;Decreased recall of recent events;Decreased recall of precautions  Safety Judgement: Decreased awareness of need for safety;Decreased awareness of need for assistance  Problem Solving: Decreased awareness of errors;Assistance required to identify errors made;Assistance required to generate solutions  Insights: Decreased awareness of deficits  Initiation: Requires cues for all  Sequencing: Requires cues for all  Cognition Comment: h/o MRDD  Objective   Bed mobility  Supine to Sit: Maximum assistance(HOB elevated.  )  Sit to Supine: Dependent/Total;2 Person assistance  Transfers  Sit to Stand: Minimal Assistance(Via Stedy. )  Stand to sit: Minimal Assistance(Via Stedy. )  Comment: Defer OOB to chair per nurse request - cont restraints. Ambulation  Ambulation?: No  WB Status: Pt edna Static Stand with use of Stedy ~ 2-3 min with Min assist.           AM-PAC Score  AM-PAC Inpatient Mobility Raw Score : 10 (12/29/19 1106)  AM-PAC Inpatient T-Scale Score : 32.29 (12/29/19 1106)  Mobility Inpatient CMS 0-100% Score: 76.75 (12/29/19 1106)  Mobility Inpatient CMS G-Code Modifier : CL (12/29/19 1106)          Goals  Short term goals  Time Frame for Short term goals: Upon d/c acute care setting. Short term goal 1: Bed Mob Mod assist.   Short term goal 2: Transfers with/without assist device Min assist.   Short term goal 3: Amb with/without assist device 48' CGA. Patient Goals   Patient goals : None stated at this time. Plan    Plan  Times per week: 3-5x week while in acute care setting.    Current Treatment Recommendations: Strengthening, Functional Mobility Training, Transfer Training, Gait Training, Safety Education & Training, Patient/Caregiver Education & Training  Safety Devices  Type of devices: Bed alarm in place, Call

## 2019-12-29 NOTE — PROGRESS NOTES
4 Eyes Skin Assessment     The patient is being assess for  Shift Handoff    I agree that 2 RN's have performed a thorough Head to Toe Skin Assessment on the patient. ALL assessment sites listed below have been assessed. Areas assessed by both nurses:   [x]   Head, Face, and Ears   [x]   Shoulders, Back, and Chest  [x]   Arms, Elbows, and Hands   [x]   Coccyx, Sacrum, and IschIum  [x]   Legs, Feet, and Heels        Does the Patient have Skin Breakdown?   Yes LDA WOUND CARE was Initiated documentation include the Debra-wound, Wound Assessment, Measurements, Dressing Treatment, Drainage, and Color\",         Stuart Prevention initiated:  Yes   Wound Care Orders initiated:  No      WOC nurse consulted for Pressure Injury (Stage 3,4, Unstageable, DTI, NWPT, and Complex wounds), New and Established Ostomies:  No      Nurse 1 eSignature: Electronically signed by Jasper Chawla RN on 12/28/19 at 7:48 PM    **SHARE this note so that the co-signing nurse is able to place an eSignature**    Nurse 2 eSignature: Electronically signed by Sulma Lyons RN on 12/28/19 at 7:50 PM

## 2019-12-29 NOTE — PROGRESS NOTES
7. 52/42/52 (on 40%)  12/27- 7.49/50/63 (on 40%)    Cultures:   Blood culture (12/18): Negative  Sputum culture (12/19): Normal lio   Pleural fluid (12/26): Negative  Nasal MRSA probe: Negative      Films:  Chest images and reports were reviewed by me. My interpretation is:  CXR (12/28/19): Stable bilateral pleuro-parenchymal disease; PICC and pacemaker in place  Chest CT (12/26/19): No LAD; bilateral pleural effusions; infiltrate in the RUL; BLL consolidation       Assessment:     Septic shock  Small bowel obstruction  Acute hypoxic respiratory failure  Aspiration pneumonia  Pleural effusions  Ischemic hepatitis  Thrombocytopenia       Plan:    Septic shock  - Due to bowel ischemia from closed loop obstruction and aspiration pneumonia  - Completed 7 days of cefepime and flagyl  - Off vasopressors     Small bowel obstruction  - s/p ex-laparotomy and lysis of adhesions on 12/18  - On TPN  - Per general surgery    Acute hypoxic respiratory failure  - Continue lasix twice daily  - Wean supplemental oxygen as able to keep saturation>90%    Aspiration pneumonia  - Completed 7 days of cefepime and flagyl     Pleural effusions   - Continue lasix twice daily  - Strict I/Os  - s/p right thoracentesis on 12/26/19.  Follow up cytology     Ischemic hepatitis  - Check daily labs   - Keep SBP>90    Thrombocytopenia   - Resolved       Prophylaxis   DVT- lovenox  GI- protonix      MD Pedro Song Pulmonary, Critical Care and Sleep

## 2019-12-29 NOTE — PROGRESS NOTES
Modification: anticipate overall max A for ADLs  OT Education: OT Role;Plan of Care;Transfer Training;Orientation  Barriers to Learning: cognition  REQUIRES OT FOLLOW UP: Yes  Activity Tolerance  Activity Tolerance: Patient Tolerated treatment well;Patient limited by fatigue;Treatment limited secondary to decreased cognition  Safety Devices  Safety Devices in place: Yes  Type of devices: Bed alarm in place; Left in bed;Nurse notified; All fall risk precautions in place;Call light within reach  Restraints  Initially in place: Yes  Restraints: jose m soft wrist restraints           Patient Diagnosis(es): The primary encounter diagnosis was Small bowel obstruction ([address] Diagnoses of Aspiration pneumonia of both lower lobes, unspecified aspiration pneumonia type ([address] Hypoxia, Rectal bleeding, and Septicemia ([address] were also pertinent to this visit. has a past medical history of Acne rosacea, Cognitive impairment, Colon cancer ([address] Down syndrome, Gout, HIGH CHOLESTEROL, History of diarrhea, Macrocytosis, Seborrheic dermatitis, Seizure disorder ([address] Status post biventricular cardiac pacemaker insertion, Status post partial colectomy, Syncope and collapse, and Weight loss. has a past surgical history that includes Cataract removal with implant (2007); Colon surgery (1/7//2014); Colonoscopy (02/27/2017); pacemaker placement; Colonoscopy (N/A, 8/21/2019); and Small intestine surgery (N/A, 12/18/2019). Restrictions  Restrictions/Precautions  Restrictions/Precautions: Fall Risk  Position Activity Restriction  Other position/activity restrictions: 12 L O2. Pt MRDD although independent care/mobility pta. NPO except Popscicles, Ice Chips      Subjective   General  Chart Reviewed: Yes  Patient assessed for rehabilitation services?: Yes  Additional Pertinent Hx: 65 y/o male admit 12/18/19 with SBO, Rectal Bleed, Pneumonia, Septicemia. 12/18-12/27/19 Pt Intubated.   12/18/19 S/P Exp Lap, Lysis of Adhesions with will toilet with mod A. Short term goal 4: Pt will complete grooming in stance at sink with SBA/CGA  Short term goal 5: Pt will complete fxl mobility and fxl transfers to/from ADL surfaces with SBA/CGA using AD prn. Short term goal 6: Pt will increase activity tolerance to achieve the above goals. Long term goals  Time Frame for Long term goals : STG=LTG  Patient Goals   Patient goals : pt unable to verbalize personal therapy goal d/t cognitive deficits. Therapy Time   Individual Concurrent Group Co-treatment   Time In 0830         Time Out 0855         Minutes 25         Timed Code Treatment Minutes: 10 Minutes     This note to serve as OT d/c summary if pt is d/c-ed prior to next therapy session.     Trina Mireles, OTR/L [id number]

## 2019-12-29 NOTE — PROGRESS NOTES
1929  Bedside report from [facility] 69  Patient remains restrained, pulling at lines and removing equipment  TV was turned on as a distraction for patient     2130  Patient pulling off Vapotherm tubing with his feet, patient redirected    [phone]  Patient remains awake, throwing legs over the siderails of the bed    0220  Patient making incomprehensible noises

## 2019-12-30 LAB
ALBUMIN SERPL-MCNC: 2.5 G/DL (ref 3.4–5)
ALP BLD-CCNC: 199 U/L (ref 40–129)
ALT SERPL-CCNC: 74 U/L (ref 10–40)
ANION GAP SERPL CALCULATED.3IONS-SCNC: 13 MMOL/L (ref 3–16)
AST SERPL-CCNC: 85 U/L (ref 15–37)
BASOPHILS ABSOLUTE: 0.2 K/UL (ref 0–0.2)
BASOPHILS RELATIVE PERCENT: 1.4 %
BILIRUB SERPL-MCNC: 1 MG/DL (ref 0–1)
BILIRUBIN DIRECT: 0.6 MG/DL (ref 0–0.3)
BILIRUBIN, INDIRECT: 0.4 MG/DL (ref 0–1)
BUN BLDV-MCNC: 24 MG/DL (ref 7–20)
CALCIUM SERPL-MCNC: 8.2 MG/DL (ref 8.3–10.6)
CHLORIDE BLD-SCNC: 104 MMOL/L (ref 99–110)
CO2: 18 MMOL/L (ref 21–32)
CREAT SERPL-MCNC: 0.8 MG/DL (ref 0.9–1.3)
EOSINOPHILS ABSOLUTE: 0.2 K/UL (ref 0–0.6)
EOSINOPHILS RELATIVE PERCENT: 1 %
GFR AFRICAN AMERICAN: >60
GFR NON-AFRICAN AMERICAN: >60
GLUCOSE BLD-MCNC: 172 MG/DL (ref 70–99)
GLUCOSE BLD-MCNC: 195 MG/DL (ref 70–99)
GLUCOSE BLD-MCNC: 208 MG/DL (ref 70–99)
GLUCOSE BLD-MCNC: 211 MG/DL (ref 70–99)
GLUCOSE BLD-MCNC: 216 MG/DL (ref 70–99)
GLUCOSE BLD-MCNC: 221 MG/DL (ref 70–99)
GLUCOSE BLD-MCNC: 225 MG/DL (ref 70–99)
GLUCOSE BLD-MCNC: 239 MG/DL (ref 70–99)
HCT VFR BLD CALC: 35.3 % (ref 40.5–52.5)
HEMOGLOBIN: 11.6 G/DL (ref 13.5–17.5)
LYMPHOCYTES ABSOLUTE: 0.9 K/UL (ref 1–5.1)
LYMPHOCYTES RELATIVE PERCENT: 6.1 %
MAGNESIUM: 2 MG/DL (ref 1.8–2.4)
MCH RBC QN AUTO: 34.2 PG (ref 26–34)
MCHC RBC AUTO-ENTMCNC: 32.9 G/DL (ref 31–36)
MCV RBC AUTO: 103.9 FL (ref 80–100)
MONOCYTES ABSOLUTE: 0.9 K/UL (ref 0–1.3)
MONOCYTES RELATIVE PERCENT: 6.1 %
NEUTROPHILS ABSOLUTE: 12.8 K/UL (ref 1.7–7.7)
NEUTROPHILS RELATIVE PERCENT: 85.4 %
PDW BLD-RTO: 14.4 % (ref 12.4–15.4)
PERFORMED ON: ABNORMAL
PHOSPHORUS: 3.5 MG/DL (ref 2.5–4.9)
PLATELET # BLD: 309 K/UL (ref 135–450)
PMV BLD AUTO: 9.9 FL (ref 5–10.5)
POTASSIUM SERPL-SCNC: 4.5 MMOL/L (ref 3.5–5.1)
RBC # BLD: 3.39 M/UL (ref 4.2–5.9)
SODIUM BLD-SCNC: 135 MMOL/L (ref 136–145)
TOTAL PROTEIN: 6.3 G/DL (ref 6.4–8.2)
TRIGL SERPL-MCNC: 98 MG/DL (ref 0–150)
WBC # BLD: 14.9 K/UL (ref 4–11)

## 2019-12-30 PROCEDURE — 83735 ASSAY OF MAGNESIUM: CPT

## 2019-12-30 PROCEDURE — 99232 SBSQ HOSP IP/OBS MODERATE 35: CPT | Performed by: INTERNAL MEDICINE

## 2019-12-30 PROCEDURE — 2500000003 HC RX 250 WO HCPCS: Performed by: SURGERY

## 2019-12-30 PROCEDURE — 80069 RENAL FUNCTION PANEL: CPT

## 2019-12-30 PROCEDURE — 2580000003 HC RX 258: Performed by: INTERNAL MEDICINE

## 2019-12-30 PROCEDURE — APPNB15 APP NON BILLABLE TIME 0-15 MINS: Performed by: NURSE PRACTITIONER

## 2019-12-30 PROCEDURE — 99024 POSTOP FOLLOW-UP VISIT: CPT | Performed by: SURGERY

## 2019-12-30 PROCEDURE — 99231 SBSQ HOSP IP/OBS SF/LOW 25: CPT | Performed by: INTERNAL MEDICINE

## 2019-12-30 PROCEDURE — 6370000000 HC RX 637 (ALT 250 FOR IP): Performed by: NURSE PRACTITIONER

## 2019-12-30 PROCEDURE — C9113 INJ PANTOPRAZOLE SODIUM, VIA: HCPCS | Performed by: NURSE PRACTITIONER

## 2019-12-30 PROCEDURE — 84478 ASSAY OF TRIGLYCERIDES: CPT

## 2019-12-30 PROCEDURE — 85025 COMPLETE CBC W/AUTO DIFF WBC: CPT

## 2019-12-30 PROCEDURE — 2500000003 HC RX 250 WO HCPCS: Performed by: PHARMACIST

## 2019-12-30 PROCEDURE — 2580000003 HC RX 258: Performed by: NURSE PRACTITIONER

## 2019-12-30 PROCEDURE — 6370000000 HC RX 637 (ALT 250 FOR IP): Performed by: SURGERY

## 2019-12-30 PROCEDURE — 6360000002 HC RX W HCPCS: Performed by: NURSE PRACTITIONER

## 2019-12-30 PROCEDURE — 80076 HEPATIC FUNCTION PANEL: CPT

## 2019-12-30 PROCEDURE — 2060000000 HC ICU INTERMEDIATE R&B

## 2019-12-30 RX ORDER — FUROSEMIDE 10 MG/ML
40 INJECTION INTRAMUSCULAR; INTRAVENOUS ONCE
Status: COMPLETED | OUTPATIENT
Start: 2019-12-30 | End: 2019-12-30

## 2019-12-30 RX ADMIN — SODIUM CHLORIDE, PRESERVATIVE FREE 10 ML: 5 INJECTION INTRAVENOUS at 11:06

## 2019-12-30 RX ADMIN — SODIUM CHLORIDE, PRESERVATIVE FREE 10 ML: 5 INJECTION INTRAVENOUS at 19:57

## 2019-12-30 RX ADMIN — PANTOPRAZOLE SODIUM 40 MG: 40 INJECTION, POWDER, FOR SOLUTION INTRAVENOUS at 11:06

## 2019-12-30 RX ADMIN — ENOXAPARIN SODIUM 40 MG: 40 INJECTION SUBCUTANEOUS at 11:05

## 2019-12-30 RX ADMIN — INSULIN LISPRO 3 UNITS: 100 INJECTION, SOLUTION INTRAVENOUS; SUBCUTANEOUS at 00:41

## 2019-12-30 RX ADMIN — POTASSIUM CHLORIDE: 2 INJECTION, SOLUTION, CONCENTRATE INTRAVENOUS at 18:26

## 2019-12-30 RX ADMIN — ACETAMINOPHEN 650 MG: 650 SUPPOSITORY RECTAL at 21:22

## 2019-12-30 RX ADMIN — INSULIN LISPRO 6 UNITS: 100 INJECTION, SOLUTION INTRAVENOUS; SUBCUTANEOUS at 18:26

## 2019-12-30 RX ADMIN — INSULIN LISPRO 6 UNITS: 100 INJECTION, SOLUTION INTRAVENOUS; SUBCUTANEOUS at 11:06

## 2019-12-30 RX ADMIN — INSULIN LISPRO 6 UNITS: 100 INJECTION, SOLUTION INTRAVENOUS; SUBCUTANEOUS at 19:53

## 2019-12-30 RX ADMIN — INSULIN LISPRO 6 UNITS: 100 INJECTION, SOLUTION INTRAVENOUS; SUBCUTANEOUS at 13:13

## 2019-12-30 RX ADMIN — Medication 10 ML: at 11:06

## 2019-12-30 RX ADMIN — I.V. FAT EMULSION 250 ML: 20 EMULSION INTRAVENOUS at 18:26

## 2019-12-30 RX ADMIN — INSULIN LISPRO 3 UNITS: 100 INJECTION, SOLUTION INTRAVENOUS; SUBCUTANEOUS at 04:34

## 2019-12-30 RX ADMIN — FUROSEMIDE 40 MG: 10 INJECTION, SOLUTION INTRAMUSCULAR; INTRAVENOUS at 11:06

## 2019-12-30 ASSESSMENT — PAIN SCALES - GENERAL
PAINLEVEL_OUTOF10: 0

## 2019-12-30 ASSESSMENT — ENCOUNTER SYMPTOMS: NAUSEA: 0

## 2019-12-30 NOTE — PLAN OF CARE
Problem: Restraint Use - Nonviolent/Non-Self-Destructive Behavior:  Goal: Absence of restraint indications  Description  Absence of restraint indications  Outcome: Ongoing  Goal: Absence of restraint-related injury  Description  Absence of restraint-related injury  Outcome: Ongoing     Problem: Nutrition  Goal: Optimal nutrition therapy  Outcome: Ongoing     Problem: Risk for Impaired Skin Integrity  Goal: Tissue integrity - skin and mucous membranes  Description  Structural intactness and normal physiological function of skin and  mucous membranes.   Outcome: Ongoing     Problem: Falls - Risk of:  Goal: Will remain free from falls  Description  Will remain free from falls  Outcome: Ongoing  Goal: Absence of physical injury  Description  Absence of physical injury  Outcome: Ongoing     Problem: Urinary Elimination:  Goal: Signs and symptoms of infection will decrease  Description  Signs and symptoms of infection will decrease  Outcome: Ongoing  Goal: Complications related to the disease process, condition or treatment will be avoided or minimized  Description  Complications related to the disease process, condition or treatment will be avoided or minimized  Outcome: Ongoing     Problem: Infection - Central Venous Catheter-Associated Bloodstream Infection:  Goal: Will show no infection signs and symptoms  Description  Will show no infection signs and symptoms  Outcome: Ongoing

## 2019-12-30 NOTE — PROGRESS NOTES
Phoenix Webb is a 64 y.o. male patient. 1. Small bowel obstruction ([facility]    2. Aspiration pneumonia of both lower lobes, unspecified aspiration pneumonia type ([facility]    3. Hypoxia    4. Rectal bleeding    5. Septicemia [facility])      Past Medical History:   Diagnosis Date    Acne rosacea     Cognitive impairment     sees Dr Zandra Burch, started Aricept 12/2019    Colon cancer ([facility] 1/7/2014    ileocecal valve/Leuenberger/yrly surveillance    Down syndrome     Gout     HIGH CHOLESTEROL     2017 3.1 % framingham risk score    History of diarrhea     since childhood, worse since partial colectomy resolved after a few months    Macrocytosis     followed by Dr Venus Mustafa and released    Seborrheic dermatitis     Seizure disorder ([facility] 2019    Status post biventricular cardiac pacemaker insertion 01/2019    for long sinus pauses with syncopal willis    Status post partial colectomy 2016    diarrhea    Syncope and collapse 01/2019    recurrent syncope, found seizures and sinus pauses    Weight loss 2015    since 2014 at the time of partial colectomy lost 25 lbs     No past surgical history pertinent negatives on file.   Scheduled Meds:   fat emulsion  250 mL Intravenous Once per day on Mon Thu    insulin lispro  0-18 Units Subcutaneous Q4H    sodium chloride flush  10 mL Intravenous 2 times per day    enoxaparin  40 mg Subcutaneous Daily    pantoprazole  40 mg Intravenous Daily    And    sodium chloride (PF)  10 mL Intravenous Daily    sodium chloride flush  10 mL Intravenous 2 times per day     Continuous Infusions:   PN-Adult Premix 5/20 - Central 80 mL/hr at 12/29/19 1810    dextrose       PRN Meds:sodium chloride flush, magnesium sulfate, potassium chloride, sodium chloride flush, ondansetron, acetaminophen, glucose, dextrose, glucagon (rDNA), dextrose, dextran 70-hypromellose    Allergies   Allergen Reactions    Penicillins      rash     Active Problems:    SBO (small bowel obstruction) (HCC)

## 2019-12-30 NOTE — PROGRESS NOTES
Patient's A line was removed from the left groin  Catheter was intact, pressure held, no bleeding, drsg placed

## 2019-12-30 NOTE — PROGRESS NOTES
of 12/18/19   XR CHEST PORTABLE    Narrative EXAMINATION:  ONE XRAY VIEW OF THE CHEST    12/26/2019 4:25 am    COMPARISON:  12/25/2019    HISTORY:  ORDERING SYSTEM PROVIDED HISTORY: intubated  TECHNOLOGIST PROVIDED HISTORY:  Reason for exam:->intubated  Reason for Exam: intubated    FINDINGS:  Endotracheal tube, endotracheal tube and gastric tube remain in place. Monitor wires overlie the patient. Unchanged dual lead pacer. There are  small to moderate bilateral pleural effusions and bilateral airspace  opacities, stable. The cardiac silhouette and osseous structures are stable. Impression No significant interval change. Access  Arterial  Arterial Line 12/19/19 Left Femoral (Active)   Continued need for line? Yes 12/30/2019  6:00 AM   Site Assessment Clean;Dry; Intact 12/30/2019  6:00 AM   Line Status Arterial fluids per protocol; Intact and in place; Blood return noted 12/30/2019  6:00 AM   Art Line Waveform Appropriate 12/30/2019  6:00 AM   Art Line Interventions Flushed per protocol 12/28/2019  6:10 AM   Color/Movement/Sensation Capillary refill less than 3 sec; Cool fingers/toes 12/30/2019  6:00 AM   Dressing Status Clean;Dry; Intact 12/30/2019  6:00 AM   Dressing Type Transparent; Anti-microbial patch 12/30/2019  6:00 AM   Dressing Intervention New;Dressing changed 12/26/2019  1:11 PM   Dressing Change Due 01/01/20 12/30/2019  6:00 AM   Number of days: 10       PICC       PICC Double Lumen 37/91/29 Right Basilic (Active)   Continued need for line? Yes 12/30/2019  6:00 AM   Is this a power PICC? Yes 12/30/2019  6:00 AM   Site Assessment Clean;Dry; Intact 12/30/2019  6:00 AM   Spence Lumen Status Infusing 12/30/2019  6:00 AM   Purple Lumen Status Infusing 12/30/2019  6:00 AM   Exposed Catheter (cm) 6 cm 12/27/2019  5:16 PM   Extremity Circumference (cm) 26 cm 12/24/2019 11:03 AM   Line Care Connections checked and tightened 12/27/2019  5:16 PM   Dressing Status Clean;Dry; Intact 12/30/2019  6:00 AM Dressing Type Transparent; Anti-microbial patch; Securing device 12/30/2019  6:00 AM   Dressing Change Due 01/01/20 12/30/2019  6:00 AM   Dressing Intervention Dressing changed;Security device changed 12/27/2019  5:16 PM   Reason Not Rotated Not due 12/27/2019  5:16 PM   Number of days: 5        CVC                 Assessment:     Septic Shock - resolved  SBO - s/p exlap  Acute Hypoxemic Respiratory Failure with SAO2 <90% on Room Air, due to  Pleural effusions  Aspriation Pneumonia - completed Abx  Ischemic Hepatitis - slight uptrend today in lfts  Possible dementia    Plan:      -oxygen requirement coming down  -Wean supplemental oxygen to goal saturation of >90%  -40mg IV lasix once  -TPN, tolerating CLD, gen surg following  -cont to monitor LFTs  -PT/OT    Eron for transfer to the floor      Thank you for this consult,    Cole ZELAYA [facility] Pulmonary, Critical Care, and Sleep Medicine

## 2019-12-30 NOTE — PROGRESS NOTES
Department of Internal Medicine  General Internal Medicine  Attending Progress Note    Chief Complaint:originally vomiting and diarrhea      HPI:   SUBJECTIVE:  65 yo male patient with a history of down's syndrome, colon cancer (resected),  Pacemaker for chb who presented with SBO and septic shock. Now over a week past exploratory lap and 2 days off the vent. Still on TPN but starting clear liquids. Less confused. Still requiring restraints until he is free of lines.       Past Medical History:   Diagnosis Date    Acne rosacea     Cognitive impairment     sees Dr Ivan Meadows, started Aricept 12/2019    Colon cancer ([facility] 1/7/2014    ileocecal valve/Leuenberger/yrly surveillance    Down syndrome     Gout     HIGH CHOLESTEROL     2017 3.1 % framingham risk score    History of diarrhea     since childhood, worse since partial colectomy resolved after a few months    Macrocytosis     followed by Dr Areli Long and released    Seborrheic dermatitis     Seizure disorder ([facility] 2019    Status post biventricular cardiac pacemaker insertion 01/2019    for long sinus pauses with syncopal willis    Status post partial colectomy 2016    diarrhea    Syncope and collapse 01/2019    recurrent syncope, found seizures and sinus pauses    Weight loss 2015    since 2014 at the time of partial colectomy lost 25 lbs     Past Surgical History:   Procedure Laterality Date    CATARACT REMOVAL WITH IMPLANT  2007    Bilateral    COLON SURGERY  1/7//2014    for removal colon cancer    COLONOSCOPY  02/27/2017    Dr Bentley Cheema N/A 8/21/2019    COLONOSCOPY WITH BIOPSY performed by Ramona Rivera MD at [address] [facility]  N/A 12/18/2019    EXPLORATORY LAPAROTOMY, LYSIS OF ADHESIONS performed by Lianna Keenan MD at [address] History   Problem Relation Age of Onset    Diabetes Brother     Hypertension Brother     Stroke Brother     High Cholesterol Brother     Cancer Mother         melanoma    Other Father         cerebral aneurysm     Social History     Tobacco Use    Smoking status: Never Smoker    Smokeless tobacco: Never Used   Substance Use Topics    Alcohol use: No    Drug use: No       Prior to Admission medications    Medication Sig Start Date End Date Taking? Authorizing Provider   doxycycline (VIBRAMYCIN) 50 MG capsule Take 50 mg by mouth 2 times daily 11/26/19  Yes Historical Provider, MD   simvastatin (ZOCOR) 20 MG tablet Take 1 tablet by mouth daily 12/10/19  Yes Dean Muñoz MD   allopurinol (ZYLOPRIM) 100 MG tablet Take 1 tablet by mouth daily 12/9/19  Yes Dean Muñoz MD   donepezil (ARICEPT) 5 MG tablet Take 1 tablet by mouth nightly 12/9/19  Yes Dean Muñoz MD   Multiple Vitamins-Minerals (THERAPEUTIC MULTIVITAMIN-MINERALS) tablet Take 1 tablet by mouth daily   Yes Historical Provider, MD   Azelaic Acid 15 % GEL Apply topically daily 12/2/19   Historical Provider, MD   hydrocortisone (HYTONE) 2.5 % lotion Apply topically daily 11/26/19   Historical Provider, MD   metroNIDAZOLE (METROCREAM) 0.75 % cream Apply topically daily 11/26/19   Historical Provider, MD         ROS:  A comprehensive review of systems was negative except for: weakness , sob     OBJECTIVE:      PHYSICAL:  Intake/Output:   Patient Vitals for the past 8 hrs:   BP Temp Temp src Pulse Resp SpO2   12/30/19 1100 (!) 94/54 -- -- 100 14 97 %   12/30/19 1001 123/62 -- -- 111 17 97 %   12/30/19 0858 115/63 98.5 °F (36.9 °C) Axillary 104 26 93 %   12/30/19 0500 -- -- -- 101 21 95 %   12/30/19 0427 -- -- -- -- 30 95 %   12/30/19 0400 109/76 98.4 °F (36.9 °C) Axillary 105 21 96 %     I/O last 3 completed shifts:   In: 2513.3 [P.O.:600]  Out: 1585 [Urine:1585]  I/O this shift:  In: -   Out: 450 [Urine:450]  VITALS:    BP (!) 94/54   Pulse 100   Temp 98.5 °F (36.9 °C) (Axillary)   Resp 14   Ht 5' 6\" (1.676 m)   Wt 128 lb 4.9 oz (58.2 kg)   SpO2 97%   BMI 20.71 kg/m²     General Appearance: awake and comfortable appearing on vent  Skin: warm and dry, no rash or erythema  Head: normocephalic and atraumatic  Eyes: conjunctivae normal and sclera anicteric  Neck: neck supple and non tender without mass, no thyromegaly or thyroid nodules, no cervical lymphadenopathy   Pulmonary/Chest: clear to auscultation bilaterally- upper lobes symmetric bs Cardiovascular: normal rate, normal S1 and S2, no gallops and no carotid bruits  Abdomen: soft bs pos nontender, dressing I place  Extremities: no cyanosis, clubbing or edema  Musculoskeletal: no swollen joints and no tender joints,  Neurologic: following commands moves all 4    DATA:   Labs:  CBC:   Recent Labs     12/28/19  0550 12/29/19  0430 12/30/19  0509   WBC 14.4* 14.6* 14.9*   HGB 11.5* 11.6* 11.6*    286 309     BMP:    Recent Labs     12/28/19  0550 12/28/19  1402 12/29/19  0430 12/30/19  0509     --  139 135*   K 3.5 3.9 4.1 4.5     --  105 104   CO2 27  --  21 18*   BUN 20  --  26* 24*   CREATININE 0.9  --  0.8* 0.8*   GLUCOSE 166*  --  196* 239*     POC GLUCOSE:    Recent Labs     12/29/19  1643 12/29/19  1952 12/30/19  0022 12/30/19  0426 12/30/19  0841   POCGLU 175* 171* 172* 195* 225*     Ca/Mg/Phos:   Recent Labs     12/28/19  0550 12/29/19  0430 12/30/19  0509   CALCIUM 8.2* 8.3 8.2*   MG 2.30 2.10 2.00   PHOS 3.6 2.9 3.5     Hepatic:   Recent Labs     12/28/19  0550 12/29/19  0430 12/30/19  0509   AST 32 47* 85*   ALT 46* 47* 74*   BILITOT 0.5 0.6 1.0   ALKPHOS 99 136* 199*     Troponin:   No results for input(s): TROPONINI in the last 72 hours. ProBNP:   No results for input(s): PROBNP in the last 72 hours. Lipids:   Recent Labs     12/30/19  0509   TRIG 98     ABGs:   Recent Labs     12/28/19  1238   PHART 7.385   PO2ART 79.7   ABH0BUL 42.6     PT/INR:   No results for input(s): PROTIME, INR in the last 72 hours. APTT:   No results for input(s): APTT in the last 72 hours.   Lactic acid 10.4 on presentation 3.3 today, 3.4 yesterday    EKG: ST and sinus arrhythmia, t wave inversion inferiorally    DIAGNOSTICS:  Ct Head Wo Contrast    Result Date: 12/18/2019  No acute intracranial abnormality. Xr Chest Portable    Result Date: 12/20/2019  1. Recommend advancement of endotracheal tube 1.0 cm. 2. Bibasilar subsegmental atelectasis plus or minus mild pleural effusions, unchanged. Xr Chest Portable    Result Date: 12/19/2019  Nasogastric tube projects in intragastric position. Bibasilar atelectasis. Pneumonia or aspiration pneumonitis are differential considerations. Nonspecific bowel gas pattern. Xr Chest Portable    Result Date: 12/19/2019  Small bilateral pleural effusions with adjacent atelectasis. Xr Chest Portable    Result Date: 12/18/2019  Low lung volume due to inspiratory effort. Mild bibasilar atelectasis with no other significant finding in the chest.     Ct Chest Abdomen Pelvis Wo Contrast    Result Date: 12/18/2019  Findings compatible with small bowel obstruction with transition point in the right mid abdomen, likely in mid to distal ileum. No obvious etiology identified. Small volume ascites, likely reactive. Areas of consolidation to the lungs bilaterally which may relate to atelectasis although multifocal pneumonia or aspiration pneumonitis could have a similar appearance. Patulous/distended appearance to the esophagus with fluid attenuation material within nearly the entirety of the esophagus, possibly secondary to the bowel obstruction and refluxed gastric contents. Patient may be at risk for aspiration. Trace bilateral pleural effusions.          ASSESSMENT AND PLAN    Active Problems:    Small bowel obstruction ([address] RESOLVED  Plan: seems to be doing well,advancing diet ,     Aspiration pneumonia of both lower lobes ([address] pleural fluid bilat not a lot  Plan: no longer on abx    Hypoxia  Plan: nearly resolved    Septicemia ([address] greatly improved  Plan: resolved    Septic shock ([address]  Plan : off pressors, resolved  ANISH  resolved  Continued treatment plan per nephrology dialysis not needed  Cognitive impairment likely alzheimers  Continue aricept  Appropriate diagnostic studies and consults ordered  Kerry Hutton MD

## 2019-12-31 ENCOUNTER — APPOINTMENT (OUTPATIENT)
Dept: GENERAL RADIOLOGY | Age: 59
DRG: 853 | End: 2019-12-31
Payer: COMMERCIAL

## 2019-12-31 LAB
ALBUMIN SERPL-MCNC: 2.2 G/DL (ref 3.4–5)
ALP BLD-CCNC: 180 U/L (ref 40–129)
ALT SERPL-CCNC: 79 U/L (ref 10–40)
ANION GAP SERPL CALCULATED.3IONS-SCNC: 14 MMOL/L (ref 3–16)
AST SERPL-CCNC: 69 U/L (ref 15–37)
BASOPHILS ABSOLUTE: 0.2 K/UL (ref 0–0.2)
BASOPHILS RELATIVE PERCENT: 1.5 %
BILIRUB SERPL-MCNC: 0.7 MG/DL (ref 0–1)
BILIRUBIN DIRECT: 0.4 MG/DL (ref 0–0.3)
BILIRUBIN URINE: ABNORMAL
BILIRUBIN, INDIRECT: 0.3 MG/DL (ref 0–1)
BLOOD, URINE: ABNORMAL
BUN BLDV-MCNC: 27 MG/DL (ref 7–20)
CALCIUM SERPL-MCNC: 8.3 MG/DL (ref 8.3–10.6)
CHLORIDE BLD-SCNC: 102 MMOL/L (ref 99–110)
CLARITY: CLEAR
CO2: 19 MMOL/L (ref 21–32)
COLOR: ABNORMAL
COMMENT UA: ABNORMAL
CREAT SERPL-MCNC: 0.7 MG/DL (ref 0.9–1.3)
EOSINOPHILS ABSOLUTE: 0.2 K/UL (ref 0–0.6)
EOSINOPHILS RELATIVE PERCENT: 1.1 %
EPITHELIAL CELLS, UA: 1 /HPF (ref 0–5)
GFR AFRICAN AMERICAN: >60
GFR NON-AFRICAN AMERICAN: >60
GLUCOSE BLD-MCNC: 214 MG/DL (ref 70–99)
GLUCOSE BLD-MCNC: 217 MG/DL (ref 70–99)
GLUCOSE BLD-MCNC: 236 MG/DL (ref 70–99)
GLUCOSE BLD-MCNC: 239 MG/DL (ref 70–99)
GLUCOSE BLD-MCNC: 257 MG/DL (ref 70–99)
GLUCOSE URINE: >=1000 MG/DL
HCT VFR BLD CALC: 33.1 % (ref 40.5–52.5)
HEMOGLOBIN: 11 G/DL (ref 13.5–17.5)
HYALINE CASTS: 2 /LPF (ref 0–8)
KETONES, URINE: NEGATIVE MG/DL
LEUKOCYTE ESTERASE, URINE: NEGATIVE
LYMPHOCYTES ABSOLUTE: 1.2 K/UL (ref 1–5.1)
LYMPHOCYTES RELATIVE PERCENT: 7.9 %
MAGNESIUM: 2 MG/DL (ref 1.8–2.4)
MCH RBC QN AUTO: 34.3 PG (ref 26–34)
MCHC RBC AUTO-ENTMCNC: 33.2 G/DL (ref 31–36)
MCV RBC AUTO: 103.5 FL (ref 80–100)
MICROSCOPIC EXAMINATION: YES
MONOCYTES ABSOLUTE: 1.4 K/UL (ref 0–1.3)
MONOCYTES RELATIVE PERCENT: 9.6 %
NEUTROPHILS ABSOLUTE: 11.6 K/UL (ref 1.7–7.7)
NEUTROPHILS RELATIVE PERCENT: 79.9 %
NITRITE, URINE: NEGATIVE
PDW BLD-RTO: 14.5 % (ref 12.4–15.4)
PERFORMED ON: ABNORMAL
PH UA: 6 (ref 5–8)
PHOSPHORUS: 2.9 MG/DL (ref 2.5–4.9)
PLATELET # BLD: 330 K/UL (ref 135–450)
PMV BLD AUTO: 10.4 FL (ref 5–10.5)
POTASSIUM SERPL-SCNC: 4.5 MMOL/L (ref 3.5–5.1)
PROTEIN UA: 30 MG/DL
RBC # BLD: 3.2 M/UL (ref 4.2–5.9)
RBC UA: 23 /HPF (ref 0–4)
REASON FOR REJECTION: NORMAL
REJECTED TEST: NORMAL
SODIUM BLD-SCNC: 135 MMOL/L (ref 136–145)
SPECIFIC GRAVITY UA: 1.02 (ref 1–1.03)
TOTAL PROTEIN: 6.1 G/DL (ref 6.4–8.2)
URINE REFLEX TO CULTURE: ABNORMAL
URINE TYPE: ABNORMAL
UROBILINOGEN, URINE: 4 E.U./DL
WBC # BLD: 14.6 K/UL (ref 4–11)
WBC UA: 2 /HPF (ref 0–5)

## 2019-12-31 PROCEDURE — 2700000000 HC OXYGEN THERAPY PER DAY

## 2019-12-31 PROCEDURE — 2060000000 HC ICU INTERMEDIATE R&B

## 2019-12-31 PROCEDURE — 83735 ASSAY OF MAGNESIUM: CPT

## 2019-12-31 PROCEDURE — 2580000003 HC RX 258: Performed by: INTERNAL MEDICINE

## 2019-12-31 PROCEDURE — 2580000003 HC RX 258: Performed by: NURSE PRACTITIONER

## 2019-12-31 PROCEDURE — 94761 N-INVAS EAR/PLS OXIMETRY MLT: CPT

## 2019-12-31 PROCEDURE — 99231 SBSQ HOSP IP/OBS SF/LOW 25: CPT | Performed by: INTERNAL MEDICINE

## 2019-12-31 PROCEDURE — APPNB15 APP NON BILLABLE TIME 0-15 MINS: Performed by: NURSE PRACTITIONER

## 2019-12-31 PROCEDURE — 71045 X-RAY EXAM CHEST 1 VIEW: CPT

## 2019-12-31 PROCEDURE — C9113 INJ PANTOPRAZOLE SODIUM, VIA: HCPCS | Performed by: NURSE PRACTITIONER

## 2019-12-31 PROCEDURE — 6370000000 HC RX 637 (ALT 250 FOR IP): Performed by: NURSE PRACTITIONER

## 2019-12-31 PROCEDURE — 80076 HEPATIC FUNCTION PANEL: CPT

## 2019-12-31 PROCEDURE — 99024 POSTOP FOLLOW-UP VISIT: CPT | Performed by: SURGERY

## 2019-12-31 PROCEDURE — 97127 HC SP THER IVNTJ W/FOCUS COG FUNCJ: CPT

## 2019-12-31 PROCEDURE — 85025 COMPLETE CBC W/AUTO DIFF WBC: CPT

## 2019-12-31 PROCEDURE — 36415 COLL VENOUS BLD VENIPUNCTURE: CPT

## 2019-12-31 PROCEDURE — 93005 ELECTROCARDIOGRAM TRACING: CPT | Performed by: SURGERY

## 2019-12-31 PROCEDURE — 81001 URINALYSIS AUTO W/SCOPE: CPT

## 2019-12-31 PROCEDURE — 97530 THERAPEUTIC ACTIVITIES: CPT

## 2019-12-31 PROCEDURE — 2500000003 HC RX 250 WO HCPCS: Performed by: SURGERY

## 2019-12-31 PROCEDURE — 36600 WITHDRAWAL OF ARTERIAL BLOOD: CPT

## 2019-12-31 PROCEDURE — 92610 EVALUATE SWALLOWING FUNCTION: CPT

## 2019-12-31 PROCEDURE — 99232 SBSQ HOSP IP/OBS MODERATE 35: CPT | Performed by: INTERNAL MEDICINE

## 2019-12-31 PROCEDURE — 6360000002 HC RX W HCPCS: Performed by: NURSE PRACTITIONER

## 2019-12-31 PROCEDURE — 87040 BLOOD CULTURE FOR BACTERIA: CPT

## 2019-12-31 PROCEDURE — 97116 GAIT TRAINING THERAPY: CPT

## 2019-12-31 PROCEDURE — 80069 RENAL FUNCTION PANEL: CPT

## 2019-12-31 RX ADMIN — PANTOPRAZOLE SODIUM 40 MG: 40 INJECTION, POWDER, FOR SOLUTION INTRAVENOUS at 09:55

## 2019-12-31 RX ADMIN — INSULIN LISPRO 6 UNITS: 100 INJECTION, SOLUTION INTRAVENOUS; SUBCUTANEOUS at 00:00

## 2019-12-31 RX ADMIN — INSULIN LISPRO 9 UNITS: 100 INJECTION, SOLUTION INTRAVENOUS; SUBCUTANEOUS at 12:10

## 2019-12-31 RX ADMIN — Medication 10 ML: at 09:55

## 2019-12-31 RX ADMIN — SODIUM CHLORIDE, PRESERVATIVE FREE 10 ML: 5 INJECTION INTRAVENOUS at 09:56

## 2019-12-31 RX ADMIN — ENOXAPARIN SODIUM 40 MG: 40 INJECTION SUBCUTANEOUS at 09:55

## 2019-12-31 RX ADMIN — INSULIN LISPRO 6 UNITS: 100 INJECTION, SOLUTION INTRAVENOUS; SUBCUTANEOUS at 17:24

## 2019-12-31 RX ADMIN — INSULIN LISPRO 6 UNITS: 100 INJECTION, SOLUTION INTRAVENOUS; SUBCUTANEOUS at 05:23

## 2019-12-31 RX ADMIN — POTASSIUM CHLORIDE: 2 INJECTION, SOLUTION, CONCENTRATE INTRAVENOUS at 18:09

## 2019-12-31 ASSESSMENT — PAIN SCALES - GENERAL
PAINLEVEL_OUTOF10: 0

## 2019-12-31 NOTE — PROGRESS NOTES
Pulmonary Progress Note    Date of Admission: 12/18/2019   LOS: 13 days     CC:  Chief Complaint   Patient presents with    Shortness of Breath     onset - 1530 today    Altered Mental Status     pt lethargic, unsteady       HPI/Subjective  No events o/n. Remains on 4LPM o2  Net + yesterday fluid balance      ROS:   No nausea  No Vomiting  No chest pain      Intake/Output Summary (Last 24 hours) at 12/31/2019 0759  Last data filed at 12/31/2019 0202  Gross per 24 hour   Intake 3803 ml   Output 2089 ml   Net 1714 ml         PHYSICAL EXAM:   Blood pressure 99/65, pulse 114, temperature 98.4 °F (36.9 °C), temperature source Oral, resp. rate 24, height 5' 6\" (1.676 m), weight 125 lb 10.6 oz (57 kg), SpO2 95 %.'  Gen:  No acute distress. Eyes: PERRL. Anicteric sclera. No conjunctival injection. ENT: No discharge. Posterior oropharynx clear. External appearance of ears and nose normal.  Neck: Trachea midline. No mass   Resp: Scattered crackles. No wheezes. No rhonchi. No dullness on percussion. CV: Regular rate. Regular rhythm. No murmur or rub. No edema. GI: Soft, Non-tender. Non-distended. +BS  Skin: Warm, dry, w/o erythema. Lymph: No cervical or supraclavicular LAD. M/S: No cyanosis. No clubbing. Neuro: Sleepy, No focal neurologic deficit.   Moves all extremities    Medications:    Scheduled Meds:   fat emulsion  250 mL Intravenous Once per day on Mon Thu    insulin lispro  0-18 Units Subcutaneous Q4H    sodium chloride flush  10 mL Intravenous 2 times per day    enoxaparin  40 mg Subcutaneous Daily    pantoprazole  40 mg Intravenous Daily    And    sodium chloride (PF)  10 mL Intravenous Daily       Continuous Infusions:   PN-Adult Premix 5/20 - Central 80 mL/hr at 12/30/19 1826    dextrose         PRN Meds:  sodium chloride flush, magnesium sulfate, potassium chloride, sodium chloride flush, ondansetron, acetaminophen, glucose, dextrose, glucagon (rDNA), dextrose, dextran 70-hypromellose    Labs reviewed:  CBC:   Recent Labs     12/29/19  0430 12/30/19  0509 12/31/19  0522   WBC 14.6* 14.9* 14.6*   HGB 11.6* 11.6* 11.0*   HCT 34.6* 35.3* 33.1*   .1* 103.9* 103.5*    309 330     BMP:   Recent Labs     12/29/19  0430 12/30/19  0509 12/31/19  0522    135* 135*   K 4.1 4.5 4.5    104 102   CO2 21 18* 19*   PHOS 2.9 3.5 2.9   BUN 26* 24* 27*   CREATININE 0.8* 0.8* 0.7*     LIVER PROFILE:   Recent Labs     12/29/19 0430 12/30/19  0509 12/31/19  0522   AST 47* 85* 69*   ALT 47* 74* 79*   BILIDIR 0.3 0.6* 0.4*   BILITOT 0.6 1.0 0.7   ALKPHOS 136* 199* 180*     PT/INR: No results for input(s): PROTIME, INR in the last 72 hours. APTT: No results for input(s): APTT in the last 72 hours. UA:No results for input(s): NITRITE, COLORU, PHUR, LABCAST, WBCUA, RBCUA, MUCUS, TRICHOMONAS, YEAST, BACTERIA, CLARITYU, SPECGRAV, LEUKOCYTESUR, UROBILINOGEN, BILIRUBINUR, BLOODU, GLUCOSEU, AMORPHOUS in the last 72 hours. Invalid input(s): Fernandez Larios  No results for input(s): PH, PCO2, PO2 in the last 72 hours. Films:  Radiology Review:  Pertinent images / reports were reviewed as a part of this visit. CT Chest w/ contrast: No results found for this or any previous visit. CT Chest w/o contrast:   Results for orders placed during the hospital encounter of 12/18/19   CT Chest WO Contrast    Narrative EXAMINATION:  CT OF THE CHEST WITHOUT CONTRAST 12/26/2019 1:28 pm    TECHNIQUE:  CT of the chest was performed without the administration of intravenous  contrast. Multiplanar reformatted images are provided for review. Dose  modulation, iterative reconstruction, and/or weight based adjustment of the  mA/kV was utilized to reduce the radiation dose to as low as reasonably  achievable. COMPARISON:  None.     HISTORY:  ORDERING SYSTEM PROVIDED HISTORY: pleural effusion  TECHNOLOGIST PROVIDED HISTORY:  Reason for exam:->pleural effusion  Reason for Exam: pleural Transparent; Anti-microbial patch; Securing device 12/30/2019  6:00 AM   Dressing Change Due 01/01/20 12/30/2019  6:00 AM   Dressing Intervention Dressing changed;Security device changed 12/27/2019  5:16 PM   Reason Not Rotated Not due 12/27/2019  5:16 PM   Number of days: 5        CVC                 Assessment:   Acute Hypoxemic Respiratory Failure with SAO2 <90% on Room Air, due to  Pleural effusions  Aspriation Pneumonia - completed Abx  SBO - s/p exlap  Ischemic Hepatitis  Probable dementia    Plan:      -Wean supplemental oxygen to goal saturation of >90%, down to 2LPM, trialing on room air  -TPN, tolerating CLD, gen surg following.   May be able to Advance diet today.  -cont to monitor LFTs  -febrile o/n, will reculture blood and urine  -will hold diuresis today, remove deleon  -PT/OT  -SLP eval    Floor status     Thank you for this consult,    Radha Fields [address] [facility]

## 2019-12-31 NOTE — PROGRESS NOTES
Education: OT Role;Plan of Care;Transfer Training;Orientation  REQUIRES OT FOLLOW UP: Yes  Activity Tolerance  Activity Tolerance: Patient Tolerated treatment well;Treatment limited secondary to decreased cognition  Safety Devices  Safety Devices in place: Yes  Type of devices: Call light within reach; Left in chair;Nurse notified; Chair alarm in place  Restraints  Restraints: RN ok to remove restraints and transfer to chair         Patient Diagnosis(es): The primary encounter diagnosis was Small bowel obstruction ([facility] Diagnoses of Aspiration pneumonia of both lower lobes, unspecified aspiration pneumonia type ([address] Hypoxia, Rectal bleeding, and Septicemia ([address] were also pertinent to this visit. has a past medical history of Acne rosacea, Cognitive impairment, Colon cancer ([facility] [address] Down syndrome, Gout, HIGH CHOLESTEROL, History of diarrhea, Macrocytosis, Seborrheic dermatitis, Seizure disorder ([address] Status post biventricular cardiac pacemaker insertion, Status post partial colectomy, Syncope and collapse, and Weight loss. has a past surgical history that includes Cataract removal with implant (2007); Colon surgery (1/7//2014); Colonoscopy (02/27/2017); pacemaker placement; Colonoscopy (N/A, 8/21/2019); and Small intestine surgery (N/A, 12/18/2019). Restrictions  Restrictions/Precautions  Restrictions/Precautions: Fall Risk  Position Activity Restriction  Other position/activity restrictions: 4L O2. Pt MRDD although independent care/mobility pta. NPO except Popscicles, Ice Chips       Subjective   General  Chart Reviewed: Yes  Patient assessed for rehabilitation services?: Yes  Additional Pertinent Hx: 65 y/o male admit 12/18/19 with SBO, Rectal Bleed, Pneumonia, Septicemia. 12/18-12/27/19 Pt Intubated. 12/18/19 S/P Exp Lap, Lysis of Adhesions with Reduction of Closed Loop Small Bowel Obstruction. PMH as noted including Colon Ca, Partial Colectomy, Syncope/Collapse, Pacemaker, Down's Syndrome, Gout. will increase activity tolerance to achieve the above goals. Long term goals  Time Frame for Long term goals : STG=LTG  Patient Goals   Patient goals : pt unable to verbalize personal therapy goal d/t cognitive deficits. Therapy Time   Individual Concurrent Group Co-treatment   Time In 0840         Time Out 0910         Minutes 30         Timed Code Treatment Minutes: 30 Minutes     This note to serve as OT d/c summary if pt is d/c-ed prior to next therapy session.     Deep Cole, OTR/L

## 2019-12-31 NOTE — PLAN OF CARE
Nutrition Problem: Inadequate oral intake  Intervention: Food and/or Nutrient Delivery: Continue current diet, Start ONS, Continue Parenteral Nutrition  Nutritional Goals: tolerate most appropriate form of nutrition

## 2019-12-31 NOTE — CARE COORDINATION
Met w/ pts brother  Discussed rehab options  Discussed pts [employer] not likely to cover acute rehab  Provided SNF list  He is considering possible long term placement as pt getting dementia and is getting harder to care for him in his home.  Also sister in [address] [place] [address] had been taking pt for one month in the spring and another month in the fall and she is now ill and may no longer be able to assist  He has met w/ CM for dept developmental disabilities and has discussed this w/ her  He tells me that surgery indicates that pt will remain here another 7-10 days, still on TPN, just started clear liquids  Brother will look into [facility] and Jelani and let MSW on 5 know where to start referral  Electronically signed by Edna Godwin RN on 12/31/2019 at 3:43 PM

## 2019-12-31 NOTE — PROGRESS NOTES
Speech Language Pathology  Facility/Department: [address] ICU   CLINICAL BEDSIDE SWALLOW EVALUATION    NAME: Kieran Chiu  : 1960  MRN: [id number]    ADMISSION DATE: 2019  ADMITTING DIAGNOSIS: has Down's syndrome; Gout; Acne rosacea; Seborrheic dermatitis; High cholesterol; Colon cancer, ascending ([address] Personal history of colon cancer, stage II; Bladder wall thickening; Weight loss; Status post partial colectomy; Chronic diarrhea; Macrocytosis; New onset seizure ([address] SSS (sick sinus syndrome) ([address] S/P placement of cardiac pacemaker; Bradycardia; Seizure disorder ([address] Status post biventricular cardiac pacemaker insertion; Memory loss; Cognitive impairment; Colon cancer ([address] Down syndrome; SBO (small bowel obstruction) ([address] Aspiration pneumonia of both lower lobes ([address] Hypoxia; Septicemia ([address] Septic shock ([address] Postprocedural hypotension; ANISH (acute kidney injury) ([address] Abnormal EKG; Acute respiratory failure with hypoxia ([address] Pleural effusion; Ischemic hepatitis; and Thrombocytopenia (HCC) on their problem list.     ONSET DATE: 2019     Impression   No substantial change in bilateral pleural effusions and asymmetric airspace   disease, left greater than right.  Developing ARDS possible.         Date of Eval: 2019  Evaluating Therapist: Reinaldo Jimenez    Current Diet level:  Current Diet : Dysphagia Pureed (Dysphagia I)  Current Liquid Diet : Thin      Primary Complaint  Patient Complaint: Unable to formulate a complaint.     Pain:  Pain Assessment  Pain Assessment: 0-10  Pain Level: 0  RASS Score: Alert and calm  PAINAD (Pain Assessment in Advance Dementia)  Breathing: normal  Negative Vocalization: none  Facial Expression: smiling or inexpressive  Body Language: relaxed  Consolability: no need to console  PAINAD Score: 0  Pain Assessment/FLACC  Pain Rating: FLACC (rest) - Face: no particular expression or smile  Pain Rating: FLACC (rest) - Legs: monitoring    Compensatory Swallowing Strategies  Compensatory Swallowing Strategies: Small bites/sips;Eat/Feed slowly; Remain upright for 30-45 minutes after meals;Upright as possible for all oral intake;Assist feed    Treatment/Goals  Short-term Goals  Timeframe for Short-term Goals: One week  Long-term Goals  Timeframe for Long-term Goals: One-two weeks  Goal 1: Pt will safely tolerate the least restrictive diet/liquid level. Goal 2: Pt will perform safe swallowing strategies with mod cues. Dysphagia Goals: The patient will tolerate puree foods 10/10. ;The patient will tolerate thin liquids without signs and symptoms of aspiration 10/10 via straw. ;The patient/caregiver will demonstrate understanding of compensatory strategies for improved swallowing safety. ;The patient will tolerate instrumental swallowing procedure    General  Chart Reviewed: Yes  Subjective  Subjective: Pt was up in his recliner and was eating his breakfast which was clear liquids. Behavior/Cognition: Alert; Cooperative;Pleasant mood;Distractible;Requires cueing  Temperature Spikes Noted: No  Respiratory Status: O2 via nasual cannula  O2 Device: Nasal cannula  Communication Observation: Dysarthria  Follows Directions: Simple  Dentition: Adequate  Patient Positioning: Upright in chair  Baseline Vocal Quality: Normal  Volitional Cough: Congested  Prior Dysphagia History: Per brother's report, the pt tends to choke with solids because he cannot monitor volume. He had an MBS 2 years ago and was told his swallowing was WFL's. Consistencies Administered: Dysphagia Pureed (Dysphagia I); Thin - straw;Nectar - straw    Vision/Hearing  Vision  Vision: Within Functional Limits  Hearing  Hearing: Within functional limits    Oral Motor Deficits  Oral/Motor  Oral Motor: Exceptions to [facility]  Lingual Coordination: Reduced    Oral Phase Dysfunction  Oral Phase  Oral Phase: Exceptions  Oral Phase Dysfunction  Decreased Anterior to Posterior Transit:

## 2019-12-31 NOTE — PROGRESS NOTES
Nutrition Assessment (Parenteral Nutrition)    Type and Reason for Visit: Reassess    Nutrition Recommendations: Add Ensure Clear to trays tid to start  PN (5/20 formula) at 80 ml per hour (goal) + lipid infusions (250 ml (20%) given twice weekly, remains appropriate    Nutrition Assessment: Pt now extubated. PN (5/20 formula) continues & tolerated at goal rate of 80 ml per hour. Lipid infusions (250 ml (20%) started on 12/28 & will continue twice weekly as long as triglycerides are acceptable (currently at 98 (taken on 12/30). Pharmacy continues to manage PN regimen. Diet advanced to Clear Liquids, which pt is tolerating per feedback. Surgery noted plan to continue PN until po improved. Will add supplements to trays to start. Malnutrition Assessment:  · Malnutrition Status: At risk for malnutrition  · Context: Acute illness or injury  · Findings of the 6 clinical characteristics of malnutrition (Minimum of 2 out of 6 clinical characteristics is required to make the diagnosis of moderate or severe Protein Calorie Malnutrition based on AND/ASPEN Guidelines):  1. Energy Intake-Less than or equal to 50% of estimated energy requirement, Greater than or equal to 5 days    2. Weight Loss-Unable to assess,    3. Fat Loss-No significant subcutaneous fat loss,    4. Muscle Loss-No significant muscle mass loss,    5. Fluid Accumulation-Moderate to severe fluid accumulation, Generalized  6.  Strength-Not measured    Nutrition Risk Level: High    Nutrient Needs:  · Estimated Daily Total Kcal: 5140-6440 (20-25 x ABW 64 kg)  · Estimated Daily Protein (g): 77-96 (1.2-1.5 x ABW)  · Estimated Daily Total Fluid (ml/day): 1 ml per kcal    Nutrition Diagnosis:   · Problem: Inadequate oral intake  · Etiology: related to Alteration in GI function     Signs and symptoms:  as evidenced by (need for limited diet orders)    Objective Information:  · Nutrition-Focused Physical Findings: Noted small BM 12/30.  Noted no

## 2019-12-31 NOTE — PROGRESS NOTES
Exam: See above. Clinical Presentation: See above. Patient Education: Role of PT, POC, Need to call for assist, Safe use of Walker. Barriers to Learning: Cognitive. REQUIRES PT FOLLOW UP: Yes  Activity Tolerance  Activity Tolerance: Patient Tolerated treatment well;Patient limited by cognitive status     Patient Diagnosis(es): The primary encounter diagnosis was Small bowel obstruction ([address] Diagnoses of Aspiration pneumonia of both lower lobes, unspecified aspiration pneumonia type ([address] Hypoxia, Rectal bleeding, and Septicemia ([address] were also pertinent to this visit. has a past medical history of Acne rosacea, Cognitive impairment, Colon cancer ([address] Down syndrome, Gout, HIGH CHOLESTEROL, History of diarrhea, Macrocytosis, Seborrheic dermatitis, Seizure disorder ([address] Status post biventricular cardiac pacemaker insertion, Status post partial colectomy, Syncope and collapse, and Weight loss. has a past surgical history that includes Cataract removal with implant (2007); Colon surgery (1/7//2014); Colonoscopy (02/27/2017); pacemaker placement; Colonoscopy (N/A, 8/21/2019); and Small intestine surgery (N/A, 12/18/2019). Restrictions  Restrictions/Precautions  Restrictions/Precautions: Fall Risk  Position Activity Restriction  Other position/activity restrictions: O2 4L via NC. Pt MRDD although independent care/mobility pta. Diet : Clear Liquid. Subjective   General  Chart Reviewed: Yes  Additional Pertinent Hx: 65 y/o male admit 12/18/19 with SBO, Rectal Bleed, Pneumonia, Septicemia. 12/18-12/27/19 Pt Intubated. 12/18/19 S/P Exp Lap, Lysis of Adhesions with Reduction of Closed Loop Small Bowel Obstruction. PMH as noted including Colon Ca, Partial Colectomy, Syncope/Collapse, Pacemaker, Down's Syndrome, Gout. Response To Previous Treatment: Patient with no complaints from previous session.   Family / Caregiver Present: (Brother arrived near completion of PT Rx. )  Referring Practitioner: Dr. Dorian Albright  Subjective  Subjective: Pt agreeable to PT Rx. Pt denies any pain. Objective   Bed mobility  Supine to Sit: Moderate assistance(HOB elevated. Use of Bedrail. )  Transfers  Sit to Stand: Minimal Assistance(With Walker (+1 CGA). )  Stand to sit: (With Walker (+1 CGA). )  Ambulation  Ambulation?: Yes  Ambulation 1  Device: Rolling Walker  Distance: 4-5 steps bed to chair, followed by 5-6 steps forward/back with Rayshawn Shiloh. Min assist (+1 CGA), alittle more unsteady with Retroamb. No LE buckling/giving way noted. Edwin relatively well. AM-PAC Score  AM-PAC Inpatient Mobility Raw Score : 14 (12/31/19 1150)  AM-PAC Inpatient T-Scale Score : 38.1 (12/31/19 1150)  Mobility Inpatient CMS 0-100% Score: 61.29 (12/31/19 1150)  Mobility Inpatient CMS G-Code Modifier : CL (12/31/19 1150)          Goals  Short term goals  Time Frame for Short term goals: Upon d/c acute care setting. Short term goal 1: Bed Mob Mod assist.   Short term goal 2: Transfers with/without assist device Min assist.   Short term goal 3: Amb with/without assist device 48' CGA. Patient Goals   Patient goals : None stated at this time. Plan    Plan  Times per week: 3-5x week while in acute care setting. Current Treatment Recommendations: Strengthening, Functional Mobility Training, Transfer Training, Gait Training, Safety Education & Training, Patient/Caregiver Education & Training  Safety Devices  Type of devices: Call light within reach, Chair alarm in place, Left in chair, Nurse notified(Family at bedside.  )  Restraints  Initially in place: No  Restraints: B UE soft restraints.       Therapy Time   Individual Concurrent Group Co-treatment   Time In 0840         Time Out 0910         Minutes [address], PT

## 2019-12-31 NOTE — PROGRESS NOTES
4 Eyes Skin Assessment     The patient is being assess for  Transfer to New Unit    I agree that 2 RN's have performed a thorough Head to Toe Skin Assessment on the patient. ALL assessment sites listed below have been assessed. Areas assessed by both nurses:  [x]   Head, Face, and Ears   [x]   Shoulders, Back, and Chest  [x]   Arms, Elbows, and Hands   [x]   Coccyx, Sacrum, and IschIum  [x]   Legs, Feet, and Heels        Does the Patient have Skin Breakdown? Yes LDA WOUND CARE was Initiated documentation include the Debra-wound, Wound Assessment, Measurements, Dressing Treatment, Drainage, and Color\",    Patient's bottom is excoriated; incision wound on mid abdomen and stapled with dry dressing.     Stuart Prevention initiated:  Yes   Wound Care Orders initiated:  Yes      [address]  nurse consulted for Pressure Injury (Stage 3,4, Unstageable, DTI, NWPT, and Complex wounds), New and Established Ostomies:  NA      Nurse 1 eSignature: Electronically signed by Maximiliano Shannon RN on 12/31/19 at 6:55 PM    **SHARE this note so that the co-signing nurse is able to place an eSignature**    Nurse 2 eSignature: Electronically signed by Patrice Greene RN on 12/31/19 at 7:28 PM

## 2019-12-31 NOTE — PROGRESS NOTES
[address]                                                                        [address] [facility] [address]                                                                 Laura Bynum                                                         Phone Number: [phone]                                                           Fax Number: [phone]                                                             Nephrology Progress Note    Chief Complaint: Diarrhea for the last few days, abdominal distention, one episode of vomiting    Subjective: We are following this patient for ANISH, Hyperkalemia and acidosis. The patient was admitted with SBO - Taken to OR on 12/18/19 for Ex Lap and RITESH. HPI:  Down to 4 liters O2. BP better. O > I. His mouth is very dry. ROS: Limited d/t pt factors. [facility]:  medications reviewed. Brother at bedside. Physical Exam:    BP (!) 104/51   Pulse 112   Temp 97.6 °F (36.4 °C) (Axillary)   Resp 23   Ht 5' 6\" (1.676 m)   Wt 125 lb 10.6 oz (57 kg)   SpO2 95%   BMI 20.28 kg/m²     24HR INTAKE/OUTPUT:      Intake/Output Summary (Last 24 hours) at 12/31/2019 1254  Last data filed at 12/31/2019 0958  Gross per 24 hour   Intake 3573 ml   Output 1889 ml   Net 1684 ml       Patient Vitals for the past 96 hrs (Last 3 readings):   Weight   12/31/19 0623 125 lb 10.6 oz (57 kg)   12/29/19 0510 128 lb 4.9 oz (58.2 kg)   12/28/19 0400 128 lb 8.5 oz (58.3 kg)       GENERAL:  Sedated, intubated, in ICU. CHEST:  +vent, +rhonchi. CARDIOVASCULAR: RRR no S3.  ABDOMEN:  Surgical dressing intact, no drainage, soft. No organomegaly. EXTREMITIES:  no edema. No extremity lymphadenopathy. SKIN:  Normal texture and turgor. No rash. : deleon in place; no hematuria noted    Allergies:   Allergies   Allergen Reactions    Penicillins      rash          LAB DATA: CBC:   Lab Results   Component Value Date    WBC 14.6 12/31/2019    RBC 3.20 12/31/2019    RBC 4.68 03/13/2017    HGB 11.0 12/31/2019    HCT 33.1 12/31/2019    .5 12/31/2019    MCH 34.3 12/31/2019    MCHC 33.2 12/31/2019    RDW 14.5 12/31/2019     12/31/2019    MPV 10.4 12/31/2019     BMP:    Lab Results   Component Value Date     12/31/2019    K 4.5 12/31/2019    K 5.5 12/19/2019     12/31/2019    CO2 19 12/31/2019    BUN 27 12/31/2019    CREATININE 0.7 12/31/2019    CALCIUM 8.3 12/31/2019    GFRAA >60 12/31/2019    GFRAA >60 04/09/2013    LABGLOM >60 12/31/2019    GLUCOSE 239 12/31/2019    GLUCOSE 115 03/13/2017     Ionized Calcium:  No results found for: IONCA  Magnesium:    Lab Results   Component Value Date    MG 2.00 12/31/2019     Phosphorus:    Lab Results   Component Value Date    PHOS 2.9 12/31/2019     U/A:    Lab Results   Component Value Date    COLORU DK YELLOW 12/19/2019    PHUR 5.0 12/19/2019    WBCUA 2 12/19/2019    RBCUA 20-50 12/19/2019    CLARITYU Clear 12/19/2019    SPECGRAV 1.023 12/19/2019    LEUKOCYTESUR Negative 12/19/2019    UROBILINOGEN 0.2 12/19/2019    BILIRUBINUR Negative 12/19/2019    BLOODU MODERATE 12/19/2019    GLUCOSEU Negative 12/19/2019         IMPRESSION/RECOMMENDATIONS:      1. ANISH (non oliguric) - in the setting of hypotension, vomiting, diarrhea, currently, required pressor support and getting aggressive volume resuscitation. Prerenal etiology    - resolved  - PICC line is fine    2. FEN  - Hyper-/hypokalemia   - monitor  - metabolic acidosis    - monitor  - advance diet? 3.  extubated  - per pulmonary  - will defer further diuretics to pulmonary. 4.  Shock:  - pressors still off    Will sign off. Thank you for this consult.

## 2019-12-31 NOTE — PROGRESS NOTES
Department of Internal Medicine  General Internal Medicine  Attending Progress Note    Chief Complaint:originally vomiting and diarrhea      HPI:   SUBJECTIVE:  63 yo male patient with a history of down's syndrome, colon cancer (resected),  Pacemaker for chb who presented with SBO and septic shock. Now over a week past exploratory lap and 2 days off the vent. Still on TPN but starting clear liquids. Less confused. Still requiring restraints until he is free of lines. Had swallowing eval today recommended thin liquids but pureed food.       Past Medical History:   Diagnosis Date    Acne rosacea     Cognitive impairment     sees Dr Magalis Rodriguez, started Aricept 12/2019    Colon cancer ([facility] [address] 1/7/2014    ileocecal valve/Leuenberger/yrly surveillance    Down syndrome     Gout     HIGH CHOLESTEROL     2017 3.1 % framingham risk score    History of diarrhea     since childhood, worse since partial colectomy resolved after a few months    Macrocytosis     followed by Dr Bella Gooden and released    Seborrheic dermatitis     Seizure disorder ([facility] [address] 2019    Status post biventricular cardiac pacemaker insertion 01/2019    for long sinus pauses with syncopal willis    Status post partial colectomy 2016    diarrhea    Syncope and collapse 01/2019    recurrent syncope, found seizures and sinus pauses    Weight loss 2015    since 2014 at the time of partial colectomy lost 25 lbs     Past Surgical History:   Procedure Laterality Date    CATARACT REMOVAL WITH IMPLANT  2007    Bilateral    COLON SURGERY  1/7//2014    for removal colon cancer    COLONOSCOPY  02/27/2017    Dr Josue Pablo N/A 8/21/2019    COLONOSCOPY WITH BIOPSY performed by Balwinder Wasserman MD at [address] [facility]  N/A 12/18/2019    EXPLORATORY LAPAROTOMY, LYSIS OF ADHESIONS performed by Alok Galeano MD at [address] [place] [address] History   Problem Relation Age of Onset    Diabetes Brother  Hypertension Brother     Stroke Brother     High Cholesterol Brother     Cancer Mother         melanoma    Other Father         cerebral aneurysm     Social History     Tobacco Use    Smoking status: Never Smoker    Smokeless tobacco: Never Used   Substance Use Topics    Alcohol use: No    Drug use: No       Prior to Admission medications    Medication Sig Start Date End Date Taking? Authorizing Provider   doxycycline (VIBRAMYCIN) 50 MG capsule Take 50 mg by mouth 2 times daily 11/26/19  Yes Historical Provider, MD   simvastatin (ZOCOR) 20 MG tablet Take 1 tablet by mouth daily 12/10/19  Yes Mara Koroma MD   allopurinol (ZYLOPRIM) 100 MG tablet Take 1 tablet by mouth daily 12/9/19  Yes Mara Koroma MD   donepezil (ARICEPT) 5 MG tablet Take 1 tablet by mouth nightly 12/9/19  Yes Mara Koroma MD   Multiple Vitamins-Minerals (THERAPEUTIC MULTIVITAMIN-MINERALS) tablet Take 1 tablet by mouth daily   Yes Historical Provider, MD   Azelaic Acid 15 % GEL Apply topically daily 12/2/19   Historical Provider, MD   hydrocortisone (HYTONE) 2.5 % lotion Apply topically daily 11/26/19   Historical Provider, MD   metroNIDAZOLE (METROCREAM) 0.75 % cream Apply topically daily 11/26/19   Historical Provider, MD         ROS:  A comprehensive review of systems was negative except for: weakness , sob     OBJECTIVE:      PHYSICAL:  Intake/Output:   Patient Vitals for the past 8 hrs:   BP Temp Temp src Pulse Resp SpO2   12/31/19 1607 121/71 98.5 °F (36.9 °C) Axillary 124 30 96 %   12/31/19 1434 103/65 99.1 °F (37.3 °C) Oral 127 (!) 32 94 %   12/31/19 1212 (!) 104/51 97.6 °F (36.4 °C) Axillary 112 23 95 %   12/31/19 1157 -- -- -- -- 28 95 %   12/31/19 0900 (!) 95/59 -- -- 117 30 95 %   12/31/19 0857 -- -- -- -- (!) 40 (!) 85 %     I/O last 3 completed shifts: In: 4253 [P.O.:360; I.V.:183]  Out: 1489 [Urine:1489]  No intake/output data recorded.   VITALS:    /71   Pulse 124   Temp 98.5 °F (36.9 °C) (Axillary)   Resp 30 Ht 5' 6\" (1.676 m)   Wt 125 lb 10.6 oz (57 kg)   SpO2 96%   BMI 20.28 kg/m²     General Appearance: awake and comfortable appearing on vent  Skin: warm and dry, no rash or erythema  Head: normocephalic and atraumatic  Eyes: conjunctivae normal and sclera anicteric  Neck: neck supple and non tender without mass, no thyromegaly or thyroid nodules, no cervical lymphadenopathy   Pulmonary/Chest: clear to auscultation bilaterally- upper lobes symmetric bs Cardiovascular: normal rate, normal S1 and S2, no gallops and no carotid bruits  Abdomen: soft bs pos nontender, dressing I place  Extremities: no cyanosis, clubbing or edema  Musculoskeletal: no swollen joints and no tender joints,  Neurologic: following commands moves all 4    DATA:   Labs:  CBC:   Recent Labs     12/29/19  0430 12/30/19  0509 12/31/19  0522   WBC 14.6* 14.9* 14.6*   HGB 11.6* 11.6* 11.0*    309 330     BMP:    Recent Labs     12/29/19  0430 12/30/19  0509 12/31/19  0522    135* 135*   K 4.1 4.5 4.5    104 102   CO2 21 18* 19*   BUN 26* 24* 27*   CREATININE 0.8* 0.8* 0.7*   GLUCOSE 196* 239* 239*     POC GLUCOSE:    Recent Labs     12/30/19  1949 12/30/19  2356 12/31/19  0517 12/31/19  1207 12/31/19  1610   POCGLU 208* 221* 236* 257* 217*     Ca/Mg/Phos:   Recent Labs     12/29/19  0430 12/30/19  0509 12/31/19  0522   CALCIUM 8.3 8.2* 8.3   MG 2.10 2.00 2.00   PHOS 2.9 3.5 2.9     Hepatic:   Recent Labs     12/29/19  0430 12/30/19  0509 12/31/19  0522   AST 47* 85* 69*   ALT 47* 74* 79*   BILITOT 0.6 1.0 0.7   ALKPHOS 136* 199* 180*     Troponin:   No results for input(s): TROPONINI in the last 72 hours. ProBNP:   No results for input(s): PROBNP in the last 72 hours. Lipids:   Recent Labs     12/30/19  0509   TRIG 98     ABGs:   No results for input(s): PHART, PO2ART, RBE8PQI in the last 72 hours. PT/INR:   No results for input(s): PROTIME, INR in the last 72 hours. APTT:   No results for input(s):  APTT in the last 72 hours. Lactic acid 10.4 on presentation 3.3 today, 3.4 yesterday    EKG: ST and sinus arrhythmia, t wave inversion inferiorally    DIAGNOSTICS:  Ct Head Wo Contrast    Result Date: 12/18/2019  No acute intracranial abnormality. Xr Chest Portable    Result Date: 12/20/2019  1. Recommend advancement of endotracheal tube 1.0 cm. 2. Bibasilar subsegmental atelectasis plus or minus mild pleural effusions, unchanged. Xr Chest Portable    Result Date: 12/19/2019  Nasogastric tube projects in intragastric position. Bibasilar atelectasis. Pneumonia or aspiration pneumonitis are differential considerations. Nonspecific bowel gas pattern. Xr Chest Portable    Result Date: 12/19/2019  Small bilateral pleural effusions with adjacent atelectasis. Xr Chest Portable    Result Date: 12/18/2019  Low lung volume due to inspiratory effort. Mild bibasilar atelectasis with no other significant finding in the chest.     Ct Chest Abdomen Pelvis Wo Contrast    Result Date: 12/18/2019  Findings compatible with small bowel obstruction with transition point in the right mid abdomen, likely in mid to distal ileum. No obvious etiology identified. Small volume ascites, likely reactive. Areas of consolidation to the lungs bilaterally which may relate to atelectasis although multifocal pneumonia or aspiration pneumonitis could have a similar appearance. Patulous/distended appearance to the esophagus with fluid attenuation material within nearly the entirety of the esophagus, possibly secondary to the bowel obstruction and refluxed gastric contents. Patient may be at risk for aspiration. Trace bilateral pleural effusions.          ASSESSMENT AND PLAN    Active Problems:    Small bowel obstruction ([address] RESOLVED  Plan: seems to be doing well,advancing diet , now to have altered diet    Aspiration pneumonia of both lower lobes ([address] pleural fluid bilat not a lot  Plan: no longer on abx    Hypoxia  Plan: nearly resolved

## 2019-12-31 NOTE — PROGRESS NOTES
0800-Pt calm and cooperative, restraints removed from bilateral wrists. 0840-Critical care team rounding on pt, new orders received. 0900-Assessment complete, Pt desat on RA so placed back on 4L NC.  0905-Pt up to chair with PT and OT, chair alarm on, pt's brother at bedside. 0945-Speech eval completed at bedside, therapist suggest modified barium swallow. 1005-Power catheter removed, will get urine culture today through clean catch or straight cath. 1145-Pt assisted to [facility], had large soft, formed brown BM. Pericare completed and calmoseptine cream applied to buttocks/periarea. 1400-Pt incontinent of large amount of urine, pericare complete and sheets changed. Pt felt warm but temp check shows 99.1 oral. Pt tachypneic during ambulation but O2sat 93% on 3L per NC. Pt straight cath for urine culture specimen, tolerated well. 1500-Report called to FOUZIA Ocampo on PCU. Transport requested. 1540-Pt transported to 510 on stretcher with portable tele and O2.  Belongings sent with pt

## 2019-12-31 NOTE — PROGRESS NOTES
Kieran Chiu is a 64 y.o. male patient. 1. Small bowel obstruction ([facility] [address]    2. Aspiration pneumonia of both lower lobes, unspecified aspiration pneumonia type ([facility] [address]    3. Hypoxia    4. Rectal bleeding    5. Septicemia [facility])      Past Medical History:   Diagnosis Date    Acne rosacea     Cognitive impairment     sees Dr Torie Concepcion, started Aricept 12/2019    Colon cancer ([facility] [address] 1/7/2014    ileocecal valve/Leuenberger/yrly surveillance    Down syndrome     Gout     HIGH CHOLESTEROL     2017 3.1 % framingham risk score    History of diarrhea     since childhood, worse since partial colectomy resolved after a few months    Macrocytosis     followed by Dr Robert Snow and released    Seborrheic dermatitis     Seizure disorder ([facility] [address] 2019    Status post biventricular cardiac pacemaker insertion 01/2019    for long sinus pauses with syncopal willis    Status post partial colectomy 2016    diarrhea    Syncope and collapse 01/2019    recurrent syncope, found seizures and sinus pauses    Weight loss 2015    since 2014 at the time of partial colectomy lost 25 lbs     No past surgical history pertinent negatives on file.   Scheduled Meds:   fat emulsion  250 mL Intravenous Once per day on Mon Thu    insulin lispro  0-18 Units Subcutaneous Q4H    sodium chloride flush  10 mL Intravenous 2 times per day    enoxaparin  40 mg Subcutaneous Daily    pantoprazole  40 mg Intravenous Daily    And    sodium chloride (PF)  10 mL Intravenous Daily     Continuous Infusions:   PN-Adult Premix 5/20 - Central      PN-Adult Premix 5/20 - Central 80 mL/hr at 12/30/19 1826    dextrose       PRN Meds:menthol-zinc oxide, sodium chloride flush, magnesium sulfate, potassium chloride, sodium chloride flush, ondansetron, acetaminophen, glucose, dextrose, glucagon (rDNA), dextrose, dextran 70-hypromellose    Allergies   Allergen Reactions    Penicillins      rash     Active Problems:    SBO (small bowel obstruction) (HCC) Aspiration pneumonia of both lower lobes (HCC)    Hypoxia    Septicemia ([address]    Septic shock (HCC)    Postprocedural hypotension    ANISH (acute kidney injury) ([address]    Abnormal EKG    Acute respiratory failure with hypoxia (HCC)    Pleural effusion    Ischemic hepatitis    Thrombocytopenia (HCC)  Resolved Problems:    * No resolved hospital problems. *    Blood pressure (!) 104/51, pulse 112, temperature 97.6 °F (36.4 °C), temperature source Axillary, resp. rate 23, height 5' 6\" (1.676 m), weight 125 lb 10.6 oz (57 kg), SpO2 95 %. Subjective:   Diet: Poor intake. Patient reports no nausea or vomiting. Activity level: Returning to normal.    Pain control: Well controlled. Objective:  Vital signs (most recent): Blood pressure (!) 104/51, pulse 112, temperature 97.6 °F (36.4 °C), temperature source Axillary, resp. rate 23, height 5' 6\" (1.676 m), weight 125 lb 10.6 oz (57 kg), SpO2 95 %. General appearance: Comfortable and in no acute distress. Abdomen: Abdomen is soft. No distension. Tenderness: There is tenderness in the incisional area. Wound:  Clean.       Assessment & Plan    Small bowel obstruction   Stable postop day 13 from lysis of adhesion   Having BM's   Trying some clears but limited   Speech to see today   Continue TPN    Electronically signed by Paulina Roberts MD on 12/31/2019 at 12:34 PM    Paulina Roberts MD  12/31/2019

## 2020-01-01 ENCOUNTER — APPOINTMENT (OUTPATIENT)
Dept: CT IMAGING | Age: 60
DRG: 853 | End: 2020-01-01
Payer: COMMERCIAL

## 2020-01-01 LAB
ALBUMIN SERPL-MCNC: 2.5 G/DL (ref 3.4–5)
ALP BLD-CCNC: 206 U/L (ref 40–129)
ALT SERPL-CCNC: 123 U/L (ref 10–40)
ANION GAP SERPL CALCULATED.3IONS-SCNC: 12 MMOL/L (ref 3–16)
APTT: 35.1 SEC (ref 24.2–36.2)
APTT: 35.2 SEC (ref 24.2–36.2)
APTT: 38.7 SEC (ref 24.2–36.2)
AST SERPL-CCNC: 138 U/L (ref 15–37)
BANDED NEUTROPHILS RELATIVE PERCENT: 2 % (ref 0–7)
BASE EXCESS ARTERIAL: -2.9 MMOL/L (ref -3–3)
BASOPHILS ABSOLUTE: 0.1 K/UL (ref 0–0.2)
BASOPHILS RELATIVE PERCENT: 1 %
BILIRUB SERPL-MCNC: 1 MG/DL (ref 0–1)
BILIRUBIN DIRECT: 0.5 MG/DL (ref 0–0.3)
BILIRUBIN, INDIRECT: 0.5 MG/DL (ref 0–1)
BUN BLDV-MCNC: 28 MG/DL (ref 7–20)
CALCIUM SERPL-MCNC: 8.3 MG/DL (ref 8.3–10.6)
CARBOXYHEMOGLOBIN ARTERIAL: 1.4 % (ref 0–1.5)
CHLORIDE BLD-SCNC: 103 MMOL/L (ref 99–110)
CO2: 19 MMOL/L (ref 21–32)
CREAT SERPL-MCNC: 0.9 MG/DL (ref 0.9–1.3)
EKG ATRIAL RATE: 124 BPM
EKG DIAGNOSIS: NORMAL
EKG P AXIS: 37 DEGREES
EKG P-R INTERVAL: 136 MS
EKG Q-T INTERVAL: 294 MS
EKG QRS DURATION: 68 MS
EKG QTC CALCULATION (BAZETT): 422 MS
EKG R AXIS: 32 DEGREES
EKG T AXIS: 26 DEGREES
EKG VENTRICULAR RATE: 124 BPM
EOSINOPHILS ABSOLUTE: 0 K/UL (ref 0–0.6)
EOSINOPHILS RELATIVE PERCENT: 0 %
GFR AFRICAN AMERICAN: >60
GFR NON-AFRICAN AMERICAN: >60
GLUCOSE BLD-MCNC: 148 MG/DL (ref 70–99)
GLUCOSE BLD-MCNC: 171 MG/DL (ref 70–99)
GLUCOSE BLD-MCNC: 181 MG/DL (ref 70–99)
GLUCOSE BLD-MCNC: 197 MG/DL (ref 70–99)
GLUCOSE BLD-MCNC: 209 MG/DL (ref 70–99)
GLUCOSE BLD-MCNC: 210 MG/DL (ref 70–99)
GLUCOSE BLD-MCNC: 236 MG/DL (ref 70–99)
HCO3 ARTERIAL: 20.8 MMOL/L (ref 21–29)
HCT VFR BLD CALC: 30.9 % (ref 40.5–52.5)
HEMOGLOBIN, ART, EXTENDED: 10.8 G/DL (ref 13.5–17.5)
HEMOGLOBIN: 10.2 G/DL (ref 13.5–17.5)
LYMPHOCYTES ABSOLUTE: 1.4 K/UL (ref 1–5.1)
LYMPHOCYTES RELATIVE PERCENT: 11 %
MACROCYTES: ABNORMAL
MAGNESIUM: 2 MG/DL (ref 1.8–2.4)
MCH RBC QN AUTO: 34 PG (ref 26–34)
MCHC RBC AUTO-ENTMCNC: 33 G/DL (ref 31–36)
MCV RBC AUTO: 103 FL (ref 80–100)
METHEMOGLOBIN ARTERIAL: 0.7 %
MONOCYTES ABSOLUTE: 1.3 K/UL (ref 0–1.3)
MONOCYTES RELATIVE PERCENT: 10 %
NEUTROPHILS ABSOLUTE: 10.2 K/UL (ref 1.7–7.7)
NEUTROPHILS RELATIVE PERCENT: 76 %
O2 CONTENT ARTERIAL: 14 ML/DL
O2 SAT, ARTERIAL: 96.6 %
O2 THERAPY: ABNORMAL
PCO2 ARTERIAL: 34.2 MMHG (ref 35–45)
PDW BLD-RTO: 14.6 % (ref 12.4–15.4)
PERFORMED ON: ABNORMAL
PH ARTERIAL: 7.4 (ref 7.35–7.45)
PHOSPHORUS: 3.2 MG/DL (ref 2.5–4.9)
PLATELET # BLD: 309 K/UL (ref 135–450)
PLATELET SLIDE REVIEW: ADEQUATE
PMV BLD AUTO: 10.3 FL (ref 5–10.5)
PO2 ARTERIAL: 70.6 MMHG (ref 75–108)
POTASSIUM SERPL-SCNC: 4.8 MMOL/L (ref 3.5–5.1)
RBC # BLD: 3 M/UL (ref 4.2–5.9)
SLIDE REVIEW: ABNORMAL
SODIUM BLD-SCNC: 134 MMOL/L (ref 136–145)
TCO2 ARTERIAL: 21.9 MMOL/L
TOTAL PROTEIN: 6 G/DL (ref 6.4–8.2)
WBC # BLD: 13.1 K/UL (ref 4–11)

## 2020-01-01 PROCEDURE — 6360000002 HC RX W HCPCS: Performed by: SURGERY

## 2020-01-01 PROCEDURE — 2580000003 HC RX 258: Performed by: INTERNAL MEDICINE

## 2020-01-01 PROCEDURE — 6360000002 HC RX W HCPCS: Performed by: INTERNAL MEDICINE

## 2020-01-01 PROCEDURE — 80076 HEPATIC FUNCTION PANEL: CPT

## 2020-01-01 PROCEDURE — 82803 BLOOD GASES ANY COMBINATION: CPT

## 2020-01-01 PROCEDURE — 36600 WITHDRAWAL OF ARTERIAL BLOOD: CPT

## 2020-01-01 PROCEDURE — 99233 SBSQ HOSP IP/OBS HIGH 50: CPT | Performed by: INTERNAL MEDICINE

## 2020-01-01 PROCEDURE — 94640 AIRWAY INHALATION TREATMENT: CPT

## 2020-01-01 PROCEDURE — 85025 COMPLETE CBC W/AUTO DIFF WBC: CPT

## 2020-01-01 PROCEDURE — 6360000004 HC RX CONTRAST MEDICATION: Performed by: INTERNAL MEDICINE

## 2020-01-01 PROCEDURE — 80069 RENAL FUNCTION PANEL: CPT

## 2020-01-01 PROCEDURE — 99024 POSTOP FOLLOW-UP VISIT: CPT | Performed by: SURGERY

## 2020-01-01 PROCEDURE — 83735 ASSAY OF MAGNESIUM: CPT

## 2020-01-01 PROCEDURE — 2700000000 HC OXYGEN THERAPY PER DAY

## 2020-01-01 PROCEDURE — 85730 THROMBOPLASTIN TIME PARTIAL: CPT

## 2020-01-01 PROCEDURE — 36415 COLL VENOUS BLD VENIPUNCTURE: CPT

## 2020-01-01 PROCEDURE — C9113 INJ PANTOPRAZOLE SODIUM, VIA: HCPCS | Performed by: NURSE PRACTITIONER

## 2020-01-01 PROCEDURE — 6360000002 HC RX W HCPCS: Performed by: NURSE PRACTITIONER

## 2020-01-01 PROCEDURE — 6370000000 HC RX 637 (ALT 250 FOR IP): Performed by: NURSE PRACTITIONER

## 2020-01-01 PROCEDURE — 93010 ELECTROCARDIOGRAM REPORT: CPT | Performed by: INTERNAL MEDICINE

## 2020-01-01 PROCEDURE — 2500000003 HC RX 250 WO HCPCS: Performed by: PHARMACIST

## 2020-01-01 PROCEDURE — 2580000003 HC RX 258: Performed by: NURSE PRACTITIONER

## 2020-01-01 PROCEDURE — 92526 ORAL FUNCTION THERAPY: CPT

## 2020-01-01 PROCEDURE — 94761 N-INVAS EAR/PLS OXIMETRY MLT: CPT

## 2020-01-01 PROCEDURE — 6370000000 HC RX 637 (ALT 250 FOR IP): Performed by: SURGERY

## 2020-01-01 PROCEDURE — 2060000000 HC ICU INTERMEDIATE R&B

## 2020-01-01 PROCEDURE — 71260 CT THORAX DX C+: CPT

## 2020-01-01 RX ORDER — HEPARIN SODIUM 1000 [USP'U]/ML
2300 INJECTION, SOLUTION INTRAVENOUS; SUBCUTANEOUS ONCE
Status: COMPLETED | OUTPATIENT
Start: 2020-01-01 | End: 2020-01-01

## 2020-01-01 RX ORDER — HEPARIN SODIUM 1000 [USP'U]/ML
4600 INJECTION, SOLUTION INTRAVENOUS; SUBCUTANEOUS ONCE
Status: COMPLETED | OUTPATIENT
Start: 2020-01-01 | End: 2020-01-01

## 2020-01-01 RX ORDER — SODIUM CHLORIDE FOR INHALATION 0.9 %
3 VIAL, NEBULIZER (ML) INHALATION EVERY 8 HOURS PRN
Status: DISCONTINUED | OUTPATIENT
Start: 2020-01-01 | End: 2020-01-02

## 2020-01-01 RX ORDER — HEPARIN SODIUM 1000 [USP'U]/ML
40 INJECTION, SOLUTION INTRAVENOUS; SUBCUTANEOUS PRN
Status: DISCONTINUED | OUTPATIENT
Start: 2020-01-01 | End: 2020-01-01 | Stop reason: ALTCHOICE

## 2020-01-01 RX ORDER — HEPARIN SODIUM 10000 [USP'U]/100ML
13.6 INJECTION, SOLUTION INTRAVENOUS CONTINUOUS
Status: DISCONTINUED | OUTPATIENT
Start: 2020-01-01 | End: 2020-01-05 | Stop reason: ALTCHOICE

## 2020-01-01 RX ORDER — LEVALBUTEROL 1.25 MG/.5ML
1.25 SOLUTION, CONCENTRATE RESPIRATORY (INHALATION) EVERY 6 HOURS SCHEDULED
Status: DISCONTINUED | OUTPATIENT
Start: 2020-01-01 | End: 2020-01-02

## 2020-01-01 RX ORDER — HEPARIN SODIUM 1000 [USP'U]/ML
80 INJECTION, SOLUTION INTRAVENOUS; SUBCUTANEOUS PRN
Status: DISCONTINUED | OUTPATIENT
Start: 2020-01-01 | End: 2020-01-01 | Stop reason: ALTCHOICE

## 2020-01-01 RX ADMIN — ENOXAPARIN SODIUM 40 MG: 40 INJECTION SUBCUTANEOUS at 08:40

## 2020-01-01 RX ADMIN — HEPARIN SODIUM 10.3 ML/HR: 10000 INJECTION, SOLUTION INTRAVENOUS at 14:19

## 2020-01-01 RX ADMIN — LEVALBUTEROL 1.25 MG: 1.25 SOLUTION, CONCENTRATE RESPIRATORY (INHALATION) at 20:16

## 2020-01-01 RX ADMIN — MEROPENEM 500 MG: 500 INJECTION, POWDER, FOR SOLUTION INTRAVENOUS at 17:51

## 2020-01-01 RX ADMIN — INSULIN LISPRO 6 UNITS: 100 INJECTION, SOLUTION INTRAVENOUS; SUBCUTANEOUS at 21:11

## 2020-01-01 RX ADMIN — INSULIN LISPRO 3 UNITS: 100 INJECTION, SOLUTION INTRAVENOUS; SUBCUTANEOUS at 08:40

## 2020-01-01 RX ADMIN — IOPAMIDOL 75 ML: 755 INJECTION, SOLUTION INTRAVENOUS at 12:11

## 2020-01-01 RX ADMIN — HEPARIN SODIUM 2300 UNITS: 1000 INJECTION INTRAVENOUS; SUBCUTANEOUS at 23:16

## 2020-01-01 RX ADMIN — HEPARIN SODIUM 4600 UNITS: 1000 INJECTION INTRAVENOUS; SUBCUTANEOUS at 14:19

## 2020-01-01 RX ADMIN — INSULIN LISPRO 3 UNITS: 100 INJECTION, SOLUTION INTRAVENOUS; SUBCUTANEOUS at 04:27

## 2020-01-01 RX ADMIN — POTASSIUM CHLORIDE: 2 INJECTION, SOLUTION, CONCENTRATE INTRAVENOUS at 18:29

## 2020-01-01 RX ADMIN — INSULIN LISPRO 3 UNITS: 100 INJECTION, SOLUTION INTRAVENOUS; SUBCUTANEOUS at 00:36

## 2020-01-01 RX ADMIN — INSULIN LISPRO 6 UNITS: 100 INJECTION, SOLUTION INTRAVENOUS; SUBCUTANEOUS at 17:51

## 2020-01-01 RX ADMIN — SODIUM CHLORIDE, PRESERVATIVE FREE 10 ML: 5 INJECTION INTRAVENOUS at 21:11

## 2020-01-01 RX ADMIN — Medication 10 ML: at 08:40

## 2020-01-01 RX ADMIN — MEROPENEM 500 MG: 500 INJECTION, POWDER, FOR SOLUTION INTRAVENOUS at 13:32

## 2020-01-01 RX ADMIN — SODIUM CHLORIDE, PRESERVATIVE FREE 10 ML: 5 INJECTION INTRAVENOUS at 10:44

## 2020-01-01 RX ADMIN — INSULIN LISPRO 6 UNITS: 100 INJECTION, SOLUTION INTRAVENOUS; SUBCUTANEOUS at 13:33

## 2020-01-01 RX ADMIN — ACETAMINOPHEN 650 MG: 650 SUPPOSITORY RECTAL at 23:04

## 2020-01-01 RX ADMIN — PANTOPRAZOLE SODIUM 40 MG: 40 INJECTION, POWDER, FOR SOLUTION INTRAVENOUS at 08:40

## 2020-01-01 ASSESSMENT — PAIN DESCRIPTION - DESCRIPTORS: DESCRIPTORS: ACHING

## 2020-01-01 ASSESSMENT — PAIN DESCRIPTION - PROGRESSION: CLINICAL_PROGRESSION: NOT CHANGED

## 2020-01-01 ASSESSMENT — PAIN DESCRIPTION - FREQUENCY: FREQUENCY: CONTINUOUS

## 2020-01-01 ASSESSMENT — PAIN DESCRIPTION - LOCATION: LOCATION: BUTTOCKS

## 2020-01-01 ASSESSMENT — PAIN SCALES - GENERAL
PAINLEVEL_OUTOF10: 0
PAINLEVEL_OUTOF10: 0
PAINLEVEL_OUTOF10: 6

## 2020-01-01 ASSESSMENT — PAIN DESCRIPTION - PAIN TYPE: TYPE: ACUTE PAIN

## 2020-01-01 ASSESSMENT — ENCOUNTER SYMPTOMS
NAUSEA: 0
VOMITING: 0

## 2020-01-01 ASSESSMENT — PAIN DESCRIPTION - ORIENTATION: ORIENTATION: MID;RIGHT;LEFT

## 2020-01-01 ASSESSMENT — PAIN DESCRIPTION - ONSET: ONSET: ON-GOING

## 2020-01-01 ASSESSMENT — PAIN - FUNCTIONAL ASSESSMENT: PAIN_FUNCTIONAL_ASSESSMENT: PREVENTS OR INTERFERES SOME ACTIVE ACTIVITIES AND ADLS

## 2020-01-01 NOTE — PROGRESS NOTES
Dysphagia Pureed (Dysphagia I), Thin - straw, Nectar - straw    Positioning    slightly reclined in bed    PO Trials:  · Not assessed, as pt had just finished 100% lunch and refused all offers of PO from SLP    Dysphagia Tx:   · Brother Bill at bedside and reports pt consumed 100% of puree and thin liquid tray without any difficulty  · SLP educated brother re: results of 12/31 swallow evaluation and recommendation for MBS once pt is medically stable. Brother and SLP discussed MBS from ~2years ago which was reportedly negative for aspiration, and that pt's rapid and unsafe rate of intake may impact swallow integrity, as well as potential for decline in swallow function since last MBS. Brother verbalizes understanding. · Discussed safe swallow strategies including optimal upright positioning, external pacing for bite/sip size and slowing down. · Events of overnight noted, as well as current medical workup (see above chart review). Goals:   Dysphagia Goals: The patient will tolerate puree foods 10/10. not assessed, pt declined PO and remains on clear liquids  The patient will tolerate thin liquids without signs and symptoms of aspiration 10/10 via straw. not assessed, pt declined PO  The patient/caregiver will demonstrate understanding of compensatory strategies for improved swallowing safety. Ongoing; pt/brother/nurse educated  The patient will tolerate instrumental swallowing procedure ongoing; no order at this time    Assessment:   Impressions:   Dysphagia Diagnosis: Mild to moderate oral stage dysphagia, Mild to moderate pharyngeal stage dysphagia  · No PO trialled this session, as pt had just consumed 100% of lunch tray with assist of brother Lorena Samuel and declined SLP attempts/offers of liquid. · SLP reviewed CSE results and recommendations with brother, who is aware of recommendation for MBS when MD deems pt is medically stable. Overnight events and current medical workup noted.       Diet

## 2020-01-01 NOTE — PROGRESS NOTES
Juanita Damon is a 64 y.o. male patient. 1. Small bowel obstruction ([facility] [address]    2. Aspiration pneumonia of both lower lobes, unspecified aspiration pneumonia type ([facility] [address]    3. Hypoxia    4. Rectal bleeding    5. Septicemia [facility])      Past Medical History:   Diagnosis Date    Acne rosacea     Cognitive impairment     sees Dr Daniele Guzman, started Aricept 12/2019    Colon cancer ([facility] [address] 1/7/2014    ileocecal valve/Leuenberger/yrly surveillance    Down syndrome     Gout     HIGH CHOLESTEROL     2017 3.1 % framingham risk score    History of diarrhea     since childhood, worse since partial colectomy resolved after a few months    Macrocytosis     followed by Dr Jillian Tejada and released    Seborrheic dermatitis     Seizure disorder ([facility] [address] 2019    Status post biventricular cardiac pacemaker insertion 01/2019    for long sinus pauses with syncopal willis    Status post partial colectomy 2016    diarrhea    Syncope and collapse 01/2019    recurrent syncope, found seizures and sinus pauses    Weight loss 2015    since 2014 at the time of partial colectomy lost 25 lbs     No past surgical history pertinent negatives on file.   Scheduled Meds:   levalbuterol  1.25 mg Nebulization 4 times per day    fat emulsion  250 mL Intravenous Once per day on Mon Thu    insulin lispro  0-18 Units Subcutaneous Q4H    sodium chloride flush  10 mL Intravenous 2 times per day    enoxaparin  40 mg Subcutaneous Daily    pantoprazole  40 mg Intravenous Daily    And    sodium chloride (PF)  10 mL Intravenous Daily     Continuous Infusions:   PN-Adult Premix 5/20 - Central      PN-Adult Premix 5/20 - Central 80 mL/hr at 12/31/19 1809    dextrose       PRN Meds:sodium chloride nebulizer, menthol-zinc oxide, sodium chloride flush, magnesium sulfate, potassium chloride, sodium chloride flush, ondansetron, acetaminophen, glucose, dextrose, glucagon (rDNA), dextrose, dextran 70-hypromellose    Allergies   Allergen Reactions   

## 2020-01-01 NOTE — PROGRESS NOTES
Pulmonary Progress Note    Date of Admission: 12/18/2019   LOS: 14 days     CC:  Chief Complaint   Patient presents with    Shortness of Breath     onset - 1530 today    Altered Mental Status     pt lethargic, unsteady       HPI/Subjective    Floor yesterday, O2 continuously weaned to 3.5 L however this morning patient became tachypneic and tachycardic. CT PE demonstrated no acute PE on the right side. There is also a large left-sided pleural effusion. ROS:   No nausea  No Vomiting  No chest pain      Intake/Output Summary (Last 24 hours) at 1/1/2020 1750  Last data filed at 1/1/2020 1000  Gross per 24 hour   Intake 480 ml   Output 500 ml   Net -20 ml         PHYSICAL EXAM:   Blood pressure (!) 133/97, pulse 108, temperature 99.6 °F (37.6 °C), temperature source Oral, resp. rate 28, height 5' 6\" (1.676 m), weight 126 lb 1.7 oz (57.2 kg), SpO2 97 %.'  Gen:  No acute distress. Eyes: PERRL. Anicteric sclera. No conjunctival injection. ENT: No discharge. Posterior oropharynx clear. External appearance of ears and nose normal.  Neck: Trachea midline. No mass   Resp: Scattered crackles. No wheezes. No rhonchi. Diminished breath sounds on the left, + dullness to percussion  CV: Regular rate. Regular rhythm. No murmur or rub. No edema. GI: Soft, Non-tender. Non-distended. +BS  Skin: Warm, dry, w/o erythema. Lymph: No cervical or supraclavicular LAD. M/S: No cyanosis. No clubbing. Neuro: Sleepy, poorly interactive no focal neurologic deficit.   Moves all extremities    Medications:    Scheduled Meds:   levalbuterol  1.25 mg Nebulization 4 times per day    meropenem  500 mg Intravenous Q6H    fat emulsion  250 mL Intravenous Once per day on Mon Thu    insulin lispro  0-18 Units Subcutaneous Q4H    sodium chloride flush  10 mL Intravenous 2 times per day    pantoprazole  40 mg Intravenous Daily    And    sodium chloride (PF)  10 mL Intravenous Daily       Continuous Infusions:   PN-Adult Premix 5/20 - Central      heparin (porcine) 10.3 mL/hr (01/01/20 1419)    PN-Adult Premix 5/20 - Central 80 mL/hr at 12/31/19 1809    dextrose         PRN Meds:  sodium chloride nebulizer, menthol-zinc oxide, sodium chloride flush, magnesium sulfate, potassium chloride, sodium chloride flush, ondansetron, acetaminophen, glucose, dextrose, glucagon (rDNA), dextrose, dextran 70-hypromellose    Labs reviewed:  CBC:   Recent Labs     12/30/19  0509 12/31/19  0522 01/01/20  0505   WBC 14.9* 14.6* 13.1*   HGB 11.6* 11.0* 10.2*   HCT 35.3* 33.1* 30.9*   .9* 103.5* 103.0*    330 309     BMP:   Recent Labs     12/30/19  0509 12/31/19  0522 01/01/20  0505   * 135* 134*   K 4.5 4.5 4.8    102 103   CO2 18* 19* 19*   PHOS 3.5 2.9 3.2   BUN 24* 27* 28*   CREATININE 0.8* 0.7* 0.9     LIVER PROFILE:   Recent Labs     12/30/19  0509 12/31/19  0522 01/01/20  0505   AST 85* 69* 138*   ALT 74* 79* 123*   BILIDIR 0.6* 0.4* 0.5*   BILITOT 1.0 0.7 1.0   ALKPHOS 199* 180* 206*     PT/INR: No results for input(s): PROTIME, INR in the last 72 hours. APTT:   Recent Labs     01/01/20  1400   APTT 35.2     UA:  Recent Labs     12/31/19  1435   COLORU DK YELLOW   PHUR 6.0   WBCUA 2   RBCUA 23*   CLARITYU Clear   SPECGRAV 1.023   LEUKOCYTESUR Negative   UROBILINOGEN 4.0*   BILIRUBINUR SMALL*   BLOODU MODERATE*   GLUCOSEU >=1000*     No results for input(s): PH, PCO2, PO2 in the last 72 hours. Films:  Radiology Review:  Pertinent images / reports were reviewed as a part of this visit. CT Chest w/ contrast: No results found for this or any previous visit. CT Chest w/o contrast:   Results for orders placed during the hospital encounter of 12/18/19   CT Chest WO Contrast    Narrative EXAMINATION:  CT OF THE CHEST WITHOUT CONTRAST 12/26/2019 1:28 pm    TECHNIQUE:  CT of the chest was performed without the administration of intravenous  contrast. Multiplanar reformatted images are provided for review. Dose  modulation, iterative reconstruction, and/or weight based adjustment of the  mA/kV was utilized to reduce the radiation dose to as low as reasonably  achievable. COMPARISON:  None. HISTORY:  ORDERING SYSTEM PROVIDED HISTORY: pleural effusion  TECHNOLOGIST PROVIDED HISTORY:  Reason for exam:->pleural effusion  Reason for Exam: pleural effusion  Acuity: Acute  Type of Exam: Initial    FINDINGS:  Mediastinum: Soft tissues of the thoracic inlet are unremarkable. The  thoracic aorta is normal in caliber. The main pulmonary artery is normal in  caliber. There is no pericardial effusion. There is no mediastinal or hilar  adenopathy. Lungs/pleura: There are large bilateral effusions with adjacent  consolidation. There are scattered areas of consolidation in the right upper  lobe. There is no pneumothorax. The tracheobronchial tree is patent. There  is endotracheal tube with the tip in the distal midtrachea. Upper Abdomen: The upper abdomen is grossly unremarkable. Soft Tissues/Bones: The extrathoracic soft tissues are unremarkable. There  is no axillary adenopathy. There is no acute osseous abnormality. Impression Large bilateral pleural effusions with adjacent consolidation consistent with  pneumonia. CTPA: No results found for this or any previous visit. CXR PA/LAT:   Results for orders placed during the hospital encounter of 01/24/19   XR CHEST STANDARD (2 VW)    Narrative EXAMINATION:  TWO VIEWS OF THE CHEST    1/24/2019 9:42 am    COMPARISON:  01/12/2019. HISTORY:  ORDERING SYSTEM PROVIDED HISTORY: Pacemaker lead malfunction, initial  encounter  TECHNOLOGIST PROVIDED HISTORY:  Reason for exam:->possible pacemaker lead dislodgement  Ordering Physician Provided Reason for Exam: possible pacemaker lead  malfuction  Acuity: Acute  Type of Exam: Initial  Relevant Medical/Surgical History: hx of colon cancer    FINDINGS:  The heart size is within normal limits.  The pulmonary vasculature is also  within normal limits. No acute infiltrates are seen. The costophrenic angles  are sharp bilaterally. No pneumothoraces are noted. There is a left subclavian  AICD. Impression 1. No active pulmonary disease. CXR portable:   Results for orders placed during the hospital encounter of 12/18/19   XR CHEST PORTABLE    Narrative EXAMINATION:  ONE XRAY VIEW OF THE CHEST    12/26/2019 4:25 am    COMPARISON:  12/25/2019    HISTORY:  ORDERING SYSTEM PROVIDED HISTORY: intubated  TECHNOLOGIST PROVIDED HISTORY:  Reason for exam:->intubated  Reason for Exam: intubated    FINDINGS:  Endotracheal tube, endotracheal tube and gastric tube remain in place. Monitor wires overlie the patient. Unchanged dual lead pacer. There are  small to moderate bilateral pleural effusions and bilateral airspace  opacities, stable. The cardiac silhouette and osseous structures are stable. Impression No significant interval change. Access  Arterial  Arterial Line 12/19/19 Left Femoral (Active)   Continued need for line? Yes 12/30/2019  6:00 AM   Site Assessment Clean;Dry; Intact 12/30/2019  6:00 AM   Line Status Arterial fluids per protocol; Intact and in place; Blood return noted 12/30/2019  6:00 AM   Art Line Waveform Appropriate 12/30/2019  6:00 AM   Art Line Interventions Flushed per protocol 12/28/2019  6:10 AM   Color/Movement/Sensation Capillary refill less than 3 sec; Cool fingers/toes 12/30/2019  6:00 AM   Dressing Status Clean;Dry; Intact 12/30/2019  6:00 AM   Dressing Type Transparent; Anti-microbial patch 12/30/2019  6:00 AM   Dressing Intervention New;Dressing changed 12/26/2019  1:11 PM   Dressing Change Due 01/01/20 12/30/2019  6:00 AM   Number of days: 10       PICC       PICC Double Lumen 03/41/50 Right Basilic (Active)   Continued need for line? Yes 12/30/2019  6:00 AM   Is this a power PICC? Yes 12/30/2019  6:00 AM   Site Assessment Clean;Dry; Intact 12/30/2019  6:00 AM   Sergio Hurl Lumen

## 2020-01-01 NOTE — PROGRESS NOTES
[facility] Internal Medicine Note      Chief Complaint: Patient non-verbal    Subjective/Interval History:    Overnight the Patient had persistent tachycardia and tachypnea. The patient is nonverbal today, but does give a thumbs up when I asked how he was doing. Brothers at the bedside. Nursing reports that when the patient is sleeping his respiratory rate improves, and his heart rate improves into the 110s  Chest x-ray shows worsening left-sided airspace disease. No fever noted overnight. Rapid response called last night, chest x-ray and ABG reviewed. Review of systems unobtainable    Medication list reviewed    Objective:    /71   Pulse 116   Temp 97.9 °F (36.6 °C) (Oral)   Resp 16   Ht 5' 6\" (1.676 m)   Wt 126 lb 1.7 oz (57.2 kg)   SpO2 94%   BMI 20.35 kg/m²   Temp  Av.4 °F (36.9 °C)  Min: 97.6 °F (36.4 °C)  Max: 99.1 °F (37.3 °C)    CV-tachycardia, regular rhythm. Pulmonary- diffusely poor air movement on the left, some better air movement on the right, clear without wheezes or rhonchi or crackles.   Tachypneic  Abdomen-bowel sounds positive, soft, nontender, nondistended  Extremities-no edema    The Following Labs Were Reviewed Today:    Recent Results (from the past 24 hour(s))   POCT Glucose    Collection Time: 19 12:07 PM   Result Value Ref Range    POC Glucose 257 (H) 70 - 99 mg/dl    Performed on ACCU-CHEK    Urinalysis Reflex to Culture    Collection Time: 19  2:35 PM   Result Value Ref Range    Color, UA DK YELLOW Straw/Yellow    Clarity, UA Clear Clear    Glucose, Ur >=1000 (A) Negative mg/dL    Bilirubin Urine SMALL (A) Negative    Ketones, Urine Negative Negative mg/dL    Specific Gravity, UA 1.023 1.005 - 1.030    Blood, Urine MODERATE (A) Negative    pH, UA 6.0 5.0 - 8.0    Protein, UA 30 (A) Negative mg/dL    Urobilinogen, Urine 4.0 (A) <2.0 E.U./dL    Nitrite, Urine Negative Negative    Leukocyte Esterase, Urine Negative Negative    Microscopic Examination YES Urine Type NotGiven     Urine Reflex to Culture Not Indicated    Microscopic Urinalysis    Collection Time: 12/31/19  2:35 PM   Result Value Ref Range    Urinalysis Comments see below     Hyaline Casts, UA 2 0 - 8 /LPF    WBC, UA 2 0 - 5 /HPF    RBC, UA 23 (H) 0 - 4 /HPF    Epi Cells 1 0 - 5 /HPF   POCT Glucose    Collection Time: 12/31/19  4:10 PM   Result Value Ref Range    POC Glucose 217 (H) 70 - 99 mg/dl    Performed on ACCU-CHEK    POCT Glucose    Collection Time: 12/31/19  7:56 PM   Result Value Ref Range    POC Glucose 214 (H) 70 - 99 mg/dl    Performed on ACCU-CHEK    EKG 12 Lead    Collection Time: 12/31/19  9:38 PM   Result Value Ref Range    Ventricular Rate 124 BPM    Atrial Rate 124 BPM    P-R Interval 136 ms    QRS Duration 68 ms    Q-T Interval 294 ms    QTc Calculation (Bazett) 422 ms    P Axis 37 degrees    R Axis 32 degrees    T Axis 26 degrees    Diagnosis       Sinus tachycardiaOtherwise normal ECGWhen compared with ECG of 20-DEC-2019 14:34,Nonspecific T wave abnormality, improved in Inferior leads   SPECIMEN REJECTION    Collection Time: 12/31/19 10:10 PM   Result Value Ref Range    Rejected Test ABG     Reason for Rejection see below    Blood Gas, Arterial    Collection Time: 01/01/20 12:10 AM   Result Value Ref Range    pH, Arterial 7.401 7.350 - 7.450    pCO2, Arterial 34.2 (L) 35.0 - 45.0 mmHg    pO2, Arterial 70.6 (L) 75.0 - 108.0 mmHg    HCO3, Arterial 20.8 (L) 21.0 - 29.0 mmol/L    Base Excess, Arterial -2.9 -3.0 - 3.0 mmol/L    Hemoglobin, Art, Extended 10.8 (L) 13.5 - 17.5 g/dL    O2 Sat, Arterial 96.6 >92 %    Carboxyhgb, Arterial 1.4 0.0 - 1.5 %    Methemoglobin, Arterial 0.7 <1.5 %    TCO2, Arterial 21.9 Not Established mmol/L    O2 Content, Arterial 14 Not Established mL/dL    O2 Therapy Unknown    POCT Glucose    Collection Time: 01/01/20 12:14 AM   Result Value Ref Range    POC Glucose 197 (H) 70 - 99 mg/dl    Performed on ACCU-CHEK    POCT Glucose    Collection Time: 01/01/20  7:42 AM   Result Value Ref Range    POC Glucose 148 (H) 70 - 99 mg/dl    Performed on ACCU-CHEK        ASSESSMENT/PLAN:      Principal Problem:    Acute respiratory failure with hypoxia-worsening respiratory status overnight. Persistent tachycardia. D/W Dr Mullins James, CTA chest, xopenex and empiric abx for pneumonia, Merrem. Consider thoracentesis  Active Problems:    SBO (small bowel obstruction) (HCC)-continues with surgical management, TPN. ANISH (acute kidney injury)-renal function is back to normal.    Pleural effusion-as above. Seems to be worsening on left per chest x-ray.     Thrombocytopenia (HCC)-platelets normal today    Time > 35 minutes reviewing chart and patient data, examining and interviewing patient, and discussing with nursing staff, family, etc.     Rox Cisneros MD, [address]  9:56 AM  1/1/2020

## 2020-01-02 PROBLEM — N17.9 AKI (ACUTE KIDNEY INJURY) (HCC): Status: RESOLVED | Noted: 2019-12-20 | Resolved: 2020-01-02

## 2020-01-02 PROBLEM — R94.31 ABNORMAL EKG: Status: RESOLVED | Noted: 2019-12-20 | Resolved: 2020-01-02

## 2020-01-02 LAB
ALBUMIN SERPL-MCNC: 2.5 G/DL (ref 3.4–5)
ALP BLD-CCNC: 212 U/L (ref 40–129)
ALT SERPL-CCNC: 152 U/L (ref 10–40)
ANION GAP SERPL CALCULATED.3IONS-SCNC: 13 MMOL/L (ref 3–16)
APTT: 37.7 SEC (ref 24.2–36.2)
APTT: 37.9 SEC (ref 24.2–36.2)
APTT: 44.8 SEC (ref 24.2–36.2)
AST SERPL-CCNC: 150 U/L (ref 15–37)
BASOPHILS ABSOLUTE: 0.2 K/UL (ref 0–0.2)
BASOPHILS RELATIVE PERCENT: 2.2 %
BILIRUB SERPL-MCNC: 0.8 MG/DL (ref 0–1)
BILIRUBIN DIRECT: 0.4 MG/DL (ref 0–0.3)
BILIRUBIN, INDIRECT: 0.4 MG/DL (ref 0–1)
BUN BLDV-MCNC: 20 MG/DL (ref 7–20)
CALCIUM SERPL-MCNC: 8.1 MG/DL (ref 8.3–10.6)
CHLORIDE BLD-SCNC: 102 MMOL/L (ref 99–110)
CO2: 20 MMOL/L (ref 21–32)
CREAT SERPL-MCNC: 0.7 MG/DL (ref 0.9–1.3)
EOSINOPHILS ABSOLUTE: 0.1 K/UL (ref 0–0.6)
EOSINOPHILS RELATIVE PERCENT: 1.2 %
GFR AFRICAN AMERICAN: >60
GFR NON-AFRICAN AMERICAN: >60
GLUCOSE BLD-MCNC: 111 MG/DL (ref 70–99)
GLUCOSE BLD-MCNC: 157 MG/DL (ref 70–99)
GLUCOSE BLD-MCNC: 176 MG/DL (ref 70–99)
GLUCOSE BLD-MCNC: 187 MG/DL (ref 70–99)
GLUCOSE BLD-MCNC: 190 MG/DL (ref 70–99)
GLUCOSE BLD-MCNC: 220 MG/DL (ref 70–99)
GLUCOSE BLD-MCNC: 226 MG/DL (ref 70–99)
GLUCOSE BLD-MCNC: 227 MG/DL (ref 70–99)
HCT VFR BLD CALC: 28.7 % (ref 40.5–52.5)
HEMOGLOBIN: 9.6 G/DL (ref 13.5–17.5)
LYMPHOCYTES ABSOLUTE: 1.3 K/UL (ref 1–5.1)
LYMPHOCYTES RELATIVE PERCENT: 12.2 %
MAGNESIUM: 2 MG/DL (ref 1.8–2.4)
MCH RBC QN AUTO: 34.5 PG (ref 26–34)
MCHC RBC AUTO-ENTMCNC: 33.4 G/DL (ref 31–36)
MCV RBC AUTO: 103.4 FL (ref 80–100)
MONOCYTES ABSOLUTE: 1.2 K/UL (ref 0–1.3)
MONOCYTES RELATIVE PERCENT: 11.5 %
NEUTROPHILS ABSOLUTE: 7.6 K/UL (ref 1.7–7.7)
NEUTROPHILS RELATIVE PERCENT: 72.9 %
PDW BLD-RTO: 14.1 % (ref 12.4–15.4)
PERFORMED ON: ABNORMAL
PHOSPHORUS: 3.5 MG/DL (ref 2.5–4.9)
PLATELET # BLD: 301 K/UL (ref 135–450)
PMV BLD AUTO: 10.2 FL (ref 5–10.5)
POTASSIUM SERPL-SCNC: 4.4 MMOL/L (ref 3.5–5.1)
RBC # BLD: 2.78 M/UL (ref 4.2–5.9)
SODIUM BLD-SCNC: 135 MMOL/L (ref 136–145)
TOTAL PROTEIN: 5.9 G/DL (ref 6.4–8.2)
TRIGL SERPL-MCNC: 68 MG/DL (ref 0–150)
WBC # BLD: 10.5 K/UL (ref 4–11)

## 2020-01-02 PROCEDURE — 83735 ASSAY OF MAGNESIUM: CPT

## 2020-01-02 PROCEDURE — 2580000003 HC RX 258: Performed by: INTERNAL MEDICINE

## 2020-01-02 PROCEDURE — 2580000003 HC RX 258: Performed by: NURSE PRACTITIONER

## 2020-01-02 PROCEDURE — 85025 COMPLETE CBC W/AUTO DIFF WBC: CPT

## 2020-01-02 PROCEDURE — 93970 EXTREMITY STUDY: CPT

## 2020-01-02 PROCEDURE — 6360000002 HC RX W HCPCS: Performed by: SURGERY

## 2020-01-02 PROCEDURE — 84478 ASSAY OF TRIGLYCERIDES: CPT

## 2020-01-02 PROCEDURE — 99233 SBSQ HOSP IP/OBS HIGH 50: CPT | Performed by: INTERNAL MEDICINE

## 2020-01-02 PROCEDURE — 80069 RENAL FUNCTION PANEL: CPT

## 2020-01-02 PROCEDURE — 85730 THROMBOPLASTIN TIME PARTIAL: CPT

## 2020-01-02 PROCEDURE — 2700000000 HC OXYGEN THERAPY PER DAY

## 2020-01-02 PROCEDURE — 94761 N-INVAS EAR/PLS OXIMETRY MLT: CPT

## 2020-01-02 PROCEDURE — C9113 INJ PANTOPRAZOLE SODIUM, VIA: HCPCS | Performed by: NURSE PRACTITIONER

## 2020-01-02 PROCEDURE — 92526 ORAL FUNCTION THERAPY: CPT

## 2020-01-02 PROCEDURE — 99024 POSTOP FOLLOW-UP VISIT: CPT | Performed by: SURGERY

## 2020-01-02 PROCEDURE — 2060000000 HC ICU INTERMEDIATE R&B

## 2020-01-02 PROCEDURE — 6370000000 HC RX 637 (ALT 250 FOR IP): Performed by: NURSE PRACTITIONER

## 2020-01-02 PROCEDURE — 80076 HEPATIC FUNCTION PANEL: CPT

## 2020-01-02 PROCEDURE — 6360000002 HC RX W HCPCS: Performed by: NURSE PRACTITIONER

## 2020-01-02 PROCEDURE — 99232 SBSQ HOSP IP/OBS MODERATE 35: CPT | Performed by: INTERNAL MEDICINE

## 2020-01-02 PROCEDURE — 6360000002 HC RX W HCPCS: Performed by: INTERNAL MEDICINE

## 2020-01-02 PROCEDURE — 6370000000 HC RX 637 (ALT 250 FOR IP): Performed by: INTERNAL MEDICINE

## 2020-01-02 PROCEDURE — 94640 AIRWAY INHALATION TREATMENT: CPT

## 2020-01-02 RX ORDER — HEPARIN SODIUM 1000 [USP'U]/ML
2300 INJECTION, SOLUTION INTRAVENOUS; SUBCUTANEOUS ONCE
Status: COMPLETED | OUTPATIENT
Start: 2020-01-02 | End: 2020-01-02

## 2020-01-02 RX ORDER — LEVALBUTEROL 1.25 MG/.5ML
1.25 SOLUTION, CONCENTRATE RESPIRATORY (INHALATION) 4 TIMES DAILY
Status: DISCONTINUED | OUTPATIENT
Start: 2020-01-02 | End: 2020-01-14 | Stop reason: HOSPADM

## 2020-01-02 RX ORDER — SODIUM CHLORIDE FOR INHALATION 0.9 %
3 VIAL, NEBULIZER (ML) INHALATION EVERY 4 HOURS PRN
Status: DISCONTINUED | OUTPATIENT
Start: 2020-01-02 | End: 2020-01-14 | Stop reason: HOSPADM

## 2020-01-02 RX ADMIN — HEPARIN SODIUM 2300 UNITS: 1000 INJECTION INTRAVENOUS; SUBCUTANEOUS at 15:04

## 2020-01-02 RX ADMIN — MEROPENEM 500 MG: 500 INJECTION, POWDER, FOR SOLUTION INTRAVENOUS at 05:25

## 2020-01-02 RX ADMIN — HEPARIN SODIUM 2300 UNITS: 1000 INJECTION INTRAVENOUS; SUBCUTANEOUS at 06:47

## 2020-01-02 RX ADMIN — LEVALBUTEROL HYDROCHLORIDE 1.25 MG: 1.25 SOLUTION, CONCENTRATE RESPIRATORY (INHALATION) at 11:52

## 2020-01-02 RX ADMIN — LEVALBUTEROL HYDROCHLORIDE 1.25 MG: 1.25 SOLUTION, CONCENTRATE RESPIRATORY (INHALATION) at 19:52

## 2020-01-02 RX ADMIN — LEVALBUTEROL HYDROCHLORIDE 1.25 MG: 1.25 SOLUTION, CONCENTRATE RESPIRATORY (INHALATION) at 16:30

## 2020-01-02 RX ADMIN — SODIUM CHLORIDE, PRESERVATIVE FREE 10 ML: 5 INJECTION INTRAVENOUS at 09:02

## 2020-01-02 RX ADMIN — MEROPENEM 500 MG: 500 INJECTION, POWDER, FOR SOLUTION INTRAVENOUS at 00:33

## 2020-01-02 RX ADMIN — ISODIUM CHLORIDE 3 ML: 0.03 SOLUTION RESPIRATORY (INHALATION) at 11:52

## 2020-01-02 RX ADMIN — INSULIN LISPRO 6 UNITS: 100 INJECTION, SOLUTION INTRAVENOUS; SUBCUTANEOUS at 05:02

## 2020-01-02 RX ADMIN — HEPARIN SODIUM 12.5 ML/HR: 10000 INJECTION, SOLUTION INTRAVENOUS at 14:29

## 2020-01-02 RX ADMIN — INSULIN LISPRO 3 UNITS: 100 INJECTION, SOLUTION INTRAVENOUS; SUBCUTANEOUS at 00:36

## 2020-01-02 RX ADMIN — SODIUM CHLORIDE, PRESERVATIVE FREE 10 ML: 5 INJECTION INTRAVENOUS at 21:27

## 2020-01-02 RX ADMIN — PANTOPRAZOLE SODIUM 40 MG: 40 INJECTION, POWDER, FOR SOLUTION INTRAVENOUS at 09:02

## 2020-01-02 RX ADMIN — INSULIN LISPRO 3 UNITS: 100 INJECTION, SOLUTION INTRAVENOUS; SUBCUTANEOUS at 14:15

## 2020-01-02 RX ADMIN — LEVALBUTEROL HYDROCHLORIDE 1.25 MG: 1.25 SOLUTION, CONCENTRATE RESPIRATORY (INHALATION) at 07:42

## 2020-01-02 RX ADMIN — Medication 10 ML: at 11:30

## 2020-01-02 RX ADMIN — HEPARIN SODIUM 2300 UNITS: 1000 INJECTION INTRAVENOUS; SUBCUTANEOUS at 22:41

## 2020-01-02 RX ADMIN — ISODIUM CHLORIDE 3 ML: 0.03 SOLUTION RESPIRATORY (INHALATION) at 07:42

## 2020-01-02 RX ADMIN — INSULIN LISPRO 3 UNITS: 100 INJECTION, SOLUTION INTRAVENOUS; SUBCUTANEOUS at 09:02

## 2020-01-02 RX ADMIN — INSULIN LISPRO 3 UNITS: 100 INJECTION, SOLUTION INTRAVENOUS; SUBCUTANEOUS at 18:19

## 2020-01-02 RX ADMIN — ISODIUM CHLORIDE 3 ML: 0.03 SOLUTION RESPIRATORY (INHALATION) at 16:30

## 2020-01-02 ASSESSMENT — PAIN DESCRIPTION - LOCATION: LOCATION: CHEST;NECK

## 2020-01-02 ASSESSMENT — ENCOUNTER SYMPTOMS
VOMITING: 0
NAUSEA: 0

## 2020-01-02 ASSESSMENT — PAIN DESCRIPTION - DESCRIPTORS: DESCRIPTORS: ACHING

## 2020-01-02 ASSESSMENT — PAIN DESCRIPTION - PROGRESSION: CLINICAL_PROGRESSION: NOT CHANGED

## 2020-01-02 ASSESSMENT — PAIN SCALES - GENERAL
PAINLEVEL_OUTOF10: 6
PAINLEVEL_OUTOF10: 0

## 2020-01-02 ASSESSMENT — PAIN DESCRIPTION - PAIN TYPE: TYPE: ACUTE PAIN

## 2020-01-02 ASSESSMENT — PAIN - FUNCTIONAL ASSESSMENT: PAIN_FUNCTIONAL_ASSESSMENT: PREVENTS OR INTERFERES SOME ACTIVE ACTIVITIES AND ADLS

## 2020-01-02 ASSESSMENT — PAIN DESCRIPTION - FREQUENCY: FREQUENCY: INTERMITTENT

## 2020-01-02 ASSESSMENT — PAIN DESCRIPTION - ORIENTATION: ORIENTATION: LEFT

## 2020-01-02 ASSESSMENT — PAIN DESCRIPTION - ONSET: ONSET: ON-GOING

## 2020-01-02 NOTE — PROGRESS NOTES
dextrose, glucagon (rDNA), dextrose, dextran 70-hypromellose    Labs:  CBC:   Recent Labs     19  0520  0505 20  0530   WBC 14.6* 13.1* 10.5   HGB 11.0* 10.2* 9.6*   HCT 33.1* 30.9* 28.7*   .5* 103.0* 103.4*    309 301     BMP:   Recent Labs     19  0520  0505 20  0530   * 134* 135*   K 4.5 4.8 4.4    103 102   CO2 19* 19* 20*   PHOS 2.9 3.2 3.5   BUN 27* 28* 20   CREATININE 0.7* 0.9 0.7*     LIVER PROFILE:   Recent Labs     19  0520  05020  0530   AST 69* 138* 150*   ALT 79* 123* 152*   BILIDIR 0.4* 0.5* 0.4*   BILITOT 0.7 1.0 0.8   ALKPHOS 180* 206* 212*     PT/INR: No results for input(s): PROTIME, INR in the last 72 hours. APTT:   Recent Labs     20  21520  0530   APTT 35.1 38.7* 37.9*     UA:  Recent Labs     19  1435   COLORU DK YELLOW   PHUR 6.0   WBCUA 2   RBCUA 23*   CLARITYU Clear   SPECGRAV 1.023   LEUKOCYTESUR Negative   UROBILINOGEN 4.0*   BILIRUBINUR SMALL*   BLOODU MODERATE*   GLUCOSEU >=1000*     No results for input(s): PH, PCO2, PO2 in the last 72 hours. Films:  Chest imaging reports were reviewed and imaging was reviewed by me and showed small PE in right lung. Large left effusion,  Small right effusion. Atelectasis     AB    Cultures:  Negative    I reviewed the labs and images listed above    Assessment:   · Acute Hypoxic Respiratory Failure with saturations less than 90% on room air  · Acute right sided PE  · Bilateral pleural effusions, L>R  · Aspiration pneumonia  · SBO s/p ex lap      Plan:  Titrate oxygen for saturations greater than or equal to 90%  · Heparin gtt. Will need at minimum of 3 months of anticoagulation if deemed to not be a fall risk  · Consider diuresing due to active TPN infusion with low albumin  · I don't believe he needs any further antibiotics for his lungs.     · DVT prophylaxis with heparin gtt  · Could do thoracentesis if

## 2020-01-02 NOTE — PROGRESS NOTES
Penicillins      rash     Principal Problem:    Acute respiratory failure with hypoxia (HCC)  Active Problems:    SBO (small bowel obstruction) (HCC)    Aspiration pneumonia of both lower lobes (HCC)    Hypoxia    Septicemia (HCC)    Septic shock (HCC)    Pleural effusion, bilateral    Ischemic hepatitis    Right pulmonary embolus (HCC)  Resolved Problems:    Postprocedural hypotension    ANISH (acute kidney injury) ([facility] [address]    Abnormal EKG    Thrombocytopenia (HCC)    Blood pressure 124/73, pulse 125, temperature 98.6 °F (37 °C), temperature source Oral, resp. rate 24, height 5' 6\" (1.676 m), weight 126 lb 1.7 oz (57.2 kg), SpO2 92 %. Subjective:   Diet: Patient reports no nausea or vomiting. Activity level: Impaired due to weakness. Pain control: Well controlled. Objective:  Vital signs (most recent): Blood pressure 124/73, pulse 125, temperature 98.6 °F (37 °C), temperature source Oral, resp. rate 24, height 5' 6\" (1.676 m), weight 126 lb 1.7 oz (57.2 kg), SpO2 92 %. General appearance: Comfortable and in no acute distress. Abdomen: Abdomen is soft. No distension.       Assessment & Plan    SBO doing well    Pulmonary embolus   On heparin    PO as able    Electronically signed by Christy Burton MD on 1/2/2020 at 11:54 AM    Christy Burton MD  1/2/2020

## 2020-01-02 NOTE — PROGRESS NOTES
Clinical Pharmacy Note  Heparin Dosing       Lab Results   Component Value Date    APTT 38.7 01/01/2020     Lab Results   Component Value Date    HGB 10.2 01/01/2020    HCT 30.9 01/01/2020     01/01/2020    INR 1.15 12/18/2019       Current Infusion Rate: 10.3 mL/hr    Plan:  Bolus: 2300 units  Rate: increase to 11.4 mL/hr  Next aPTT: 0500 1/2/20    Pharmacy will continue to monitor and adjust based on aPTT results.   Jaleesa Merlos, CamilleD

## 2020-01-02 NOTE — PROGRESS NOTES
Trials:  Nectar: prolonged oral transit; concern for premature bolus loss to the pharynx; delayed swallow; decreased laryngeal elevation; no cough or throat clear; no immediate vocal quality changes; potential wet vocal quality at conclusion of session POST-trials. Dysphagia Tx:   · PO trials: concern for increased risk for penetration/aspiration with PO.    · MBS: no orders received at this time. Patient with medical status changes limiting MBS participation per RN d/t medical needs; recommend MBS when cleared medically by MD; please write/obtain MD order when patient appropriate      Goals:   Dysphagia Goals: The patient will tolerate puree foods 10/10. not assessed,   The patient will tolerate thin liquids without signs and symptoms of aspiration 10/10 via straw. not assessed,   The patient/caregiver will demonstrate understanding of compensatory strategies for improved swallowing safety. Ongoing; pt/brother/nurse educated  The patient will tolerate instrumental swallowing procedure ongoing; no order at this time    Assessment:   Impressions:   Dysphagia Diagnosis: Mild to moderate oral stage dysphagia, Mild to moderate pharyngeal stage dysphagia  · Patient appears at increased risk for penetration/aspiration. MD downgraded diet to puree/nectar liquids as preventive measure; it may be beneficial to consider NPO until MBS can be completed, but will defer decision to MD  · Continue to recommend MBS; please write/obtain MD order when patient cleared medically for MBS.     Diet Recommendations:  Per MD  Recommended Form of Meds: Crushed in puree as able    Strategies:   Compensatory Swallowing Strategies: Small bites/sips, Eat/Feed slowly, Remain upright for 30-45 minutes after meals, Upright as possible for all oral intake, Assist feed, external pacing    Education:  Consulted and agree with results and recommendations: Patient, Family member, RN  Patient Education: Pt was given the results and rec's of this

## 2020-01-02 NOTE — PLAN OF CARE
Problem: Risk for Impaired Skin Integrity  Goal: Tissue integrity - skin and mucous membranes  Description  Structural intactness and normal physiological function of skin and  mucous membranes. Outcome: Ongoing  Skin assessment completed every shift. Pt assessed for incontinence, appropriate barrier cream applied prn. Pt encouraged to turn/rotate every 2 hours. Assistance provided if pt unable to do so themselves. Problem: Falls - Risk of:  Goal: Will remain free from falls  Description  Will remain free from falls  Outcome: Ongoing  Fall risk precautions in place. Bed in lowest position with wheels locked,bed alarm in place and activated, non-skid socks on pt, fall risk ID on pt, call light in reach, pt encouraged to call before getting out of bed and for any other needs or complaints. Problem: Pain:  Goal: Control of acute pain  Description  Control of acute pain  Outcome: Ongoing  Pain/discomfort being managed with PRN analgesics per MD orders. Pt able to express presence and absence of pain and rate pain appropriately using numerical scale.

## 2020-01-02 NOTE — PROGRESS NOTES
Ht 5' 6\" (1.676 m)   Wt 126 lb 1.7 oz (57.2 kg)   SpO2 93%   BMI 20.35 kg/m²     General Appearance: awake and comfortable appearing on vent  Skin: warm and dry, no rash or erythema  Head: normocephalic and atraumatic  Eyes: conjunctivae normal and sclera anicteric  Neck: neck supple and non tender without mass, no thyromegaly or thyroid nodules, no cervical lymphadenopathy   Pulmonary/Chest:decreased breath sounds all the way up on the left, right with basilar crackles and better breath sounds   Cardiovascular: sinus tach normal S1 and S2, no gallops and no carotid bruits  Abdomen: soft bs pos nontender, dressing I place  Extremities: no cyanosis, clubbing or edema  Musculoskeletal: no swollen joints and no tender joints,  Neurologic: following commands moves all 4    DATA:   Labs:  CBC:   Recent Labs     12/31/19 0522 01/01/20  0505 01/02/20  0530   WBC 14.6* 13.1* 10.5   HGB 11.0* 10.2* 9.6*    309 301     BMP:    Recent Labs     12/31/19 0522 01/01/20  0505 01/02/20  0530   * 134* 135*   K 4.5 4.8 4.4    103 102   CO2 19* 19* 20*   BUN 27* 28* 20   CREATININE 0.7* 0.9 0.7*   GLUCOSE 239* 171* 226*     POC GLUCOSE:    Recent Labs     01/01/20  1709 01/01/20  2024 01/02/20  0022 01/02/20  0434 01/02/20  0749   POCGLU 236* 210* 176* 220* 190*     Ca/Mg/Phos:   Recent Labs     12/31/19 0522 01/01/20  0505 01/02/20  0530   CALCIUM 8.3 8.3 8.1*   MG 2.00 2.00 2.00   PHOS 2.9 3.2 3.5     Hepatic:   Recent Labs     12/31/19 0522 01/01/20  0505 01/02/20  0530   AST 69* 138* 150*   ALT 79* 123* 152*   BILITOT 0.7 1.0 0.8   ALKPHOS 180* 206* 212*     Troponin:   No results for input(s): TROPONINI in the last 72 hours. ProBNP:   No results for input(s): PROBNP in the last 72 hours.   Lipids:   Recent Labs     01/02/20  0530   TRIG 68     ABGs:   Recent Labs     01/01/20  0010   PHART 7.401   PO2ART 70.6*   [id number] 34.2*     PT/INR:   No results for input(s): PROTIME, INR in the last 72 hours.  APTT:   Recent Labs     01/01/20  2110 01/01/20  2150 01/02/20  0530   APTT 35.1 38.7* 37.9*     Lactic acid 10.4 on presentation 3.3 today, 3.4 yesterday    EKG: ST and sinus arrhythmia, t wave inversion inferiorally    DIAGNOSTICS:  Ct Head Wo Contrast    Result Date: 12/18/2019  No acute intracranial abnormality. Xr Chest Portable    Result Date: 12/20/2019  1. Recommend advancement of endotracheal tube 1.0 cm. 2. Bibasilar subsegmental atelectasis plus or minus mild pleural effusions, unchanged. Xr Chest Portable    Result Date: 12/19/2019  Nasogastric tube projects in intragastric position. Bibasilar atelectasis. Pneumonia or aspiration pneumonitis are differential considerations. Nonspecific bowel gas pattern. Xr Chest Portable    Result Date: 12/19/2019  Small bilateral pleural effusions with adjacent atelectasis. Xr Chest Portable    Result Date: 12/18/2019  Low lung volume due to inspiratory effort. Mild bibasilar atelectasis with no other significant finding in the chest.     Ct Chest Abdomen Pelvis Wo Contrast    Result Date: 12/18/2019  Findings compatible with small bowel obstruction with transition point in the right mid abdomen, likely in mid to distal ileum. No obvious etiology identified. Small volume ascites, likely reactive. Areas of consolidation to the lungs bilaterally which may relate to atelectasis although multifocal pneumonia or aspiration pneumonitis could have a similar appearance. Patulous/distended appearance to the esophagus with fluid attenuation material within nearly the entirety of the esophagus, possibly secondary to the bowel obstruction and refluxed gastric contents. Patient may be at risk for aspiration. Trace bilateral pleural effusions.          ASSESSMENT AND PLAN    Active Problems:    Small bowel obstruction ([address] RESOLVED  Plan: seems to be doing well,advancing diet , now to have altered diet    Aspiration pneumonia of both lower lobes ([address]

## 2020-01-03 ENCOUNTER — APPOINTMENT (OUTPATIENT)
Dept: GENERAL RADIOLOGY | Age: 60
DRG: 853 | End: 2020-01-03
Payer: COMMERCIAL

## 2020-01-03 LAB
ALBUMIN SERPL-MCNC: 2.4 G/DL (ref 3.4–5)
ALP BLD-CCNC: 219 U/L (ref 40–129)
ALT SERPL-CCNC: 138 U/L (ref 10–40)
ANION GAP SERPL CALCULATED.3IONS-SCNC: 11 MMOL/L (ref 3–16)
APTT: 52.3 SEC (ref 24.2–36.2)
APTT: 71.2 SEC (ref 24.2–36.2)
APTT: 71.8 SEC (ref 24.2–36.2)
AST SERPL-CCNC: 102 U/L (ref 15–37)
BASOPHILS ABSOLUTE: 0.2 K/UL (ref 0–0.2)
BASOPHILS RELATIVE PERCENT: 1.9 %
BILIRUB SERPL-MCNC: 0.7 MG/DL (ref 0–1)
BILIRUBIN DIRECT: 0.3 MG/DL (ref 0–0.3)
BILIRUBIN, INDIRECT: 0.4 MG/DL (ref 0–1)
BUN BLDV-MCNC: 16 MG/DL (ref 7–20)
CALCIUM SERPL-MCNC: 8.3 MG/DL (ref 8.3–10.6)
CHLORIDE BLD-SCNC: 100 MMOL/L (ref 99–110)
CO2: 25 MMOL/L (ref 21–32)
CREAT SERPL-MCNC: 0.8 MG/DL (ref 0.9–1.3)
EOSINOPHILS ABSOLUTE: 0.1 K/UL (ref 0–0.6)
EOSINOPHILS RELATIVE PERCENT: 1.1 %
GFR AFRICAN AMERICAN: >60
GFR NON-AFRICAN AMERICAN: >60
GLUCOSE BLD-MCNC: 106 MG/DL (ref 70–99)
GLUCOSE BLD-MCNC: 117 MG/DL (ref 70–99)
GLUCOSE BLD-MCNC: 119 MG/DL (ref 70–99)
GLUCOSE BLD-MCNC: 124 MG/DL (ref 70–99)
GLUCOSE BLD-MCNC: 126 MG/DL (ref 70–99)
GLUCOSE BLD-MCNC: 141 MG/DL (ref 70–99)
GLUCOSE BLD-MCNC: 153 MG/DL (ref 70–99)
HCT VFR BLD CALC: 27.8 % (ref 40.5–52.5)
HEMOGLOBIN: 9.5 G/DL (ref 13.5–17.5)
LYMPHOCYTES ABSOLUTE: 1.4 K/UL (ref 1–5.1)
LYMPHOCYTES RELATIVE PERCENT: 12.7 %
MAGNESIUM: 1.9 MG/DL (ref 1.8–2.4)
MCH RBC QN AUTO: 34.7 PG (ref 26–34)
MCHC RBC AUTO-ENTMCNC: 34 G/DL (ref 31–36)
MCV RBC AUTO: 102 FL (ref 80–100)
MONOCYTES ABSOLUTE: 1.4 K/UL (ref 0–1.3)
MONOCYTES RELATIVE PERCENT: 12.6 %
NEUTROPHILS ABSOLUTE: 8 K/UL (ref 1.7–7.7)
NEUTROPHILS RELATIVE PERCENT: 71.7 %
PDW BLD-RTO: 14.3 % (ref 12.4–15.4)
PERFORMED ON: ABNORMAL
PHOSPHORUS: 4.5 MG/DL (ref 2.5–4.9)
PLATELET # BLD: 306 K/UL (ref 135–450)
PMV BLD AUTO: 9.9 FL (ref 5–10.5)
POTASSIUM SERPL-SCNC: 4.6 MMOL/L (ref 3.5–5.1)
RBC # BLD: 2.73 M/UL (ref 4.2–5.9)
SODIUM BLD-SCNC: 136 MMOL/L (ref 136–145)
TOTAL PROTEIN: 6.1 G/DL (ref 6.4–8.2)
WBC # BLD: 11.1 K/UL (ref 4–11)

## 2020-01-03 PROCEDURE — 80076 HEPATIC FUNCTION PANEL: CPT

## 2020-01-03 PROCEDURE — C9113 INJ PANTOPRAZOLE SODIUM, VIA: HCPCS | Performed by: NURSE PRACTITIONER

## 2020-01-03 PROCEDURE — 83735 ASSAY OF MAGNESIUM: CPT

## 2020-01-03 PROCEDURE — 6360000002 HC RX W HCPCS: Performed by: INTERNAL MEDICINE

## 2020-01-03 PROCEDURE — 92611 MOTION FLUOROSCOPY/SWALLOW: CPT

## 2020-01-03 PROCEDURE — 80069 RENAL FUNCTION PANEL: CPT

## 2020-01-03 PROCEDURE — 6370000000 HC RX 637 (ALT 250 FOR IP): Performed by: NURSE PRACTITIONER

## 2020-01-03 PROCEDURE — 6360000002 HC RX W HCPCS: Performed by: SURGERY

## 2020-01-03 PROCEDURE — 85730 THROMBOPLASTIN TIME PARTIAL: CPT

## 2020-01-03 PROCEDURE — 99024 POSTOP FOLLOW-UP VISIT: CPT | Performed by: SURGERY

## 2020-01-03 PROCEDURE — 71045 X-RAY EXAM CHEST 1 VIEW: CPT

## 2020-01-03 PROCEDURE — 99232 SBSQ HOSP IP/OBS MODERATE 35: CPT | Performed by: INTERNAL MEDICINE

## 2020-01-03 PROCEDURE — 2700000000 HC OXYGEN THERAPY PER DAY

## 2020-01-03 PROCEDURE — 6360000002 HC RX W HCPCS: Performed by: NURSE PRACTITIONER

## 2020-01-03 PROCEDURE — 2580000003 HC RX 258: Performed by: NURSE PRACTITIONER

## 2020-01-03 PROCEDURE — 97535 SELF CARE MNGMENT TRAINING: CPT

## 2020-01-03 PROCEDURE — 2580000003 HC RX 258: Performed by: INTERNAL MEDICINE

## 2020-01-03 PROCEDURE — 94760 N-INVAS EAR/PLS OXIMETRY 1: CPT

## 2020-01-03 PROCEDURE — 36415 COLL VENOUS BLD VENIPUNCTURE: CPT

## 2020-01-03 PROCEDURE — 97530 THERAPEUTIC ACTIVITIES: CPT

## 2020-01-03 PROCEDURE — 6370000000 HC RX 637 (ALT 250 FOR IP): Performed by: SURGERY

## 2020-01-03 PROCEDURE — 97116 GAIT TRAINING THERAPY: CPT

## 2020-01-03 PROCEDURE — 99233 SBSQ HOSP IP/OBS HIGH 50: CPT | Performed by: INTERNAL MEDICINE

## 2020-01-03 PROCEDURE — 94640 AIRWAY INHALATION TREATMENT: CPT

## 2020-01-03 PROCEDURE — 2060000000 HC ICU INTERMEDIATE R&B

## 2020-01-03 PROCEDURE — 74230 X-RAY XM SWLNG FUNCJ C+: CPT

## 2020-01-03 PROCEDURE — 85025 COMPLETE CBC W/AUTO DIFF WBC: CPT

## 2020-01-03 PROCEDURE — 92526 ORAL FUNCTION THERAPY: CPT

## 2020-01-03 PROCEDURE — 97110 THERAPEUTIC EXERCISES: CPT

## 2020-01-03 RX ORDER — FUROSEMIDE 10 MG/ML
20 INJECTION INTRAMUSCULAR; INTRAVENOUS ONCE
Status: COMPLETED | OUTPATIENT
Start: 2020-01-03 | End: 2020-01-03

## 2020-01-03 RX ADMIN — INSULIN LISPRO 3 UNITS: 100 INJECTION, SOLUTION INTRAVENOUS; SUBCUTANEOUS at 21:20

## 2020-01-03 RX ADMIN — SODIUM CHLORIDE, PRESERVATIVE FREE 10 ML: 5 INJECTION INTRAVENOUS at 08:30

## 2020-01-03 RX ADMIN — LEVALBUTEROL HYDROCHLORIDE 1.25 MG: 1.25 SOLUTION, CONCENTRATE RESPIRATORY (INHALATION) at 19:30

## 2020-01-03 RX ADMIN — Medication 10 ML: at 08:31

## 2020-01-03 RX ADMIN — LEVALBUTEROL HYDROCHLORIDE 1.25 MG: 1.25 SOLUTION, CONCENTRATE RESPIRATORY (INHALATION) at 17:12

## 2020-01-03 RX ADMIN — INSULIN LISPRO 3 UNITS: 100 INJECTION, SOLUTION INTRAVENOUS; SUBCUTANEOUS at 18:31

## 2020-01-03 RX ADMIN — PANTOPRAZOLE SODIUM 40 MG: 40 INJECTION, POWDER, FOR SOLUTION INTRAVENOUS at 08:30

## 2020-01-03 RX ADMIN — HEPARIN SODIUM 14.7 ML/HR: 10000 INJECTION, SOLUTION INTRAVENOUS at 07:55

## 2020-01-03 RX ADMIN — FUROSEMIDE 20 MG: 10 INJECTION, SOLUTION INTRAMUSCULAR; INTRAVENOUS at 09:31

## 2020-01-03 RX ADMIN — LEVALBUTEROL HYDROCHLORIDE 1.25 MG: 1.25 SOLUTION, CONCENTRATE RESPIRATORY (INHALATION) at 09:25

## 2020-01-03 RX ADMIN — LEVALBUTEROL HYDROCHLORIDE 1.25 MG: 1.25 SOLUTION, CONCENTRATE RESPIRATORY (INHALATION) at 13:32

## 2020-01-03 RX ADMIN — SODIUM CHLORIDE, PRESERVATIVE FREE 10 ML: 5 INJECTION INTRAVENOUS at 21:21

## 2020-01-03 RX ADMIN — ACETAMINOPHEN 650 MG: 650 SUPPOSITORY RECTAL at 06:17

## 2020-01-03 ASSESSMENT — PAIN DESCRIPTION - ONSET: ONSET: ON-GOING

## 2020-01-03 ASSESSMENT — PAIN DESCRIPTION - FREQUENCY: FREQUENCY: INTERMITTENT

## 2020-01-03 ASSESSMENT — ENCOUNTER SYMPTOMS
NAUSEA: 0
VOMITING: 0

## 2020-01-03 ASSESSMENT — PAIN DESCRIPTION - DESCRIPTORS: DESCRIPTORS: ACHING

## 2020-01-03 ASSESSMENT — PAIN DESCRIPTION - ORIENTATION: ORIENTATION: MID

## 2020-01-03 ASSESSMENT — PAIN - FUNCTIONAL ASSESSMENT: PAIN_FUNCTIONAL_ASSESSMENT: PREVENTS OR INTERFERES SOME ACTIVE ACTIVITIES AND ADLS

## 2020-01-03 ASSESSMENT — PAIN SCALES - GENERAL
PAINLEVEL_OUTOF10: 6
PAINLEVEL_OUTOF10: 0

## 2020-01-03 ASSESSMENT — PAIN DESCRIPTION - PROGRESSION: CLINICAL_PROGRESSION: GRADUALLY WORSENING

## 2020-01-03 ASSESSMENT — PAIN DESCRIPTION - PAIN TYPE: TYPE: ACUTE PAIN

## 2020-01-03 ASSESSMENT — PAIN DESCRIPTION - LOCATION: LOCATION: BUTTOCKS

## 2020-01-03 NOTE — PROGRESS NOTES
Patient noted to be calling out frequently. When assessing patient, patient states that he feels as though he cannot breathe. Oxygen saturation 93% on 4L,  and RR 24. Patient not noted to be in any apparent distress, just a little anxious appearing. Call placed to on call physician Dr. Larissa Winn, to notify of patient's current condition. Will continue to monitor. Electronically signed by Ang Cordova RN on 1/3/2020 at 12:03 AM     Dr. Larissa Winn ordered stat CXR. Patient still anxious. Will continue to monitor. Oxygen saturation 95% on 4L. Heparin gtt infusing.     Electronically signed by Ang Cordova RN on 1/3/2020 at 12:08 AM

## 2020-01-03 NOTE — PROGRESS NOTES
Department of Internal Medicine  General Internal Medicine  Attending Progress Note    Chief Complaint:originally vomiting and diarrhea      HPI:   SUBJECTIVE:  65 yo male patient with a history of down's syndrome, colon cancer (resected),  Pacemaker for chb who presented with SBO and septic shock. Now on the PCU and back to mental status a lot more down today since was made NPO yesterday afternoon again. Speech therapist concerned that he is aspirating still due to gurgling after eating. He is to get MBS asap and have his diet modified again. We have concerns about him not getting enough food/ low protein levels will need to have nutrition eval may need tube feeds.   TPN has been stopped      Past Medical History:   Diagnosis Date    Acne rosacea     Cognitive impairment     sees Dr Leita Dandy, started Aricept 12/2019    Colon cancer ([facility] 1/7/2014    ileocecal valve/Leuenberger/yrly surveillance    Down syndrome     Gout     HIGH CHOLESTEROL     2017 3.1 % framingham risk score    History of diarrhea     since childhood, worse since partial colectomy resolved after a few months    Macrocytosis     followed by Dr Halina King and released    Seborrheic dermatitis     Seizure disorder ([address] 2019    Status post biventricular cardiac pacemaker insertion 01/2019    for long sinus pauses with syncopal willis    Status post partial colectomy 2016    diarrhea    Syncope and collapse 01/2019    recurrent syncope, found seizures and sinus pauses    Weight loss 2015    since 2014 at the time of partial colectomy lost 25 lbs     Past Surgical History:   Procedure Laterality Date    CATARACT REMOVAL WITH IMPLANT  2007    Bilateral    COLON SURGERY  1/7//2014    for removal colon cancer    COLONOSCOPY  02/27/2017    Dr Major Graves N/A 8/21/2019    COLONOSCOPY WITH BIOPSY performed by Amanda Jj MD at [address] N/A 12/18/2019    EXPLORATORY LAPAROTOMY, LYSIS OF ADHESIONS performed by Joe Padilla MD at [address] History   Problem Relation Age of Onset    Diabetes Brother     Hypertension Brother     Stroke Brother     High Cholesterol Brother     Cancer Mother         melanoma    Other Father         cerebral aneurysm     Social History     Tobacco Use    Smoking status: Never Smoker    Smokeless tobacco: Never Used   Substance Use Topics    Alcohol use: No    Drug use: No       Prior to Admission medications    Medication Sig Start Date End Date Taking? Authorizing Provider   doxycycline (VIBRAMYCIN) 50 MG capsule Take 50 mg by mouth 2 times daily 11/26/19  Yes Historical Provider, MD   simvastatin (ZOCOR) 20 MG tablet Take 1 tablet by mouth daily 12/10/19  Yes Deedee Gómez MD   allopurinol (ZYLOPRIM) 100 MG tablet Take 1 tablet by mouth daily 12/9/19  Yes Deedee Gómez MD   donepezil (ARICEPT) 5 MG tablet Take 1 tablet by mouth nightly 12/9/19  Yes Deedee Gómez MD   Multiple Vitamins-Minerals (THERAPEUTIC MULTIVITAMIN-MINERALS) tablet Take 1 tablet by mouth daily   Yes Historical Provider, MD   Azelaic Acid 15 % GEL Apply topically daily 12/2/19   Historical Provider, MD   hydrocortisone (HYTONE) 2.5 % lotion Apply topically daily 11/26/19   Historical Provider, MD   metroNIDAZOLE (METROCREAM) 0.75 % cream Apply topically daily 11/26/19   Historical Provider, MD         ROS:  A comprehensive review of systems was negative except for: weakness , sob     OBJECTIVE:      PHYSICAL:  Intake/Output:   Patient Vitals for the past 8 hrs:   BP Temp Temp src Pulse Resp SpO2 Weight   01/03/20 0727 95/61 98 °F (36.7 °C) Axillary 99 21 95 % --   01/03/20 0633 -- -- -- -- -- -- 129 lb 13.6 oz (58.9 kg)   01/03/20 0408 (!) 97/57 98.9 °F (37.2 °C) Oral 103 26 92 % --     I/O last 3 completed shifts: In: 480 [P.O.:480]  Out: 1925 [Urine:1925]  No intake/output data recorded.   VITALS:    BP 95/61   Pulse 99   Temp 98 °F (36.7 °C) (Axillary)   Resp 21   Ht 5' 6\" (1.676 m)   Wt 129 lb 13.6 oz (58.9 kg)   SpO2 95%   BMI 20.96 kg/m²     General Appearance: awake and comfortable appearing  Skin: warm and dry, no rash or erythema  Head: normocephalic and atraumatic  Eyes: conjunctivae normal and sclera anicteric  Neck: neck supple and non tender without mass, no thyromegaly or thyroid nodules, no cervical lymphadenopathy   Pulmonary/Chest:decreased breath sounds all the way up on the left, right with basilar crackles and better breath sounds   Cardiovascular: sinus tach normal S1 and S2, no gallops and no carotid bruits no longer tachycardic  Abdomen: soft bs pos nontender, dressing I place  Extremities: no cyanosis, clubbing or edema  Musculoskeletal: no swollen joints and no tender joints,  Neurologic: following commands moves all 4    DATA:   Labs:  CBC:   Recent Labs     01/01/20  0505 01/02/20  0530 01/03/20  0455   WBC 13.1* 10.5 11.1*   HGB 10.2* 9.6* 9.5*    301 306     BMP:    Recent Labs     01/01/20  0505 01/02/20  0530 01/03/20  0455   * 135* 136   K 4.8 4.4 4.6    102 100   CO2 19* 20* 25   BUN 28* 20 16   CREATININE 0.9 0.7* 0.8*   GLUCOSE 171* 226* 124*     POC GLUCOSE:    Recent Labs     01/02/20  1403 01/02/20  1554 01/02/20  2051 01/03/20  0040 01/03/20  0454   POCGLU 187* 157* 111* 117* 106*     Ca/Mg/Phos:   Recent Labs     01/01/20  0505 01/02/20  0530 01/03/20  0455   CALCIUM 8.3 8.1* 8.3   MG 2.00 2.00 1.90   PHOS 3.2 3.5 4.5     Hepatic:   Recent Labs     01/01/20  0505 01/02/20  0530 01/03/20  0455   * 150* 102*   * 152* 138*   BILITOT 1.0 0.8 0.7   ALKPHOS 206* 212* 219*     Troponin:   No results for input(s): TROPONINI in the last 72 hours. ProBNP:   No results for input(s): PROBNP in the last 72 hours.   Lipids:   Recent Labs     01/02/20  0530   TRIG 68     ABGs:   Recent Labs     01/01/20  0010   PHART 7.401   PO2ART 70.6*   GLV0LMU 34.2*     PT/INR:   No results for input(s):

## 2020-01-03 NOTE — PROCEDURES
Arterial Catheter Insertion Procedure Note    Consent: The family members were counseled regarding the procedure, its indications, risks, potential complications and alternatives, and any questions were answered. Consent was obtained to proceed. Procedure: Insertion of Arterial Catheter    Indications: Hypotension, Septic Shock    Estimated Blood Loss: Minimal    Procedure Details   Informed consent was obtained for the procedure, including sedation. Risks of  hemorrhage, arrhythmia, infection and adverse drug reaction were discussed. Ultrasound used and Left Femoral artery was noted to be patent. Under sterile conditions the skin above the Left Femoral artery was prepped with betadine and covered with a sterile drape with gown, mask, gloves . Kemal Fontanez Local anesthesia was applied to the skin and subcutaneous tissues. A 22-gauge needle was inserted into the Left Femoral artery under active ultrasound guidance. A guide wire was then passed easily through the catheter which was then advanced with no resistance. Good pulsatile blood returned. The catheter was sutured into place. Findings: There were no changes to vital signs. Patient did tolerate procedure well. Total time of procedure 15 minutes.     Lawanna Ahumada, ACNP [facility]
toelrated MBS in fluoroscopy suite    Pain   Patient Currently in Pain: Denies    Behavior/Cognition/Vision/Hearing:  Behavior/Cognition: Alert; Cooperative;Pleasant mood;Distractible;Requires cueing  Vision: Within Functional Limits  Hearing: Within functional limits    Patient Position: Lateral   Patient Degrees: Fully upright n VSE chair  Consistencies Administered: Pudding teaspoon;Honey teaspoon    Impressions:  Treatment Dx and ICD 10: oropharyngeal dysphagia; r13.12    Moderate-severe oral stage dysphagia characterized by decreased lingual manipulation. · Bolus formation and movement is slow, disorganized, and prolonged without excess labor, but with concern for excess labor with denser solids. Severe pharyngeal stage dysphagia characterized by delayed swallow and decreased laryngeal elevation. · Flash penetration with pudding/puree via teaspoon. · Aspiration with occasional cough response with honey via teaspoon; wet vocal quality also noted post-swallow. · Mild-moderate vallecular and pyriform residue with PO trials is cleared with repeat swallows. · Patient unable to follow multi-step directives to complete chin tuck. Dysphagia Outcome Severity Scale: Level 3: Moderate dysphagia- Total assisstance, supervision or strategies. Two or more diet consistencies restricted  Penetration-Aspiration Scale (PAS): 8 - Material Enters the airway, passes below the vocal folds, and no effort is made to eject    Recommended Diet:  Solid consistency: Dysphagia Pureed (Dysphagia I)(VIA TEASPOON; REPEAT SWALLOW)  Liquid consistency: Extremely Thick (Spoon-Pudding)(VIA TEASPOON; REPEAT SWALLOW)  Liquid administration via: Spoon  Medication administration: Meds in puree(CRUSHED)  Supervision: 1:1  Compensatory Swallowing Strategies: Small bites/sips;Eat/Feed slowly; Remain upright for 30-45 minutes after meals;Upright as possible for all oral intake; No straws; Total feed; Liquid by spoon only;Swallow 2 times

## 2020-01-03 NOTE — PROGRESS NOTES
Clinical Pharmacy Note  Heparin Dosing       Lab Results   Component Value Date    APTT 71.2 01/03/2020     Lab Results   Component Value Date    HGB 9.5 01/03/2020    HCT 27.8 01/03/2020     01/03/2020    INR 1.15 12/18/2019       Current Infusion Rate: 14.7 mL/hr    Plan:  Continue same rate: 14.7 mL/hr  Next aPTT: 01/04/20 0600    Pharmacy will continue to monitor and adjust based on aPTT results.   DEEPALI [facility]

## 2020-01-03 NOTE — CONSULTS
Brief Nutrition Note     Consult for tube feed ordering and management \"NPO currently getting a MBS today.  Low protein levels need to recover asap.  Would tube feeds at night help? \". See RD note from earlier today for full nutrition assessment. Pt with MBS today, SLP recommending Pureed with Extremely Thick liquids. Pt currently not meeting nutrition/hydration needs and suspect this will be an ongoing issues with this diet. Will provide EN nocturnal recs if attending desires to pursue tube feedings. Note, most SNF will not accept NGT (if this is planned disposition). If EN desired, see below:     · Tube Feeding (TF) Orders:   · Goal TF & Flush Orders Provides: If EN desired, recommend Osmolite 1.5 at 70 ml/hr x 12 (nocturnal). This provides 840 ml TV, 1260 kcal, 53 gm of protein, and 640 ml free fluid. This meets ~75% of estimated nutrition needs. Flushes will need to be adjusted based on po fluid intake.       Electronically signed by Gerhardt Perks, RD, LD on 1/3/2020 at 1:42 PM

## 2020-01-03 NOTE — PROGRESS NOTES
Physical Therapy  Facility/Department: [address] PROGRESSIVE CARE  Daily Treatment Note  NAME: Kieran Chiu  : 1960  MRN: [id number]    Date of Service: 1/3/2020    Discharge Recommendations:  Continue to assess pending progress   PT Equipment Recommendations  Other: Will monitor for potential equipt needs. Kieran Chiu scored a 13/24 on the AM-PAC short mobility form. Current research shows that an AM-PAC score of 17 or less is typically not associated with a discharge to the patient's home setting. Based on the patients AM-PAC score and their current functional mobility deficits, it is recommended that the patient have 3-5 sessions per week of Physical Therapy at d/c to increase the patients independence. Assessment   Body structures, Functions, Activity limitations: Decreased functional mobility ; Decreased strength;Decreased safe awareness;Decreased cognition;Decreased endurance  Assessment: 65 y/o male admit 19 with SBO, Rectal Bleed, Pneumonia, Septicemia. -19 Pt Intubated. 19 S/P Exp Lap, Lysis of Adhesions with Reduction of Closed Loop Small Bowel Obstruction. PMH as noted including Colon Ca, Partial Colectomy, Syncope/Collapse, Pacemaker, Down's Syndrome, Gout. PTA pt living with family (brother, s-i-l) in [place] home; independent with self care and functional mobility (without assist device) per family report. Family does report gradual onset forgetful/confusion over past yr. Pt edna OOB short distances at bedside with Maisha Squire Mod assist; weak/limited endurance. Also difficult due to pt unfamiliar with use of Walker pta. Per , anticipate SNF setting upon d/c at this time. Prognosis: Good  Decision Making: Medium Complexity  History: 65 y/o male admit 19 with SBO, Rectal Bleed, Pneumonia, Septicemia. -19 Pt Intubated. 19 S/P Exp Lap, Lysis of Adhesions with Reduction of Closed Loop Small Bowel Obstruction.   PMH as noted including Colon Ca, Partial Colectomy, Syncope/Collapse, Pacemaker, Down's Syndrome, Gout. Exam: See above. Clinical Presentation: See above. Patient Education: Role of PT, POC, Need to call for assist, Safe use of Walker, LE Exs. Barriers to Learning: Cognitive. REQUIRES PT FOLLOW UP: Yes  Activity Tolerance  Activity Tolerance: Patient Tolerated treatment well;Patient limited by endurance; Patient limited by cognitive status     Patient Diagnosis(es): The primary encounter diagnosis was Small bowel obstruction ([address] Diagnoses of Aspiration pneumonia of both lower lobes, unspecified aspiration pneumonia type ([address] Hypoxia, Rectal bleeding, and Septicemia ([address] were also pertinent to this visit. has a past medical history of Acne rosacea, Cognitive impairment, Colon cancer ([address] Down syndrome, Gout, HIGH CHOLESTEROL, History of diarrhea, Macrocytosis, Seborrheic dermatitis, Seizure disorder ([address] Status post biventricular cardiac pacemaker insertion, Status post partial colectomy, Syncope and collapse, and Weight loss. has a past surgical history that includes Cataract removal with implant (2007); Colon surgery (1/7//2014); Colonoscopy (02/27/2017); pacemaker placement; Colonoscopy (N/A, 8/21/2019); and Small intestine surgery (N/A, 12/18/2019). Restrictions  Restrictions/Precautions  Restrictions/Precautions: Fall Risk  Position Activity Restriction  Other position/activity restrictions: O2 4L via NC. Pt MRDD although independent care/mobility pta. Diet : Full Liquid, Dysphagia Pureed (Pudding Thick). Subjective   General  Chart Reviewed: Yes  Additional Pertinent Hx: 65 y/o male admit 12/18/19 with SBO, Rectal Bleed, Pneumonia, Septicemia. 12/18-12/27/19 Pt Intubated. 12/18/19 S/P Exp Lap, Lysis of Adhesions with Reduction of Closed Loop Small Bowel Obstruction. PMH as noted including Colon Ca, Partial Colectomy, Syncope/Collapse, Pacemaker, Down's Syndrome, Gout.     Response To 1446)  Mobility Inpatient CMS 0-100% Score: 64.91 (01/03/20 1446)  Mobility Inpatient CMS G-Code Modifier : CL (01/03/20 1446)          Goals  Short term goals  Time Frame for Short term goals: Upon d/c acute care setting. Short term goal 1: Bed Mob Mod assist.   Short term goal 2: Transfers with/without assist device Min assist.   Short term goal 3: Amb with/without assist device 48' CGA. Patient Goals   Patient goals : None stated at this time. Plan    Plan  Times per week: 3-5x week while in acute care setting. Current Treatment Recommendations: Strengthening, Functional Mobility Training, Transfer Training, Gait Training, Safety Education & Training, Patient/Caregiver Education & Training  Safety Devices  Type of devices: Call light within reach, Chair alarm in place, Left in chair, Nurse notified(Family at bedside. )  Restraints  Initially in place: No  Restraints: B UE soft restraints.       Therapy Time   Individual Concurrent Group Co-treatment   Time In 1025         Time Out 1110         Minutes [address] [place] [address]

## 2020-01-03 NOTE — PROGRESS NOTES
Pulmonary Progress Note    CC:  Follow up hypoxia, PE, pleural effusions    Subjective:    4 liters of oxygen  Brother at bedside  Patient is non-verbal.  Failed swallow assessment. Now off of TPN    Intake/Output Summary (Last 24 hours) at 1/3/2020 0706  Last data filed at 1/3/2020 0406  Gross per 24 hour   Intake 480 ml   Output 1925 ml   Net -1445 ml         PHYSICAL EXAM:  Blood pressure (!) 97/57, pulse 103, temperature 98.9 °F (37.2 °C), temperature source Oral, resp. rate 26, height 5' 6\" (1.676 m), weight 129 lb 13.6 oz (58.9 kg), SpO2 92 %.'  Gen: No distress. Down's facies  Eyes: PERRL. No sclera icterus. No conjunctival injection. ENT: No discharge. Pharynx clear. External appearance of ears and nose normal.  Neck: Trachea midline. No obvious mass. Resp: Essentially clear, diminished bilaterally. Not taking deep breaths  CV: Irregular, trachy. No murmur or rub. GI: post surgical dressing  Skin: Warm, dry, normal texture and turgor. No nodule on exposed extremities. Lymph: No cervical LAD. No supraclavicular LAD. M/S: No cyanosis. No clubbing. No joint deformity. Neuro: Moves all four extremities. CN 2-12 tested, no defect noted.   Ext:   no edema    Medications:    Scheduled Meds:   levalbuterol  1.25 mg Nebulization 4x daily    insulin lispro  0-18 Units Subcutaneous Q4H    sodium chloride flush  10 mL Intravenous 2 times per day    pantoprazole  40 mg Intravenous Daily    And    sodium chloride (PF)  10 mL Intravenous Daily       Continuous Infusions:   heparin (porcine) 14.7 mL/hr (01/02/20 2241)    dextrose         PRN Meds:  sodium chloride nebulizer, menthol-zinc oxide, sodium chloride flush, magnesium sulfate, potassium chloride, sodium chloride flush, ondansetron, acetaminophen, glucose, dextrose, glucagon (rDNA), dextrose, dextran 70-hypromellose    Labs:  CBC:   Recent Labs     01/01/20  0505 01/02/20  0530 01/03/20  0455   WBC 13.1* 10.5 11.1*   HGB 10.2* 9.6* 9.5*   HCT 30.9* 28.7* 27.8*   .0* 103.4* 102.0*    301 306     BMP:   Recent Labs     20  0505 20  0530 20  0455   * 135* 136   K 4.8 4.4 4.6    102 100   CO2 19* 20* 25   PHOS 3.2 3.5 4.5   BUN 28* 20 16   CREATININE 0.9 0.7* 0.8*     LIVER PROFILE:   Recent Labs     20  0505 20  0530 20  0455   * 150* 102*   * 152* 138*   BILIDIR 0.5* 0.4* 0.3   BILITOT 1.0 0.8 0.7   ALKPHOS 206* 212* 219*     PT/INR: No results for input(s): PROTIME, INR in the last 72 hours. APTT:   Recent Labs     20  1415 20  2110 20  0455   APTT 37.7* 44.8* 52.3*     UA:  Recent Labs     19  1435   COLORU DK YELLOW   PHUR 6.0   WBCUA 2   RBCUA 23*   CLARITYU Clear   SPECGRAV 1.023   LEUKOCYTESUR Negative   UROBILINOGEN 4.0*   BILIRUBINUR SMALL*   BLOODU MODERATE*   GLUCOSEU >=1000*     No results for input(s): PH, PCO2, PO2 in the last 72 hours. Films:  Chest imaging reports were reviewed and imaging was reviewed by me and showed large left effusion    [id number]    Cultures:  Negative    I reviewed the labs and images listed above    Assessment:   · Acute Hypoxic Respiratory Failure with saturations less than 90% on room air  · Acute right sided PE  · Bilateral pleural effusions, L>R  · Aspiration pneumonia  · Dysphagia   · SBO s/p ex lap      Plan:  Titrate oxygen for saturations greater than or equal to 90%. I turned him down to 2 liters while I was in the room. No need for thoracentesis if oxygen requirements continue to improved   · Heparin gtt. Will need at minimum of 3 months of anticoagulation if deemed to not be a fall risk. Probably can transition to NOAC but once again I am unclear how safe long term anti-coagulation will be with him. The PE is small and no DVTs. · May need intermittent diuresis depending on oxygen requirements. · DVT prophylaxis with heparin gtt    Updated brother.         DO Mar[facility]

## 2020-01-03 NOTE — PROGRESS NOTES
Clinical Pharmacy Note  Heparin Dosing       Lab Results   Component Value Date    APTT 71.8 01/03/2020     Lab Results   Component Value Date    HGB 9.5 01/03/2020    HCT 27.8 01/03/2020     01/03/2020    INR 1.15 12/18/2019       Current Infusion Rate: 14.7 mL/hr    Plan:  Continue same rate: 14.7 mL/hr  Next aPTT: 01/03/20 1800    Pharmacy will continue to monitor and adjust based on aPTT results.   Kaz Lopes, [facility]

## 2020-01-04 LAB
ALBUMIN SERPL-MCNC: 2.4 G/DL (ref 3.4–5)
ALP BLD-CCNC: 192 U/L (ref 40–129)
ALT SERPL-CCNC: 120 U/L (ref 10–40)
ANION GAP SERPL CALCULATED.3IONS-SCNC: 13 MMOL/L (ref 3–16)
APTT: 73.3 SEC (ref 24.2–36.2)
AST SERPL-CCNC: 77 U/L (ref 15–37)
BASOPHILS ABSOLUTE: 0.2 K/UL (ref 0–0.2)
BASOPHILS RELATIVE PERCENT: 2.3 %
BILIRUB SERPL-MCNC: 0.6 MG/DL (ref 0–1)
BILIRUBIN DIRECT: 0.3 MG/DL (ref 0–0.3)
BILIRUBIN, INDIRECT: 0.3 MG/DL (ref 0–1)
BLOOD CULTURE, ROUTINE: NORMAL
BUN BLDV-MCNC: 18 MG/DL (ref 7–20)
CALCIUM SERPL-MCNC: 8.5 MG/DL (ref 8.3–10.6)
CHLORIDE BLD-SCNC: 100 MMOL/L (ref 99–110)
CO2: 28 MMOL/L (ref 21–32)
CREAT SERPL-MCNC: 0.9 MG/DL (ref 0.9–1.3)
CULTURE, BLOOD 2: NORMAL
EOSINOPHILS ABSOLUTE: 0.1 K/UL (ref 0–0.6)
EOSINOPHILS RELATIVE PERCENT: 1.3 %
GFR AFRICAN AMERICAN: >60
GFR NON-AFRICAN AMERICAN: >60
GLUCOSE BLD-MCNC: 109 MG/DL (ref 70–99)
GLUCOSE BLD-MCNC: 118 MG/DL (ref 70–99)
GLUCOSE BLD-MCNC: 127 MG/DL (ref 70–99)
GLUCOSE BLD-MCNC: 130 MG/DL (ref 70–99)
GLUCOSE BLD-MCNC: 130 MG/DL (ref 70–99)
GLUCOSE BLD-MCNC: 134 MG/DL (ref 70–99)
GLUCOSE BLD-MCNC: 158 MG/DL (ref 70–99)
GLUCOSE BLD-MCNC: 174 MG/DL (ref 70–99)
HCT VFR BLD CALC: 27.5 % (ref 40.5–52.5)
HEMOGLOBIN: 9.2 G/DL (ref 13.5–17.5)
LYMPHOCYTES ABSOLUTE: 1.5 K/UL (ref 1–5.1)
LYMPHOCYTES RELATIVE PERCENT: 14.1 %
MAGNESIUM: 1.9 MG/DL (ref 1.8–2.4)
MCH RBC QN AUTO: 34.3 PG (ref 26–34)
MCHC RBC AUTO-ENTMCNC: 33.6 G/DL (ref 31–36)
MCV RBC AUTO: 102 FL (ref 80–100)
MONOCYTES ABSOLUTE: 1.3 K/UL (ref 0–1.3)
MONOCYTES RELATIVE PERCENT: 12.1 %
NEUTROPHILS ABSOLUTE: 7.5 K/UL (ref 1.7–7.7)
NEUTROPHILS RELATIVE PERCENT: 70.2 %
PDW BLD-RTO: 14.3 % (ref 12.4–15.4)
PERFORMED ON: ABNORMAL
PHOSPHORUS: 4.4 MG/DL (ref 2.5–4.9)
PLATELET # BLD: 373 K/UL (ref 135–450)
PMV BLD AUTO: 9.5 FL (ref 5–10.5)
POTASSIUM SERPL-SCNC: 4.6 MMOL/L (ref 3.5–5.1)
RBC # BLD: 2.7 M/UL (ref 4.2–5.9)
SODIUM BLD-SCNC: 141 MMOL/L (ref 136–145)
TOTAL PROTEIN: 6.3 G/DL (ref 6.4–8.2)
WBC # BLD: 10.7 K/UL (ref 4–11)

## 2020-01-04 PROCEDURE — 99233 SBSQ HOSP IP/OBS HIGH 50: CPT | Performed by: INTERNAL MEDICINE

## 2020-01-04 PROCEDURE — 2580000003 HC RX 258: Performed by: INTERNAL MEDICINE

## 2020-01-04 PROCEDURE — APPNB30 APP NON BILLABLE TIME 0-30 MINS: Performed by: PHYSICIAN ASSISTANT

## 2020-01-04 PROCEDURE — 36415 COLL VENOUS BLD VENIPUNCTURE: CPT

## 2020-01-04 PROCEDURE — 99024 POSTOP FOLLOW-UP VISIT: CPT | Performed by: PHYSICIAN ASSISTANT

## 2020-01-04 PROCEDURE — 6360000002 HC RX W HCPCS: Performed by: INTERNAL MEDICINE

## 2020-01-04 PROCEDURE — 2060000000 HC ICU INTERMEDIATE R&B

## 2020-01-04 PROCEDURE — 6360000002 HC RX W HCPCS: Performed by: NURSE PRACTITIONER

## 2020-01-04 PROCEDURE — C9113 INJ PANTOPRAZOLE SODIUM, VIA: HCPCS | Performed by: NURSE PRACTITIONER

## 2020-01-04 PROCEDURE — 2700000000 HC OXYGEN THERAPY PER DAY

## 2020-01-04 PROCEDURE — 94760 N-INVAS EAR/PLS OXIMETRY 1: CPT

## 2020-01-04 PROCEDURE — 6370000000 HC RX 637 (ALT 250 FOR IP): Performed by: INTERNAL MEDICINE

## 2020-01-04 PROCEDURE — 99024 POSTOP FOLLOW-UP VISIT: CPT | Performed by: SURGERY

## 2020-01-04 PROCEDURE — 94640 AIRWAY INHALATION TREATMENT: CPT

## 2020-01-04 PROCEDURE — 2580000003 HC RX 258: Performed by: NURSE PRACTITIONER

## 2020-01-04 PROCEDURE — 85025 COMPLETE CBC W/AUTO DIFF WBC: CPT

## 2020-01-04 PROCEDURE — 85730 THROMBOPLASTIN TIME PARTIAL: CPT

## 2020-01-04 PROCEDURE — 80069 RENAL FUNCTION PANEL: CPT

## 2020-01-04 PROCEDURE — APPSS15 APP SPLIT SHARED TIME 0-15 MINUTES: Performed by: PHYSICIAN ASSISTANT

## 2020-01-04 PROCEDURE — 83735 ASSAY OF MAGNESIUM: CPT

## 2020-01-04 PROCEDURE — 80076 HEPATIC FUNCTION PANEL: CPT

## 2020-01-04 PROCEDURE — 6360000002 HC RX W HCPCS: Performed by: SURGERY

## 2020-01-04 PROCEDURE — 6370000000 HC RX 637 (ALT 250 FOR IP): Performed by: NURSE PRACTITIONER

## 2020-01-04 RX ADMIN — INSULIN LISPRO 3 UNITS: 100 INJECTION, SOLUTION INTRAVENOUS; SUBCUTANEOUS at 17:50

## 2020-01-04 RX ADMIN — LEVALBUTEROL HYDROCHLORIDE 1.25 MG: 1.25 SOLUTION, CONCENTRATE RESPIRATORY (INHALATION) at 08:13

## 2020-01-04 RX ADMIN — HEPARIN SODIUM 14.7 ML/HR: 10000 INJECTION, SOLUTION INTRAVENOUS at 00:05

## 2020-01-04 RX ADMIN — ISODIUM CHLORIDE 3 ML: 0.03 SOLUTION RESPIRATORY (INHALATION) at 15:47

## 2020-01-04 RX ADMIN — PANTOPRAZOLE SODIUM 40 MG: 40 INJECTION, POWDER, FOR SOLUTION INTRAVENOUS at 09:46

## 2020-01-04 RX ADMIN — Medication 10 ML: at 09:45

## 2020-01-04 RX ADMIN — SODIUM CHLORIDE, PRESERVATIVE FREE 10 ML: 5 INJECTION INTRAVENOUS at 09:46

## 2020-01-04 RX ADMIN — INSULIN LISPRO 3 UNITS: 100 INJECTION, SOLUTION INTRAVENOUS; SUBCUTANEOUS at 21:19

## 2020-01-04 RX ADMIN — LEVALBUTEROL HYDROCHLORIDE 1.25 MG: 1.25 SOLUTION, CONCENTRATE RESPIRATORY (INHALATION) at 15:47

## 2020-01-04 RX ADMIN — SODIUM CHLORIDE, PRESERVATIVE FREE 10 ML: 5 INJECTION INTRAVENOUS at 21:19

## 2020-01-04 RX ADMIN — LEVALBUTEROL HYDROCHLORIDE 1.25 MG: 1.25 SOLUTION, CONCENTRATE RESPIRATORY (INHALATION) at 11:54

## 2020-01-04 RX ADMIN — HEPARIN SODIUM 14.7 ML/HR: 10000 INJECTION, SOLUTION INTRAVENOUS at 17:50

## 2020-01-04 ASSESSMENT — ENCOUNTER SYMPTOMS
GASTROINTESTINAL NEGATIVE: 1
SHORTNESS OF BREATH: 0
WHEEZING: 0
CHEST TIGHTNESS: 0

## 2020-01-04 ASSESSMENT — PAIN SCALES - GENERAL
PAINLEVEL_OUTOF10: 0

## 2020-01-04 NOTE — PROGRESS NOTES
Palpations: Abdomen is soft. There is no hepatomegaly, splenomegaly or mass. Tenderness: There is no tenderness. Lymphadenopathy:      Cervical: No cervical adenopathy. Neurological:      Mental Status: He is alert.              CBC:   Recent Labs     01/03/20 0455 01/04/20 0442   WBC 11.1* 10.7   HGB 9.5* 9.2*    373     BMP:    Recent Labs     01/03/20 0455 01/04/20 0442    141   K 4.6 4.6    100   CO2 25 28   BUN 16 18   CREATININE 0.8* 0.9   GLUCOSE 124* 130*     Hepatic:   Recent Labs     01/03/20  0455 01/04/20 0442   * 77*   * 120*   BILITOT 0.7 0.6   ALKPHOS 219* 192*        Glucose:    Recent Labs     01/02/20  0530 01/03/20 0455 01/04/20 0442   GLUCOSE 226* 124* 130*              Assessment/Plan:    Active Hospital Problems    Diagnosis Date Noted    Right pulmonary embolus ([address] [I26.99]-doing better and continue anticoagulation     Acute respiratory failure with hypoxia (HCC) [J96.01]-on nasal oxygen     Pleural effusion, bilateral [J90]-clinically better but cxr unchanged     Ischemic hepatitis [K75.9]-liver enzymes are coming down     Aspiration pneumonia of both lower lobes (HCC) [J69.0]-stable          Septicemia (HCC) [A41.9]-resolved     Septic shock (HCC) [A41.9, R65.21]-resolved     SBO (small bowel obstruction) ([address] [K56.609]-stable after surgery 12/18/2019     Discussed with him and his father and his nurse   Continue current medications        Electronically signed by Laila Cevallos MD on 1/4/2020 at 11:13 AM

## 2020-01-04 NOTE — PLAN OF CARE
Problem: Falls - Risk of:  Goal: Will remain free from falls  Description  Will remain free from falls  Outcome: Ongoing     Problem: Injury - Risk of, Physical Injury:  Goal: Will remain free from falls  Description  Will remain free from falls  Outcome: Ongoing     Problem: Pain:  Goal: Pain level will decrease  Description  Pain level will decrease  Outcome: Ongoing  Goal: Control of acute pain  Description  Control of acute pain  Outcome: Ongoing  Goal: Control of chronic pain  Description  Control of chronic pain  Outcome: Ongoing

## 2020-01-04 NOTE — PLAN OF CARE
Problem: Risk for Impaired Skin Integrity  Goal: Tissue integrity - skin and mucous membranes  Description  Structural intactness and normal physiological function of skin and  mucous membranes.   Outcome: Ongoing     Problem: Falls - Risk of:  Goal: Will remain free from falls  Description  Will remain free from falls  1/4/2020 1546 by Sarah Beth Ramirez RN  Outcome: Ongoing  1/4/2020 0151 by Fer Ramos RN  Outcome: Ongoing     Problem: Urinary Elimination:  Goal: Signs and symptoms of infection will decrease  Description  Signs and symptoms of infection will decrease  Outcome: Ongoing

## 2020-01-04 NOTE — PROGRESS NOTES
General and Vascular Surgery                                                           Daily Progress Note                                                             Radha Toro PA-C     Pt Name: Brad Lopez  Medical Record Number: [id number]  Date of Birth 1960   Today's Date: 1/4/2020      ASSESSMENT/PLAN  1. Small bowel obstruction, hypovolemic shock  2. Aspiration pneumonia  3. Hypoxia  4. Rectal bleeding  5. Septicemia  -12/18 exploratory laparotomy with RITESH, reduction of closed loop SBO  -Seen by dietary and speech. Still with significant dysphagia. Talking about continuing to monitor vs placing a temporary  PEG   -Continue PT/OT  -patient continues to improve    EDUCATION  Patient educated about their illness/diagnosis, stated above, and all questions answered. We discussed the importance of nutrition, medications they are taking, and healthy lifestyle. Edita Barnacle has improved from yesterday. Pain is well controlled. OBJECTIVE  VITALS:  height is 5' 6\" (1.676 m) and weight is 127 lb 13.9 oz (58 kg). His oral temperature is 97.8 °F (36.6 °C). His blood pressure is 100/65 and his pulse is 101. His respiration is 18 and oxygen saturation is 97%. VITALS:  /65   Pulse 101   Temp 97.8 °F (36.6 °C) (Oral)   Resp 18   Ht 5' 6\" (1.676 m)   Wt 127 lb 13.9 oz (58 kg)   SpO2 97%   BMI 20.64 kg/m²   GENERAL: alert, no distress  ABDOMEN: tenderness present- minimal,  without rebound and guarding and distention present- mild  I/O last 3 completed shifts:   In: 66.5 [I.V.:66.5]  Out: 1225 [Urine:1225]  I/O this shift:  In: -   Out: 300 [Urine:300]    LABS  Recent Labs     01/04/20  0442   WBC 10.7   HGB 9.2*   HCT 27.5*         K 4.6      CO2 28   BUN 18   CREATININE 0.9   MG 1.90   PHOS 4.4   CALCIUM 8.5   AST 77*   *   BILITOT 0.6   BILIDIR 0.3     CBC:   Lab Results   Component Value Date    WBC 10.7 01/04/2020    RBC 2.70 01/04/2020    RBC 4.68 03/13/2017    HGB 9.2 01/04/2020    HCT 27.5 01/04/2020    .0 01/04/2020    MCH 34.3 01/04/2020    MCHC 33.6 01/04/2020    RDW 14.3 01/04/2020     01/04/2020    MPV 9.5 01/04/2020     CMP:    Lab Results   Component Value Date     01/04/2020    K 4.6 01/04/2020    K 5.5 12/19/2019     01/04/2020    CO2 28 01/04/2020    BUN 18 01/04/2020    CREATININE 0.9 01/04/2020    GFRAA >60 01/04/2020    GFRAA >60 04/09/2013    AGRATIO 1.3 12/09/2019    LABGLOM >60 01/04/2020    GLUCOSE 130 01/04/2020    GLUCOSE 115 03/13/2017    PROT 6.3 01/04/2020    PROT 7.3 03/13/2017    LABALBU 2.4 01/04/2020    CALCIUM 8.5 01/04/2020    BILITOT 0.6 01/04/2020    ALKPHOS 192 01/04/2020    AST 77 01/04/2020     01/04/2020         Km Ruggiero PA-C  Electronically signed 1/4/2020 at 9:15 AM     Agree with above note. The patient was personally seen and examined. Jessica Maxwell is doing slightly better. Denies abdominal pain. Abd soft, NT, ND, incision c/d/i    A/P:    Continue dysphagia diet. Monitor po intake  Continue heparin gtt. Transition to oral anticoagulation?   Ambulate/OOB    Dora Haile MD

## 2020-01-05 LAB
ALBUMIN SERPL-MCNC: 2.2 G/DL (ref 3.4–5)
ALP BLD-CCNC: 180 U/L (ref 40–129)
ALT SERPL-CCNC: 105 U/L (ref 10–40)
ANION GAP SERPL CALCULATED.3IONS-SCNC: 15 MMOL/L (ref 3–16)
APTT: 82.3 SEC (ref 24.2–36.2)
AST SERPL-CCNC: 68 U/L (ref 15–37)
BASOPHILS ABSOLUTE: 0.1 K/UL (ref 0–0.2)
BASOPHILS RELATIVE PERCENT: 0.9 %
BILIRUB SERPL-MCNC: 0.4 MG/DL (ref 0–1)
BILIRUBIN DIRECT: <0.2 MG/DL (ref 0–0.3)
BILIRUBIN, INDIRECT: ABNORMAL MG/DL (ref 0–1)
BUN BLDV-MCNC: 20 MG/DL (ref 7–20)
CALCIUM SERPL-MCNC: 8.7 MG/DL (ref 8.3–10.6)
CHLORIDE BLD-SCNC: 103 MMOL/L (ref 99–110)
CO2: 25 MMOL/L (ref 21–32)
CREAT SERPL-MCNC: 0.8 MG/DL (ref 0.9–1.3)
EOSINOPHILS ABSOLUTE: 0.2 K/UL (ref 0–0.6)
EOSINOPHILS RELATIVE PERCENT: 1.9 %
GFR AFRICAN AMERICAN: >60
GFR NON-AFRICAN AMERICAN: >60
GLUCOSE BLD-MCNC: 134 MG/DL (ref 70–99)
GLUCOSE BLD-MCNC: 142 MG/DL (ref 70–99)
GLUCOSE BLD-MCNC: 152 MG/DL (ref 70–99)
GLUCOSE BLD-MCNC: 157 MG/DL (ref 70–99)
GLUCOSE BLD-MCNC: 169 MG/DL (ref 70–99)
HCT VFR BLD CALC: 28.2 % (ref 40.5–52.5)
HEMOGLOBIN: 9.4 G/DL (ref 13.5–17.5)
LYMPHOCYTES ABSOLUTE: 1.7 K/UL (ref 1–5.1)
LYMPHOCYTES RELATIVE PERCENT: 15.2 %
MAGNESIUM: 2 MG/DL (ref 1.8–2.4)
MCH RBC QN AUTO: 33.9 PG (ref 26–34)
MCHC RBC AUTO-ENTMCNC: 33.3 G/DL (ref 31–36)
MCV RBC AUTO: 102 FL (ref 80–100)
MONOCYTES ABSOLUTE: 0.9 K/UL (ref 0–1.3)
MONOCYTES RELATIVE PERCENT: 8.6 %
NEUTROPHILS ABSOLUTE: 8 K/UL (ref 1.7–7.7)
NEUTROPHILS RELATIVE PERCENT: 73.4 %
PDW BLD-RTO: 14.8 % (ref 12.4–15.4)
PERFORMED ON: ABNORMAL
PHOSPHORUS: 4.2 MG/DL (ref 2.5–4.9)
PLATELET # BLD: 419 K/UL (ref 135–450)
PMV BLD AUTO: 9.6 FL (ref 5–10.5)
POTASSIUM SERPL-SCNC: 3.8 MMOL/L (ref 3.5–5.1)
RBC # BLD: 2.77 M/UL (ref 4.2–5.9)
SODIUM BLD-SCNC: 143 MMOL/L (ref 136–145)
TOTAL PROTEIN: 6.6 G/DL (ref 6.4–8.2)
WBC # BLD: 10.9 K/UL (ref 4–11)

## 2020-01-05 PROCEDURE — 36415 COLL VENOUS BLD VENIPUNCTURE: CPT

## 2020-01-05 PROCEDURE — 6360000002 HC RX W HCPCS: Performed by: NURSE PRACTITIONER

## 2020-01-05 PROCEDURE — 6370000000 HC RX 637 (ALT 250 FOR IP): Performed by: NURSE PRACTITIONER

## 2020-01-05 PROCEDURE — 2580000003 HC RX 258: Performed by: NURSE PRACTITIONER

## 2020-01-05 PROCEDURE — 6370000000 HC RX 637 (ALT 250 FOR IP): Performed by: INTERNAL MEDICINE

## 2020-01-05 PROCEDURE — 85730 THROMBOPLASTIN TIME PARTIAL: CPT

## 2020-01-05 PROCEDURE — 6370000000 HC RX 637 (ALT 250 FOR IP): Performed by: SURGERY

## 2020-01-05 PROCEDURE — 99233 SBSQ HOSP IP/OBS HIGH 50: CPT | Performed by: INTERNAL MEDICINE

## 2020-01-05 PROCEDURE — 80069 RENAL FUNCTION PANEL: CPT

## 2020-01-05 PROCEDURE — 2700000000 HC OXYGEN THERAPY PER DAY

## 2020-01-05 PROCEDURE — 2060000000 HC ICU INTERMEDIATE R&B

## 2020-01-05 PROCEDURE — 2580000003 HC RX 258: Performed by: INTERNAL MEDICINE

## 2020-01-05 PROCEDURE — 80076 HEPATIC FUNCTION PANEL: CPT

## 2020-01-05 PROCEDURE — C9113 INJ PANTOPRAZOLE SODIUM, VIA: HCPCS | Performed by: NURSE PRACTITIONER

## 2020-01-05 PROCEDURE — 85025 COMPLETE CBC W/AUTO DIFF WBC: CPT

## 2020-01-05 PROCEDURE — 94640 AIRWAY INHALATION TREATMENT: CPT

## 2020-01-05 PROCEDURE — 6360000002 HC RX W HCPCS: Performed by: SURGERY

## 2020-01-05 PROCEDURE — 99024 POSTOP FOLLOW-UP VISIT: CPT | Performed by: SURGERY

## 2020-01-05 PROCEDURE — 83735 ASSAY OF MAGNESIUM: CPT

## 2020-01-05 PROCEDURE — 94760 N-INVAS EAR/PLS OXIMETRY 1: CPT

## 2020-01-05 PROCEDURE — 6360000002 HC RX W HCPCS: Performed by: INTERNAL MEDICINE

## 2020-01-05 RX ADMIN — HEPARIN SODIUM 13.6 ML/HR: 10000 INJECTION, SOLUTION INTRAVENOUS at 12:29

## 2020-01-05 RX ADMIN — SODIUM CHLORIDE, PRESERVATIVE FREE 10 ML: 5 INJECTION INTRAVENOUS at 08:10

## 2020-01-05 RX ADMIN — ACETAMINOPHEN 650 MG: 650 SUPPOSITORY RECTAL at 04:31

## 2020-01-05 RX ADMIN — INSULIN LISPRO 3 UNITS: 100 INJECTION, SOLUTION INTRAVENOUS; SUBCUTANEOUS at 13:50

## 2020-01-05 RX ADMIN — LEVALBUTEROL HYDROCHLORIDE 1.25 MG: 1.25 SOLUTION, CONCENTRATE RESPIRATORY (INHALATION) at 16:24

## 2020-01-05 RX ADMIN — LEVALBUTEROL HYDROCHLORIDE 1.25 MG: 1.25 SOLUTION, CONCENTRATE RESPIRATORY (INHALATION) at 07:44

## 2020-01-05 RX ADMIN — Medication 10 ML: at 08:09

## 2020-01-05 RX ADMIN — ISODIUM CHLORIDE 3 ML: 0.03 SOLUTION RESPIRATORY (INHALATION) at 19:18

## 2020-01-05 RX ADMIN — LEVALBUTEROL HYDROCHLORIDE 1.25 MG: 1.25 SOLUTION, CONCENTRATE RESPIRATORY (INHALATION) at 19:17

## 2020-01-05 RX ADMIN — INSULIN LISPRO 3 UNITS: 100 INJECTION, SOLUTION INTRAVENOUS; SUBCUTANEOUS at 22:12

## 2020-01-05 RX ADMIN — APIXABAN 10 MG: 5 TABLET, FILM COATED ORAL at 22:13

## 2020-01-05 RX ADMIN — APIXABAN 10 MG: 5 TABLET, FILM COATED ORAL at 13:41

## 2020-01-05 RX ADMIN — LEVALBUTEROL HYDROCHLORIDE 1.25 MG: 1.25 SOLUTION, CONCENTRATE RESPIRATORY (INHALATION) at 12:07

## 2020-01-05 RX ADMIN — PANTOPRAZOLE SODIUM 40 MG: 40 INJECTION, POWDER, FOR SOLUTION INTRAVENOUS at 08:09

## 2020-01-05 RX ADMIN — INSULIN LISPRO 3 UNITS: 100 INJECTION, SOLUTION INTRAVENOUS; SUBCUTANEOUS at 09:51

## 2020-01-05 ASSESSMENT — PAIN DESCRIPTION - ONSET: ONSET: ON-GOING

## 2020-01-05 ASSESSMENT — PAIN SCALES - GENERAL
PAINLEVEL_OUTOF10: 0
PAINLEVEL_OUTOF10: 3
PAINLEVEL_OUTOF10: 0

## 2020-01-05 ASSESSMENT — PAIN DESCRIPTION - LOCATION: LOCATION: HEAD

## 2020-01-05 ASSESSMENT — PAIN DESCRIPTION - DESCRIPTORS: DESCRIPTORS: ACHING

## 2020-01-05 ASSESSMENT — ENCOUNTER SYMPTOMS
CHEST TIGHTNESS: 0
TROUBLE SWALLOWING: 0
WHEEZING: 0
SHORTNESS OF BREATH: 0
COUGH: 1

## 2020-01-05 ASSESSMENT — PAIN - FUNCTIONAL ASSESSMENT: PAIN_FUNCTIONAL_ASSESSMENT: ACTIVITIES ARE NOT PREVENTED

## 2020-01-05 ASSESSMENT — PAIN DESCRIPTION - FREQUENCY: FREQUENCY: CONTINUOUS

## 2020-01-05 ASSESSMENT — PAIN DESCRIPTION - PAIN TYPE: TYPE: ACUTE PAIN

## 2020-01-05 ASSESSMENT — PAIN DESCRIPTION - PROGRESSION: CLINICAL_PROGRESSION: GRADUALLY WORSENING

## 2020-01-05 NOTE — PROGRESS NOTES
General and Vascular Surgery                                                           Daily Progress Note                                                             Brittney Lowery MD    Pt Name: Jacob Bonner Record Number: [id number]  Date of Birth 1960   Today's Date: 1/5/2020      ASSESSMENT/PLAN  1. Small bowel obstruction, hypovolemic shock  2. Aspiration pneumonia  3. Hypoxia  4. Rectal bleeding  5. Septicemia  -12/18 exploratory laparotomy with RITESH, reduction of closed loop SBO  -Seen by dietary and speech. Still with significant dysphagia. Eating better yesterday and today per brother  -continue heparin gtt for PE. Discussed with Dr. Darcy Lewis. Will hopefully start on eliquis today.  -Continue PT/OT  -patient continues to improve    EDUCATION  Patient educated about their illness/diagnosis, stated above, and all questions answered. We discussed the importance of nutrition, medications they are taking, and healthy lifestyle. Rommie Ilia has improved from yesterday. Pain is well controlled. OBJECTIVE  VITALS:  height is 5' 6\" (1.676 m) and weight is 125 lb 7.1 oz (56.9 kg). His oral temperature is 98.2 °F (36.8 °C). His blood pressure is 118/72 and his pulse is 95. His respiration is 18 and oxygen saturation is 95%. VITALS:  /72   Pulse 95   Temp 98.2 °F (36.8 °C) (Oral)   Resp 18   Ht 5' 6\" (1.676 m)   Wt 125 lb 7.1 oz (56.9 kg)   SpO2 95%   BMI 20.25 kg/m²   GENERAL: alert, no distress  ABDOMEN: soft, NT, ND  I/O last 3 completed shifts: In: 593.6 [P.O.:240;  I.V.:353.6]  Out: 1125 [Urine:1125]  I/O this shift:  In: -   Out: 175 [Urine:175]    LABS  Recent Labs     01/05/20  1019   WBC 10.9   HGB 9.4*   HCT 28.2*         K 3.8      CO2 25   BUN 20   CREATININE 0.8*   MG 2.00   PHOS 4.2   CALCIUM 8.7   AST 68*   *   BILITOT 0.4   BILIDIR <0.2     CBC:   Lab Results Component Value Date    WBC 10.9 01/05/2020    RBC 2.77 01/05/2020    RBC 4.68 03/13/2017    HGB 9.4 01/05/2020    HCT 28.2 01/05/2020    .0 01/05/2020    MCH 33.9 01/05/2020    MCHC 33.3 01/05/2020    RDW 14.8 01/05/2020     01/05/2020    MPV 9.6 01/05/2020     CMP:    Lab Results   Component Value Date     01/05/2020    K 3.8 01/05/2020    K 5.5 12/19/2019     01/05/2020    CO2 25 01/05/2020    BUN 20 01/05/2020    CREATININE 0.8 01/05/2020    GFRAA >60 01/05/2020    GFRAA >60 04/09/2013    AGRATIO 1.3 12/09/2019    LABGLOM >60 01/05/2020    GLUCOSE 152 01/05/2020    GLUCOSE 115 03/13/2017    PROT 6.6 01/05/2020    PROT 7.3 03/13/2017    LABALBU 2.2 01/05/2020    CALCIUM 8.7 01/05/2020    BILITOT 0.4 01/05/2020    ALKPHOS 180 01/05/2020    AST 68 01/05/2020     01/05/2020         Slime Rutherford MD  Electronically signed 1/5/2020 at 11:47 AM

## 2020-01-05 NOTE — PLAN OF CARE
Problem: Pain:  Goal: Pain level will decrease  Description  Pain level will decrease  1/5/2020 0134 by Amandeep Reed RN  Outcome: Ongoing. Voices no complaint of pain or discomfort. Able to sleep at intervals.      Problem: Pain:  Goal: Control of acute pain  Description  Control of acute pain  1/5/2020 0134 by Amandeep Reed RN  Outcome: Ongoing     Problem: Pain:  Goal: Control of chronic pain  Description  Control of chronic pain  1/5/2020 0134 by Amandeep Reed RN  Outcome: Ongoing

## 2020-01-05 NOTE — PROGRESS NOTES
Musculoskeletal:      Right lower leg: No edema. Left lower leg: No edema. Lymphadenopathy:      Cervical: No cervical adenopathy. Neurological:      Mental Status: He is alert.              CBC:   Recent Labs     01/04/20  0442 01/05/20  1019   WBC 10.7 10.9   HGB 9.2* 9.4*    419     BMP:    Recent Labs     01/03/20  0455 01/04/20  0442    141   K 4.6 4.6    100   CO2 25 28   BUN 16 18   CREATININE 0.8* 0.9   GLUCOSE 124* 130*     Hepatic:   Recent Labs     01/03/20 0455 01/04/20 0442   * 77*   * 120*   BILITOT 0.7 0.6   ALKPHOS 219* 192*        Glucose:    Recent Labs     01/03/20 0455 01/04/20 0442   GLUCOSE 124* 130*              Assessment/Plan:    Active Hospital Problems    Diagnosis Date Noted    Right pulmonary embolus (HCC) [I26.99]-has n symptoms and is on anticoagulation     Acute respiratory failure with hypoxia (HCC) [J96.01]-improving slowly and is on oxygen     Pleural effusion, bilateral [J90]-right is almost clear and large left effusion     Ischemic hepatitis [K75.9]-improving     Aspiration pneumonia of both lower lobes (HCC) [J69.0]-improved               Septic shock (HCC) [A41.9, R65.21]-resolved     SBO (small bowel obstruction) ([facility] [address] [K56.609]-stable 12/18/2019     Continue current medications  Discussed with his father      Electronically signed by Chiquita Wright MD on 1/5/2020 at 10:54 AM

## 2020-01-05 NOTE — PROGRESS NOTES
Clinical Pharmacy Note  Heparin Dosing       Lab Results   Component Value Date    APTT 82.3 01/05/2020     Lab Results   Component Value Date    HGB 9.4 01/05/2020    HCT 28.2 01/05/2020     01/05/2020    INR 1.15 12/18/2019       Current Infusion Rate: 14.7 mL/hr    Plan:  Bolus:   Rate: 13.6 mL/hr  Next aPTT: 1830 1/5    Pharmacy will continue to monitor and adjust based on aPTT results.   Cornel Tello RPh 1/5/2020 12:01 PM

## 2020-01-06 ENCOUNTER — APPOINTMENT (OUTPATIENT)
Dept: GENERAL RADIOLOGY | Age: 60
DRG: 853 | End: 2020-01-06
Payer: COMMERCIAL

## 2020-01-06 LAB
ALBUMIN SERPL-MCNC: 2.5 G/DL (ref 3.4–5)
ALP BLD-CCNC: 169 U/L (ref 40–129)
ALT SERPL-CCNC: 102 U/L (ref 10–40)
ANION GAP SERPL CALCULATED.3IONS-SCNC: 13 MMOL/L (ref 3–16)
APTT: 34.9 SEC (ref 24.2–36.2)
APTT: 56.5 SEC (ref 24.2–36.2)
APTT: 65.9 SEC (ref 24.2–36.2)
AST SERPL-CCNC: 71 U/L (ref 15–37)
BASOPHILS ABSOLUTE: 0.2 K/UL (ref 0–0.2)
BASOPHILS RELATIVE PERCENT: 1.2 %
BILIRUB SERPL-MCNC: 0.3 MG/DL (ref 0–1)
BILIRUBIN DIRECT: <0.2 MG/DL (ref 0–0.3)
BILIRUBIN, INDIRECT: ABNORMAL MG/DL (ref 0–1)
BUN BLDV-MCNC: 24 MG/DL (ref 7–20)
CALCIUM SERPL-MCNC: 8.5 MG/DL (ref 8.3–10.6)
CHLORIDE BLD-SCNC: 105 MMOL/L (ref 99–110)
CO2: 27 MMOL/L (ref 21–32)
CREAT SERPL-MCNC: 0.9 MG/DL (ref 0.9–1.3)
EOSINOPHILS ABSOLUTE: 0.2 K/UL (ref 0–0.6)
EOSINOPHILS RELATIVE PERCENT: 1.3 %
GFR AFRICAN AMERICAN: >60
GFR NON-AFRICAN AMERICAN: >60
GLUCOSE BLD-MCNC: 128 MG/DL (ref 70–99)
GLUCOSE BLD-MCNC: 131 MG/DL (ref 70–99)
GLUCOSE BLD-MCNC: 175 MG/DL (ref 70–99)
GLUCOSE BLD-MCNC: 178 MG/DL (ref 70–99)
GLUCOSE BLD-MCNC: 183 MG/DL (ref 70–99)
HCT VFR BLD CALC: 27.3 % (ref 40.5–52.5)
HEMOGLOBIN: 9 G/DL (ref 13.5–17.5)
LYMPHOCYTES ABSOLUTE: 1.7 K/UL (ref 1–5.1)
LYMPHOCYTES RELATIVE PERCENT: 10.6 %
MAGNESIUM: 1.9 MG/DL (ref 1.8–2.4)
MCH RBC QN AUTO: 33.7 PG (ref 26–34)
MCHC RBC AUTO-ENTMCNC: 33 G/DL (ref 31–36)
MCV RBC AUTO: 102.1 FL (ref 80–100)
MONOCYTES ABSOLUTE: 1.1 K/UL (ref 0–1.3)
MONOCYTES RELATIVE PERCENT: 6.8 %
NEUTROPHILS ABSOLUTE: 12.7 K/UL (ref 1.7–7.7)
NEUTROPHILS RELATIVE PERCENT: 80.1 %
PDW BLD-RTO: 14.4 % (ref 12.4–15.4)
PERFORMED ON: ABNORMAL
PHOSPHORUS: 4.2 MG/DL (ref 2.5–4.9)
PLATELET # BLD: 452 K/UL (ref 135–450)
PMV BLD AUTO: 9.7 FL (ref 5–10.5)
POTASSIUM SERPL-SCNC: 4.1 MMOL/L (ref 3.5–5.1)
PROCALCITONIN: 0.13 NG/ML (ref 0–0.15)
RBC # BLD: 2.67 M/UL (ref 4.2–5.9)
SODIUM BLD-SCNC: 145 MMOL/L (ref 136–145)
TOTAL PROTEIN: 6.6 G/DL (ref 6.4–8.2)
WBC # BLD: 15.8 K/UL (ref 4–11)

## 2020-01-06 PROCEDURE — 6360000002 HC RX W HCPCS: Performed by: INTERNAL MEDICINE

## 2020-01-06 PROCEDURE — 99233 SBSQ HOSP IP/OBS HIGH 50: CPT | Performed by: INTERNAL MEDICINE

## 2020-01-06 PROCEDURE — 80069 RENAL FUNCTION PANEL: CPT

## 2020-01-06 PROCEDURE — 36415 COLL VENOUS BLD VENIPUNCTURE: CPT

## 2020-01-06 PROCEDURE — 94761 N-INVAS EAR/PLS OXIMETRY MLT: CPT

## 2020-01-06 PROCEDURE — 97129 THER IVNTJ 1ST 15 MIN: CPT

## 2020-01-06 PROCEDURE — 2700000000 HC OXYGEN THERAPY PER DAY

## 2020-01-06 PROCEDURE — APPSS15 APP SPLIT SHARED TIME 0-15 MINUTES: Performed by: PHYSICIAN ASSISTANT

## 2020-01-06 PROCEDURE — 2060000000 HC ICU INTERMEDIATE R&B

## 2020-01-06 PROCEDURE — 99024 POSTOP FOLLOW-UP VISIT: CPT | Performed by: PHYSICIAN ASSISTANT

## 2020-01-06 PROCEDURE — 85025 COMPLETE CBC W/AUTO DIFF WBC: CPT

## 2020-01-06 PROCEDURE — 80076 HEPATIC FUNCTION PANEL: CPT

## 2020-01-06 PROCEDURE — 83735 ASSAY OF MAGNESIUM: CPT

## 2020-01-06 PROCEDURE — APPNB30 APP NON BILLABLE TIME 0-30 MINS: Performed by: PHYSICIAN ASSISTANT

## 2020-01-06 PROCEDURE — 2580000003 HC RX 258: Performed by: INTERNAL MEDICINE

## 2020-01-06 PROCEDURE — 6360000002 HC RX W HCPCS: Performed by: NURSE PRACTITIONER

## 2020-01-06 PROCEDURE — 94640 AIRWAY INHALATION TREATMENT: CPT

## 2020-01-06 PROCEDURE — 99232 SBSQ HOSP IP/OBS MODERATE 35: CPT | Performed by: INTERNAL MEDICINE

## 2020-01-06 PROCEDURE — 6370000000 HC RX 637 (ALT 250 FOR IP): Performed by: SURGERY

## 2020-01-06 PROCEDURE — 97530 THERAPEUTIC ACTIVITIES: CPT | Performed by: PHYSICAL THERAPIST

## 2020-01-06 PROCEDURE — 6370000000 HC RX 637 (ALT 250 FOR IP): Performed by: INTERNAL MEDICINE

## 2020-01-06 PROCEDURE — 71046 X-RAY EXAM CHEST 2 VIEWS: CPT

## 2020-01-06 PROCEDURE — 84145 PROCALCITONIN (PCT): CPT

## 2020-01-06 PROCEDURE — 89051 BODY FLUID CELL COUNT: CPT

## 2020-01-06 PROCEDURE — 92526 ORAL FUNCTION THERAPY: CPT

## 2020-01-06 PROCEDURE — 99024 POSTOP FOLLOW-UP VISIT: CPT | Performed by: SURGERY

## 2020-01-06 PROCEDURE — C9113 INJ PANTOPRAZOLE SODIUM, VIA: HCPCS | Performed by: NURSE PRACTITIONER

## 2020-01-06 PROCEDURE — 97116 GAIT TRAINING THERAPY: CPT | Performed by: PHYSICAL THERAPIST

## 2020-01-06 PROCEDURE — 2580000003 HC RX 258: Performed by: NURSE PRACTITIONER

## 2020-01-06 PROCEDURE — 85730 THROMBOPLASTIN TIME PARTIAL: CPT

## 2020-01-06 RX ORDER — HEPARIN SODIUM 10000 [USP'U]/100ML
11 INJECTION, SOLUTION INTRAVENOUS CONTINUOUS
Status: DISCONTINUED | OUTPATIENT
Start: 2020-01-06 | End: 2020-01-13

## 2020-01-06 RX ORDER — HEPARIN SODIUM 1000 [USP'U]/ML
4600 INJECTION, SOLUTION INTRAVENOUS; SUBCUTANEOUS PRN
Status: DISCONTINUED | OUTPATIENT
Start: 2020-01-06 | End: 2020-01-14 | Stop reason: HOSPADM

## 2020-01-06 RX ORDER — HEPARIN SODIUM 1000 [USP'U]/ML
40 INJECTION, SOLUTION INTRAVENOUS; SUBCUTANEOUS PRN
Status: DISCONTINUED | OUTPATIENT
Start: 2020-01-06 | End: 2020-01-06

## 2020-01-06 RX ADMIN — LEVALBUTEROL HYDROCHLORIDE 1.25 MG: 1.25 SOLUTION, CONCENTRATE RESPIRATORY (INHALATION) at 12:42

## 2020-01-06 RX ADMIN — PANTOPRAZOLE SODIUM 40 MG: 40 INJECTION, POWDER, FOR SOLUTION INTRAVENOUS at 09:13

## 2020-01-06 RX ADMIN — ISODIUM CHLORIDE 3 ML: 0.03 SOLUTION RESPIRATORY (INHALATION) at 16:55

## 2020-01-06 RX ADMIN — LEVALBUTEROL HYDROCHLORIDE 1.25 MG: 1.25 SOLUTION, CONCENTRATE RESPIRATORY (INHALATION) at 16:55

## 2020-01-06 RX ADMIN — HEPARIN SODIUM 13.6 ML/HR: 10000 INJECTION, SOLUTION INTRAVENOUS at 10:59

## 2020-01-06 RX ADMIN — LEVALBUTEROL HYDROCHLORIDE 1.25 MG: 1.25 SOLUTION, CONCENTRATE RESPIRATORY (INHALATION) at 19:24

## 2020-01-06 RX ADMIN — HEPARIN SODIUM 4600 UNITS: 1000 INJECTION INTRAVENOUS; SUBCUTANEOUS at 10:56

## 2020-01-06 RX ADMIN — ISODIUM CHLORIDE 3 ML: 0.03 SOLUTION RESPIRATORY (INHALATION) at 12:42

## 2020-01-06 RX ADMIN — INSULIN LISPRO 3 UNITS: 100 INJECTION, SOLUTION INTRAVENOUS; SUBCUTANEOUS at 13:37

## 2020-01-06 RX ADMIN — SODIUM CHLORIDE, PRESERVATIVE FREE 10 ML: 5 INJECTION INTRAVENOUS at 09:14

## 2020-01-06 RX ADMIN — INSULIN LISPRO 3 UNITS: 100 INJECTION, SOLUTION INTRAVENOUS; SUBCUTANEOUS at 18:44

## 2020-01-06 RX ADMIN — ACETAMINOPHEN 650 MG: 650 SUPPOSITORY RECTAL at 20:49

## 2020-01-06 RX ADMIN — LEVALBUTEROL HYDROCHLORIDE 1.25 MG: 1.25 SOLUTION, CONCENTRATE RESPIRATORY (INHALATION) at 08:50

## 2020-01-06 RX ADMIN — INSULIN LISPRO 3 UNITS: 100 INJECTION, SOLUTION INTRAVENOUS; SUBCUTANEOUS at 21:38

## 2020-01-06 RX ADMIN — Medication 10 ML: at 09:13

## 2020-01-06 RX ADMIN — ISODIUM CHLORIDE 3 ML: 0.03 SOLUTION RESPIRATORY (INHALATION) at 08:50

## 2020-01-06 ASSESSMENT — PAIN SCALES - GENERAL: PAINLEVEL_OUTOF10: 0

## 2020-01-06 NOTE — PROGRESS NOTES
pulmonary arteries  are obscured. Interval decreased right pleural effusion. Interval increased left pleural effusion with increased dependent left lung  consolidation, likely passive atelectasis. The aerated lungs show patchy airspace disease, similar to the prior study,  greatest in the right upper lobe. Findings were discussed with MATTHEW Agapito Brunner at 1:14 pm on 1/1/2020. CXR PA/LAT:   Results for orders placed during the hospital encounter of 01/24/19   XR CHEST STANDARD (2 VW)    Narrative EXAMINATION:  TWO VIEWS OF THE CHEST    1/24/2019 9:42 am    COMPARISON:  01/12/2019. HISTORY:  ORDERING SYSTEM PROVIDED HISTORY: Pacemaker lead malfunction, initial  encounter  TECHNOLOGIST PROVIDED HISTORY:  Reason for exam:->possible pacemaker lead dislodgement  Ordering Physician Provided Reason for Exam: possible pacemaker lead  malfuction  Acuity: Acute  Type of Exam: Initial  Relevant Medical/Surgical History: hx of colon cancer    FINDINGS:  The heart size is within normal limits. The pulmonary vasculature is also  within normal limits. No acute infiltrates are seen. The costophrenic angles  are sharp bilaterally. No pneumothoraces are noted. There is a left subclavian  AICD. Impression 1. No active pulmonary disease. CXR portable:   Results for orders placed during the hospital encounter of 12/18/19   XR CHEST PORTABLE    Narrative EXAMINATION:  ONE XRAY VIEW OF THE CHEST    1/3/2020 12:24 am    COMPARISON:  Prior studies including 12/31/2019 chest x-ray and CT 01/01/2020    HISTORY:  ORDERING SYSTEM PROVIDED HISTORY: SOB  TECHNOLOGIST PROVIDED HISTORY:  Reason for exam:->SOB  Reason for Exam: very sob  Acuity: Acute  Type of Exam: Initial    FINDINGS:  A right arm PICC and left subclavian cardiac device are unchanged. No acute  bony abnormality. There remains near complete opacification of the left hemithorax compatible  with large effusion and airspace disease.

## 2020-01-06 NOTE — PROGRESS NOTES
General and Vascular Surgery                                                           Daily Progress Note                                                             Ulysses Beams, MD    Pt Name: Edith You  Medical Record Number: [id number]  Date of Birth 1960   Today's Date: 1/6/2020      ASSESSMENT/PLAN  1. Small bowel obstruction, hypovolemic shock  2. Aspiration pneumonia  3. Hypoxia  4. Rectal bleeding  5. Septicemia  -12/18 exploratory laparotomy with RITESH, reduction of closed loop SBO  -Seen by dietary and speech. Still with significant dysphagia. Monitor PO intake, brother feels he is eating better  -continue heparin gtt for PE. Discussed with Dr. Henna Tim.    -Continue PT/OT  -patient continues to improve, said that he has abdominal pain but nothing new  -Moderate left-sided pleural  effusion perhaps slightly decreased in volume.  Persistent airspace changes  in the left mid and lower lung zone also slightly improved. -To get Thoracentesis    EDUCATION  Patient educated about their illness/diagnosis, stated above, and all questions answered. We discussed the importance of nutrition, medications they are taking, and healthy lifestyle. Kenna Anthony has improved from yesterday. Pain is well controlled. OBJECTIVE  VITALS:  height is 5' 6\" (1.676 m) and weight is 123 lb 14.4 oz (56.2 kg). His axillary temperature is 98.2 °F (36.8 °C). His blood pressure is 96/66 and his pulse is 105. His respiration is 20 and oxygen saturation is 98%. VITALS:  BP 96/66   Pulse 105   Temp 98.2 °F (36.8 °C) (Axillary)   Resp 20   Ht 5' 6\" (1.676 m)   Wt 123 lb 14.4 oz (56.2 kg)   SpO2 98%   BMI 20.00 kg/m²   GENERAL: alert, no distress  ABDOMEN: soft, NT, ND  I/O last 3 completed shifts: In: 46 [P.O.:236;  I.V.:151]  Out: 275 [Urine:275]  I/O this shift:  In: -   Out: 250 [Urine:250]    LABS  Recent Labs     01/06/20  0434   WBC 15.8* HGB 9.0*   HCT 27.3*   *      K 4.1      CO2 27   BUN 24*   CREATININE 0.9   MG 1.90   PHOS 4.2   CALCIUM 8.5   AST 71*   *   BILITOT 0.3   BILIDIR <0.2     CBC:   Lab Results   Component Value Date    WBC 15.8 01/06/2020    RBC 2.67 01/06/2020    RBC 4.68 03/13/2017    HGB 9.0 01/06/2020    HCT 27.3 01/06/2020    .1 01/06/2020    MCH 33.7 01/06/2020    MCHC 33.0 01/06/2020    RDW 14.4 01/06/2020     01/06/2020    MPV 9.7 01/06/2020     CMP:    Lab Results   Component Value Date     01/06/2020    K 4.1 01/06/2020    K 5.5 12/19/2019     01/06/2020    CO2 27 01/06/2020    BUN 24 01/06/2020    CREATININE 0.9 01/06/2020    GFRAA >60 01/06/2020    GFRAA >60 04/09/2013    AGRATIO 1.3 12/09/2019    LABGLOM >60 01/06/2020    GLUCOSE 131 01/06/2020    GLUCOSE 115 03/13/2017    PROT 6.6 01/06/2020    PROT 7.3 03/13/2017    LABALBU 2.5 01/06/2020    CALCIUM 8.5 01/06/2020    BILITOT 0.3 01/06/2020    ALKPHOS 169 01/06/2020    AST 71 01/06/2020     01/06/2020         Byron Lara PA-C  Electronically signed 1/6/2020 at 1:43 PM     As above  Seems to be doing better  Incision healing well  Abdomen soft    Continue supportive care    Electronically signed by Bola Godfrey MD on 1/6/2020 at 4:31 PM

## 2020-01-06 NOTE — PROGRESS NOTES
Physical Therapy  Facility/Department: [facility] 5W PROGRESSIVE CARE  Daily Treatment Note  NAME: Savana Centeno  : 1960  MRN: [id number]    Date of Service: 2020    Discharge Recommendations:  Patient would benefit from continued therapy after discharge, 3-5 sessions per week   PT Equipment Recommendations  Equipment Needed: Yes  Other: Will monitor for potential equipt needs. Savana Centeno scored a 14/24 on the AM-PAC short mobility form. Current research shows that an AM-PAC score of 17 or less is typically not associated with a discharge to the patient's home setting. Based on the patients AM-PAC score and their current functional mobility deficits, it is recommended that the patient have 3-5 sessions per week of Physical Therapy at d/c to increase the patients independence. Assessment   Body structures, Functions, Activity limitations: Decreased functional mobility ; Decreased strength;Decreased safe awareness;Decreased cognition;Decreased endurance  Assessment: 63 y/o male admit 19 with SBO, Rectal Bleed, Pneumonia, Septicemia. -19 Pt Intubated. 19 S/P Exp Lap, Lysis of Adhesions with Reduction of Closed Loop Small Bowel Obstruction. PMH as noted including Colon Ca, Partial Colectomy, Syncope/Collapse, Pacemaker, Down's Syndrome, Gout. PTA pt living with family (brother, s-i-l) in trilevel home; independent with self care and functional mobility (without assist device) per family report. Family does report gradual onset forgetful/confusion over past yr. Pt impulsive at times but tolerated amb short distances in room with RW with min/mod A of 1-2; pt unsteady and needing assist for all mobility tasks; Feel pt will need continued therapy at discharge to address deficits to allow pt to return home with family   Prognosis: Good  PT Education: General Safety  Barriers to Learning: Cognitive.    REQUIRES PT FOLLOW UP: Yes  Activity Tolerance  Activity Tolerance: Patient Tolerated treatment well;Patient limited by endurance; Patient limited by cognitive status     Patient Diagnosis(es): The primary encounter diagnosis was Small bowel obstruction ([address] Diagnoses of Aspiration pneumonia of both lower lobes, unspecified aspiration pneumonia type ([address] Hypoxia, Rectal bleeding, and Septicemia ([address] were also pertinent to this visit. has a past medical history of Acne rosacea, Cognitive impairment, Colon cancer ([address] Down syndrome, Gout, HIGH CHOLESTEROL, History of diarrhea, Macrocytosis, Seborrheic dermatitis, Seizure disorder ([address] Status post biventricular cardiac pacemaker insertion, Status post partial colectomy, Syncope and collapse, and Weight loss. has a past surgical history that includes Cataract removal with implant (2007); Colon surgery (1/7//2014); Colonoscopy (02/27/2017); pacemaker placement; Colonoscopy (N/A, 8/21/2019); and Small intestine surgery (N/A, 12/18/2019). Restrictions  Restrictions/Precautions  Restrictions/Precautions: Fall Risk  Position Activity Restriction  Other position/activity restrictions: O2 3 L via NC. Pt MRDD although independent care/mobility pta. Diet : Full Liquid, Dysphagia Pureed (Pudding Thick). Subjective   General  Chart Reviewed: Yes  Additional Pertinent Hx: 63 y/o male admit 12/18/19 with SBO, Rectal Bleed, Pneumonia, Septicemia. 12/18-12/27/19 Pt Intubated. 12/18/19 S/P Exp Lap, Lysis of Adhesions with Reduction of Closed Loop Small Bowel Obstruction. PMH as noted including Colon Ca, Partial Colectomy, Syncope/Collapse, Pacemaker, Down's Syndrome, Gout. Response To Previous Treatment: Patient with no complaints from previous session.   Family / Caregiver Present: Yes(brother in room)  Referring Practitioner: Dr. Tanya Lomeli: pt very pleasant and agreeable to getting up with therapy and nurse; denies any pain          Orientation  Orientation  Orientation Level: Oriented to person;Oriented to

## 2020-01-06 NOTE — PROGRESS NOTES
Speech Language Pathology  [facility]   Speech Therapy  Daily Dysphagia Treatment Note    Mayte Pizarro  AGE: 64 y.o. GENDER: male  : 1960  [id number]  EPISODE DATE:  2019     Patient Active Problem List   Diagnosis    Down's syndrome    Gout    Acne rosacea    Seborrheic dermatitis    High cholesterol    Colon cancer, ascending ([address]    Personal history of colon cancer, stage II    Bladder wall thickening    Weight loss    Status post partial colectomy    Chronic diarrhea    Macrocytosis    New onset seizure (HCC)    SSS (sick sinus syndrome) ([address]    S/P placement of cardiac pacemaker    Bradycardia    Seizure disorder (HCC)    Status post biventricular cardiac pacemaker insertion    Memory loss    Cognitive impairment    Colon cancer (HCC)    Down syndrome    SBO (small bowel obstruction) (HCC)    Aspiration pneumonia of both lower lobes (HCC)    Hypoxia    Septicemia ([address]    Septic shock (HCC)    Acute respiratory failure with hypoxia (HCC)    Pleural effusion, bilateral    Ischemic hepatitis    Right pulmonary embolus (HCC)     Allergies   Allergen Reactions    Penicillins      rash     Treatment Diagnosis: Dysphagia     Chart review:   2019 admitted with SBO  2019 exploratory laparotomy; remained intubated  2019 extubated to vapotherm  2019 BSE rec puree/thin  2019 overnight: persistent tachycardia and tachypnea; worsening left-sided airspace disease; rapid response called  2020 R pulmonary embolus confirmed   2020 surgery downgraded diet to puree/nectar d/t concern for aspiration  2020 SLP rec NPO pending MBS  1/3/2020 verbal order for MBS received from MD  2020 Pulmonology notes:  MBS shows aspiration, I am concerned this will be an ongoing issue with what appears to be a progressing dementia on top of his Downs.  may be appropriate for palliative consult, but brother seems in denial to some pacing    Comments regarding tolerating Current Diet:   adequate tolerance reported; brother reports improved tolerance with downgrade    Objective:     Pain   Patient Currently in Pain: Denies    Cognitive/Behavior   Behavior/Cognition: Alert, Cooperative, Pleasant mood, Distractible, Requires cueing    Presentations   Consistencies Administered:  Puree, Pudding    Positioning   Upright in bed    PO Trials:  Puree - teaspoon: prolonged oral transit; concern for premature bolus loss to the pharynx; delayed swallow; decreased laryngeal elevation; no cough or throat clear; no vocal quality changes  Pudding - teaspoon: prolonged oral transit; concern for premature bolus loss to the pharynx; delayed swallow; decreased laryngeal elevation; no cough or throat clear; no vocal quality changes    Dysphagia Tx:   · PO trials: appears to be safely tolerating current diet. Improved breathing quality noted  · Comp strats: handout provided with comp strats listed. Patient /family education completed - family indicates understanding  · Swallow ex: max cues for lingula protrusion and elevation; max cues for effortful swallow; unable to stimulate pitch swing or yawn. Goals:   Dysphagia Goals: The patient will tolerate recommended diet without observed clinical signs of aspiration;continue  The patient/caregiver will demonstrate understanding of compensatory strategies for improved swallowing safety. continue  The patient will improve swallow function via swallow ex completed 5/5 each continue - MD request dysphagia tx continue      Assessment:   Impressions:   Dysphagia Diagnosis:   Severe oropharyngeal dysphagia as noted above. Initiated swallow ex: patient requires max cues ,but suspect decreased cueing and increased receptiveness  As attempts continue and as ex become more familiar.   Continue ST    Diet Recommendations:  Solid consistency: Dysphagia Pureed (Dysphagia I)(VIA TEASPOON; REPEAT SWALLOW)  Liquid consistency: Extremely Thick (Spoon-Pudding)(VIA TEASPOON; REPEAT SWALLOW)  Liquid administration via: Spoon  Medication administration: Meds in puree(CRUSHED)  Supervision: 1:1  Compensatory Swallowing Strategies:   · Small bites/sips;  · Eat/Feed slowly;  · Remain upright for 30-45 minutes after meals;  · Upright as possible for all oral intake;  · No straws;  · Total feed;  · Liquid by spoon only;  · Swallow 2 times per bite/sip;  · External pacing    Education:  Consulted and agree with results and recommendations: Patient, RN  Patient Education: Completed on results/recs/plan  Patient Education Response: No evidence of learning    Prognosis:   Guarded for dysphagia     Plan:     Continue Dysphagia Therapy: yes  Interventions: Therapeutic Interventions: Patient/Family education, Diet tolerance monitoring, Therapeutic PO trials with SLP, Oral motor exercises, Pharyngeal exercises, Laryngeal exercises  Duration/Frequency of therapy while on unit: Duration/Frequency of Treatment  Duration/Frequency of Treatment: ST to tx 3-5 times per week for dyspahgia during acute admission  Discharge Instructions:   Anticipate Yes for further skilled Speech Therapy for Dysphagia at discharge    This note serves as a D/C Summary in the event that this patient is discharged prior to the next therapy session. Coded treatment time: 15  Total treatment time: [address] [facility].  Nikos Ledezma, #[id number]  Speech-Language Pathologist  1/6/2020 at 3:52 PM

## 2020-01-06 NOTE — PLAN OF CARE
Problem: Restraint Use - Nonviolent/Non-Self-Destructive Behavior:  Goal: Absence of restraint indications  Description  Absence of restraint indications  Outcome: Ongoing  Goal: Absence of restraint-related injury  Description  Absence of restraint-related injury  Outcome: Ongoing     Problem: Nutrition  Goal: Optimal nutrition therapy  Outcome: Ongoing     Problem: Risk for Impaired Skin Integrity  Goal: Tissue integrity - skin and mucous membranes  Description  Structural intactness and normal physiological function of skin and  mucous membranes.   Outcome: Ongoing     Problem: Falls - Risk of:  Goal: Will remain free from falls  Description  Will remain free from falls  Outcome: Ongoing  Goal: Absence of physical injury  Description  Absence of physical injury  Outcome: Ongoing     Problem: Urinary Elimination:  Goal: Signs and symptoms of infection will decrease  Description  Signs and symptoms of infection will decrease  Outcome: Ongoing  Goal: Complications related to the disease process, condition or treatment will be avoided or minimized  Description  Complications related to the disease process, condition or treatment will be avoided or minimized  Outcome: Ongoing     Problem: Infection - Central Venous Catheter-Associated Bloodstream Infection:  Goal: Will show no infection signs and symptoms  Description  Will show no infection signs and symptoms  Outcome: Ongoing     Problem: Confusion - Acute:  Goal: Absence of continued neurological deterioration signs and symptoms  Description  Absence of continued neurological deterioration signs and symptoms  Outcome: Ongoing  Goal: Mental status will be restored to baseline  Description  Mental status will be restored to baseline  Outcome: Ongoing     Problem: Discharge Planning:  Goal: Ability to perform activities of daily living will improve  Description  Ability to perform activities of daily living will improve  Outcome: Ongoing  Goal: Participates in care Ongoing     Problem: Pain:  Goal: Pain level will decrease  Description  Pain level will decrease  Outcome: Ongoing  Goal: Control of acute pain  Description  Control of acute pain  Outcome: Ongoing  Goal: Control of chronic pain  Description  Control of chronic pain  Outcome: Ongoing

## 2020-01-07 ENCOUNTER — APPOINTMENT (OUTPATIENT)
Dept: CT IMAGING | Age: 60
DRG: 853 | End: 2020-01-07
Payer: COMMERCIAL

## 2020-01-07 ENCOUNTER — APPOINTMENT (OUTPATIENT)
Dept: GENERAL RADIOLOGY | Age: 60
DRG: 853 | End: 2020-01-07
Payer: COMMERCIAL

## 2020-01-07 LAB
ALBUMIN SERPL-MCNC: 2.6 G/DL (ref 3.4–5)
ALP BLD-CCNC: 170 U/L (ref 40–129)
ALT SERPL-CCNC: 128 U/L (ref 10–40)
ANION GAP SERPL CALCULATED.3IONS-SCNC: 18 MMOL/L (ref 3–16)
APPEARANCE FLUID: CLEAR
APTT: 56.4 SEC (ref 24.2–36.2)
APTT: 65 SEC (ref 24.2–36.2)
AST SERPL-CCNC: 98 U/L (ref 15–37)
BASO FLUID: 1 %
BASOPHILS ABSOLUTE: 0.3 K/UL (ref 0–0.2)
BASOPHILS RELATIVE PERCENT: 1.6 %
BILIRUB SERPL-MCNC: 0.3 MG/DL (ref 0–1)
BILIRUBIN DIRECT: <0.2 MG/DL (ref 0–0.3)
BILIRUBIN URINE: NEGATIVE
BILIRUBIN, INDIRECT: ABNORMAL MG/DL (ref 0–1)
BLOOD, URINE: NEGATIVE
BUN BLDV-MCNC: 20 MG/DL (ref 7–20)
CALCIUM SERPL-MCNC: 8.5 MG/DL (ref 8.3–10.6)
CELL COUNT FLUID TYPE: NORMAL
CHLORIDE BLD-SCNC: 105 MMOL/L (ref 99–110)
CLARITY: CLEAR
CLOT EVALUATION: NORMAL
CO2: 23 MMOL/L (ref 21–32)
COLOR FLUID: YELLOW
COLOR: ABNORMAL
CREAT SERPL-MCNC: 1.1 MG/DL (ref 0.9–1.3)
EOSINOPHILS ABSOLUTE: 0.3 K/UL (ref 0–0.6)
EOSINOPHILS RELATIVE PERCENT: 1.7 %
EPITHELIAL CELLS, UA: 1 /HPF (ref 0–5)
FLUID TYPE: NORMAL
GFR AFRICAN AMERICAN: >60
GFR NON-AFRICAN AMERICAN: >60
GLUCOSE BLD-MCNC: 113 MG/DL (ref 70–99)
GLUCOSE BLD-MCNC: 118 MG/DL (ref 70–99)
GLUCOSE BLD-MCNC: 134 MG/DL (ref 70–99)
GLUCOSE BLD-MCNC: 148 MG/DL (ref 70–99)
GLUCOSE BLD-MCNC: 154 MG/DL (ref 70–99)
GLUCOSE URINE: NEGATIVE MG/DL
HCT VFR BLD CALC: 26.4 % (ref 40.5–52.5)
HEMOGLOBIN: 8.6 G/DL (ref 13.5–17.5)
HYALINE CASTS: 3 /LPF (ref 0–8)
KETONES, URINE: NEGATIVE MG/DL
LACTIC ACID: 2.6 MMOL/L (ref 0.4–2)
LD, FLUID: 220 U/L
LEUKOCYTE ESTERASE, URINE: NEGATIVE
LYMPHOCYTES ABSOLUTE: 1.6 K/UL (ref 1–5.1)
LYMPHOCYTES RELATIVE PERCENT: 8.7 %
LYMPHOCYTES, BODY FLUID: 28 %
MACROPHAGE FLUID: 46 %
MAGNESIUM: 2 MG/DL (ref 1.8–2.4)
MCH RBC QN AUTO: 33.7 PG (ref 26–34)
MCHC RBC AUTO-ENTMCNC: 32.7 G/DL (ref 31–36)
MCV RBC AUTO: 103.1 FL (ref 80–100)
MICROSCOPIC EXAMINATION: YES
MONOCYTE, FLUID: 5 %
MONOCYTES ABSOLUTE: 1 K/UL (ref 0–1.3)
MONOCYTES RELATIVE PERCENT: 5.6 %
NEUTROPHIL, FLUID: 20 %
NEUTROPHILS ABSOLUTE: 15.2 K/UL (ref 1.7–7.7)
NEUTROPHILS RELATIVE PERCENT: 82.4 %
NITRITE, URINE: NEGATIVE
NUCLEATED CELLS FLUID: 57 /CUMM
NUMBER OF CELLS COUNTED FLUID: 100
PDW BLD-RTO: 14.8 % (ref 12.4–15.4)
PERFORMED ON: ABNORMAL
PH UA: 5.5 (ref 5–8)
PHOSPHORUS: 4.1 MG/DL (ref 2.5–4.9)
PLATELET # BLD: 484 K/UL (ref 135–450)
PMV BLD AUTO: 9.9 FL (ref 5–10.5)
POTASSIUM SERPL-SCNC: 3.5 MMOL/L (ref 3.5–5.1)
PROCALCITONIN: 0.14 NG/ML (ref 0–0.15)
PROCALCITONIN: 0.15 NG/ML (ref 0–0.15)
PROTEIN FLUID: 4.6 G/DL
PROTEIN UA: ABNORMAL MG/DL
RBC # BLD: 2.56 M/UL (ref 4.2–5.9)
RBC FLUID: 862 /CUMM
RBC UA: 9 /HPF (ref 0–4)
SODIUM BLD-SCNC: 146 MMOL/L (ref 136–145)
SPECIFIC GRAVITY UA: 1.03 (ref 1–1.03)
TOTAL PROTEIN: 6.8 G/DL (ref 6.4–8.2)
URINE REFLEX TO CULTURE: ABNORMAL
URINE TYPE: ABNORMAL
UROBILINOGEN, URINE: 1 E.U./DL
VOLUME: 6 ML
WBC # BLD: 18.5 K/UL (ref 4–11)
WBC UA: 2 /HPF (ref 0–5)

## 2020-01-07 PROCEDURE — 87070 CULTURE OTHR SPECIMN AEROBIC: CPT

## 2020-01-07 PROCEDURE — 2580000003 HC RX 258: Performed by: INTERNAL MEDICINE

## 2020-01-07 PROCEDURE — 94761 N-INVAS EAR/PLS OXIMETRY MLT: CPT

## 2020-01-07 PROCEDURE — 81001 URINALYSIS AUTO W/SCOPE: CPT

## 2020-01-07 PROCEDURE — 87040 BLOOD CULTURE FOR BACTERIA: CPT

## 2020-01-07 PROCEDURE — 85025 COMPLETE CBC W/AUTO DIFF WBC: CPT

## 2020-01-07 PROCEDURE — 85730 THROMBOPLASTIN TIME PARTIAL: CPT

## 2020-01-07 PROCEDURE — 6360000002 HC RX W HCPCS: Performed by: INTERNAL MEDICINE

## 2020-01-07 PROCEDURE — 83615 LACTATE (LD) (LDH) ENZYME: CPT

## 2020-01-07 PROCEDURE — 99233 SBSQ HOSP IP/OBS HIGH 50: CPT | Performed by: INTERNAL MEDICINE

## 2020-01-07 PROCEDURE — 6360000002 HC RX W HCPCS: Performed by: RADIOLOGY

## 2020-01-07 PROCEDURE — 94640 AIRWAY INHALATION TREATMENT: CPT

## 2020-01-07 PROCEDURE — 2700000000 HC OXYGEN THERAPY PER DAY

## 2020-01-07 PROCEDURE — 80069 RENAL FUNCTION PANEL: CPT

## 2020-01-07 PROCEDURE — 80076 HEPATIC FUNCTION PANEL: CPT

## 2020-01-07 PROCEDURE — 83605 ASSAY OF LACTIC ACID: CPT

## 2020-01-07 PROCEDURE — 83735 ASSAY OF MAGNESIUM: CPT

## 2020-01-07 PROCEDURE — APPSS15 APP SPLIT SHARED TIME 0-15 MINUTES: Performed by: PHYSICIAN ASSISTANT

## 2020-01-07 PROCEDURE — 6370000000 HC RX 637 (ALT 250 FOR IP): Performed by: INTERNAL MEDICINE

## 2020-01-07 PROCEDURE — 2060000000 HC ICU INTERMEDIATE R&B

## 2020-01-07 PROCEDURE — C1729 CATH, DRAINAGE: HCPCS

## 2020-01-07 PROCEDURE — 87205 SMEAR GRAM STAIN: CPT

## 2020-01-07 PROCEDURE — 77012 CT SCAN FOR NEEDLE BIOPSY: CPT

## 2020-01-07 PROCEDURE — 97116 GAIT TRAINING THERAPY: CPT | Performed by: PHYSICAL THERAPIST

## 2020-01-07 PROCEDURE — 36415 COLL VENOUS BLD VENIPUNCTURE: CPT

## 2020-01-07 PROCEDURE — 97530 THERAPEUTIC ACTIVITIES: CPT | Performed by: PHYSICAL THERAPIST

## 2020-01-07 PROCEDURE — 84145 PROCALCITONIN (PCT): CPT

## 2020-01-07 PROCEDURE — 99232 SBSQ HOSP IP/OBS MODERATE 35: CPT | Performed by: INTERNAL MEDICINE

## 2020-01-07 PROCEDURE — APPNB30 APP NON BILLABLE TIME 0-30 MINS: Performed by: PHYSICIAN ASSISTANT

## 2020-01-07 PROCEDURE — C9113 INJ PANTOPRAZOLE SODIUM, VIA: HCPCS | Performed by: INTERNAL MEDICINE

## 2020-01-07 PROCEDURE — 71045 X-RAY EXAM CHEST 1 VIEW: CPT

## 2020-01-07 PROCEDURE — 84157 ASSAY OF PROTEIN OTHER: CPT

## 2020-01-07 RX ORDER — FENTANYL CITRATE 50 UG/ML
INJECTION, SOLUTION INTRAMUSCULAR; INTRAVENOUS DAILY PRN
Status: COMPLETED | OUTPATIENT
Start: 2020-01-07 | End: 2020-01-07

## 2020-01-07 RX ORDER — MIDAZOLAM HYDROCHLORIDE 1 MG/ML
INJECTION INTRAMUSCULAR; INTRAVENOUS DAILY PRN
Status: COMPLETED | OUTPATIENT
Start: 2020-01-07 | End: 2020-01-07

## 2020-01-07 RX ADMIN — FENTANYL CITRATE 25 MCG: 50 INJECTION INTRAMUSCULAR; INTRAVENOUS at 08:38

## 2020-01-07 RX ADMIN — MEROPENEM 500 MG: 500 INJECTION, POWDER, FOR SOLUTION INTRAVENOUS at 21:35

## 2020-01-07 RX ADMIN — LEVALBUTEROL HYDROCHLORIDE 1.25 MG: 1.25 SOLUTION, CONCENTRATE RESPIRATORY (INHALATION) at 19:49

## 2020-01-07 RX ADMIN — PANTOPRAZOLE SODIUM 40 MG: 40 INJECTION, POWDER, FOR SOLUTION INTRAVENOUS at 10:12

## 2020-01-07 RX ADMIN — Medication 10 ML: at 10:12

## 2020-01-07 RX ADMIN — MEROPENEM 500 MG: 500 INJECTION, POWDER, FOR SOLUTION INTRAVENOUS at 10:12

## 2020-01-07 RX ADMIN — LEVALBUTEROL HYDROCHLORIDE 1.25 MG: 1.25 SOLUTION, CONCENTRATE RESPIRATORY (INHALATION) at 16:12

## 2020-01-07 RX ADMIN — HEPARIN SODIUM 13.6 ML/HR: 10000 INJECTION, SOLUTION INTRAVENOUS at 02:38

## 2020-01-07 RX ADMIN — INSULIN LISPRO 3 UNITS: 100 INJECTION, SOLUTION INTRAVENOUS; SUBCUTANEOUS at 18:11

## 2020-01-07 RX ADMIN — MIDAZOLAM 0.5 MG: 1 INJECTION INTRAMUSCULAR; INTRAVENOUS at 08:38

## 2020-01-07 RX ADMIN — LEVALBUTEROL HYDROCHLORIDE 1.25 MG: 1.25 SOLUTION, CONCENTRATE RESPIRATORY (INHALATION) at 12:59

## 2020-01-07 RX ADMIN — MEROPENEM 500 MG: 500 INJECTION, POWDER, FOR SOLUTION INTRAVENOUS at 16:15

## 2020-01-07 NOTE — PROGRESS NOTES
Nutrition Assessment    Type and Reason for Visit: Reassess    Nutrition Recommendations:   Continue dysphagia pureed extreamly thick liquid diet. Continue Magic Cup tid  Monitor meal and supplement intake  Monitor weight, bowels, pertinent labs  Monitor progress with SLP tx    Nutrition Assessment: Pt continues with compromised nutrition status as intake variable, but is improving on dysphagia pureed, extremly thick liquid diet with Magic Cup tid. Per chart review family does not feel a PEG tube could be managed at home. SLP continues to work with pt. Thoracenteis completed today and pt feeling well. Will continue to follow    Malnutrition Assessment:  · Malnutrition Status: At risk for malnutrition  · Context: Acute illness or injury  · Findings of the 6 clinical characteristics of malnutrition (Minimum of 2 out of 6 clinical characteristics is required to make the diagnosis of moderate or severe Protein Calorie Malnutrition based on AND/ASPEN Guidelines):  1. Energy Intake-Less than or equal to 50% of estimated energy requirement, Greater than or equal to 5 days    2. Weight Loss-Unable to assess,    3. Fat Loss-No significant subcutaneous fat loss,    4. Muscle Loss-No significant muscle mass loss,    5. Fluid Accumulation-Moderate to severe fluid accumulation, Generalized  6.   Strength-Not measured    Nutrition Risk Level: High    Nutrient Needs:  · Estimated Daily Total Kcal: 2152-7994 (20-25 x ABW 64 kg)  · Estimated Daily Protein (g): 77-96 (1.2-1.5 x ABW)  · Estimated Daily Total Fluid (ml/day): 1 ml per kcal    Nutrition Diagnosis:   · Problem: Inadequate oral intake  · Etiology: related to Alteration in GI function     Signs and symptoms:  as evidenced by Other (Comment)    Objective Information:  · Nutrition-Focused Physical Findings: BM 1/1   · Wound Type: Surgical Wound(and some red areas )  · Current Nutrition Therapies:  · Oral Diet Orders: Full Liquid, Dysphagia Pureed (Dysphagia 1), Mildly Thick   · Oral Diet intake: 26-50%  · Oral Nutrition Supplement (ONS) Orders: Frozen Oral Supplement  · ONS intake: 26-50%, 51-75%, %, Unable to assess  · Anthropometric Measures:  · Ht: 5' 6\" (167.6 cm)   · Current Body Wt: 125 lb 10.6 oz (57 kg)  · Admission Body Wt:    · Usual Body Wt: (per records, wt looks to be high 120s - low 130s)  · % Weight Change:  ,     · Ideal Body Wt: 130 lb (59 kg), % Ideal Body    · Adjusted Body Wt:  , body weight adjusted for    · BMI Classification: BMI 18.5 - 24.9 Normal Weight    Nutrition Interventions:   Continue current diet, Continue current ONS  Continued Inpatient Monitoring    Nutrition Evaluation:   · Evaluation: Progressing toward goals   · Goals: tolerate most appropriate form of nutrition    · Monitoring: Meal Intake, Supplement Intake, Diet Tolerance, Wound Healing, Weight, Pertinent Labs, Monitor Bowel Function      Electronically signed by Carisa Leonard RD, LD on 1/7/20 at 2:28 PM    Contact Number: [phone]

## 2020-01-07 NOTE — PROGRESS NOTES
12/18/2019). Restrictions  Restrictions/Precautions  Restrictions/Precautions: Fall Risk  Position Activity Restriction  Other position/activity restrictions: O2 1 L via NC. Pt MRDD although independent care/mobility pta. Diet : Full Liquid, Dysphagia Pureed (Pudding Thick). chest tube with suction  Subjective   General  Chart Reviewed: Yes  Patient assessed for rehabilitation services?: Yes  Additional Pertinent Hx: 65 y/o male admit 12/18/19 with SBO, Rectal Bleed, Pneumonia, Septicemia. 12/18-12/27/19 Pt Intubated. 12/18/19 S/P Exp Lap, Lysis of Adhesions with Reduction of Closed Loop Small Bowel Obstruction. PMH as noted including Colon Ca, Partial Colectomy, Syncope/Collapse, Pacemaker, Down's Syndrome, Gout. Family / Caregiver Present: Yes(brother)  Referring Practitioner: Edward Gonzalez MD  Subjective  Subjective: Pt seen bedside with PT for cotx. Pt agreed to ambulating in the hallway. General Comment  Comments: okay for therapy per RN. RN disconnected suction for ambulation in the hallway. Objective       Functional Mobility  Functional - Mobility Device: Other(Pt ambulated 10 feet with the walker but does not like it so completed with hand hold assist.)  Assist Level: Minimal assistance  Functional Mobility Comments: Min asssit x 1 with ambulation and HHA. Assist of a second to manage lines. Wheelchair Bed Transfers  Wheelchair/Bed - Technique: Ambulating  Equipment Used: Bed;Other(chair with arms)  Level of Asssistance: Minimal assistance  Wheelchair Transfers Comments: assist of another for line management  Bed mobility  Supine to Sit: Minimal assistance  Sit to Supine: Contact guard assistance  Transfers  Sit to stand: Minimal assistance;Contact guard assistance  Stand to sit: Minimal assistance;Contact guard assistance        Cognition  Overall Cognitive Status: Exceptions  Following Commands: Follows one step commands with increased time; Follows one step commands with repetition  Attention Span: Difficulty attending to directions; Difficulty dividing attention  Memory: Decreased short term memory;Decreased recall of recent events;Decreased recall of precautions  Safety Judgement: Decreased awareness of need for safety;Decreased awareness of need for assistance  Problem Solving: Decreased awareness of errors;Assistance required to identify errors made;Assistance required to generate solutions  Insights: Decreased awareness of deficits  Initiation: Requires cues for some  Sequencing: Requires cues for some  Cognition Comment: h/o MRDD; impulsive           Plan   Plan  Times per week: 3-5  Times per day: Daily  Current Treatment Recommendations: Strengthening, Functional Mobility Training, Endurance Training, Balance Training, Safety Education & Training, Cognitive/Perceptual Training, Cognitive Reorientation, Self-Care / ADL, Gait Training    AM-PAC Score        AM-[facility] Inpatient Daily Activity Raw Score: 14 (01/07/20 1419)  AM-PAC Inpatient ADL T-Scale Score : 33.39 (01/07/20 1419)  ADL Inpatient CMS 0-100% Score: 59.67 (01/07/20 1419)  ADL Inpatient CMS G-Code Modifier : CK (01/07/20 1419)    Goals  Short term goals  Time Frame for Short term goals: Prior to d/c: pt progressing towards goals 1/3  Short term goal 1: Pt will bathe/dress UB with min A. Short term goal 2: Pt will bathe/dress LB with mod A. Short term goal 3: Pt will toilet with mod A. Short term goal 4: Pt will complete grooming in stance at sink with SBA/CGA  Short term goal 5: Pt will complete fxl mobility and fxl transfers to/from ADL surfaces with SBA/CGA using AD prn. Short term goal 6: Pt will increase activity tolerance to achieve the above goals. Long term goals  Time Frame for Long term goals : STG=LTG  Patient Goals   Patient goals : pt unable to verbalize personal therapy goal d/t cognitive deficits.        Therapy Time   Individual Concurrent Group Co-treatment   Time In 1340         Time Out 4699 Minutes 38              This note to serve as OT d/c summary if pt is d/c-ed from hospital prior to next OT session.       Rudi Dec, [address]

## 2020-01-07 NOTE — PROGRESS NOTES
Ht 5' 6\" (1.676 m)   Wt 123 lb 14.4 oz (56.2 kg)   SpO2 94%   BMI 20.00 kg/m²     General Appearance: awake and comfortable appearing  Skin: warm and dry, no rash or erythema  Head: normocephalic and atraumatic  Eyes: conjunctivae normal and sclera anicteric  Neck: neck supple and non tender without mass, no thyromegaly or thyroid nodules, no cervical lymphadenopathy   Pulmonary/Chest:decreased breath sounds all the way up on the left, right with basilar crackles and better breath sounds   Cardiovascular: sinus tach normal S1 and S2, no gallops and no carotid bruits no longer tachycardic  Abdomen: soft bs pos nontender, dressing I place  Extremities: no cyanosis, clubbing or edema  Musculoskeletal: no swollen joints and no tender joints,  Neurologic: following commands moves all 4    DATA:   Labs:  CBC:   Recent Labs     01/04/20 0442 01/05/20  1019 01/06/20  0434   WBC 10.7 10.9 15.8*   HGB 9.2* 9.4* 9.0*    419 452*     BMP:    Recent Labs     01/04/20 0442 01/05/20  1019 01/06/20  0434    143 145   K 4.6 3.8 4.1    103 105   CO2 28 25 27   BUN 18 20 24*   CREATININE 0.9 0.8* 0.9   GLUCOSE 130* 152* 131*     POC GLUCOSE:    Recent Labs     01/05/20  2034 01/06/20  0746 01/06/20  1155 01/06/20  1637 01/06/20 2020   POCGLU 169* 128* 183* 175* 178*     Ca/Mg/Phos:   Recent Labs     01/04/20 0442 01/05/20  1019 01/06/20  0434   CALCIUM 8.5 8.7 8.5   MG 1.90 2.00 1.90   PHOS 4.4 4.2 4.2     Hepatic:   Recent Labs     01/04/20 0442 01/05/20  1019 01/06/20  0434   AST 77* 68* 71*   * 105* 102*   BILITOT 0.6 0.4 0.3   ALKPHOS 192* 180* 169*     Troponin:   No results for input(s): TROPONINI in the last 72 hours. ProBNP:   No results for input(s): PROBNP in the last 72 hours. Lipids:   No results for input(s): CHOL, TRIG, HDL, LDLCALC, LABVLDL in the last 72 hours. ABGs:   No results for input(s): PHART, PO2ART, WZS3DOC in the last 72 hours.   PT/INR:   No results for input(s): PROTIME, INR in the last 72 hours. APTT:   Recent Labs     01/05/20  1019 01/06/20  0916 01/06/20  1627   APTT 82.3* 34.9 65.9*     Lactic acid 10.4 on presentation 3.3 today, 3.4 yesterday    EKG: ST and sinus arrhythmia, t wave inversion inferiorally    DIAGNOSTICS:  Ct Head Wo Contrast    Result Date: 12/18/2019  No acute intracranial abnormality. Xr Chest Portable    Result Date: 12/20/2019  1. Recommend advancement of endotracheal tube 1.0 cm. 2. Bibasilar subsegmental atelectasis plus or minus mild pleural effusions, unchanged. Xr Chest Portable    Result Date: 12/19/2019  Nasogastric tube projects in intragastric position. Bibasilar atelectasis. Pneumonia or aspiration pneumonitis are differential considerations. Nonspecific bowel gas pattern. Xr Chest Portable    Result Date: 12/19/2019  Small bilateral pleural effusions with adjacent atelectasis. Xr Chest Portable    Result Date: 12/18/2019  Low lung volume due to inspiratory effort. Mild bibasilar atelectasis with no other significant finding in the chest.     Ct Chest Abdomen Pelvis Wo Contrast    Result Date: 12/18/2019  Findings compatible with small bowel obstruction with transition point in the right mid abdomen, likely in mid to distal ileum. No obvious etiology identified. Small volume ascites, likely reactive. Areas of consolidation to the lungs bilaterally which may relate to atelectasis although multifocal pneumonia or aspiration pneumonitis could have a similar appearance. Patulous/distended appearance to the esophagus with fluid attenuation material within nearly the entirety of the esophagus, possibly secondary to the bowel obstruction and refluxed gastric contents. Patient may be at risk for aspiration. Trace bilateral pleural effusions.          ASSESSMENT AND PLAN    Active Problems:    Small bowel obstruction ([address] RESOLVED  Plan: back to eating with restrictions due to dysphagia and tolerating from GI standpoint

## 2020-01-07 NOTE — PROGRESS NOTES
General and Vascular Surgery                                                           Daily Progress Note                                                             Evelyn Max MD    Pt Name: Lisa Banks  Medical Record Number: [id number]  Date of Birth 1960   Today's Date: 1/7/2020      ASSESSMENT/PLAN  1. Small bowel obstruction, hypovolemic shock  2. Aspiration pneumonia  3. Hypoxia  4. Rectal bleeding  5. Septicemia  -12/18 exploratory laparotomy with RITESH, reduction of closed loop SBO  -Seen by dietary and speech. Still with significant dysphagia. Monitor PO intake, brother feels he is eating better  -continue heparin gtt for PE.  -Continue PT/OT  -patient continues to improve, said that he feels great today  -Thoracentesis this AM had a moderate left sided pleural effusion    EDUCATION  Patient educated about their illness/diagnosis, stated above, and all questions answered. We discussed the importance of nutrition, medications they are taking, and healthy lifestyle. Froy Im has improved from yesterday. Pain is well controlled. OBJECTIVE  VITALS:  height is 5' 6\" (1.676 m) and weight is 125 lb 10.6 oz (57 kg). His oral temperature is 98.2 °F (36.8 °C). His blood pressure is 109/73 and his pulse is 104. His respiration is 18 and oxygen saturation is 100%. VITALS:  /73   Pulse 104   Temp 98.2 °F (36.8 °C) (Oral)   Resp 18   Ht 5' 6\" (1.676 m)   Wt 125 lb 10.6 oz (57 kg)   SpO2 100%   BMI 20.28 kg/m²   GENERAL: alert, no distress  ABDOMEN: soft, NT, ND  I/O last 3 completed shifts: In: 480 [P.O.:480]  Out: 775 [Urine:775]  No intake/output data recorded.     LABS  Recent Labs     01/07/20  0437   WBC 18.5*   HGB 8.6*   HCT 26.4*   *   *   K 3.5      CO2 23   BUN 20   CREATININE 1.1   MG 2.00   PHOS 4.1   CALCIUM 8.5   AST 98*   *   BILITOT 0.3   BILIDIR <0.2     CBC:   Lab Results Component Value Date    WBC 18.5 01/07/2020    RBC 2.56 01/07/2020    RBC 4.68 03/13/2017    HGB 8.6 01/07/2020    HCT 26.4 01/07/2020    .1 01/07/2020    MCH 33.7 01/07/2020    MCHC 32.7 01/07/2020    RDW 14.8 01/07/2020     01/07/2020    MPV 9.9 01/07/2020     CMP:    Lab Results   Component Value Date     01/07/2020    K 3.5 01/07/2020    K 5.5 12/19/2019     01/07/2020    CO2 23 01/07/2020    BUN 20 01/07/2020    CREATININE 1.1 01/07/2020    GFRAA >60 01/07/2020    GFRAA >60 04/09/2013    AGRATIO 1.3 12/09/2019    LABGLOM >60 01/07/2020    GLUCOSE 148 01/07/2020    GLUCOSE 115 03/13/2017    PROT 6.8 01/07/2020    PROT 7.3 03/13/2017    LABALBU 2.6 01/07/2020    CALCIUM 8.5 01/07/2020    BILITOT 0.3 01/07/2020    ALKPHOS 170 01/07/2020    AST 98 01/07/2020     01/07/2020         Miko Cobb PA-C  Electronically signed 1/7/2020 at 10:15 AM

## 2020-01-07 NOTE — PROGRESS NOTES
Department of Internal Medicine  General Internal Medicine  Attending Progress Note    Chief Complaint: a little more spacy      HPI:   SUBJECTIVE:  65 yo male patient with a history of down's syndrome, colon cancer (resected),  Pacemaker for chb who presented with SBO and septic shock. Now on thickened liquids and purreed diet which need to be spoon fed. Brother is frustrated over the restrictions. Still sob and requiring oxygen supplement. Not made much progress with diuresis. Very wobbly on standing always wants to sit or lie down. Spiked a temp last night 101.6 and more tachy today. Denies any cough, dysuria etc, no diarrhea etc  About to go downstairs for thoracentesis.       Past Medical History:   Diagnosis Date    Acne rosacea     Cognitive impairment     sees Dr Magalis Rodriguez, started Aricept 12/2019    Colon cancer ([facility] [address] 1/7/2014    ileocecal valve/Leuenberger/yrly surveillance    Down syndrome     Gout     HIGH CHOLESTEROL     2017 3.1 % framingham risk score    History of diarrhea     since childhood, worse since partial colectomy resolved after a few months    Macrocytosis     followed by Dr Bella Gooden and released    Seborrheic dermatitis     Seizure disorder ([facility] [address] 2019    Status post biventricular cardiac pacemaker insertion 01/2019    for long sinus pauses with syncopal willis    Status post partial colectomy 2016    diarrhea    Syncope and collapse 01/2019    recurrent syncope, found seizures and sinus pauses    Weight loss 2015    since 2014 at the time of partial colectomy lost 25 lbs     Past Surgical History:   Procedure Laterality Date    CATARACT REMOVAL WITH IMPLANT  2007    Bilateral    COLON SURGERY  1/7//2014    for removal colon cancer    COLONOSCOPY  02/27/2017    Dr Josue Pablo N/A 8/21/2019    COLONOSCOPY WITH BIOPSY performed by Balwinder Wasserman MD at [address] N/A 12/18/2019    EXPLORATORY LAPAROTOMY, LYSIS OF ADHESIONS performed by Kerri Sanchez MD at [address] History   Problem Relation Age of Onset    Diabetes Brother     Hypertension Brother     Stroke Brother     High Cholesterol Brother     Cancer Mother         melanoma    Other Father         cerebral aneurysm     Social History     Tobacco Use    Smoking status: Never Smoker    Smokeless tobacco: Never Used   Substance Use Topics    Alcohol use: No    Drug use: No       Prior to Admission medications    Medication Sig Start Date End Date Taking? Authorizing Provider   doxycycline (VIBRAMYCIN) 50 MG capsule Take 50 mg by mouth 2 times daily 11/26/19  Yes Historical Provider, MD   simvastatin (ZOCOR) 20 MG tablet Take 1 tablet by mouth daily 12/10/19  Yes Nafisa Castaneda MD   allopurinol (ZYLOPRIM) 100 MG tablet Take 1 tablet by mouth daily 12/9/19  Yes Nafisa Castaneda MD   donepezil (ARICEPT) 5 MG tablet Take 1 tablet by mouth nightly 12/9/19  Yes Nafisa Castaneda MD   Multiple Vitamins-Minerals (THERAPEUTIC MULTIVITAMIN-MINERALS) tablet Take 1 tablet by mouth daily   Yes Historical Provider, MD   Azelaic Acid 15 % GEL Apply topically daily 12/2/19   Historical Provider, MD   hydrocortisone (HYTONE) 2.5 % lotion Apply topically daily 11/26/19   Historical Provider, MD   metroNIDAZOLE (METROCREAM) 0.75 % cream Apply topically daily 11/26/19   Historical Provider, MD         ROS:  A comprehensive review of systems was negative except for: weakness , sob     OBJECTIVE:      PHYSICAL:  Intake/Output:   Patient Vitals for the past 8 hrs:   BP Temp Temp src Pulse Resp SpO2 Weight   01/07/20 0834 (!) 124/55 -- -- 105 17 100 % --   01/07/20 0803 113/75 97.7 °F (36.5 °C) -- 70 17 100 % --   01/07/20 0521 106/75 97.4 °F (36.3 °C) Oral 105 16 96 % --   01/07/20 0502 -- -- -- -- -- -- 125 lb 10.6 oz (57 kg)   01/07/20 0230 -- 99.9 °F (37.7 °C) Rectal -- -- -- --   01/07/20 0058 -- -- -- -- -- 98 % --     I/O last 3 completed shifts:   In: LABVLDL in the last 72 hours. ABGs:   No results for input(s): PHART, PO2ART, SIJ9VCP in the last 72 hours. PT/INR:   No results for input(s): PROTIME, INR in the last 72 hours. APTT:   Recent Labs     01/06/20  1627 01/06/20  2316 01/07/20  0437   APTT 65.9* 56.5* 65.0*     Lactic acid 10.4 on presentation 3.3 today, 3.4 yesterday    EKG: ST and sinus arrhythmia, t wave inversion inferiorally    DIAGNOSTICS:  Ct Head Wo Contrast    Result Date: 12/18/2019  No acute intracranial abnormality. Xr Chest Portable    Result Date: 12/20/2019  1. Recommend advancement of endotracheal tube 1.0 cm. 2. Bibasilar subsegmental atelectasis plus or minus mild pleural effusions, unchanged. Xr Chest Portable    Result Date: 12/19/2019  Nasogastric tube projects in intragastric position. Bibasilar atelectasis. Pneumonia or aspiration pneumonitis are differential considerations. Nonspecific bowel gas pattern. Xr Chest Portable    Result Date: 12/19/2019  Small bilateral pleural effusions with adjacent atelectasis. Xr Chest Portable    Result Date: 12/18/2019  Low lung volume due to inspiratory effort. Mild bibasilar atelectasis with no other significant finding in the chest.     Ct Chest Abdomen Pelvis Wo Contrast    Result Date: 12/18/2019  Findings compatible with small bowel obstruction with transition point in the right mid abdomen, likely in mid to distal ileum. No obvious etiology identified. Small volume ascites, likely reactive. Areas of consolidation to the lungs bilaterally which may relate to atelectasis although multifocal pneumonia or aspiration pneumonitis could have a similar appearance. Patulous/distended appearance to the esophagus with fluid attenuation material within nearly the entirety of the esophagus, possibly secondary to the bowel obstruction and refluxed gastric contents. Patient may be at risk for aspiration. Trace bilateral pleural effusions.          ASSESSMENT AND PLAN    Active Problems:    Small bowel obstruction (HCC) RESOLVED  Plan: back to eating with restrictions due to dysphagia and tolerating from GI standpoint    Aspiration pneumonia of both lower lobes (HCC) pleural fluid left much worse than right  Plan: plan for thoracentesis tomorrow    Hypoxia  Plan: adequate oxygenation on NC    Septicemia ([facility] [address] Suspect has another aspiration pneumonia  Plan: discussed with Dr Author Cummings will start Merrem    Septic shock ([facility] [address]  Plan : off pressors, resolved  ANISH  resolved  Cognitive impairment likely alzheimers  Continue aricept  Abnormal LFTS  Worsening again could be the pneumonia    Eva Montes MD

## 2020-01-07 NOTE — PRE SEDATION
Sedation Pre-Procedure Note    Patient Name: Gwyneth Essex   YOB: 1960  Room/Bed: V0P-9600/9047-89  Medical Record Number: [id number]  Date: 1/7/2020   Time: 8:48 AM       Indication:  Left pleural effusion    Consent: I have discussed with the patient and/or the patient representative the indication, alternatives, and the possible risks and/or complications of the planned procedure and the anesthesia methods. The patient and/or patient representative appear to understand and agree to proceed. Vital Signs:   Vitals:    01/07/20 0847   BP: (!) (P) 117/44   Pulse: (P) 114   Resp: (P) 18   Temp:    SpO2: (P) 100%       Past Medical History:   has a past medical history of Acne rosacea, Cognitive impairment, Colon cancer ([facility] [address] Down syndrome, Gout, HIGH CHOLESTEROL, History of diarrhea, Macrocytosis, Seborrheic dermatitis, Seizure disorder ([facility] [address] Status post biventricular cardiac pacemaker insertion, Status post partial colectomy, Syncope and collapse, and Weight loss. Past Surgical History:   has a past surgical history that includes Cataract removal with implant (2007); Colon surgery (1/7//2014); Colonoscopy (02/27/2017); pacemaker placement; Colonoscopy (N/A, 8/21/2019); and Small intestine surgery (N/A, 12/18/2019).     Medications:   Scheduled Meds:    [Held by provider] apixaban  10 mg Oral BID    insulin lispro  0-18 Units Subcutaneous 4x Daily AC & HS    levalbuterol  1.25 mg Nebulization 4x daily    sodium chloride flush  10 mL Intravenous 2 times per day    pantoprazole  40 mg Intravenous Daily    And    sodium chloride (PF)  10 mL Intravenous Daily     Continuous Infusions:    [Held by provider] heparin (porcine) Stopped (01/07/20 0549)    dextrose       PRN Meds: midazolam, fentaNYL, heparin (porcine), sodium chloride nebulizer, menthol-zinc oxide, sodium chloride flush, magnesium sulfate, potassium chloride, sodium chloride flush, ondansetron, acetaminophen, glucose, dextrose,

## 2020-01-07 NOTE — PROGRESS NOTES
Pulmonary Progress Note    Date of Admission: 12/18/2019   LOS: 20 days     Chief Complaint   Patient presents with    Shortness of Breath     onset - 1530 today    Altered Mental Status     pt lethargic, unsteady       Assessment:     Aspiration Pneumonia  Acute Hypoxemic Respiratory Failure with SAO2 <90% on Room Air  Acute PE  Pleural Effusion  SBO    Plan:      -Wean supplemental oxygen to goal saturation of >90%  -anticoagulation holding for Thoracentesis today, can resume later today  -pt has dysphagia and evidence of aspiration. SLP working with patient but given what is suspected underlying dementia there is concern he will apirate chronically. Family has already stated that PEG/TF would not be something they could do for patient at home.  -wbc still trending up, and febrile overnight. Will trend procal but concern there could be repeat aspiration PNA or other source of infection.  -started on merrem    24 Hour Events/Subjective  No acute events o/n. WBC up. Febrile 100.8F overnight. ROS:   No nausea  No Vomiting  No chest pain      Intake/Output Summary (Last 24 hours) at 1/7/2020 0809  Last data filed at 1/7/2020 0650  Gross per 24 hour   Intake 480 ml   Output 525 ml   Net -45 ml         PHYSICAL EXAM:   Blood pressure 113/75, pulse 70, temperature 97.7 °F (36.5 °C), resp. rate 17, height 5' 6\" (1.676 m), weight 125 lb 10.6 oz (57 kg), SpO2 100 %. Gen:  No acute distress. Eyes: PERRL. Anicteric sclera. No conjunctival injection. ENT: No discharge. Posterior oropharynx clear. External appearance of ears and nose normal.  Neck: Trachea midline. No mass   Resp:  No crackles. No wheezes. No rhonchi. Near absent on the left. CV: tachy rate. Regular rhythm. No murmur or rub. No edema. GI: Soft, Non-tender. Non-distended. +BS  Skin: Warm, dry, w/o erythema. Lymph: No cervical or supraclavicular LAD. M/S: No cyanosis. No clubbing. Neuro:  no focal neurologic deficit.   Moves all extremities  Psych: Awake and alert. Mood stable. Medications:    Scheduled Meds:   [Held by provider] apixaban  10 mg Oral BID    insulin lispro  0-18 Units Subcutaneous 4x Daily AC & HS    levalbuterol  1.25 mg Nebulization 4x daily    sodium chloride flush  10 mL Intravenous 2 times per day    pantoprazole  40 mg Intravenous Daily    And    sodium chloride (PF)  10 mL Intravenous Daily       Continuous Infusions:   [Held by provider] heparin (porcine) Stopped (01/07/20 0549)    dextrose         PRN Meds:  heparin (porcine), sodium chloride nebulizer, menthol-zinc oxide, sodium chloride flush, magnesium sulfate, potassium chloride, sodium chloride flush, ondansetron, acetaminophen, glucose, dextrose, glucagon (rDNA), dextrose, dextran 70-hypromellose    Labs reviewed:  CBC:   Recent Labs     01/05/20  1019 01/06/20  0434 01/07/20 0437   WBC 10.9 15.8* 18.5*   HGB 9.4* 9.0* 8.6*   HCT 28.2* 27.3* 26.4*   .0* 102.1* 103.1*    452* 484*     BMP:   Recent Labs     01/05/20  1019 01/06/20  0434 01/07/20 0437    145 146*   K 3.8 4.1 3.5    105 105   CO2 25 27 23   PHOS 4.2 4.2 4.1   BUN 20 24* 20   CREATININE 0.8* 0.9 1.1     LIVER PROFILE:   Recent Labs     01/05/20  1019 01/06/20  0434 01/07/20 0437   AST 68* 71* 98*   * 102* 128*   BILIDIR <0.2 <0.2 <0.2   BILITOT 0.4 0.3 0.3   ALKPHOS 180* 169* 170*     PT/INR: No results for input(s): PROTIME, INR in the last 72 hours. APTT:   Recent Labs     01/06/20  1627 01/06/20  2316 01/07/20 0437   APTT 65.9* 56.5* 65.0*     UA:No results for input(s): NITRITE, COLORU, PHUR, LABCAST, WBCUA, RBCUA, MUCUS, TRICHOMONAS, YEAST, BACTERIA, CLARITYU, SPECGRAV, LEUKOCYTESUR, UROBILINOGEN, BILIRUBINUR, BLOODU, GLUCOSEU, AMORPHOUS in the last 72 hours. Invalid input(s): Fadumo Caban  No results for input(s): PH, PCO2, PO2 in the last 72 hours.       Films:  Radiology Review:  Pertinent images / reports were reviewed as a part of this visit.    CT Chest w/ contrast: No results found for this or any previous visit. CT Chest w/o contrast:   Results for orders placed during the hospital encounter of 12/18/19   CT Chest WO Contrast    Narrative EXAMINATION:  CT OF THE CHEST WITHOUT CONTRAST 12/26/2019 1:28 pm    TECHNIQUE:  CT of the chest was performed without the administration of intravenous  contrast. Multiplanar reformatted images are provided for review. Dose  modulation, iterative reconstruction, and/or weight based adjustment of the  mA/kV was utilized to reduce the radiation dose to as low as reasonably  achievable. COMPARISON:  None. HISTORY:  ORDERING SYSTEM PROVIDED HISTORY: pleural effusion  TECHNOLOGIST PROVIDED HISTORY:  Reason for exam:->pleural effusion  Reason for Exam: pleural effusion  Acuity: Acute  Type of Exam: Initial    FINDINGS:  Mediastinum: Soft tissues of the thoracic inlet are unremarkable. The  thoracic aorta is normal in caliber. The main pulmonary artery is normal in  caliber. There is no pericardial effusion. There is no mediastinal or hilar  adenopathy. Lungs/pleura: There are large bilateral effusions with adjacent  consolidation. There are scattered areas of consolidation in the right upper  lobe. There is no pneumothorax. The tracheobronchial tree is patent. There  is endotracheal tube with the tip in the distal midtrachea. Upper Abdomen: The upper abdomen is grossly unremarkable. Soft Tissues/Bones: The extrathoracic soft tissues are unremarkable. There  is no axillary adenopathy. There is no acute osseous abnormality. Impression Large bilateral pleural effusions with adjacent consolidation consistent with  pneumonia.          CTPA:   Results for orders placed during the hospital encounter of 12/18/19   CT CHEST PULMONARY EMBOLISM W CONTRAST    Narrative EXAMINATION:  CTA OF THE CHEST 1/1/2020 12:03 pm    TECHNIQUE:  CTA of the chest was performed after the administration of intravenous  contrast.  Multiplanar reformatted images are provided for review. MIP  images are provided for review. Dose modulation, iterative reconstruction,  and/or weight based adjustment of the mA/kV was utilized to reduce the  radiation dose to as low as reasonably achievable. COMPARISON:  Prior studies including chest x-ray last evening, CT chest 12/26/2018    HISTORY:  ORDERING SYSTEM PROVIDED HISTORY: hypoxia and tachycadia, r/o PE, eval  effusion, pneumonia  TECHNOLOGIST PROVIDED HISTORY:  Reason for exam:->hypoxia and tachycadia, r/o PE, eval effusion, pneumonia  Reason for Exam: hypoxia and tachycadia, r/o PE, eval effusion, pneumonia  Acuity: Acute  Type of Exam: Initial    FINDINGS:  Pulmonary Arteries: Motion artifact as well as streak artifact due to lack of  elevation of the patient's arms, degrades the images. Central pulmonary  arteries are normal in caliber. Despite the limitation of the study, a filling defect is identified within  the anterior segmental pulmonary artery of the right lower lobe. Most of the  segmental and subsegmental pulmonary arteries are obscured. Exact extent of  clot load is difficult to evaluate given obscuration of images; no central  pulmonary embolism is seen. Mediastinum: The heart size is stable. The RV/LV ratio is approximately 0.9. No pericardial effusion. No pathologically enlarged mediastinal lymph nodes. Lungs/pleura: Bones there has been interval decrease in size of a right  pleural effusion which is now very small. A moderate-to-large left pleural  effusion has increased. There is mostly dependent left lung consolidation  with complete consolidation of the left lower lobe, likely passive  atelectasis. The aerated lungs show patchy areas of airspace opacification,  greatest in the right upper lung, similar to the prior study. Septal  thickening within the apices is decreased.     Upper Abdomen: Limited images of the upper abdomen show no PICC and left subclavian cardiac device are unchanged. No acute  bony abnormality. There remains near complete opacification of the left hemithorax compatible  with large effusion and airspace disease. Multifocal right lung airspace  disease is similar to the prior study. There is no significant interval  change in the appearance of the chest since 12/31/2019      Impression No significant interval change in the appearance the chest including large  left pleural effusion and multifocal bilateral airspace disease. This note was transcribed using [id number] [employer]. Please disregard any translational errors.     Thank you for this consult,    Radha Fields [address] [facility]

## 2020-01-07 NOTE — PROGRESS NOTES
Clinical Pharmacy Note  Heparin Dosing       Lab Results   Component Value Date    APTT 56.5 01/06/2020     Lab Results   Component Value Date    HGB 9.0 01/06/2020    HCT 27.3 01/06/2020     01/06/2020    INR 1.15 12/18/2019       Current Infusion Rate: 13.6 mL/hr    Plan:  Rate: continue at 13.6 mL/hr  Next aPTT: 0600 1/7/20    Pharmacy will continue to monitor and adjust based on aPTT results.   Macy Jernigan, PharmD

## 2020-01-07 NOTE — PROGRESS NOTES
Speech Language Pathology  [facility]   Speech Therapy  Daily Dysphagia Treatment Note    Kieran Chiu  AGE: 64 y.o. GENDER: male  : 1960  [id number]  EPISODE DATE:  2019     Patient Active Problem List   Diagnosis    Down's syndrome    Gout    Acne rosacea    Seborrheic dermatitis    High cholesterol    Colon cancer, ascending ([address]    Personal history of colon cancer, stage II    Bladder wall thickening    Weight loss    Status post partial colectomy    Chronic diarrhea    Macrocytosis    New onset seizure (HCC)    SSS (sick sinus syndrome) ([address]    S/P placement of cardiac pacemaker    Bradycardia    Seizure disorder (HCC)    Status post biventricular cardiac pacemaker insertion    Memory loss    Cognitive impairment    Colon cancer (HCC)    Down syndrome    SBO (small bowel obstruction) (HCC)    Aspiration pneumonia of both lower lobes (HCC)    Hypoxia    Septicemia ([address]    Septic shock (HCC)    Acute respiratory failure with hypoxia (HCC)    Pleural effusion, bilateral    Ischemic hepatitis    Right pulmonary embolus (HCC)     Allergies   Allergen Reactions    Penicillins      rash     Treatment Diagnosis: Dysphagia     Chart review:   2019 admitted with SBO  2019 exploratory laparotomy; remained intubated  2019 extubated to vapotherm  2019 BSE rec puree/thin  2019 overnight: persistent tachycardia and tachypnea; worsening left-sided airspace disease; rapid response called  2020 R pulmonary embolus confirmed   2020 surgery downgraded diet to puree/nectar d/t concern for aspiration  2020 SLP rec NPO pending MBS  1/3/2020 verbal order for MBS received from MD  2020 Pulmonology notes:  MBS shows aspiration, I am concerned this will be an ongoing issue with what appears to be a progressing dementia on top of his Downs.  may be appropriate for palliative consult, but brother seems in denial to some degree about aspiration risk. will ask SLP to continue to work with patient in hopes this is not a chronic condition. 1/3/2020 most recent MBS:  Moderate-severe oral stage dysphagia characterized by decreased lingual manipulation. · Bolus formation and movement is slow, disorganized, and prolonged without excess labor, but with concern for excess labor with denser solids. Severe pharyngeal stage dysphagia characterized by delayed swallow and decreased laryngeal elevation. · Flash penetration with pudding/puree via teaspoon. · Aspiration with occasional cough response with honey via teaspoon; wet vocal quality also noted post-swallow. · Mild-moderate vallecular and pyriform residue with PO trials is cleared with repeat swallows. · Patient unable to follow multi-step directives to complete chin tuck. Recommended Diet:  · Solid consistency: Dysphagia Pureed (Dysphagia I)(VIA TEASPOON; REPEAT SWALLOW)  · Liquid consistency: Extremely Thick (Spoon-Pudding)(VIA TEASPOON; REPEAT SWALLOW)  · Liquid administration via: Spoon  · Medication administration: Meds in puree(CRUSHED)  · Supervision: 1:1  · Compensatory Swallowing Strategies: Small bites/sips;Eat/Feed slowly; Remain upright for 30-45 minutes after meals;Upright as possible for all oral intake; No straws; Total feed; Liquid by spoon only;Swallow 2 times per bite/sip; External pacing    Subjective:     Current diet  Solid consistency: Dysphagia Pureed (Dysphagia I)(VIA TEASPOON; REPEAT SWALLOW)  Liquid consistency: Extremely Thick (Spoon-Pudding)(VIA TEASPOON; REPEAT SWALLOW)  Liquid administration via: Spoon  Medication administration: Meds in puree(CRUSHED)  Supervision: 1:1  Compensatory Swallowing Strategies:   · Small bites/sips;  · Eat/Feed slowly;  · Remain upright for 30-45 minutes after meals;  · Upright as possible for all oral intake;  · No straws;  · Total feed;  · Liquid by spoon only;  · Swallow 2 times per bite/sip;  · External pacing    Comments regarding tolerating Current Diet:   adequate tolerance reported; brother continues to report improved tolerance with downgrade    Objective:     Pain   Patient Currently in Pain: Denies    Cognitive/Behavior   Behavior/Cognition: Alert, Cooperative, Pleasant mood, Distractible, Requires cueing    Presentations   Consistencies Administered:  Puree, Pudding    Positioning   Upright in bed    PO Trials:  Puree - teaspoon: prolonged oral transit; concern for premature bolus loss to the pharynx; delayed swallow; decreased laryngeal elevation; no cough or throat clear; no vocal quality changes  Pudding - teaspoon: prolonged oral transit; concern for premature bolus loss to the pharynx; delayed swallow; decreased laryngeal elevation; no cough or throat clear; no vocal quality changes    Dysphagia Tx:   · PO trials: appears to be safely tolerating current diet. Improved breathing quality noted  · Comp strats: handout provided 1/6/2020 with comp strats listed reinforced. Patient /family education completed - family indicates understanding  · Swallow ex: mod-max cues for lingula protrusion and elevation; max cues for effortful swallow; mod-max cues for yawn, pitch swing, and glottals. Improved tx tolerance this date suspected 2/2 familiarity with SLP and tx ex; continue. Goals:   Dysphagia Goals: The patient will tolerate recommended diet without observed clinical signs of aspiration;continue  The patient/caregiver will demonstrate understanding of compensatory strategies for improved swallowing safety. continue  The patient will improve swallow function via swallow ex completed 5/5 each continue - MD request dysphagia tx continue      Assessment:   Impressions:   Dysphagia Diagnosis:   Severe oropharyngeal dysphagia as noted above.   Completed swallow ex: patient requires mod-max cues ,but continue to suspect decreased cueing and increased receptiveness as attempts continue and as ex become more familiar. Continue ST    Diet Recommendations:  Solid consistency: Dysphagia Pureed (Dysphagia I)(VIA TEASPOON; REPEAT SWALLOW)  Liquid consistency: Extremely Thick (Spoon-Pudding)(VIA TEASPOON; REPEAT SWALLOW)  Liquid administration via: Spoon  Medication administration: Meds in puree(CRUSHED)  Supervision: 1:1  Compensatory Swallowing Strategies:   · Small bites/sips;  · Eat/Feed slowly;  · Remain upright for 30-45 minutes after meals;  · Upright as possible for all oral intake;  · No straws;  · Total feed;  · Liquid by spoon only;  · Swallow 2 times per bite/sip;  · External pacing    Education:  Consulted and agree with results and recommendations: Patient, RN  Patient Education: Completed on results/recs/plan  Patient Education Response: No evidence of learning    Prognosis:   Guarded for dysphagia     Plan:     Continue Dysphagia Therapy: yes  Interventions: Therapeutic Interventions: Patient/Family education, Diet tolerance monitoring, Therapeutic PO trials with SLP, Oral motor exercises, Pharyngeal exercises, Laryngeal exercises  Duration/Frequency of therapy while on unit: Duration/Frequency of Treatment  Duration/Frequency of Treatment: ST to tx 3-5 times per week for dyspahgia during acute admission  Discharge Instructions:   Anticipate Yes for further skilled Speech Therapy for Dysphagia at discharge    This note serves as a D/C Summary in the event that this patient is discharged prior to the next therapy session. Coded treatment time: 15  Total treatment time: [address] [facility].  Alivia [facility], [address] [facility], [phone]  Speech-Language Pathologist  1/7/2020 at 12:15 PM

## 2020-01-07 NOTE — PROGRESS NOTES
Sax  Subjective  Subjective: pt very pleasant and agreeable to getting up with therapy and nurse; denies any pain  General Comment  Comments: pt's brother reported that pt is uncomfortable sitting up in chair therefore requested to assist pt back to bed after session          Orientation  Orientation  Orientation Level: Oriented to person;Oriented to place; Disoriented to situation  Cognition   Cognition  Overall Cognitive Status: Exceptions  Following Commands: Follows one step commands with increased time; Follows one step commands with repetition  Attention Span: Difficulty attending to directions; Difficulty dividing attention  Memory: Decreased short term memory;Decreased recall of recent events;Decreased recall of precautions  Safety Judgement: Decreased awareness of need for safety;Decreased awareness of need for assistance  Problem Solving: Decreased awareness of errors;Assistance required to identify errors made;Assistance required to generate solutions  Insights: Decreased awareness of deficits  Initiation: Requires cues for some  Sequencing: Requires cues for some  Cognition Comment: h/o MRDD; impulsive  Objective   Bed mobility  Supine to Sit: Minimal assistance  Sit to Supine: Contact guard assistance  Transfers  Sit to Stand: Contact guard assistance;Minimal Assistance  Stand to sit: Contact guard assistance  Ambulation  Ambulation?: Yes  More Ambulation?: Yes  Ambulation 1  Surface: level tile  Device: Rolling Walker  Assistance: Minimal assistance(with assist of another to manage the lines)  Quality of Gait: slighlty unsteady with early fatigue  Distance: 20'  Comments: pt reported he was scared to walk with walker  Ambulation 2  Surface - 2: level tile  Device 2: (hand held assist)  Assistance 2: Minimal assistance  Quality of Gait 2: slightly unsteady with early fatigue  Distance: 50'     Balance  Comments: CGA for sitting balance; CGA/min A for standing balance with walker            Comment: assisted with positioning in bed for comfort and pressure relief   nursing in to disconnect suction to walk pt then re-connected after session      AM-PAC Score  AM-PAC Inpatient Mobility Raw Score : 14 (01/06/20 0934)  AM-PAC Inpatient T-Scale Score : 38.1 (01/06/20 0934)  Mobility Inpatient CMS 0-100% Score: 61.29 (01/06/20 0934)  Mobility Inpatient CMS G-Code Modifier : CL (01/06/20 0934)          Goals  Short term goals  Time Frame for Short term goals: Upon d/c acute care setting. - all goals ongoing 1-7  Short term goal 1: Bed Mob Mod assist. - met; new goal SBA  Short term goal 2: Transfers with/without assist device Min assist.   Short term goal 3: Amb with/without assist device 50' CGA. Patient Goals   Patient goals : None stated at this time. Plan    Plan  Times per week: 3-5x week while in acute care setting. Current Treatment Recommendations: Functional Mobility Training  Safety Devices  Type of devices: Call light within reach, Bed alarm in place, Left in bed, Nurse notified  Restraints  Initially in place: No  Restraints: B UE soft restraints. Therapy Time   Individual Concurrent Group Co-treatment   Time In 6089         Time Out 0121         Minutes 38              If patient is discharged prior to the next physical therapy visit;  Please see last written PT note for discharge status.     Lor Schreiber, PT, [id number]   LOR SCHREIBER, PT

## 2020-01-07 NOTE — PROGRESS NOTES
Occupational Therapy    MORENO attempted to see for treatment. Pt being transported for a procedure. Will attempt back later this date as schedule permits.   Rudi Dec, [address]

## 2020-01-08 LAB
ALBUMIN SERPL-MCNC: 2.4 G/DL (ref 3.4–5)
ALP BLD-CCNC: 140 U/L (ref 40–129)
ALT SERPL-CCNC: 118 U/L (ref 10–40)
ANION GAP SERPL CALCULATED.3IONS-SCNC: 14 MMOL/L (ref 3–16)
APTT: 59 SEC (ref 24.2–36.2)
AST SERPL-CCNC: 74 U/L (ref 15–37)
BASOPHILS ABSOLUTE: 0.5 K/UL (ref 0–0.2)
BASOPHILS RELATIVE PERCENT: 3.5 %
BILIRUB SERPL-MCNC: 0.3 MG/DL (ref 0–1)
BILIRUBIN DIRECT: <0.2 MG/DL (ref 0–0.3)
BILIRUBIN, INDIRECT: ABNORMAL MG/DL (ref 0–1)
BUN BLDV-MCNC: 17 MG/DL (ref 7–20)
CALCIUM SERPL-MCNC: 8.4 MG/DL (ref 8.3–10.6)
CHLORIDE BLD-SCNC: 106 MMOL/L (ref 99–110)
CO2: 24 MMOL/L (ref 21–32)
CREAT SERPL-MCNC: 0.7 MG/DL (ref 0.9–1.3)
EOSINOPHILS ABSOLUTE: 0.6 K/UL (ref 0–0.6)
EOSINOPHILS RELATIVE PERCENT: 4.7 %
GFR AFRICAN AMERICAN: >60
GFR NON-AFRICAN AMERICAN: >60
GLUCOSE BLD-MCNC: 117 MG/DL (ref 70–99)
GLUCOSE BLD-MCNC: 134 MG/DL (ref 70–99)
GLUCOSE BLD-MCNC: 137 MG/DL (ref 70–99)
GLUCOSE BLD-MCNC: 143 MG/DL (ref 70–99)
GLUCOSE BLD-MCNC: 165 MG/DL (ref 70–99)
HCT VFR BLD CALC: 26.2 % (ref 40.5–52.5)
HEMOGLOBIN: 8.5 G/DL (ref 13.5–17.5)
LYMPHOCYTES ABSOLUTE: 1.8 K/UL (ref 1–5.1)
LYMPHOCYTES RELATIVE PERCENT: 13.2 %
MAGNESIUM: 1.9 MG/DL (ref 1.8–2.4)
MCH RBC QN AUTO: 33.3 PG (ref 26–34)
MCHC RBC AUTO-ENTMCNC: 32.5 G/DL (ref 31–36)
MCV RBC AUTO: 102.5 FL (ref 80–100)
MONOCYTES ABSOLUTE: 1 K/UL (ref 0–1.3)
MONOCYTES RELATIVE PERCENT: 7.2 %
NEUTROPHILS ABSOLUTE: 9.6 K/UL (ref 1.7–7.7)
NEUTROPHILS RELATIVE PERCENT: 71.4 %
PDW BLD-RTO: 15.2 % (ref 12.4–15.4)
PERFORMED ON: ABNORMAL
PHOSPHORUS: 3.8 MG/DL (ref 2.5–4.9)
PLATELET # BLD: 484 K/UL (ref 135–450)
PMV BLD AUTO: 9.4 FL (ref 5–10.5)
POTASSIUM SERPL-SCNC: 3.9 MMOL/L (ref 3.5–5.1)
RBC # BLD: 2.56 M/UL (ref 4.2–5.9)
SODIUM BLD-SCNC: 144 MMOL/L (ref 136–145)
TOTAL PROTEIN: 6.3 G/DL (ref 6.4–8.2)
WBC # BLD: 13.5 K/UL (ref 4–11)

## 2020-01-08 PROCEDURE — 6360000002 HC RX W HCPCS: Performed by: INTERNAL MEDICINE

## 2020-01-08 PROCEDURE — APPSS15 APP SPLIT SHARED TIME 0-15 MINUTES: Performed by: PHYSICIAN ASSISTANT

## 2020-01-08 PROCEDURE — APPNB30 APP NON BILLABLE TIME 0-30 MINS: Performed by: PHYSICIAN ASSISTANT

## 2020-01-08 PROCEDURE — 36415 COLL VENOUS BLD VENIPUNCTURE: CPT

## 2020-01-08 PROCEDURE — 6370000000 HC RX 637 (ALT 250 FOR IP): Performed by: INTERNAL MEDICINE

## 2020-01-08 PROCEDURE — 94640 AIRWAY INHALATION TREATMENT: CPT

## 2020-01-08 PROCEDURE — 94761 N-INVAS EAR/PLS OXIMETRY MLT: CPT

## 2020-01-08 PROCEDURE — C9113 INJ PANTOPRAZOLE SODIUM, VIA: HCPCS | Performed by: INTERNAL MEDICINE

## 2020-01-08 PROCEDURE — 97530 THERAPEUTIC ACTIVITIES: CPT

## 2020-01-08 PROCEDURE — 2580000003 HC RX 258: Performed by: INTERNAL MEDICINE

## 2020-01-08 PROCEDURE — 99232 SBSQ HOSP IP/OBS MODERATE 35: CPT | Performed by: INTERNAL MEDICINE

## 2020-01-08 PROCEDURE — 99233 SBSQ HOSP IP/OBS HIGH 50: CPT | Performed by: INTERNAL MEDICINE

## 2020-01-08 PROCEDURE — 85025 COMPLETE CBC W/AUTO DIFF WBC: CPT

## 2020-01-08 PROCEDURE — 97129 THER IVNTJ 1ST 15 MIN: CPT

## 2020-01-08 PROCEDURE — 85730 THROMBOPLASTIN TIME PARTIAL: CPT

## 2020-01-08 PROCEDURE — 2060000000 HC ICU INTERMEDIATE R&B

## 2020-01-08 PROCEDURE — 80076 HEPATIC FUNCTION PANEL: CPT

## 2020-01-08 PROCEDURE — 80069 RENAL FUNCTION PANEL: CPT

## 2020-01-08 PROCEDURE — 92526 ORAL FUNCTION THERAPY: CPT

## 2020-01-08 PROCEDURE — 83735 ASSAY OF MAGNESIUM: CPT

## 2020-01-08 RX ADMIN — ISODIUM CHLORIDE 3 ML: 0.03 SOLUTION RESPIRATORY (INHALATION) at 07:30

## 2020-01-08 RX ADMIN — SODIUM CHLORIDE, PRESERVATIVE FREE 10 ML: 5 INJECTION INTRAVENOUS at 09:17

## 2020-01-08 RX ADMIN — MEROPENEM 500 MG: 500 INJECTION, POWDER, FOR SOLUTION INTRAVENOUS at 16:48

## 2020-01-08 RX ADMIN — Medication 10 ML: at 09:17

## 2020-01-08 RX ADMIN — LEVALBUTEROL HYDROCHLORIDE 1.25 MG: 1.25 SOLUTION, CONCENTRATE RESPIRATORY (INHALATION) at 07:30

## 2020-01-08 RX ADMIN — ISODIUM CHLORIDE 3 ML: 0.03 SOLUTION RESPIRATORY (INHALATION) at 11:45

## 2020-01-08 RX ADMIN — MEROPENEM 500 MG: 500 INJECTION, POWDER, FOR SOLUTION INTRAVENOUS at 10:06

## 2020-01-08 RX ADMIN — LEVALBUTEROL HYDROCHLORIDE 1.25 MG: 1.25 SOLUTION, CONCENTRATE RESPIRATORY (INHALATION) at 20:12

## 2020-01-08 RX ADMIN — HEPARIN SODIUM 13.6 ML/HR: 10000 INJECTION, SOLUTION INTRAVENOUS at 04:19

## 2020-01-08 RX ADMIN — LEVALBUTEROL HYDROCHLORIDE 1.25 MG: 1.25 SOLUTION, CONCENTRATE RESPIRATORY (INHALATION) at 16:44

## 2020-01-08 RX ADMIN — MEROPENEM 500 MG: 500 INJECTION, POWDER, FOR SOLUTION INTRAVENOUS at 22:30

## 2020-01-08 RX ADMIN — PANTOPRAZOLE SODIUM 40 MG: 40 INJECTION, POWDER, FOR SOLUTION INTRAVENOUS at 09:16

## 2020-01-08 RX ADMIN — SODIUM CHLORIDE, PRESERVATIVE FREE 10 ML: 5 INJECTION INTRAVENOUS at 20:27

## 2020-01-08 RX ADMIN — ISODIUM CHLORIDE 3 ML: 0.03 SOLUTION RESPIRATORY (INHALATION) at 16:44

## 2020-01-08 RX ADMIN — LEVALBUTEROL HYDROCHLORIDE 1.25 MG: 1.25 SOLUTION, CONCENTRATE RESPIRATORY (INHALATION) at 11:45

## 2020-01-08 RX ADMIN — MEROPENEM 500 MG: 500 INJECTION, POWDER, FOR SOLUTION INTRAVENOUS at 04:19

## 2020-01-08 RX ADMIN — INSULIN LISPRO 3 UNITS: 100 INJECTION, SOLUTION INTRAVENOUS; SUBCUTANEOUS at 19:02

## 2020-01-08 ASSESSMENT — PAIN SCALES - GENERAL: PAINLEVEL_OUTOF10: 0

## 2020-01-08 NOTE — PROGRESS NOTES
pacing    Comments regarding tolerating Current Diet:   adequate tolerance reported; brother continues to report improved tolerance with downgrade    Objective:     Pain   Patient Currently in Pain: Denies    Cognitive/Behavior   Behavior/Cognition: Alert, Cooperative, Pleasant mood, Distractible, Requires cueing    Presentations   Consistencies Administered:  Puree, Pudding    Positioning   Upright in bed    PO Trials:  Puree - teaspoon: prolonged oral transit; concern for premature bolus loss to the pharynx; delayed swallow; decreased laryngeal elevation; no cough or throat clear; no vocal quality changes  Pudding - teaspoon: prolonged oral transit; concern for premature bolus loss to the pharynx; delayed swallow; decreased laryngeal elevation; no cough or throat clear; no vocal quality changes    Dysphagia Tx:   · PO trials: appears to be safely tolerating current diet. Improved breathing quality noted  · Comp strats: handout provided 1/6/2020 with comp strats listed reinforced. Patient /family education completed - family indicates understanding  · Swallow ex: mod cues for lingual protrusion and elevation; mod-max cues for effortful swallow; mod cues for yawn, pitch swing, and glottals. Improved tx tolerance this date suspected 2/2 familiarity with SLP and tx ex; continue. Goals:   Dysphagia Goals: The patient will tolerate recommended diet without observed clinical signs of aspiration;continue  The patient/caregiver will demonstrate understanding of compensatory strategies for improved swallowing safety. continue  The patient will improve swallow function via swallow ex completed 5/5 each continue - MD request dysphagia tx continue      Assessment:   Impressions:   Dysphagia Diagnosis:   Severe oropharyngeal dysphagia as noted above.   Completed swallow ex: patient requires mod-max cues ,but continue to suspect decreased cueing and increased receptiveness as attempts continue and as ex become more familiar. Continue ST    Diet Recommendations:  Solid consistency: Dysphagia Pureed (Dysphagia I)(VIA TEASPOON; REPEAT SWALLOW)  Liquid consistency: Extremely Thick (Spoon-Pudding)(VIA TEASPOON; REPEAT SWALLOW)  Liquid administration via: Spoon  Medication administration: Meds in puree(CRUSHED)  Supervision: 1:1  Compensatory Swallowing Strategies:   · Small bites/sips;  · Eat/Feed slowly;  · Remain upright for 30-45 minutes after meals;  · Upright as possible for all oral intake;  · No straws;  · Total feed;  · Liquid by spoon only;  · Swallow 2 times per bite/sip;  · External pacing    Education:  Consulted and agree with results and recommendations: Patient, RN  Patient Education: Completed on results/recs/plan  Patient Education Response: No evidence of learning    Prognosis:   Guarded for dysphagia     Plan:     Continue Dysphagia Therapy: yes  Interventions: Therapeutic Interventions: Patient/Family education, Diet tolerance monitoring, Therapeutic PO trials with SLP, Oral motor exercises, Pharyngeal exercises, Laryngeal exercises  Duration/Frequency of therapy while on unit: Duration/Frequency of Treatment  Duration/Frequency of Treatment: ST to tx 3-5 times per week for dyspahgia during acute admission  Discharge Instructions:   Anticipate Yes for further skilled Speech Therapy for Dysphagia at discharge    This note serves as a D/C Summary in the event that this patient is discharged prior to the next therapy session. Coded treatment time: 15  Total treatment time: [address] [place] [address], [address] [place] [address], [id number]  Speech-Language Pathologist  1/8/2020 at 2:30 PM

## 2020-01-08 NOTE — PROGRESS NOTES
General and Vascular Surgery                                                           Daily Progress Note                                                             Nancy Ardon MD    Pt Name: Leatha Kamara  Medical Record Number: [id number]  Date of Birth 1960   Today's Date: 1/8/2020      ASSESSMENT/PLAN  1. Small bowel obstruction, hypovolemic shock  2. Aspiration pneumonia  3. Hypoxia  4. Rectal bleeding  5. Septicemia  -12/18 exploratory laparotomy with RITESH, reduction of closed loop SBO  -Seen by dietary and speech. Still with significant dysphagia. Monitor PO intake, brother feels he is eating better.    -No need for feeding tube at this itme  -continue heparin gtt for PE.  swollen upper lip secondary to trauma from vent improving  -Continue PT/OT  -Leukocytosis improved today: 15.8-->18.5-->13.5. Started on meropenem yesterday.   -Abdomen soft, minimal incision tenderness. No signs of infection.  -Thoracentesis yesterday for treatment of a moderate left sided pleural effusion, pulmonology following    EDUCATION  Patient educated about their illness/diagnosis, stated above, and all questions answered. We discussed the importance of nutrition, medications they are taking, and healthy lifestyle. Jurline Pain has improved from yesterday. Pain is well controlled. OBJECTIVE  VITALS:  height is 5' 6\" (1.676 m) and weight is 125 lb 10.6 oz (57 kg). His oral temperature is 98.2 °F (36.8 °C). His blood pressure is 102/68 and his pulse is 114. His respiration is 18 and oxygen saturation is 91%. VITALS:  /68   Pulse 114   Temp 98.2 °F (36.8 °C) (Oral)   Resp 18   Ht 5' 6\" (1.676 m)   Wt 125 lb 10.6 oz (57 kg)   SpO2 91%   BMI 20.28 kg/m²   GENERAL: alert, no distress  ABDOMEN: soft, NT, ND  I/O last 3 completed shifts: In: 360 [P.O.:360]  Out: 585 [Urine:475; Chest Tube:110]  I/O this shift:   In: 473 [I.V.:473]  Out: 100 [Chest Tube:100]    LABS  Recent Labs     01/07/20  2140 01/08/20  0446   WBC  --  13.5*   HGB  --  8.5*   HCT  --  26.2*   PLT  --  484*   NA  --  144   K  --  3.9   CL  --  106   CO2  --  24   BUN  --  17   CREATININE  --  0.7*   MG  --  1.90   PHOS  --  3.8   CALCIUM  --  8.4   AST  --  74*   ALT  --  118*   BILITOT  --  0.3   BILIDIR  --  <0.2   NITRU Negative  --    COLORU DK YELLOW  --      CBC:   Lab Results   Component Value Date    WBC 13.5 01/08/2020    RBC 2.56 01/08/2020    RBC 4.68 03/13/2017    HGB 8.5 01/08/2020    HCT 26.2 01/08/2020    .5 01/08/2020    MCH 33.3 01/08/2020    MCHC 32.5 01/08/2020    RDW 15.2 01/08/2020     01/08/2020    MPV 9.4 01/08/2020     CMP:    Lab Results   Component Value Date     01/08/2020    K 3.9 01/08/2020    K 5.5 12/19/2019     01/08/2020    CO2 24 01/08/2020    BUN 17 01/08/2020    CREATININE 0.7 01/08/2020    GFRAA >60 01/08/2020    GFRAA >60 04/09/2013    AGRATIO 1.3 12/09/2019    LABGLOM >60 01/08/2020    GLUCOSE 134 01/08/2020    GLUCOSE 115 03/13/2017    PROT 6.3 01/08/2020    PROT 7.3 03/13/2017    LABALBU 2.4 01/08/2020    CALCIUM 8.4 01/08/2020    BILITOT 0.3 01/08/2020    ALKPHOS 140 01/08/2020    AST 74 01/08/2020     01/08/2020         Tiffany Poole PA-C  Electronically signed 1/8/2020 at 9:55 AM

## 2020-01-08 NOTE — PLAN OF CARE
Problem: Restraint Use - Nonviolent/Non-Self-Destructive Behavior:  Goal: Absence of restraint indications  Description  Absence of restraint indications  Outcome: Ongoing  Goal: Absence of restraint-related injury  Description  Absence of restraint-related injury  Outcome: Ongoing     Problem: Nutrition  Goal: Optimal nutrition therapy  Outcome: Ongoing     Problem: Risk for Impaired Skin Integrity  Goal: Tissue integrity - skin and mucous membranes  Description  Structural intactness and normal physiological function of skin and  mucous membranes.   Outcome: Ongoing     Problem: Falls - Risk of:  Goal: Will remain free from falls  Description  Will remain free from falls  Outcome: Ongoing  Goal: Absence of physical injury  Description  Absence of physical injury  Outcome: Ongoing     Problem: Urinary Elimination:  Goal: Signs and symptoms of infection will decrease  Description  Signs and symptoms of infection will decrease  Outcome: Ongoing  Goal: Complications related to the disease process, condition or treatment will be avoided or minimized  Description  Complications related to the disease process, condition or treatment will be avoided or minimized  Outcome: Ongoing     Problem: Infection - Central Venous Catheter-Associated Bloodstream Infection:  Goal: Will show no infection signs and symptoms  Description  Will show no infection signs and symptoms  Outcome: Ongoing     Problem: Confusion - Acute:  Goal: Absence of continued neurological deterioration signs and symptoms  Description  Absence of continued neurological deterioration signs and symptoms  Outcome: Ongoing  Goal: Mental status will be restored to baseline  Description  Mental status will be restored to baseline  Outcome: Ongoing     Problem: Discharge Planning:  Goal: Ability to perform activities of daily living will improve  Description  Ability to perform activities of daily living will improve  Outcome: Ongoing  Goal: Participates in care

## 2020-01-08 NOTE — PROGRESS NOTES
Occupational Therapy  Facility/Department: [facility]GR[facility] C[facility]  Daily Treatment Note  NAME: Marilu Castro  : 1960  MRN: [id number]    Date of Service: 2020    Discharge Recommendations:  3-5 sessions per week  OT Equipment Recommendations  Other: defer to DC facility    Assessment   Performance deficits / Impairments: Decreased functional mobility ; Decreased ADL status; Decreased endurance;Decreased strength;Decreased safe awareness;Decreased cognition;Decreased balance;Decreased high-level IADLs  Assessment: Pt tolerated session well. Pt agreed to sit in the recliner with encouragement from therapy and family. Pt completed transfer with min/CGA for balance. He required assist of a second person to manage suction and also IV pole. Prognosis: Good  REQUIRES OT FOLLOW UP: Yes  Activity Tolerance  Activity Tolerance: Patient Tolerated treatment well  Safety Devices  Type of devices: Call light within reach; Chair alarm in place; Left in chair         Patient Diagnosis(es): The primary encounter diagnosis was Small bowel obstruction ([address] Diagnoses of Aspiration pneumonia of both lower lobes, unspecified aspiration pneumonia type ([address] Hypoxia, Rectal bleeding, and Septicemia ([address] were also pertinent to this visit. has a past medical history of Acne rosacea, Cognitive impairment, Colon cancer ([address] Down syndrome, Gout, HIGH CHOLESTEROL, History of diarrhea, Macrocytosis, Seborrheic dermatitis, Seizure disorder ([address] Status post biventricular cardiac pacemaker insertion, Status post partial colectomy, Syncope and collapse, and Weight loss. has a past surgical history that includes Cataract removal with implant (); Colon surgery (2014); Colonoscopy (2017); pacemaker placement; Colonoscopy (N/A, 2019); and Small intestine surgery (N/A, 2019).     Restrictions  Restrictions/Precautions  Restrictions/Precautions: Fall Risk  Position Activity Restriction  Other position/activity restrictions: O2 1 L via NC. Pt MRDD although independent care/mobility pta. Diet : Full Liquid, Dysphagia Pureed (Pudding Thick). chest tube with suction  Subjective   General  Chart Reviewed: Yes  Patient assessed for rehabilitation services?: Yes  Additional Pertinent Hx: 65 y/o male admit 12/18/19 with SBO, Rectal Bleed, Pneumonia, Septicemia. 12/18-12/27/19 Pt Intubated. 12/18/19 S/P Exp Lap, Lysis of Adhesions with Reduction of Closed Loop Small Bowel Obstruction. PMH as noted including Colon Ca, Partial Colectomy, Syncope/Collapse, Pacemaker, Down's Syndrome, Gout. Family / Caregiver Present: Yes(multiple family members)  Referring Practitioner: Christine Vance MD  Subjective  Subjective: Pt seen bedside and agreed to sit up in recliner to eat lunch. Objective          Balance  Sitting Balance: Stand by assistance  Standing Balance: Contact guard assistance  Functional Mobility  Functional - Mobility Device: Other(HHA)  Activity: Other(Took several steps to the recliner from the bed.)  Assist Level: Minimal assistance(min/CGA x 1, assist of another to manage lines.)  Wheelchair Bed Transfers  Wheelchair/Bed - Technique: Stand step  Equipment Used: Other(recliner)  Level of Asssistance: Minimal assistance;Contact guard assistance  Wheelchair Transfers Comments: assist of a second person to manage lines.   Bed mobility  Supine to Sit: Minimal assistance           Plan   Plan  Times per week: 3-5  Times per day: Daily  Current Treatment Recommendations: Strengthening, Functional Mobility Training, Endurance Training, Balance Training, Safety Education & Training, Cognitive/Perceptual Training, Cognitive Reorientation, Self-Care / ADL, Gait Training    AM-PAC Score        AM-[facility] Inpatient Daily Activity Raw Score: 14 (01/07/20 1419)  AM-PAC Inpatient ADL T-Scale Score : 33.39 (01/07/20 1419)  ADL Inpatient CMS 0-100% Score: 59.67 (01/07/20 1419)  ADL Inpatient CMS G-Code Modifier : JACOBO (01/07/20 1419)    Goals  Short term goals  Time Frame for Short term goals: Prior to d/c: pt progressing towards goals 1/3  Short term goal 1: Pt will bathe/dress UB with min A. Short term goal 2: Pt will bathe/dress LB with mod A. Short term goal 3: Pt will toilet with mod A. Short term goal 4: Pt will complete grooming in stance at sink with SBA/CGA  Short term goal 5: Pt will complete fxl mobility and fxl transfers to/from ADL surfaces with SBA/CGA using AD prn. Short term goal 6: Pt will increase activity tolerance to achieve the above goals. Long term goals  Time Frame for Long term goals : STG=LTG  Patient Goals   Patient goals : pt unable to verbalize personal therapy goal d/t cognitive deficits. Therapy Time   Individual Concurrent Group Co-treatment   Time In 2043         Time Out 1330         Minutes 25              This note to serve as OT d/c summary if pt is d/c-ed from hospital prior to next OT session.       Tabitha Reid, [address]

## 2020-01-08 NOTE — PROGRESS NOTES
Clinical Pharmacy Note  Heparin Dosing       Lab Results   Component Value Date    APTT 59.0 01/08/2020     Lab Results   Component Value Date    HGB 8.5 01/08/2020    HCT 26.2 01/08/2020     01/08/2020    INR 1.15 12/18/2019       Current Infusion Rate: 13.6 mL/hr    Plan:  Rate: continue at 13.6 mL/hr  Next aPTT: 0600 1/9/20    Pharmacy will continue to monitor and adjust based on aPTT results.   Sam Mcelroy, PharmD

## 2020-01-08 NOTE — PROGRESS NOTES
Pulmonary Progress Note    Date of Admission: 12/18/2019   LOS: 21 days     Chief Complaint   Patient presents with    Shortness of Breath     onset - 1530 today    Altered Mental Status     pt lethargic, unsteady       Assessment:     Aspiration Pneumonia  Acute Hypoxemic Respiratory Failure with SAO2 <90% on Room Air  Acute PE  Pleural Effusion  SBO    Plan:      -tolerating RA  -chest tube placed yesterday, unclear output but CXR much improved  -repeat CXR today. -CT output >200 last 24h, would leave in place for now. -PFA c/w exudate. Cultures and cytology pending  -pt has dysphagia and evidence of aspiration. -merrem started yesterday for worsening leukocytosis and fever, wbc now down. Recommend 7 days merrem. 24 Hour Events/Subjective  Chest tube placed yesterday, CXR improved    ROS:   No nausea  No Vomiting  No chest pain      Intake/Output Summary (Last 24 hours) at 1/8/2020 0757  Last data filed at 1/8/2020 0719  Gross per 24 hour   Intake 833 ml   Output 685 ml   Net 148 ml         PHYSICAL EXAM:   Blood pressure 102/68, pulse 114, temperature 98.2 °F (36.8 °C), resp. rate 18, height 5' 6\" (1.676 m), weight 125 lb 10.6 oz (57 kg), SpO2 91 %. Gen:  No acute distress. Eyes: PERRL. Anicteric sclera. No conjunctival injection. ENT: No discharge. Posterior oropharynx clear. External appearance of ears and nose normal.  Neck: Trachea midline. No mass   Resp: Left sided crackles. No wheezes. No rhonchi. Near absent on the left. CV: tachy rate. Regular rhythm. No murmur or rub. No edema. GI: Soft, Non-tender. Non-distended. +BS  Skin: Warm, dry, w/o erythema. Lymph: No cervical or supraclavicular LAD. M/S: No cyanosis. No clubbing. Neuro:  no focal neurologic deficit. Moves all extremities  Psych: Awake and alert. Mood stable.       Medications:    Scheduled Meds:   meropenem  500 mg Intravenous Q6H    [Held by provider] apixaban  10 mg Oral BID    insulin lispro  0-18 Units contrast:   Results for orders placed during the hospital encounter of 12/18/19   CT Chest WO Contrast    Narrative EXAMINATION:  CT OF THE CHEST WITHOUT CONTRAST 12/26/2019 1:28 pm    TECHNIQUE:  CT of the chest was performed without the administration of intravenous  contrast. Multiplanar reformatted images are provided for review. Dose  modulation, iterative reconstruction, and/or weight based adjustment of the  mA/kV was utilized to reduce the radiation dose to as low as reasonably  achievable. COMPARISON:  None. HISTORY:  ORDERING SYSTEM PROVIDED HISTORY: pleural effusion  TECHNOLOGIST PROVIDED HISTORY:  Reason for exam:->pleural effusion  Reason for Exam: pleural effusion  Acuity: Acute  Type of Exam: Initial    FINDINGS:  Mediastinum: Soft tissues of the thoracic inlet are unremarkable. The  thoracic aorta is normal in caliber. The main pulmonary artery is normal in  caliber. There is no pericardial effusion. There is no mediastinal or hilar  adenopathy. Lungs/pleura: There are large bilateral effusions with adjacent  consolidation. There are scattered areas of consolidation in the right upper  lobe. There is no pneumothorax. The tracheobronchial tree is patent. There  is endotracheal tube with the tip in the distal midtrachea. Upper Abdomen: The upper abdomen is grossly unremarkable. Soft Tissues/Bones: The extrathoracic soft tissues are unremarkable. There  is no axillary adenopathy. There is no acute osseous abnormality. Impression Large bilateral pleural effusions with adjacent consolidation consistent with  pneumonia. CTPA:   Results for orders placed during the hospital encounter of 12/18/19   CT CHEST PULMONARY EMBOLISM W CONTRAST    Narrative EXAMINATION:  CTA OF THE CHEST 1/1/2020 12:03 pm    TECHNIQUE:  CTA of the chest was performed after the administration of intravenous  contrast.  Multiplanar reformatted images are provided for review.   MIP  images are provided for review. Dose modulation, iterative reconstruction,  and/or weight based adjustment of the mA/kV was utilized to reduce the  radiation dose to as low as reasonably achievable. COMPARISON:  Prior studies including chest x-ray last evening, CT chest 12/26/2018    HISTORY:  ORDERING SYSTEM PROVIDED HISTORY: hypoxia and tachycadia, r/o PE, eval  effusion, pneumonia  TECHNOLOGIST PROVIDED HISTORY:  Reason for exam:->hypoxia and tachycadia, r/o PE, eval effusion, pneumonia  Reason for Exam: hypoxia and tachycadia, r/o PE, eval effusion, pneumonia  Acuity: Acute  Type of Exam: Initial    FINDINGS:  Pulmonary Arteries: Motion artifact as well as streak artifact due to lack of  elevation of the patient's arms, degrades the images. Central pulmonary  arteries are normal in caliber. Despite the limitation of the study, a filling defect is identified within  the anterior segmental pulmonary artery of the right lower lobe. Most of the  segmental and subsegmental pulmonary arteries are obscured. Exact extent of  clot load is difficult to evaluate given obscuration of images; no central  pulmonary embolism is seen. Mediastinum: The heart size is stable. The RV/LV ratio is approximately 0.9. No pericardial effusion. No pathologically enlarged mediastinal lymph nodes. Lungs/pleura: Bones there has been interval decrease in size of a right  pleural effusion which is now very small. A moderate-to-large left pleural  effusion has increased. There is mostly dependent left lung consolidation  with complete consolidation of the left lower lobe, likely passive  atelectasis. The aerated lungs show patchy areas of airspace opacification,  greatest in the right upper lung, similar to the prior study. Septal  thickening within the apices is decreased. Upper Abdomen: Limited images of the upper abdomen show no acute abnormality. Soft Tissues/Bones: No acute bony abnormality.       Impression At least 1 embolism within the the anterior right lower lobe segmental  pulmonary artery. Most of the segmental and subsegmental pulmonary arteries  are obscured. Interval decreased right pleural effusion. Interval increased left pleural effusion with increased dependent left lung  consolidation, likely passive atelectasis. The aerated lungs show patchy airspace disease, similar to the prior study,  greatest in the right upper lobe. Findings were discussed with MEHDI Riley at 1:14 pm on 1/1/2020. CXR PA/LAT:   Results for orders placed during the hospital encounter of 01/24/19   XR CHEST STANDARD (2 VW)    Narrative EXAMINATION:  TWO VIEWS OF THE CHEST    1/24/2019 9:42 am    COMPARISON:  01/12/2019. HISTORY:  ORDERING SYSTEM PROVIDED HISTORY: Pacemaker lead malfunction, initial  encounter  TECHNOLOGIST PROVIDED HISTORY:  Reason for exam:->possible pacemaker lead dislodgement  Ordering Physician Provided Reason for Exam: possible pacemaker lead  malfuction  Acuity: Acute  Type of Exam: Initial  Relevant Medical/Surgical History: hx of colon cancer    FINDINGS:  The heart size is within normal limits. The pulmonary vasculature is also  within normal limits. No acute infiltrates are seen. The costophrenic angles  are sharp bilaterally. No pneumothoraces are noted. There is a left subclavian  AICD. Impression 1. No active pulmonary disease. CXR portable:   Results for orders placed during the hospital encounter of 12/18/19   XR CHEST PORTABLE    Narrative EXAMINATION:  ONE XRAY VIEW OF THE CHEST    1/3/2020 12:24 am    COMPARISON:  Prior studies including 12/31/2019 chest x-ray and CT 01/01/2020    HISTORY:  ORDERING SYSTEM PROVIDED HISTORY: SOB  TECHNOLOGIST PROVIDED HISTORY:  Reason for exam:->SOB  Reason for Exam: very sob  Acuity: Acute  Type of Exam: Initial    FINDINGS:  A right arm PICC and left subclavian cardiac device are unchanged. No acute  bony abnormality.     There

## 2020-01-09 ENCOUNTER — APPOINTMENT (OUTPATIENT)
Dept: GENERAL RADIOLOGY | Age: 60
DRG: 853 | End: 2020-01-09
Payer: COMMERCIAL

## 2020-01-09 LAB
ALBUMIN SERPL-MCNC: 2.6 G/DL (ref 3.4–5)
ALP BLD-CCNC: 163 U/L (ref 40–129)
ALT SERPL-CCNC: 121 U/L (ref 10–40)
ANION GAP SERPL CALCULATED.3IONS-SCNC: 14 MMOL/L (ref 3–16)
APTT: 64.1 SEC (ref 24.2–36.2)
AST SERPL-CCNC: 70 U/L (ref 15–37)
BASOPHILS ABSOLUTE: 0.2 K/UL (ref 0–0.2)
BASOPHILS RELATIVE PERCENT: 1.4 %
BILIRUB SERPL-MCNC: 0.3 MG/DL (ref 0–1)
BILIRUBIN DIRECT: <0.2 MG/DL (ref 0–0.3)
BILIRUBIN, INDIRECT: ABNORMAL MG/DL (ref 0–1)
BUN BLDV-MCNC: 14 MG/DL (ref 7–20)
CALCIUM SERPL-MCNC: 8.5 MG/DL (ref 8.3–10.6)
CHLORIDE BLD-SCNC: 103 MMOL/L (ref 99–110)
CO2: 26 MMOL/L (ref 21–32)
CREAT SERPL-MCNC: 0.9 MG/DL (ref 0.9–1.3)
EOSINOPHILS ABSOLUTE: 0.3 K/UL (ref 0–0.6)
EOSINOPHILS RELATIVE PERCENT: 2.2 %
GFR AFRICAN AMERICAN: >60
GFR NON-AFRICAN AMERICAN: >60
GLUCOSE BLD-MCNC: 122 MG/DL (ref 70–99)
GLUCOSE BLD-MCNC: 122 MG/DL (ref 70–99)
GLUCOSE BLD-MCNC: 128 MG/DL (ref 70–99)
GLUCOSE BLD-MCNC: 132 MG/DL (ref 70–99)
GLUCOSE BLD-MCNC: 148 MG/DL (ref 70–99)
HCT VFR BLD CALC: 29.5 % (ref 40.5–52.5)
HEMOGLOBIN: 9.5 G/DL (ref 13.5–17.5)
LYMPHOCYTES ABSOLUTE: 2.1 K/UL (ref 1–5.1)
LYMPHOCYTES RELATIVE PERCENT: 13.6 %
MAGNESIUM: 2 MG/DL (ref 1.8–2.4)
MCH RBC QN AUTO: 33.3 PG (ref 26–34)
MCHC RBC AUTO-ENTMCNC: 32.3 G/DL (ref 31–36)
MCV RBC AUTO: 102.9 FL (ref 80–100)
MONOCYTES ABSOLUTE: 1.2 K/UL (ref 0–1.3)
MONOCYTES RELATIVE PERCENT: 7.6 %
NEUTROPHILS ABSOLUTE: 11.5 K/UL (ref 1.7–7.7)
NEUTROPHILS RELATIVE PERCENT: 75.2 %
PDW BLD-RTO: 15.3 % (ref 12.4–15.4)
PERFORMED ON: ABNORMAL
PHOSPHORUS: 3.4 MG/DL (ref 2.5–4.9)
PLATELET # BLD: 578 K/UL (ref 135–450)
PMV BLD AUTO: 9.6 FL (ref 5–10.5)
POTASSIUM SERPL-SCNC: 3.8 MMOL/L (ref 3.5–5.1)
RBC # BLD: 2.87 M/UL (ref 4.2–5.9)
SODIUM BLD-SCNC: 143 MMOL/L (ref 136–145)
TOTAL PROTEIN: 6.9 G/DL (ref 6.4–8.2)
WBC # BLD: 15.3 K/UL (ref 4–11)

## 2020-01-09 PROCEDURE — 83735 ASSAY OF MAGNESIUM: CPT

## 2020-01-09 PROCEDURE — 85025 COMPLETE CBC W/AUTO DIFF WBC: CPT

## 2020-01-09 PROCEDURE — 2580000003 HC RX 258: Performed by: INTERNAL MEDICINE

## 2020-01-09 PROCEDURE — C9113 INJ PANTOPRAZOLE SODIUM, VIA: HCPCS | Performed by: INTERNAL MEDICINE

## 2020-01-09 PROCEDURE — 80069 RENAL FUNCTION PANEL: CPT

## 2020-01-09 PROCEDURE — APPNB30 APP NON BILLABLE TIME 0-30 MINS: Performed by: NURSE PRACTITIONER

## 2020-01-09 PROCEDURE — 2060000000 HC ICU INTERMEDIATE R&B

## 2020-01-09 PROCEDURE — 6370000000 HC RX 637 (ALT 250 FOR IP): Performed by: INTERNAL MEDICINE

## 2020-01-09 PROCEDURE — 88305 TISSUE EXAM BY PATHOLOGIST: CPT

## 2020-01-09 PROCEDURE — 97129 THER IVNTJ 1ST 15 MIN: CPT

## 2020-01-09 PROCEDURE — 6360000002 HC RX W HCPCS: Performed by: INTERNAL MEDICINE

## 2020-01-09 PROCEDURE — 99232 SBSQ HOSP IP/OBS MODERATE 35: CPT | Performed by: INTERNAL MEDICINE

## 2020-01-09 PROCEDURE — 92526 ORAL FUNCTION THERAPY: CPT

## 2020-01-09 PROCEDURE — 36415 COLL VENOUS BLD VENIPUNCTURE: CPT

## 2020-01-09 PROCEDURE — 71046 X-RAY EXAM CHEST 2 VIEWS: CPT

## 2020-01-09 PROCEDURE — 97116 GAIT TRAINING THERAPY: CPT | Performed by: PHYSICAL THERAPIST

## 2020-01-09 PROCEDURE — 88112 CYTOPATH CELL ENHANCE TECH: CPT

## 2020-01-09 PROCEDURE — APPSS30 APP SPLIT SHARED TIME 16-30 MINUTES: Performed by: NURSE PRACTITIONER

## 2020-01-09 PROCEDURE — 94640 AIRWAY INHALATION TREATMENT: CPT

## 2020-01-09 PROCEDURE — 97530 THERAPEUTIC ACTIVITIES: CPT

## 2020-01-09 PROCEDURE — 85730 THROMBOPLASTIN TIME PARTIAL: CPT

## 2020-01-09 PROCEDURE — 94761 N-INVAS EAR/PLS OXIMETRY MLT: CPT

## 2020-01-09 PROCEDURE — 80076 HEPATIC FUNCTION PANEL: CPT

## 2020-01-09 PROCEDURE — 97530 THERAPEUTIC ACTIVITIES: CPT | Performed by: PHYSICAL THERAPIST

## 2020-01-09 RX ORDER — VALACYCLOVIR HYDROCHLORIDE 500 MG/1
1000 TABLET, FILM COATED ORAL DAILY
Status: DISCONTINUED | OUTPATIENT
Start: 2020-01-09 | End: 2020-01-14 | Stop reason: HOSPADM

## 2020-01-09 RX ADMIN — PANTOPRAZOLE SODIUM 40 MG: 40 INJECTION, POWDER, FOR SOLUTION INTRAVENOUS at 09:00

## 2020-01-09 RX ADMIN — MEROPENEM 500 MG: 500 INJECTION, POWDER, FOR SOLUTION INTRAVENOUS at 15:54

## 2020-01-09 RX ADMIN — MEROPENEM 500 MG: 500 INJECTION, POWDER, FOR SOLUTION INTRAVENOUS at 09:00

## 2020-01-09 RX ADMIN — SODIUM CHLORIDE, PRESERVATIVE FREE 10 ML: 5 INJECTION INTRAVENOUS at 21:54

## 2020-01-09 RX ADMIN — ISODIUM CHLORIDE 3 ML: 0.03 SOLUTION RESPIRATORY (INHALATION) at 11:25

## 2020-01-09 RX ADMIN — LEVALBUTEROL HYDROCHLORIDE 1.25 MG: 1.25 SOLUTION, CONCENTRATE RESPIRATORY (INHALATION) at 11:25

## 2020-01-09 RX ADMIN — MEROPENEM 500 MG: 500 INJECTION, POWDER, FOR SOLUTION INTRAVENOUS at 21:53

## 2020-01-09 RX ADMIN — HEPARIN SODIUM 13.6 ML/HR: 10000 INJECTION, SOLUTION INTRAVENOUS at 00:56

## 2020-01-09 RX ADMIN — LEVALBUTEROL HYDROCHLORIDE 1.25 MG: 1.25 SOLUTION, CONCENTRATE RESPIRATORY (INHALATION) at 20:28

## 2020-01-09 RX ADMIN — VALACYCLOVIR HYDROCHLORIDE 1000 MG: 500 TABLET, FILM COATED ORAL at 12:12

## 2020-01-09 RX ADMIN — Medication 10 ML: at 09:01

## 2020-01-09 RX ADMIN — MEROPENEM 500 MG: 500 INJECTION, POWDER, FOR SOLUTION INTRAVENOUS at 04:25

## 2020-01-09 RX ADMIN — ACETAMINOPHEN 650 MG: 650 SUPPOSITORY RECTAL at 21:54

## 2020-01-09 RX ADMIN — ISODIUM CHLORIDE 3 ML: 0.03 SOLUTION RESPIRATORY (INHALATION) at 20:28

## 2020-01-09 RX ADMIN — HEPARIN SODIUM 13.6 ML/HR: 10000 INJECTION, SOLUTION INTRAVENOUS at 16:02

## 2020-01-09 RX ADMIN — ISODIUM CHLORIDE 3 ML: 0.03 SOLUTION RESPIRATORY (INHALATION) at 15:40

## 2020-01-09 RX ADMIN — LEVALBUTEROL HYDROCHLORIDE 1.25 MG: 1.25 SOLUTION, CONCENTRATE RESPIRATORY (INHALATION) at 15:40

## 2020-01-09 RX ADMIN — SODIUM CHLORIDE, PRESERVATIVE FREE 10 ML: 5 INJECTION INTRAVENOUS at 09:01

## 2020-01-09 ASSESSMENT — PAIN DESCRIPTION - FREQUENCY: FREQUENCY: CONTINUOUS

## 2020-01-09 ASSESSMENT — PAIN DESCRIPTION - LOCATION: LOCATION: HEAD

## 2020-01-09 ASSESSMENT — PAIN SCALES - WONG BAKER: WONGBAKER_NUMERICALRESPONSE: 0

## 2020-01-09 ASSESSMENT — PAIN SCALES - GENERAL
PAINLEVEL_OUTOF10: 0
PAINLEVEL_OUTOF10: 0
PAINLEVEL_OUTOF10: 5
PAINLEVEL_OUTOF10: 0

## 2020-01-09 ASSESSMENT — PAIN DESCRIPTION - ONSET: ONSET: ON-GOING

## 2020-01-09 ASSESSMENT — PAIN - FUNCTIONAL ASSESSMENT: PAIN_FUNCTIONAL_ASSESSMENT: ACTIVITIES ARE NOT PREVENTED

## 2020-01-09 ASSESSMENT — PAIN DESCRIPTION - PAIN TYPE: TYPE: ACUTE PAIN

## 2020-01-09 ASSESSMENT — PAIN DESCRIPTION - DESCRIPTORS: DESCRIPTORS: HEADACHE

## 2020-01-09 ASSESSMENT — PAIN DESCRIPTION - PROGRESSION: CLINICAL_PROGRESSION: GRADUALLY WORSENING

## 2020-01-09 NOTE — PROGRESS NOTES
Physical Therapy  Facility/Department: [address] PROGRESSIVE CARE  Daily Treatment Note  NAME: Juanita Damon  : 1960  MRN: [id number]    Date of Service: 2020    Discharge Recommendations:  Patient would benefit from continued therapy after discharge, 3-5 sessions per week     Juanita Damon scored a 14/24 on the AM-PAC short mobility form. Current research shows that an AM-PAC score of 17 or less is typically not associated with a discharge to the patient's home setting. Based on the patients AM-PAC score and their current functional mobility deficits, it is recommended that the patient have 3-5 sessions per week of Physical Therapy at d/c to increase the patients independence. Assessment   Body structures, Functions, Activity limitations: Decreased functional mobility   Assessment: pt making progress in therapy however continues to be unsteady with early fatigue with functional tasks; feel pt will need continued therapy at discharge to address deficits to allow pt to regain his premorbid status and return home when able  PT Education: General Safety  REQUIRES PT FOLLOW UP: Yes  Activity Tolerance  Activity Tolerance: Patient Tolerated treatment well     Patient Diagnosis(es): The primary encounter diagnosis was Small bowel obstruction ([address] Diagnoses of Aspiration pneumonia of both lower lobes, unspecified aspiration pneumonia type ([address] Hypoxia, Rectal bleeding, and Septicemia ([address] were also pertinent to this visit. has a past medical history of Acne rosacea, Cognitive impairment, Colon cancer ([address] Down syndrome, Gout, HIGH CHOLESTEROL, History of diarrhea, Macrocytosis, Seborrheic dermatitis, Seizure disorder ([address] Status post biventricular cardiac pacemaker insertion, Status post partial colectomy, Syncope and collapse, and Weight loss. has a past surgical history that includes Cataract removal with implant (); Colon surgery (2014);  Colonoscopy (2017);

## 2020-01-09 NOTE — PROGRESS NOTES
Department of Internal Medicine  General Internal Medicine  Attending Progress Note    Chief Complaint: getting to eat very little      HPI:   SUBJECTIVE:  65 yo male patient with a history of down's syndrome, colon cancer (resected),  Pacemaker for chb who presented with SBO and septic shock. Now on thickened liquids and purreed diet which need to be spoon fed. Brother is frustrated over the restrictions. Still sob and requiring oxygen supplement. Not made much progress with diuresis. Very wobbly on standing always wants to sit or lie down.   Will walk with assistance  Denies pain from chest tube but will not sit up for me today      Past Medical History:   Diagnosis Date    Acne rosacea     Cognitive impairment     sees Dr Christina Witt, started Aricept 12/2019    Colon cancer ([facility] [address] 1/7/2014    ileocecal valve/Leuenberger/yrly surveillance    Down syndrome     Gout     HIGH CHOLESTEROL     2017 3.1 % framingham risk score    History of diarrhea     since childhood, worse since partial colectomy resolved after a few months    Macrocytosis     followed by Dr Xavier Hernandez and released    Seborrheic dermatitis     Seizure disorder ([facility] [address] 2019    Status post biventricular cardiac pacemaker insertion 01/2019    for long sinus pauses with syncopal willis    Status post partial colectomy 2016    diarrhea    Syncope and collapse 01/2019    recurrent syncope, found seizures and sinus pauses    Weight loss 2015    since 2014 at the time of partial colectomy lost 25 lbs     Past Surgical History:   Procedure Laterality Date    CATARACT REMOVAL WITH IMPLANT  2007    Bilateral    COLON SURGERY  1/7//2014    for removal colon cancer    COLONOSCOPY  02/27/2017    Dr Charisma Nguyen 8/21/2019    COLONOSCOPY WITH BIOPSY performed by Vicente Ridley MD at [address] N/A 12/18/2019    EXPLORATORY LAPAROTOMY, LYSIS OF ADHESIONS performed by Osmani Salcedo restrictions due to dysphagia and tolerating from GI standpoint    Aspiration pneumonia of both lower lobes (HCC) pleural fluid left much worse than right  Plan:chest tube now draining, care per pulmonary  Recent cxr still with fair amount of fluid Dr Gipson Records to adjust to suction    Hypoxia  Plan: adequate oxygenation on NC    Septicemia ([facility] [address] Suspect has another aspiration pneumonia  Plan: discussed with Dr Gipson Records will start Merrem    Septic shock ([facility] [address]  Plan : off pressors, resolved  ANISH  resolved  Cognitive impairment likely alzheimers  Continue aricept  Abnormal LFTS  Improving, observe    Rhianna Monroy MD

## 2020-01-09 NOTE — PROGRESS NOTES
degree about aspiration risk. will ask SLP to continue to work with patient in hopes this is not a chronic condition. 1/3/2020 most recent MBS:  Moderate-severe oral stage dysphagia characterized by decreased lingual manipulation. · Bolus formation and movement is slow, disorganized, and prolonged without excess labor, but with concern for excess labor with denser solids. Severe pharyngeal stage dysphagia characterized by delayed swallow and decreased laryngeal elevation. · Flash penetration with pudding/puree via teaspoon. · Aspiration with occasional cough response with honey via teaspoon; wet vocal quality also noted post-swallow. · Mild-moderate vallecular and pyriform residue with PO trials is cleared with repeat swallows. · Patient unable to follow multi-step directives to complete chin tuck. Recommended Diet:  · Solid consistency: Dysphagia Pureed (Dysphagia I)(VIA TEASPOON; REPEAT SWALLOW)  · Liquid consistency: Extremely Thick (Spoon-Pudding)(VIA TEASPOON; REPEAT SWALLOW)  · Liquid administration via: Spoon  · Medication administration: Meds in puree(CRUSHED)  · Supervision: 1:1  · Compensatory Swallowing Strategies: Small bites/sips;Eat/Feed slowly; Remain upright for 30-45 minutes after meals;Upright as possible for all oral intake; No straws; Total feed; Liquid by spoon only;Swallow 2 times per bite/sip; External pacing    Subjective:     Current diet  Solid consistency: Dysphagia Pureed (Dysphagia I)(VIA TEASPOON; REPEAT SWALLOW)  Liquid consistency: Extremely Thick (Spoon-Pudding)(VIA TEASPOON; REPEAT SWALLOW)  Liquid administration via: Spoon  Medication administration: Meds in puree(CRUSHED)  Supervision: 1:1  Compensatory Swallowing Strategies:   · Small bites/sips;  · Eat/Feed slowly;  · Remain upright for 30-45 minutes after meals;  · Upright as possible for all oral intake;  · No straws;  · Total feed;  · Liquid by spoon only;  · Swallow 2 times per bite/sip;  · External pacing    Comments regarding tolerating Current Diet:   adequate tolerance reported; brother continues to report improved tolerance with downgrade    Objective:     Pain   Patient Currently in Pain: Denies    Cognitive/Behavior   Behavior/Cognition: Alert, Cooperative, Pleasant mood, Distractible, Requires cueing    Presentations   Consistencies Administered:  Puree, Pudding    Positioning   Upright in bed    PO Trials:  Puree - teaspoon: prolonged oral transit; concern for premature bolus loss to the pharynx; delayed swallow; decreased laryngeal elevation; no cough or throat clear; no vocal quality changes  Pudding - teaspoon: prolonged oral transit; concern for premature bolus loss to the pharynx; delayed swallow; decreased laryngeal elevation; no cough or throat clear; no vocal quality changes    Dysphagia Tx:   · PO trials: appears to be safely tolerating current diet. Improved breathing quality noted  · Comp strats: handout provided 1/6/2020 with comp strats listed reinforced. Patient /family education completed - family indicates understanding  · Swallow ex: mod cues for lingual protrusion and elevation; mod-max cues for effortful swallow and falsetto; mod cues for yawn, pitch swing, and glottals. Continues with improved tx tolerance this date suspected 2/2 familiarity with SLP and tx ex; continue. Goals:   Dysphagia Goals: The patient will tolerate recommended diet without observed clinical signs of aspiration;continue  The patient/caregiver will demonstrate understanding of compensatory strategies for improved swallowing safety. continue  The patient will improve swallow function via swallow ex completed 5/5 each continue - MD request dysphagia tx continue      Assessment:   Impressions:   Dysphagia Diagnosis:   Severe oropharyngeal dysphagia as noted above.   Completed swallow ex: patient requires mod-max cues ,but continue to suspect decreased cueing and increased receptiveness as attempts continue

## 2020-01-09 NOTE — PROGRESS NOTES
Occupational Therapy  Facility/Department: [address] PROGRESSIVE CARE  Daily Treatment Note  NAME: Joycelyn Hewitt  : 1960  MRN: [id number]    Date of Service: 2020    Discharge Recommendations:  3-5 sessions per week       Assessment   Performance deficits / Impairments: Decreased functional mobility ; Decreased ADL status; Decreased endurance;Decreased strength;Decreased safe awareness;Decreased cognition;Decreased balance;Decreased high-level IADLs  Assessment: Pt tolerated session well. Pt was able to increase distance for mobility but continues to require min assist for balance. Pt completed transfer with min assist.  Pt would benefit from continued therapy after discharge to increase independence with mobility and ADL's. Prognosis: Good  History: PMH as noted including Colon Ca, Partial Colectomy, Syncope/Collapse, Pacemaker, Down's Syndrome, Gout. REQUIRES OT FOLLOW UP: Yes  Activity Tolerance  Activity Tolerance: Patient Tolerated treatment well  Safety Devices  Type of devices: Call light within reach; Left in bed;Bed alarm in place;Gait belt         Patient Diagnosis(es): The primary encounter diagnosis was Small bowel obstruction ([address] Diagnoses of Aspiration pneumonia of both lower lobes, unspecified aspiration pneumonia type ([address] Hypoxia, Rectal bleeding, and Septicemia ([address] were also pertinent to this visit. has a past medical history of Acne rosacea, Cognitive impairment, Colon cancer ([address] Down syndrome, Gout, HIGH CHOLESTEROL, History of diarrhea, Macrocytosis, Seborrheic dermatitis, Seizure disorder ([address] Status post biventricular cardiac pacemaker insertion, Status post partial colectomy, Syncope and collapse, and Weight loss. has a past surgical history that includes Cataract removal with implant (); Colon surgery (2014);  Colonoscopy (2017); pacemaker placement; Colonoscopy (N/A, 2019); and Small intestine surgery (N/A, 12/18/2019). Restrictions  Restrictions/Precautions  Restrictions/Precautions: Fall Risk  Position Activity Restriction  Other position/activity restrictions:  Pt MRDD although independent care/mobility pta. Diet : Full Liquid, Dysphagia Pureed (Pudding Thick). chest tube with suction  Subjective   General  Chart Reviewed: Yes  Patient assessed for rehabilitation services?: Yes  Additional Pertinent Hx: 65 y/o male admit 12/18/19 with SBO, Rectal Bleed, Pneumonia, Septicemia. 12/18-12/27/19 Pt Intubated. 12/18/19 S/P Exp Lap, Lysis of Adhesions with Reduction of Closed Loop Small Bowel Obstruction. PMH as noted including Colon Ca, Partial Colectomy, Syncope/Collapse, Pacemaker, Down's Syndrome, Gout. Family / Caregiver Present: Yes(brother)  Referring Practitioner: Tyler Parrish MD  Subjective  Subjective: Pt seen bedside with PT for a cotx. General Comment  Comments: Per RN ok to disconnect suction to ambulate in the wolff. Objective       Functional Mobility  Functional Mobility Comments: Ambulated 85 feet without a rest break with HHA and min assist.  Required assist of another to manage suction and IV pole. Wheelchair Bed Transfers  Wheelchair/Bed - Technique: Ambulating  Equipment Used: Bed  Level of Asssistance: Minimal assistance;2 Person assistance  Wheelchair Transfers Comments: min assist x 1 for balance and assist x 1 to manage suction and IV pole  Bed mobility  Supine to Sit: Minimal assistance  Sit to Supine: Stand by assistance     Cognition  Overall Cognitive Status: Exceptions  Following Commands: Follows one step commands with increased time; Follows one step commands with repetition  Attention Span: Difficulty attending to directions; Difficulty dividing attention  Memory: Decreased short term memory;Decreased recall of recent events;Decreased recall of precautions  Safety Judgement: Decreased awareness of need for safety;Decreased awareness of need for assistance  Problem Solving:

## 2020-01-09 NOTE — PROGRESS NOTES
Pulmonary Progress Note    Date of Admission: 12/18/2019   LOS: 22 days     Chief Complaint   Patient presents with    Shortness of Breath     onset - 1530 today    Altered Mental Status     pt lethargic, unsteady       Assessment:     Aspiration Pneumonia  Acute Hypoxemic Respiratory Failure with SAO2 <90% on Room Air  Acute PE  Pleural Effusion  SBO    Plan:     -repeat CXR with persistent effusions, CT not on suction this morning. Place back on suction. -CT output 120cc last 24h, leave in place   -PFA c/w exudate. Cultures and cytology pending  -pt is aspirating, at least in part due to underlying dementia. - Recommend 7 days merrem. 24 Hour Events/Subjective  Chest tube placed, exudate. Cultures NGTD. ROS:   No nausea  No Vomiting  No chest pain      Intake/Output Summary (Last 24 hours) at 1/9/2020 1433  Last data filed at 1/9/2020 0430  Gross per 24 hour   Intake 900 ml   Output 625 ml   Net 275 ml         PHYSICAL EXAM:   Blood pressure 102/64, pulse 109, temperature 98.2 °F (36.8 °C), temperature source Oral, resp. rate 18, height 5' 6\" (1.676 m), weight 126 lb 12.2 oz (57.5 kg), SpO2 94 %. Gen:  No acute distress. Eyes: PERRL. Anicteric sclera. No conjunctival injection. ENT: No discharge. Posterior oropharynx clear. External appearance of ears and nose normal.  Neck: Trachea midline. No mass   Resp: Left sided crackles. No wheezes. No rhonchi. Improved aeration on the left chest  CV: tachy rate. Regular rhythm. No murmur or rub. No edema. GI: Soft, Non-tender. Non-distended. +BS  Skin: Warm, dry, w/o erythema. Lymph: No cervical or supraclavicular LAD. M/S: No cyanosis. No clubbing. Neuro:  no focal neurologic deficit. Moves all extremities  Psych: Awake and alert. Mood stable.       Medications:    Scheduled Meds:   meropenem  500 mg Intravenous Q6H    [Held by provider] apixaban  10 mg Oral BID    insulin lispro  0-18 Units Subcutaneous 4x Daily AC & HS    levalbuterol 1.25 mg Nebulization 4x daily    sodium chloride flush  10 mL Intravenous 2 times per day    pantoprazole  40 mg Intravenous Daily    And    sodium chloride (PF)  10 mL Intravenous Daily       Continuous Infusions:   heparin (porcine) 13.6 mL/hr (01/09/20 0056)    dextrose         PRN Meds:  heparin (porcine), sodium chloride nebulizer, menthol-zinc oxide, sodium chloride flush, magnesium sulfate, potassium chloride, sodium chloride flush, ondansetron, acetaminophen, glucose, dextrose, glucagon (rDNA), dextrose, dextran 70-hypromellose    Labs reviewed:  CBC:   Recent Labs     01/07/20 0437 01/08/20 0446 01/09/20  0431   WBC 18.5* 13.5* 15.3*   HGB 8.6* 8.5* 9.5*   HCT 26.4* 26.2* 29.5*   .1* 102.5* 102.9*   * 484* 578*     BMP:   Recent Labs     01/07/20 0437 01/08/20 0446 01/09/20  0431   * 144 143   K 3.5 3.9 3.8    106 103   CO2 23 24 26   PHOS 4.1 3.8 3.4   BUN 20 17 14   CREATININE 1.1 0.7* 0.9     LIVER PROFILE:   Recent Labs     01/07/20 0437 01/08/20 0446 01/09/20  0431   AST 98* 74* 70*   * 118* 121*   BILIDIR <0.2 <0.2 <0.2   BILITOT 0.3 0.3 0.3   ALKPHOS 170* 140* 163*     PT/INR: No results for input(s): PROTIME, INR in the last 72 hours. APTT:   Recent Labs     01/07/20 2058 01/08/20 0446 01/09/20  0431   APTT 56.4* 59.0* 64.1*     UA:  Recent Labs     01/07/20  2140   COLORU DK YELLOW   PHUR 5.5   WBCUA 2   RBCUA 9*   CLARITYU Clear   SPECGRAV 1.029   LEUKOCYTESUR Negative   UROBILINOGEN 1.0   BILIRUBINUR Negative   BLOODU Negative   GLUCOSEU Negative     No results for input(s): PH, PCO2, PO2 in the last 72 hours. Films:  Radiology Review:  Pertinent images / reports were reviewed as a part of this visit. Chest x-ray reviewed personally by me, interpretation as follows:  -Improving left-sided pleural effusion, does remain with a lateral meniscus, cannot rule out loculation.     CT Chest w/o contrast:   Results for orders placed during the hospital

## 2020-01-09 NOTE — PROGRESS NOTES
26   BUN  --    < > 14   CREATININE  --    < > 0.9   MG  --    < > 2.00   PHOS  --    < > 3.4   CALCIUM  --    < > 8.5   AST  --    < > 70*   ALT  --    < > 121*   BILITOT  --    < > 0.3   BILIDIR  --    < > <0.2   NITRU Negative  --   --    COLORU DK YELLOW  --   --     < > = values in this interval not displayed.      CBC:   Lab Results   Component Value Date    WBC 15.3 01/09/2020    RBC 2.87 01/09/2020    RBC 4.68 03/13/2017    HGB 9.5 01/09/2020    HCT 29.5 01/09/2020    .9 01/09/2020    MCH 33.3 01/09/2020    MCHC 32.3 01/09/2020    RDW 15.3 01/09/2020     01/09/2020    MPV 9.6 01/09/2020     CMP:    Lab Results   Component Value Date     01/09/2020    K 3.8 01/09/2020    K 5.5 12/19/2019     01/09/2020    CO2 26 01/09/2020    BUN 14 01/09/2020    CREATININE 0.9 01/09/2020    GFRAA >60 01/09/2020    GFRAA >60 04/09/2013    AGRATIO 1.3 12/09/2019    LABGLOM >60 01/09/2020    GLUCOSE 132 01/09/2020    GLUCOSE 115 03/13/2017    PROT 6.9 01/09/2020    PROT 7.3 03/13/2017    LABALBU 2.6 01/09/2020    CALCIUM 8.5 01/09/2020    BILITOT 0.3 01/09/2020    ALKPHOS 163 01/09/2020    AST 70 01/09/2020     01/09/2020        SAUMYA Jain-MAGY  Electronically signed 1/9/2020 at 3:11 PM     As above  Doing better today  More engaged, happy    Tolerating PO    Continue ongoing care  To ECF when medically stable    Electronically signed by Marialuisa Ramirez MD on 1/9/2020 at 4:06 PM

## 2020-01-09 NOTE — PROGRESS NOTES
Clinical Pharmacy Note  Heparin Dosing       Lab Results   Component Value Date    APTT 64.1 01/09/2020     Lab Results   Component Value Date    HGB 9.5 01/09/2020    HCT 29.5 01/09/2020     01/09/2020    INR 1.15 12/18/2019       Current Infusion Rate: 13.6 mL/hr    Plan:  Rate: continue at 13.6 mL/hr  Next aPTT: 0600 1/10/20    Pharmacy will continue to monitor and adjust based on aPTT results.   Lilibeth Hunter, [address] [facility]

## 2020-01-09 NOTE — PROGRESS NOTES
Department of Internal Medicine  General Internal Medicine  Attending Progress Note    Chief Complaint: getting to eat very little      HPI:   SUBJECTIVE:  65 yo male patient with a history of down's syndrome, colon cancer (resected),  Pacemaker for chb who presented with SBO and septic shock. Now on thickened liquids and purreed diet which need to be spoon fed. Brother is frustrated over the restrictions. Still sob and requiring oxygen supplement. Not made much progress with diuresis. Very wobbly on standing always wants to sit or lie down. Spiked a temp last night 101.6 and more tachy today.   Denies any cough, dysuria etc, no diarrhea etc    Denies pain from chest tube but will not sit up for me today      Past Medical History:   Diagnosis Date    Acne rosacea     Cognitive impairment     sees Dr Angeline Mendez, started Aricept 12/2019    Colon cancer ([facility] 1/7/2014    ileocecal valve/Leuenberger/yrly surveillance    Down syndrome     Gout     HIGH CHOLESTEROL     2017 3.1 % framingham risk score    History of diarrhea     since childhood, worse since partial colectomy resolved after a few months    Macrocytosis     followed by Dr Kayy Vasquez and released    Seborrheic dermatitis     Seizure disorder ([facility] [address] 2019    Status post biventricular cardiac pacemaker insertion 01/2019    for long sinus pauses with syncopal willis    Status post partial colectomy 2016    diarrhea    Syncope and collapse 01/2019    recurrent syncope, found seizures and sinus pauses    Weight loss 2015    since 2014 at the time of partial colectomy lost 25 lbs     Past Surgical History:   Procedure Laterality Date    CATARACT REMOVAL WITH IMPLANT  2007    Bilateral    COLON SURGERY  1/7//2014    for removal colon cancer    COLONOSCOPY  02/27/2017    Dr Iva Alvarez N/A 8/21/2019    COLONOSCOPY WITH BIOPSY performed by Adolfo Dupont MD at [address] [place] [address] 12/18/2019    EXPLORATORY LAPAROTOMY, LYSIS OF ADHESIONS performed by Alok Galeano MD at [address] History   Problem Relation Age of Onset    Diabetes Brother     Hypertension Brother     Stroke Brother     High Cholesterol Brother     Cancer Mother         melanoma    Other Father         cerebral aneurysm     Social History     Tobacco Use    Smoking status: Never Smoker    Smokeless tobacco: Never Used   Substance Use Topics    Alcohol use: No    Drug use: No       Prior to Admission medications    Medication Sig Start Date End Date Taking? Authorizing Provider   doxycycline (VIBRAMYCIN) 50 MG capsule Take 50 mg by mouth 2 times daily 11/26/19  Yes Historical Provider, MD   simvastatin (ZOCOR) 20 MG tablet Take 1 tablet by mouth daily 12/10/19  Yes Malik , MD   allopurinol (ZYLOPRIM) 100 MG tablet Take 1 tablet by mouth daily 12/9/19  Yes Malik , MD   donepezil (ARICEPT) 5 MG tablet Take 1 tablet by mouth nightly 12/9/19  Yes Malik , MD   Multiple Vitamins-Minerals (THERAPEUTIC MULTIVITAMIN-MINERALS) tablet Take 1 tablet by mouth daily   Yes Historical Provider, MD   Azelaic Acid 15 % GEL Apply topically daily 12/2/19   Historical Provider, MD   hydrocortisone (HYTONE) 2.5 % lotion Apply topically daily 11/26/19   Historical Provider, MD   metroNIDAZOLE (METROCREAM) 0.75 % cream Apply topically daily 11/26/19   Historical Provider, MD         ROS:  A comprehensive review of systems was negative except for: weakness , sob     OBJECTIVE:      PHYSICAL:  Intake/Output:   Patient Vitals for the past 8 hrs:   BP Temp Temp src Pulse Resp SpO2   01/08/20 1516 (!) 97/59 97.8 °F (36.6 °C) Oral 107 18 91 %   01/08/20 1145 -- -- -- -- -- 90 %     I/O last 3 completed shifts:   In: 1133 [P.O.:660; I.V.:473]  Out: 575 [Urine:475; Chest Tube:100]  I/O this shift:  In: -   Out: 30 [Chest Tube:30]  VITALS:    BP (!) 97/59   Pulse 107   Temp 97.8 °F (36.6 °C) (Oral)   Resp 18   Ht 5' 6\" (1.676 m)   Wt 125 lb 10.6 oz (57 kg)   SpO2 91%   BMI 20.28 kg/m²     General Appearance: awake and comfortable appearing  Skin: warm and dry, no rash or erythema  Head: normocephalic and atraumatic  Eyes: conjunctivae normal and sclera anicteric  Neck: neck supple and non tender without mass, no thyromegaly or thyroid nodules, no cervical lymphadenopathy   Pulmonary/Chest:decreased breath sounds all the way up on the left, right with basilar crackles and better breath sounds   Cardiovascular: sinus tach normal S1 and S2, no gallops and no carotid bruits no longer tachycardic  Abdomen: soft bs pos nontender, dressing I place  Extremities: no cyanosis, clubbing or edema  Musculoskeletal: no swollen joints and no tender joints,  Neurologic: following commands moves all 4    DATA:   Labs:  CBC:   Recent Labs     01/06/20  0434 01/07/20 0437 01/08/20  0446   WBC 15.8* 18.5* 13.5*   HGB 9.0* 8.6* 8.5*   * 484* 484*     BMP:    Recent Labs     01/06/20  0434 01/07/20  0437 01/08/20  0446    146* 144   K 4.1 3.5 3.9    105 106   CO2 27 23 24   BUN 24* 20 17   CREATININE 0.9 1.1 0.7*   GLUCOSE 131* 148* 134*     POC GLUCOSE:    Recent Labs     01/07/20  1652 01/07/20  2027 01/08/20  0736 01/08/20  1203 01/08/20  1819   POCGLU 154* 113* 117* 137* 165*     Ca/Mg/Phos:   Recent Labs     01/06/20  0434 01/07/20  0437 01/08/20  0446   CALCIUM 8.5 8.5 8.4   MG 1.90 2.00 1.90   PHOS 4.2 4.1 3.8     Hepatic:   Recent Labs     01/06/20  0434 01/07/20  0437 01/08/20  0446   AST 71* 98* 74*   * 128* 118*   BILITOT 0.3 0.3 0.3   ALKPHOS 169* 170* 140*     Troponin:   No results for input(s): TROPONINI in the last 72 hours. ProBNP:   No results for input(s): PROBNP in the last 72 hours. Lipids:   No results for input(s): CHOL, TRIG, HDL, LDLCALC, LABVLDL in the last 72 hours. ABGs:   No results for input(s): PHART, PO2ART, QTZ1TJY in the last 72 hours.   PT/INR:   No results for input(s): PROTIME, INR in the last 72 hours. APTT:   Recent Labs     01/07/20  0437 01/07/20 2058 01/08/20  0446   APTT 65.0* 56.4* 59.0*     Lactic acid 10.4 on presentation 3.3 today, 3.4 yesterday    EKG: ST and sinus arrhythmia, t wave inversion inferiorally    DIAGNOSTICS:  Ct Head Wo Contrast    Result Date: 12/18/2019  No acute intracranial abnormality. Xr Chest Portable    Result Date: 12/20/2019  1. Recommend advancement of endotracheal tube 1.0 cm. 2. Bibasilar subsegmental atelectasis plus or minus mild pleural effusions, unchanged. Xr Chest Portable    Result Date: 12/19/2019  Nasogastric tube projects in intragastric position. Bibasilar atelectasis. Pneumonia or aspiration pneumonitis are differential considerations. Nonspecific bowel gas pattern. Xr Chest Portable    Result Date: 12/19/2019  Small bilateral pleural effusions with adjacent atelectasis. Xr Chest Portable    Result Date: 12/18/2019  Low lung volume due to inspiratory effort. Mild bibasilar atelectasis with no other significant finding in the chest.     Ct Chest Abdomen Pelvis Wo Contrast    Result Date: 12/18/2019  Findings compatible with small bowel obstruction with transition point in the right mid abdomen, likely in mid to distal ileum. No obvious etiology identified. Small volume ascites, likely reactive. Areas of consolidation to the lungs bilaterally which may relate to atelectasis although multifocal pneumonia or aspiration pneumonitis could have a similar appearance. Patulous/distended appearance to the esophagus with fluid attenuation material within nearly the entirety of the esophagus, possibly secondary to the bowel obstruction and refluxed gastric contents. Patient may be at risk for aspiration. Trace bilateral pleural effusions.          ASSESSMENT AND PLAN    Active Problems:    Small bowel obstruction ([address] RESOLVED  Plan: back to eating with restrictions due to dysphagia and tolerating from GI standpoint    Aspiration

## 2020-01-09 NOTE — PLAN OF CARE
Skin assessment performed each shift per protocol. Patient turned and repositioned every two hours and prn with pillow support. Patient checked for incontence every two hours. Patient free from falls this shift. Fall precautions in place at all times. Bed in lowest position with two side rails up and wheels locked. Call light within reach. Patient able and agreeable to contact for safety appropriately. Problem: Urinary Elimination:  Goal: Signs and symptoms of infection will decrease  Description  Signs and symptoms of infection will decrease  Outcome: Ongoing     Problem: Infection - Central Venous Catheter-Associated Bloodstream Infection:  Goal: Will show no infection signs and symptoms  Description  Will show no infection signs and symptoms  Outcome: Ongoing   Continuing to work with patient and health care team on discharge plan. Discharge instructions and medication management will be reviewed prior to discharge. Pt able to express presence/absence of pain and rate pain appropriately using numerical scale. Pain/discomfort being managed with PRN analgesics per MD orders (see MAR). Pain assessed every shift and after interventions.

## 2020-01-10 ENCOUNTER — APPOINTMENT (OUTPATIENT)
Dept: CT IMAGING | Age: 60
DRG: 853 | End: 2020-01-10
Payer: COMMERCIAL

## 2020-01-10 LAB
APTT: 61.9 SEC (ref 24.2–36.2)
APTT: 64 SEC (ref 24.2–36.2)
GLUCOSE BLD-MCNC: 115 MG/DL (ref 70–99)
GLUCOSE BLD-MCNC: 157 MG/DL (ref 70–99)
GLUCOSE BLD-MCNC: 157 MG/DL (ref 70–99)
GLUCOSE BLD-MCNC: 159 MG/DL (ref 70–99)
PERFORMED ON: ABNORMAL

## 2020-01-10 PROCEDURE — 97530 THERAPEUTIC ACTIVITIES: CPT | Performed by: PHYSICAL THERAPIST

## 2020-01-10 PROCEDURE — 99232 SBSQ HOSP IP/OBS MODERATE 35: CPT | Performed by: INTERNAL MEDICINE

## 2020-01-10 PROCEDURE — 99024 POSTOP FOLLOW-UP VISIT: CPT | Performed by: SURGERY

## 2020-01-10 PROCEDURE — 2580000003 HC RX 258: Performed by: INTERNAL MEDICINE

## 2020-01-10 PROCEDURE — 6370000000 HC RX 637 (ALT 250 FOR IP): Performed by: INTERNAL MEDICINE

## 2020-01-10 PROCEDURE — 6360000002 HC RX W HCPCS: Performed by: INTERNAL MEDICINE

## 2020-01-10 PROCEDURE — 94640 AIRWAY INHALATION TREATMENT: CPT

## 2020-01-10 PROCEDURE — 36415 COLL VENOUS BLD VENIPUNCTURE: CPT

## 2020-01-10 PROCEDURE — 97116 GAIT TRAINING THERAPY: CPT | Performed by: PHYSICAL THERAPIST

## 2020-01-10 PROCEDURE — C9113 INJ PANTOPRAZOLE SODIUM, VIA: HCPCS | Performed by: INTERNAL MEDICINE

## 2020-01-10 PROCEDURE — 92526 ORAL FUNCTION THERAPY: CPT

## 2020-01-10 PROCEDURE — 2060000000 HC ICU INTERMEDIATE R&B

## 2020-01-10 PROCEDURE — 97530 THERAPEUTIC ACTIVITIES: CPT

## 2020-01-10 PROCEDURE — 85730 THROMBOPLASTIN TIME PARTIAL: CPT

## 2020-01-10 PROCEDURE — 71250 CT THORAX DX C-: CPT

## 2020-01-10 PROCEDURE — APPSS30 APP SPLIT SHARED TIME 16-30 MINUTES: Performed by: NURSE PRACTITIONER

## 2020-01-10 PROCEDURE — 94761 N-INVAS EAR/PLS OXIMETRY MLT: CPT

## 2020-01-10 PROCEDURE — 97129 THER IVNTJ 1ST 15 MIN: CPT

## 2020-01-10 PROCEDURE — APPNB30 APP NON BILLABLE TIME 0-30 MINS: Performed by: NURSE PRACTITIONER

## 2020-01-10 RX ADMIN — MEROPENEM 500 MG: 500 INJECTION, POWDER, FOR SOLUTION INTRAVENOUS at 03:30

## 2020-01-10 RX ADMIN — MEROPENEM 500 MG: 500 INJECTION, POWDER, FOR SOLUTION INTRAVENOUS at 16:14

## 2020-01-10 RX ADMIN — ISODIUM CHLORIDE 3 ML: 0.03 SOLUTION RESPIRATORY (INHALATION) at 15:48

## 2020-01-10 RX ADMIN — ISODIUM CHLORIDE 3 ML: 0.03 SOLUTION RESPIRATORY (INHALATION) at 08:26

## 2020-01-10 RX ADMIN — LEVALBUTEROL HYDROCHLORIDE 1.25 MG: 1.25 SOLUTION, CONCENTRATE RESPIRATORY (INHALATION) at 15:48

## 2020-01-10 RX ADMIN — ISODIUM CHLORIDE 3 ML: 0.03 SOLUTION RESPIRATORY (INHALATION) at 11:26

## 2020-01-10 RX ADMIN — Medication 10 ML: at 11:32

## 2020-01-10 RX ADMIN — PANTOPRAZOLE SODIUM 40 MG: 40 INJECTION, POWDER, FOR SOLUTION INTRAVENOUS at 09:51

## 2020-01-10 RX ADMIN — MEROPENEM 500 MG: 500 INJECTION, POWDER, FOR SOLUTION INTRAVENOUS at 09:51

## 2020-01-10 RX ADMIN — LEVALBUTEROL HYDROCHLORIDE 1.25 MG: 1.25 SOLUTION, CONCENTRATE RESPIRATORY (INHALATION) at 20:10

## 2020-01-10 RX ADMIN — MEROPENEM 500 MG: 500 INJECTION, POWDER, FOR SOLUTION INTRAVENOUS at 21:27

## 2020-01-10 RX ADMIN — DORNASE ALFA 5 MG: 1 SOLUTION RESPIRATORY (INHALATION) at 14:13

## 2020-01-10 RX ADMIN — ALTEPLASE 10 MG: 2.2 INJECTION, POWDER, LYOPHILIZED, FOR SOLUTION INTRAVENOUS at 14:11

## 2020-01-10 RX ADMIN — VALACYCLOVIR HYDROCHLORIDE 1000 MG: 500 TABLET, FILM COATED ORAL at 09:51

## 2020-01-10 RX ADMIN — SODIUM CHLORIDE, PRESERVATIVE FREE 10 ML: 5 INJECTION INTRAVENOUS at 09:51

## 2020-01-10 RX ADMIN — LEVALBUTEROL HYDROCHLORIDE 1.25 MG: 1.25 SOLUTION, CONCENTRATE RESPIRATORY (INHALATION) at 11:26

## 2020-01-10 RX ADMIN — LEVALBUTEROL HYDROCHLORIDE 1.25 MG: 1.25 SOLUTION, CONCENTRATE RESPIRATORY (INHALATION) at 08:26

## 2020-01-10 RX ADMIN — HEPARIN SODIUM 13.6 ML/HR: 10000 INJECTION, SOLUTION INTRAVENOUS at 16:14

## 2020-01-10 RX ADMIN — ISODIUM CHLORIDE 3 ML: 0.03 SOLUTION RESPIRATORY (INHALATION) at 20:10

## 2020-01-10 ASSESSMENT — PAIN SCALES - WONG BAKER
WONGBAKER_NUMERICALRESPONSE: 0

## 2020-01-10 NOTE — PROGRESS NOTES
General and Vascular Surgery                                                           Daily Progress Note      Pt Name: Juanita Damon  Medical Record Number: [id number]  Date of Birth 1960   Today's Date: 1/10/2020      ASSESSMENT/PLAN  1. Small bowel obstruction, hypovolemic shock  -12/18 exploratory laparotomy with RITESH, reduction of closed loop SBO  -tolerating oral intake   -Heparin and eliquis on hold d/t possible intrapleural lytics. swollen upper lip secondary to trauma from vent improving  -Continue PT/OT  -Leukocytosis: 15.8-->18.5-->13.5-->15.3.   -Abdomen soft, minimal incision tenderness. No signs of infection. Aspiration pneumonia  Acute hypoxemic respiratory failure  Acute PE  Pleural effusion  -Per pulm. Resume anticoagulation after procedure. EDUCATION  Patient educated about their illness/diagnosis, stated above, and all questions answered. We discussed the importance of nutrition, medications they are taking, and healthy lifestyle. Leeta Fought has improved from yesterday. Pain is well controlled. Denies SOB or abdominal pain. OBJECTIVE  VITALS:  height is 5' 6\" (1.676 m) and weight is 127 lb 6.8 oz (57.8 kg). His oral temperature is 98.2 °F (36.8 °C). His blood pressure is 118/75 and his pulse is 100. His respiration is 18 and oxygen saturation is 93%. VITALS:  /75   Pulse 100   Temp 98.2 °F (36.8 °C) (Oral)   Resp 18   Ht 5' 6\" (1.676 m)   Wt 127 lb 6.8 oz (57.8 kg)   SpO2 93%   BMI 20.57 kg/m²   GENERAL: alert, no distress  ABDOMEN: soft, NT, ND. Midline incision with sterstrips intact. I/O last 3 completed shifts:   In: 1399.9 [P.O.:365; I.V.:634.9; IV Piggyback:400]  Out: 650 [Urine:600; Chest Tube:50]  I/O this shift:  In: 240 [P.O.:240]  Out: 390 [Urine:350; Chest Tube:40]    LABS  Recent Labs     01/07/20  2140  01/09/20  0431   WBC  --    < > 15.3*   HGB  --    < > 9.5*   HCT  --    < > 29.5*   PLT  --    < > 578*   NA  --    < > 143   K  --    < > 3.8   CL  --    < > 103   CO2  --    < > 26   BUN  --    < > 14   CREATININE  --    < > 0.9   MG  --    < > 2.00   PHOS  --    < > 3.4   CALCIUM  --    < > 8.5   AST  --    < > 70*   ALT  --    < > 121*   BILITOT  --    < > 0.3   BILIDIR  --    < > <0.2   NITRU Negative  --   --    COLORU DK YELLOW  --   --     < > = values in this interval not displayed.      CBC:   Lab Results   Component Value Date    WBC 15.3 01/09/2020    RBC 2.87 01/09/2020    RBC 4.68 03/13/2017    HGB 9.5 01/09/2020    HCT 29.5 01/09/2020    .9 01/09/2020    MCH 33.3 01/09/2020    MCHC 32.3 01/09/2020    RDW 15.3 01/09/2020     01/09/2020    MPV 9.6 01/09/2020     CMP:    Lab Results   Component Value Date     01/09/2020    K 3.8 01/09/2020    K 5.5 12/19/2019     01/09/2020    CO2 26 01/09/2020    BUN 14 01/09/2020    CREATININE 0.9 01/09/2020    GFRAA >60 01/09/2020    GFRAA >60 04/09/2013    AGRATIO 1.3 12/09/2019    LABGLOM >60 01/09/2020    GLUCOSE 132 01/09/2020    GLUCOSE 115 03/13/2017    PROT 6.9 01/09/2020    PROT 7.3 03/13/2017    LABALBU 2.6 01/09/2020    CALCIUM 8.5 01/09/2020    BILITOT 0.3 01/09/2020    ALKPHOS 163 01/09/2020    AST 70 01/09/2020     01/09/2020        BERNARDINO Vazquez  Electronically signed 1/10/2020 at 2:34 PM

## 2020-01-10 NOTE — PROGRESS NOTES
Physical Therapy  Facility/Department: [facility]ESS[facility]  Daily Treatment Note  NAME: Layton Villeda  : 1960  MRN: [id number]    Date of Service: 1/10/2020    Discharge Recommendations:  Patient would benefit from continued therapy after discharge, 3-5 sessions per week   PT Equipment Recommendations  Equipment Needed: No  Layton Villeda scored a 14/24 on the AM-PAC short mobility form. Current research shows that an AM-PAC score of 17 or less is typically not associated with a discharge to the patient's home setting. Based on the patients AM-PAC score and their current functional mobility deficits, it is recommended that the patient have 3-5 sessions per week of Physical Therapy at d/c to increase the patients independence. Assessment   Body structures, Functions, Activity limitations: Decreased functional mobility   Assessment: pt making progress in therapy however continues to be unsteady with early fatigue with functional tasks; feel pt will need continued therapy at discharge to address deficits to allow pt to regain his premorbid status and return home when able  Prognosis: Good  PT Education: General Safety  REQUIRES PT FOLLOW UP: Yes  Activity Tolerance  Activity Tolerance: Patient Tolerated treatment well     Patient Diagnosis(es): The primary encounter diagnosis was Small bowel obstruction ([address] Diagnoses of Aspiration pneumonia of both lower lobes, unspecified aspiration pneumonia type ([address] Hypoxia, Rectal bleeding, and Septicemia ([address] were also pertinent to this visit. has a past medical history of Acne rosacea, Cognitive impairment, Colon cancer ([address] Down syndrome, Gout, HIGH CHOLESTEROL, History of diarrhea, Macrocytosis, Seborrheic dermatitis, Seizure disorder ([address] Status post biventricular cardiac pacemaker insertion, Status post partial colectomy, Syncope and collapse, and Weight loss.    has a past surgical history that includes Cataract removal with implant (2007); Colon surgery (1/7//2014); Colonoscopy (02/27/2017); pacemaker placement; Colonoscopy (N/A, 8/21/2019); and Small intestine surgery (N/A, 12/18/2019). Social/Functional History  Lives With: Family(Brother (Himanshu), S-I-L. )  Type of Home: House  Home Layout: Multi-level, Bed/Bath upstairs, Laundry in basement(Trilevel. )  Home Access: Stairs to enter without rails(1+1 steps to enter.  )  Bathroom Shower/Tub: Tub/Shower unit  Bathroom Toilet: Standard  Bathroom Accessibility: Accessible  ADL Assistance: Independent  Ambulation Assistance: Independent(Without assist device pta. )  Transfer Assistance: Independent  Active : No  Type of occupation: Pt works as [employer] 5 day/wk; transportation provided. Leisure & Hobbies: [employer], Watching TV. Additional Comments: Information provided from family (brother) at bedside. Family reports pt occassionally home alone when he/wife need to go out. Reports gradual progress of forgetful/confusion, \"dementia\" over past yr. Restrictions  Restrictions/Precautions  Restrictions/Precautions: Fall Risk  Position Activity Restriction  Other position/activity restrictions:  Pt MRDD although independent care/mobility pta (although brother reports, patient did not complete dressing on his own prior to admit). Diet : Dysphagia Pureed (Pudding Thick). chest tube with suction  Subjective   General  Chart Reviewed: Yes  Additional Pertinent Hx: 65 y/o male admit 12/18/19 with SBO, Rectal Bleed, Pneumonia, Septicemia. 12/18-12/27/19 Pt Intubated. 12/18/19 S/P Exp Lap, Lysis of Adhesions with Reduction of Closed Loop Small Bowel Obstruction. PMH as noted including Colon Ca, Partial Colectomy, Syncope/Collapse, Pacemaker, Down's Syndrome, Gout. Response To Previous Treatment: Patient with no complaints from previous session.   Family / Caregiver Present: Yes  Referring Practitioner: Dr. Agustin Banegas  Subjective: pt very pleasant and agreeable to getting

## 2020-01-10 NOTE — PLAN OF CARE
Problem: Nutrition  Goal: Optimal nutrition therapy  Outcome: Ongoing     Nutrition Problem: Inadequate oral intake  Intervention: Food and/or Nutrient Delivery: Continue current diet, Continue current ONS  Nutritional Goals: tolerate most appropriate form of nutrition

## 2020-01-10 NOTE — PROGRESS NOTES
Department of Internal Medicine  General Internal Medicine  Attending Progress Note    Chief Complaint: getting to eat very little      HPI:   SUBJECTIVE:  63 yo male patient with a history of down's syndrome, colon cancer (resected),  Pacemaker for chb who presented with SBO and septic shock. Now on thickened liquids and purreed diet which need to be spoon fed. Brother is frustrated over the restrictions. Off oxygen feeling well, walking around the room  Still on special diet, not improving with therapy.       Past Medical History:   Diagnosis Date    Acne rosacea     Cognitive impairment     sees Dr Angeline Mendez, started Aricept 12/2019    Colon cancer ([facility] 1/7/2014    ileocecal valve/Leuenberger/yrly surveillance    Down syndrome     Gout     HIGH CHOLESTEROL     2017 3.1 % framingham risk score    History of diarrhea     since childhood, worse since partial colectomy resolved after a few months    Macrocytosis     followed by Dr Kayy Vasquez and released    Seborrheic dermatitis     Seizure disorder ([facility] [address] [facility] 2019    Status post biventricular cardiac pacemaker insertion 01/2019    for long sinus pauses with syncopal willis    Status post partial colectomy 2016    diarrhea    Syncope and collapse 01/2019    recurrent syncope, found seizures and sinus pauses    Weight loss 2015    since 2014 at the time of partial colectomy lost 25 lbs     Past Surgical History:   Procedure Laterality Date    CATARACT REMOVAL WITH IMPLANT  2007    Bilateral    COLON SURGERY  1/7//2014    for removal colon cancer    COLONOSCOPY  02/27/2017    Dr Iva Alvarez N/A 8/21/2019    COLONOSCOPY WITH BIOPSY performed by Adolfo Dupont MD at [address] [facility]  N/A 12/18/2019    EXPLORATORY LAPAROTOMY, LYSIS OF ADHESIONS performed by Jessica Tripathi MD at [facility] History   Problem Relation Age of Onset    Diabetes Brother     Hypertension Brother    Sherren Kehr Stroke Brother     High Cholesterol Brother     Cancer Mother         melanoma    Other Father         cerebral aneurysm     Social History     Tobacco Use    Smoking status: Never Smoker    Smokeless tobacco: Never Used   Substance Use Topics    Alcohol use: No    Drug use: No       Prior to Admission medications    Medication Sig Start Date End Date Taking? Authorizing Provider   doxycycline (VIBRAMYCIN) 50 MG capsule Take 50 mg by mouth 2 times daily 11/26/19  Yes Historical Provider, MD   simvastatin (ZOCOR) 20 MG tablet Take 1 tablet by mouth daily 12/10/19  Yes Mara Koroma MD   allopurinol (ZYLOPRIM) 100 MG tablet Take 1 tablet by mouth daily 12/9/19  Yes Mara Koroma MD   donepezil (ARICEPT) 5 MG tablet Take 1 tablet by mouth nightly 12/9/19  Yes Mara Koroma MD   Multiple Vitamins-Minerals (THERAPEUTIC MULTIVITAMIN-MINERALS) tablet Take 1 tablet by mouth daily   Yes Historical Provider, MD   Azelaic Acid 15 % GEL Apply topically daily 12/2/19   Historical Provider, MD   hydrocortisone (HYTONE) 2.5 % lotion Apply topically daily 11/26/19   Historical Provider, MD   metroNIDAZOLE (METROCREAM) 0.75 % cream Apply topically daily 11/26/19   Historical Provider, MD         ROS:  A comprehensive review of systems was negative except for: weakness , sob     OBJECTIVE:      PHYSICAL:  Intake/Output:   Patient Vitals for the past 8 hrs:   BP Temp Temp src Pulse Resp SpO2 Weight   01/10/20 0827 -- -- -- -- 16 92 % --   01/10/20 0815 111/72 98 °F (36.7 °C) -- 106 -- 90 % --   01/10/20 0424 125/75 98.2 °F (36.8 °C) Oral 94 16 91 % 127 lb 6.8 oz (57.8 kg)     I/O last 3 completed shifts: In: 1399.9 [P.O.:365; I.V.:634.9; IV Piggyback:400]  Out: 650 [Urine:600; Chest Tube:50]  No intake/output data recorded.   VITALS:    /72   Pulse 106   Temp 98 °F (36.7 °C)   Resp 16   Ht 5' 6\" (1.676 m)   Wt 127 lb 6.8 oz (57.8 kg)   SpO2 92%   BMI 20.57 kg/m²     General Appearance: awake and comfortable Head Wo Contrast    Result Date: 12/18/2019  No acute intracranial abnormality. Xr Chest Portable    Result Date: 12/20/2019  1. Recommend advancement of endotracheal tube 1.0 cm. 2. Bibasilar subsegmental atelectasis plus or minus mild pleural effusions, unchanged. Xr Chest Portable    Result Date: 12/19/2019  Nasogastric tube projects in intragastric position. Bibasilar atelectasis. Pneumonia or aspiration pneumonitis are differential considerations. Nonspecific bowel gas pattern. Xr Chest Portable    Result Date: 12/19/2019  Small bilateral pleural effusions with adjacent atelectasis. Xr Chest Portable    Result Date: 12/18/2019  Low lung volume due to inspiratory effort. Mild bibasilar atelectasis with no other significant finding in the chest.     Ct Chest Abdomen Pelvis Wo Contrast    Result Date: 12/18/2019  Findings compatible with small bowel obstruction with transition point in the right mid abdomen, likely in mid to distal ileum. No obvious etiology identified. Small volume ascites, likely reactive. Areas of consolidation to the lungs bilaterally which may relate to atelectasis although multifocal pneumonia or aspiration pneumonitis could have a similar appearance. Patulous/distended appearance to the esophagus with fluid attenuation material within nearly the entirety of the esophagus, possibly secondary to the bowel obstruction and refluxed gastric contents. Patient may be at risk for aspiration. Trace bilateral pleural effusions. ASSESSMENT AND PLAN    Active Problems:    Small bowel obstruction ([address] RESOLVED  Plan: back to eating with restrictions due to dysphagia and tolerating from GI standpoint    Aspiration pneumonia of both lower lobes (HCC) pleural fluid left much worse than right  Plan:chest tube in, decreased drainage, CT chest to further evaluate for loculations.     Recent cxr still with fair amount of fluid Dr Diogo Treadwell to adjust to suction    Hypoxia  Plan: currently better    Septicemia [facility]) Suspect has another aspiration pneumonia  Plan: discussed with Dr Nasrin Walker will start Merrem  Cognitive impairment likely alzheimers  Continue aricept  Abnormal LFTS  Improving, observe    Next labs Monday    Ivory Cai MD

## 2020-01-10 NOTE — PROGRESS NOTES
Minimal assistance(HOB up)  Sit to Supine: Stand by assistance    Cognition  Cognition Comment: h/o MRDD; impulsive, needs increased cues to initiate activity      Plan  Times per week: 3-5  Times per day: Daily  Current Treatment Recommendations: Strengthening, Functional Mobility Training, Endurance Training, Balance Training, Safety Education & Training, Cognitive/Perceptual Training, Cognitive Reorientation, Self-Care / ADL, Gait Training    Goals  Short term goals  Time Frame for Short term goals: Prior to d/c: pt progressing towards goals 1/10  Short term goal 1: Pt will bathe/dress UB with min A. Short term goal 2: Pt will bathe/dress LB with mod A. Short term goal 3: Pt will toilet with mod A. Short term goal 4: Pt will complete grooming in stance at sink with SBA/CGA  Short term goal 5: Pt will complete fxl mobility and fxl transfers to/from ADL surfaces with SBA/CGA using AD prn. Short term goal 6: Pt will increase activity tolerance to achieve the above goals. Long term goals  Time Frame for Long term goals : STG=LTG  Patient Goals   Patient goals : pt unable to verbalize personal therapy goal d/t cognitive deficits.        Therapy Time   Individual Concurrent Group Co-treatment   Time In       9353   Time Out       1338   Minutes       23   Timed Code Treatment Minutes: 24 [address] 140

## 2020-01-10 NOTE — PROGRESS NOTES
Clinical Pharmacy Note  Heparin Dosing       Lab Results   Component Value Date    APTT 64.0 01/10/2020     Lab Results   Component Value Date    HGB 9.5 01/09/2020    HCT 29.5 01/09/2020     01/09/2020    INR 1.15 12/18/2019       Current Infusion Rate: 13.6 mL/hr    Plan:  Rate: continue at 13.6 mL/hr  Next aPTT: 0600  01/11/20    Pharmacy will continue to monitor and adjust based on aPTT results.     David Ayala PharmD  1/10/2020 6:42 AM

## 2020-01-10 NOTE — PROGRESS NOTES
Pulmonary Progress Note    Date of Admission: 12/18/2019   LOS: 23 days     Chief Complaint   Patient presents with    Shortness of Breath     onset - 1530 today    Altered Mental Status     pt lethargic, unsteady       Assessment:     Aspiration Pneumonia  Acute Hypoxemic Respiratory Failure with SAO2 <90% on Room Air  Acute PE  Pleural Effusion  SBO    Plan:     -CT with 50cc output  -exudative effusion, cultures cytology pending  -CT of chest today for evaluation of persistent loculations, if remain may need tPA/dnase  --hold heparin this morning, for possible intrapleural lytics  -pt is aspirating  - Recommend 7 days merrem, then stop    24 Hour Events/Subjective  No acute events o/n. Pt denies dyspnea, says he feels better  Slowing CT output. ROS:   No nausea  No Vomiting  No chest pain      Intake/Output Summary (Last 24 hours) at 1/10/2020 0759  Last data filed at 1/10/2020 0600  Gross per 24 hour   Intake 1399.89 ml   Output 650 ml   Net 749.89 ml         PHYSICAL EXAM:   Blood pressure 125/75, pulse 94, temperature 98.2 °F (36.8 °C), temperature source Oral, resp. rate 16, height 5' 6\" (1.676 m), weight 127 lb 6.8 oz (57.8 kg), SpO2 91 %. Gen:  No acute distress. Eyes: PERRL. Anicteric sclera. No conjunctival injection. ENT: No discharge. Posterior oropharynx clear. External appearance of ears and nose normal.  Neck: Trachea midline. No mass   Resp: Left sided crackles. No wheezes. No rhonchi. CV: tachy rate. Regular rhythm. No murmur or rub. No edema. GI: Soft, Non-tender. Non-distended. +BS  Skin: Warm, dry, w/o erythema. Lymph: No cervical or supraclavicular LAD. M/S: No cyanosis. No clubbing. Neuro:  no focal neurologic deficit. Moves all extremities  Psych: Awake and alert. Mood stable.       Medications:    Scheduled Meds:   valACYclovir  1,000 mg Oral Daily    meropenem  500 mg Intravenous Q6H    [Held by provider] apixaban  10 mg Oral BID    levalbuterol  1.25 mg Nebulization 4x daily    sodium chloride flush  10 mL Intravenous 2 times per day    pantoprazole  40 mg Intravenous Daily    And    sodium chloride (PF)  10 mL Intravenous Daily       Continuous Infusions:   heparin (porcine) 13.6 mL/hr (01/09/20 1602)       PRN Meds:  heparin (porcine), sodium chloride nebulizer, menthol-zinc oxide, sodium chloride flush, magnesium sulfate, potassium chloride, sodium chloride flush, ondansetron, acetaminophen, dextran 70-hypromellose    Labs reviewed:  CBC:   Recent Labs     01/08/20  0446 01/09/20  0431   WBC 13.5* 15.3*   HGB 8.5* 9.5*   HCT 26.2* 29.5*   .5* 102.9*   * 578*     BMP:   Recent Labs     01/08/20  0446 01/09/20  0431    143   K 3.9 3.8    103   CO2 24 26   PHOS 3.8 3.4   BUN 17 14   CREATININE 0.7* 0.9     LIVER PROFILE:   Recent Labs     01/08/20  0446 01/09/20  0431   AST 74* 70*   * 121*   BILIDIR <0.2 <0.2   BILITOT 0.3 0.3   ALKPHOS 140* 163*     PT/INR: No results for input(s): PROTIME, INR in the last 72 hours. APTT:   Recent Labs     01/08/20  0446 01/09/20  0431 01/10/20  0434   APTT 59.0* 64.1* 64.0*     UA:  Recent Labs     01/07/20  2140   COLORU DK YELLOW   PHUR 5.5   WBCUA 2   RBCUA 9*   CLARITYU Clear   SPECGRAV 1.029   LEUKOCYTESUR Negative   UROBILINOGEN 1.0   BILIRUBINUR Negative   BLOODU Negative   GLUCOSEU Negative     No results for input(s): PH, PCO2, PO2 in the last 72 hours. Films:  Radiology Review:  Pertinent images / reports were reviewed as a part of this visit. Chest x-ray reviewed personally by me, interpretation as follows:  -Improving left-sided pleural effusion, does remain with a lateral meniscus, cannot rule out loculation.     CT Chest w/o contrast:   Results for orders placed during the hospital encounter of 12/18/19   CT Chest WO Contrast    Narrative EXAMINATION:  CT OF THE CHEST WITHOUT CONTRAST 12/26/2019 1:28 pm    TECHNIQUE:  CT of the chest was performed without the evening, CT chest 12/26/2018    HISTORY:  ORDERING SYSTEM PROVIDED HISTORY: hypoxia and tachycadia, r/o PE, eval  effusion, pneumonia  TECHNOLOGIST PROVIDED HISTORY:  Reason for exam:->hypoxia and tachycadia, r/o PE, eval effusion, pneumonia  Reason for Exam: hypoxia and tachycadia, r/o PE, eval effusion, pneumonia  Acuity: Acute  Type of Exam: Initial    FINDINGS:  Pulmonary Arteries: Motion artifact as well as streak artifact due to lack of  elevation of the patient's arms, degrades the images. Central pulmonary  arteries are normal in caliber. Despite the limitation of the study, a filling defect is identified within  the anterior segmental pulmonary artery of the right lower lobe. Most of the  segmental and subsegmental pulmonary arteries are obscured. Exact extent of  clot load is difficult to evaluate given obscuration of images; no central  pulmonary embolism is seen. Mediastinum: The heart size is stable. The RV/LV ratio is approximately 0.9. No pericardial effusion. No pathologically enlarged mediastinal lymph nodes. Lungs/pleura: Bones there has been interval decrease in size of a right  pleural effusion which is now very small. A moderate-to-large left pleural  effusion has increased. There is mostly dependent left lung consolidation  with complete consolidation of the left lower lobe, likely passive  atelectasis. The aerated lungs show patchy areas of airspace opacification,  greatest in the right upper lung, similar to the prior study. Septal  thickening within the apices is decreased. Upper Abdomen: Limited images of the upper abdomen show no acute abnormality. Soft Tissues/Bones: No acute bony abnormality. Impression At least 1 embolism within the the anterior right lower lobe segmental  pulmonary artery. Most of the segmental and subsegmental pulmonary arteries  are obscured. Interval decreased right pleural effusion.     Interval increased left pleural effusion with increased dependent left lung  consolidation, likely passive atelectasis. The aerated lungs show patchy airspace disease, similar to the prior study,  greatest in the right upper lobe. Findings were discussed with MEHDI Jimenez at 1:14 pm on 1/1/2020. CXR PA/LAT:   Results for orders placed during the hospital encounter of 01/24/19   XR CHEST STANDARD (2 VW)    Narrative EXAMINATION:  TWO VIEWS OF THE CHEST    1/24/2019 9:42 am    COMPARISON:  01/12/2019. HISTORY:  ORDERING SYSTEM PROVIDED HISTORY: Pacemaker lead malfunction, initial  encounter  TECHNOLOGIST PROVIDED HISTORY:  Reason for exam:->possible pacemaker lead dislodgement  Ordering Physician Provided Reason for Exam: possible pacemaker lead  malfuction  Acuity: Acute  Type of Exam: Initial  Relevant Medical/Surgical History: hx of colon cancer    FINDINGS:  The heart size is within normal limits. The pulmonary vasculature is also  within normal limits. No acute infiltrates are seen. The costophrenic angles  are sharp bilaterally. No pneumothoraces are noted. There is a left subclavian  AICD. Impression 1. No active pulmonary disease. CXR portable:   Results for orders placed during the hospital encounter of 12/18/19   XR CHEST PORTABLE    Narrative EXAMINATION:  ONE XRAY VIEW OF THE CHEST    1/3/2020 12:24 am    COMPARISON:  Prior studies including 12/31/2019 chest x-ray and CT 01/01/2020    HISTORY:  ORDERING SYSTEM PROVIDED HISTORY: SOB  TECHNOLOGIST PROVIDED HISTORY:  Reason for exam:->SOB  Reason for Exam: very sob  Acuity: Acute  Type of Exam: Initial    FINDINGS:  A right arm PICC and left subclavian cardiac device are unchanged. No acute  bony abnormality. There remains near complete opacification of the left hemithorax compatible  with large effusion and airspace disease. Multifocal right lung airspace  disease is similar to the prior study.   There is no significant interval  change in the appearance of the

## 2020-01-10 NOTE — PROGRESS NOTES
about aspiration risk. will ask SLP to continue to work with patient in hopes this is not a chronic condition. 1/3/2020 most recent MBS:  Moderate-severe oral stage dysphagia characterized by decreased lingual manipulation. · Bolus formation and movement is slow, disorganized, and prolonged without excess labor, but with concern for excess labor with denser solids. Severe pharyngeal stage dysphagia characterized by delayed swallow and decreased laryngeal elevation. · Flash penetration with pudding/puree via teaspoon. · Aspiration with occasional cough response with honey via teaspoon; wet vocal quality also noted post-swallow. · Mild-moderate vallecular and pyriform residue with PO trials is cleared with repeat swallows. · Patient unable to follow multi-step directives to complete chin tuck. Recommended Diet:  · Solid consistency: Dysphagia Pureed (Dysphagia I)(VIA TEASPOON; REPEAT SWALLOW)  · Liquid consistency: Extremely Thick (Spoon-Pudding)(VIA TEASPOON; REPEAT SWALLOW)  · Liquid administration via: Spoon  · Medication administration: Meds in puree(CRUSHED)  · Supervision: 1:1  · Compensatory Swallowing Strategies: Small bites/sips;Eat/Feed slowly; Remain upright for 30-45 minutes after meals;Upright as possible for all oral intake; No straws; Total feed; Liquid by spoon only;Swallow 2 times per bite/sip; External pacing    Subjective:     Current diet  Solid consistency: Dysphagia Pureed (Dysphagia I)(VIA TEASPOON; REPEAT SWALLOW)  Liquid consistency: Extremely Thick (Spoon-Pudding)(VIA TEASPOON; REPEAT SWALLOW)  Liquid administration via: Spoon  Medication administration: Meds in puree(CRUSHED)  Supervision: 1:1  Compensatory Swallowing Strategies:   · Small bites/sips;  · Eat/Feed slowly;  · Remain upright for 30-45 minutes after meals;  · Upright as possible for all oral intake;  · No straws;  · Total feed;  · Liquid by spoon only;  · Swallow 2 times per bite/sip;  · External pacing    Comments regarding tolerating Current Diet:   adequate tolerance reported; brother continues to report improved tolerance with downgrade    Objective:     Pain   Patient Currently in Pain: Denies    Cognitive/Behavior   Behavior/Cognition: Alert, Cooperative, Pleasant mood, Distractible, Requires cueing    Presentations   Consistencies Administered:  Puree, Pudding    Positioning   Upright in bed    PO Trials:  Puree - teaspoon: prolonged oral transit; concern for premature bolus loss to the pharynx; delayed swallow; decreased laryngeal elevation; no cough or throat clear; no vocal quality changes  Pudding - teaspoon: prolonged oral transit; concern for premature bolus loss to the pharynx; delayed swallow; decreased laryngeal elevation; no cough or throat clear; no vocal quality changes    Dysphagia Tx:   · PO trials: appears to be safely tolerating current diet. Improved breathing quality noted  · Comp strats: handout provided 1/6/2020 with comp strats listed reinforced. Patient /family education completed - family indicates understanding  · Swallow ex: mod cues for lingual protrusion and elevation; mod-max cues for effortful swallow and falsetto; mod cues for yawn, pitch swing, and glottals. Continues with improved tx tolerance this date suspected 2/2 familiarity with SLP and tx ex; reps increased to 8 from 5; continue. Goals:   Dysphagia Goals: The patient will tolerate recommended diet without observed clinical signs of aspiration;continue  The patient/caregiver will demonstrate understanding of compensatory strategies for improved swallowing safety. continue  The patient will improve swallow function via swallow ex completed 5/5 each continue - MD request dysphagia tx continue      Assessment:   Impressions:   Dysphagia Diagnosis:   Severe oropharyngeal dysphagia as noted above.   Completed swallow ex: patient requires mod-max cues ,but continue to suspect decreased cueing and increased receptiveness as attempts continue and as ex become more familiar. Continue ST    Diet Recommendations:  Solid consistency: Dysphagia Pureed (Dysphagia I)(VIA TEASPOON; REPEAT SWALLOW)  Liquid consistency: Extremely Thick (Spoon-Pudding)(VIA TEASPOON; REPEAT SWALLOW)  Liquid administration via: Spoon  Medication administration: Meds in puree(CRUSHED)  Supervision: 1:1  Compensatory Swallowing Strategies:   · Small bites/sips;  · Eat/Feed slowly;  · Remain upright for 30-45 minutes after meals;  · Upright as possible for all oral intake;  · No straws;  · Total feed;  · Liquid by spoon only;  · Swallow 2 times per bite/sip;  · External pacing    Education:  Consulted and agree with results and recommendations: Patient, RN  Patient Education: Completed on results/recs/plan  Patient Education Response: No evidence of learning    Prognosis:   Guarded for dysphagia     Plan:     Continue Dysphagia Therapy: yes  Interventions: Therapeutic Interventions: Patient/Family education, Diet tolerance monitoring, Therapeutic PO trials with SLP, Oral motor exercises, Pharyngeal exercises, Laryngeal exercises  Duration/Frequency of therapy while on unit: Duration/Frequency of Treatment  Duration/Frequency of Treatment: ST to tx 3-5 times per week for dyspahgia during acute admission  Discharge Instructions:   Anticipate Yes for further skilled Speech Therapy for Dysphagia at discharge    This note serves as a D/C Summary in the event that this patient is discharged prior to the next therapy session. Coded treatment time: 10  Total treatment time: [address] [place] [address].  [address], [facility], [id number]  Speech-Language Pathologist  1/10/2020 at 12:19 PM

## 2020-01-11 ENCOUNTER — APPOINTMENT (OUTPATIENT)
Dept: GENERAL RADIOLOGY | Age: 60
DRG: 853 | End: 2020-01-11
Payer: COMMERCIAL

## 2020-01-11 LAB
APTT: 73.6 SEC (ref 24.2–36.2)
BLOOD CULTURE, ROUTINE: NORMAL
BODY FLUID CULTURE, STERILE: NORMAL
CULTURE, BLOOD 2: NORMAL
GLUCOSE BLD-MCNC: 119 MG/DL (ref 70–99)
GLUCOSE BLD-MCNC: 126 MG/DL (ref 70–99)
GLUCOSE BLD-MCNC: 146 MG/DL (ref 70–99)
GRAM STAIN RESULT: NORMAL
PERFORMED ON: ABNORMAL

## 2020-01-11 PROCEDURE — 6360000002 HC RX W HCPCS: Performed by: INTERNAL MEDICINE

## 2020-01-11 PROCEDURE — 94760 N-INVAS EAR/PLS OXIMETRY 1: CPT

## 2020-01-11 PROCEDURE — 36415 COLL VENOUS BLD VENIPUNCTURE: CPT

## 2020-01-11 PROCEDURE — 2580000003 HC RX 258: Performed by: INTERNAL MEDICINE

## 2020-01-11 PROCEDURE — 99232 SBSQ HOSP IP/OBS MODERATE 35: CPT | Performed by: NURSE PRACTITIONER

## 2020-01-11 PROCEDURE — 71045 X-RAY EXAM CHEST 1 VIEW: CPT

## 2020-01-11 PROCEDURE — 6370000000 HC RX 637 (ALT 250 FOR IP): Performed by: INTERNAL MEDICINE

## 2020-01-11 PROCEDURE — 2060000000 HC ICU INTERMEDIATE R&B

## 2020-01-11 PROCEDURE — C9113 INJ PANTOPRAZOLE SODIUM, VIA: HCPCS | Performed by: INTERNAL MEDICINE

## 2020-01-11 PROCEDURE — 99024 POSTOP FOLLOW-UP VISIT: CPT | Performed by: SURGERY

## 2020-01-11 PROCEDURE — 85730 THROMBOPLASTIN TIME PARTIAL: CPT

## 2020-01-11 PROCEDURE — 94640 AIRWAY INHALATION TREATMENT: CPT

## 2020-01-11 PROCEDURE — 99233 SBSQ HOSP IP/OBS HIGH 50: CPT | Performed by: INTERNAL MEDICINE

## 2020-01-11 RX ADMIN — LEVALBUTEROL HYDROCHLORIDE 1.25 MG: 1.25 SOLUTION, CONCENTRATE RESPIRATORY (INHALATION) at 07:58

## 2020-01-11 RX ADMIN — MEROPENEM 500 MG: 500 INJECTION, POWDER, FOR SOLUTION INTRAVENOUS at 15:34

## 2020-01-11 RX ADMIN — LEVALBUTEROL HYDROCHLORIDE 1.25 MG: 1.25 SOLUTION, CONCENTRATE RESPIRATORY (INHALATION) at 15:49

## 2020-01-11 RX ADMIN — VALACYCLOVIR HYDROCHLORIDE 1000 MG: 500 TABLET, FILM COATED ORAL at 09:30

## 2020-01-11 RX ADMIN — SODIUM CHLORIDE, PRESERVATIVE FREE 10 ML: 5 INJECTION INTRAVENOUS at 09:32

## 2020-01-11 RX ADMIN — LEVALBUTEROL HYDROCHLORIDE 1.25 MG: 1.25 SOLUTION, CONCENTRATE RESPIRATORY (INHALATION) at 11:46

## 2020-01-11 RX ADMIN — MEROPENEM 500 MG: 500 INJECTION, POWDER, FOR SOLUTION INTRAVENOUS at 09:30

## 2020-01-11 RX ADMIN — PANTOPRAZOLE SODIUM 40 MG: 40 INJECTION, POWDER, FOR SOLUTION INTRAVENOUS at 09:30

## 2020-01-11 RX ADMIN — Medication 10 ML: at 09:31

## 2020-01-11 RX ADMIN — ISODIUM CHLORIDE 3 ML: 0.03 SOLUTION RESPIRATORY (INHALATION) at 20:27

## 2020-01-11 RX ADMIN — MEROPENEM 500 MG: 500 INJECTION, POWDER, FOR SOLUTION INTRAVENOUS at 22:04

## 2020-01-11 RX ADMIN — MEROPENEM 500 MG: 500 INJECTION, POWDER, FOR SOLUTION INTRAVENOUS at 03:49

## 2020-01-11 RX ADMIN — HEPARIN SODIUM 13.6 ML/HR: 10000 INJECTION, SOLUTION INTRAVENOUS at 10:46

## 2020-01-11 RX ADMIN — LEVALBUTEROL HYDROCHLORIDE 1.25 MG: 1.25 SOLUTION, CONCENTRATE RESPIRATORY (INHALATION) at 20:27

## 2020-01-11 ASSESSMENT — ENCOUNTER SYMPTOMS
VOMITING: 0
NAUSEA: 0

## 2020-01-11 NOTE — PROGRESS NOTES
(HCC)--Continue current therapy  --O2/antibx nd resp support   Active Problems:    Small bowel obstruction (HCC)    Aspiration pneumonia of both lower lobes (HCC)    Hypoxia    Septicemia (HCC)    Septic shock (HCC)--resolved     Pleural effusion, bilateral--persisting---pulm is following     Ischemic hepatitis    Right pulmonary embolus ([facility] [address]--   Resolved Problems:    Postprocedural hypotension    ANISH (acute kidney injury) ([facility] [address]    Abnormal EKG    Thrombocytopenia (HCC)  Cont antibx and resp support---slowly better   Please note that over 35 minutes was spent in evaluating the patient, review of records and results, discussion with staff/family, etc.    Benny Ohara MD

## 2020-01-11 NOTE — PLAN OF CARE
Skin assessment performed each shift per protocol. Patient turned and repositioned every two hours and prn with pillow support. Patient checked for incontence every two hours. Patient free from falls this shift. Fall precautions in place at all times. Bed in lowest position with two side rails up and wheels locked. Call light within reach. Patient able and agreeable to contact for safety appropriately. Continuing to work with patient and health care team on discharge plan. Discharge instructions and medication management will be reviewed prior to discharge. Pt able to express presence/absence of pain and rate pain appropriately using numerical scale. Pain/discomfort being managed with PRN analgesics per MD orders (see MAR). Pain assessed every shift and after interventions.

## 2020-01-11 NOTE — PROGRESS NOTES
Clinical Pharmacy Note  Heparin Dosing       Lab Results   Component Value Date    APTT 61.9 01/10/2020     Lab Results   Component Value Date    HGB 9.5 01/09/2020    HCT 29.5 01/09/2020     01/09/2020    INR 1.15 12/18/2019       Current Infusion Rate: 13.6 mL/hr    Plan:  Rate: Continue 13.6 mL/hr  Next aPTT: 0400  01/11/20    Pharmacy will continue to monitor and adjust based on aPTT results.     Elizabeth Hi, PharmD  1/10/2020 11:00 PM

## 2020-01-11 NOTE — PROGRESS NOTES
nebulizer, menthol-zinc oxide, sodium chloride flush, magnesium sulfate, potassium chloride, sodium chloride flush, ondansetron, acetaminophen, dextran 70-hypromellose    Labs:  CBC:   Recent Labs     01/09/20  0431   WBC 15.3*   HGB 9.5*   HCT 29.5*   .9*   *     BMP:   Recent Labs     01/09/20  0431      K 3.8      CO2 26   PHOS 3.4   BUN 14   CREATININE 0.9     LIVER PROFILE:   Recent Labs     01/09/20 0431   AST 70*   *   BILIDIR <0.2   BILITOT 0.3   ALKPHOS 163*     PT/INR: No results for input(s): PROTIME, INR in the last 72 hours. APTT:   Recent Labs     01/10/20  0434 01/10/20  2202 01/11/20  0423   APTT 64.0* 61.9* 73.6*     UA:No results for input(s): NITRITE, COLORU, PHUR, LABCAST, WBCUA, RBCUA, MUCUS, TRICHOMONAS, YEAST, BACTERIA, CLARITYU, SPECGRAV, LEUKOCYTESUR, UROBILINOGEN, BILIRUBINUR, BLOODU, GLUCOSEU, AMORPHOUS in the last 72 hours. Invalid input(s): [place] Salvia  No results for input(s): PH, PCO2, PO2 in the last 72 hours. Films:  Chest imaging and associated reports were personally reviewed and showed CXR 1/11:  Moderate acute pulmonary vascular congestion.  Mild left basilar atelectasis. No pneumothorax.  Left-sided chest tube remains in place.  Significant  clearing of the left lung base since the prior study.  No evident pleural  effusion remaining. Cultures:  Blood: no growth  Body fluid: no growth    ECHO  1/25/19      Conclusions      Summary   Left ventricular cavity size is normal.   Normal left ventricular wall thickness. Ejection fraction is visually estimated to be 55-60%. Pacer / ICD wire is visualized in the right ventricle.     Assessment:   Acute hypoxic respiratory failure  Aspiration pneumonia  Pleural effusion-resolving  Acute PE  SBO    Plan:  · Maintain oxygen saturations >90% with supplemental oxygen and wean as tolerated  · Will not re-dose intrapleural lytics today as effusion has significantly resolved   · Continue merrem  · Consider CT removal within next two days by IR  · Xopenex  · CXR daily  · DVT prophylaxis with heparin gtt    Discussed plan with brother at bedside    Thank you for allowing me to participate in the care of this patient.     SHARMILA FofanaP  [facility] Pulmonary, Sleep, and Critical Care

## 2020-01-11 NOTE — PLAN OF CARE
Problem: Risk for Impaired Skin Integrity  Goal: Tissue integrity - skin and mucous membranes  Description  Structural intactness and normal physiological function of skin and  mucous membranes. 1/11/2020 0137 by Facundo Gloria RN  Outcome: Ongoing     Problem: Falls - Risk of:  Goal: Will remain free from falls  Description  Will remain free from falls  1/11/2020 0137 by Facundo Gloria RN  Outcome: Ongoing     Problem: Risk for Impaired Skin Integrity  Goal: Tissue integrity - skin and mucous membranes  Description  Structural intactness and normal physiological function of skin and  mucous membranes.   1/11/2020 0137 by Facundo Gloria RN  Outcome: Ongoing     Problem: Falls - Risk of:  Goal: Will remain free from falls  Description  Will remain free from falls  1/11/2020 0137 by Facundo Gloria RN  Outcome: Ongoing     Problem: Pain:  Goal: Pain level will decrease  Description  Pain level will decrease  1/11/2020 0137 by Facundo Gloria RN  Outcome: Ongoing     Problem: Pain:  Goal: Control of acute pain  Description  Control of acute pain  1/11/2020 0137 by Facundo Gloria RN  Outcome: Ongoing     Problem: Pain:  Goal: Control of chronic pain  Description  Control of chronic pain  1/11/2020 0137 by Facundo Gloria RN  Outcome: Ongoing

## 2020-01-12 ENCOUNTER — APPOINTMENT (OUTPATIENT)
Dept: GENERAL RADIOLOGY | Age: 60
DRG: 853 | End: 2020-01-12
Payer: COMMERCIAL

## 2020-01-12 LAB
APTT: 48.1 SEC (ref 24.2–36.2)
APTT: 70.9 SEC (ref 24.2–36.2)
APTT: 88 SEC (ref 24.2–36.2)

## 2020-01-12 PROCEDURE — 2580000003 HC RX 258: Performed by: INTERNAL MEDICINE

## 2020-01-12 PROCEDURE — 94640 AIRWAY INHALATION TREATMENT: CPT

## 2020-01-12 PROCEDURE — 94760 N-INVAS EAR/PLS OXIMETRY 1: CPT

## 2020-01-12 PROCEDURE — 99232 SBSQ HOSP IP/OBS MODERATE 35: CPT | Performed by: NURSE PRACTITIONER

## 2020-01-12 PROCEDURE — 6360000002 HC RX W HCPCS: Performed by: INTERNAL MEDICINE

## 2020-01-12 PROCEDURE — 6370000000 HC RX 637 (ALT 250 FOR IP): Performed by: INTERNAL MEDICINE

## 2020-01-12 PROCEDURE — 36415 COLL VENOUS BLD VENIPUNCTURE: CPT

## 2020-01-12 PROCEDURE — 2060000000 HC ICU INTERMEDIATE R&B

## 2020-01-12 PROCEDURE — 99233 SBSQ HOSP IP/OBS HIGH 50: CPT | Performed by: INTERNAL MEDICINE

## 2020-01-12 PROCEDURE — C9113 INJ PANTOPRAZOLE SODIUM, VIA: HCPCS | Performed by: INTERNAL MEDICINE

## 2020-01-12 PROCEDURE — 71045 X-RAY EXAM CHEST 1 VIEW: CPT

## 2020-01-12 PROCEDURE — 85730 THROMBOPLASTIN TIME PARTIAL: CPT

## 2020-01-12 RX ORDER — HEPARIN SODIUM 1000 [USP'U]/ML
2300 INJECTION, SOLUTION INTRAVENOUS; SUBCUTANEOUS ONCE
Status: COMPLETED | OUTPATIENT
Start: 2020-01-12 | End: 2020-01-12

## 2020-01-12 RX ADMIN — HEPARIN SODIUM 13.6 ML/HR: 10000 INJECTION, SOLUTION INTRAVENOUS at 05:05

## 2020-01-12 RX ADMIN — LEVALBUTEROL HYDROCHLORIDE 1.25 MG: 1.25 SOLUTION, CONCENTRATE RESPIRATORY (INHALATION) at 16:44

## 2020-01-12 RX ADMIN — LEVALBUTEROL HYDROCHLORIDE 1.25 MG: 1.25 SOLUTION, CONCENTRATE RESPIRATORY (INHALATION) at 08:24

## 2020-01-12 RX ADMIN — MEROPENEM 500 MG: 500 INJECTION, POWDER, FOR SOLUTION INTRAVENOUS at 10:06

## 2020-01-12 RX ADMIN — HEPARIN SODIUM 2300 UNITS: 1000 INJECTION INTRAVENOUS; SUBCUTANEOUS at 21:50

## 2020-01-12 RX ADMIN — ISODIUM CHLORIDE 3 ML: 0.03 SOLUTION RESPIRATORY (INHALATION) at 16:44

## 2020-01-12 RX ADMIN — VALACYCLOVIR HYDROCHLORIDE 1000 MG: 500 TABLET, FILM COATED ORAL at 10:06

## 2020-01-12 RX ADMIN — LEVALBUTEROL HYDROCHLORIDE 1.25 MG: 1.25 SOLUTION, CONCENTRATE RESPIRATORY (INHALATION) at 13:47

## 2020-01-12 RX ADMIN — MEROPENEM 500 MG: 500 INJECTION, POWDER, FOR SOLUTION INTRAVENOUS at 05:00

## 2020-01-12 RX ADMIN — LEVALBUTEROL HYDROCHLORIDE 1.25 MG: 1.25 SOLUTION, CONCENTRATE RESPIRATORY (INHALATION) at 21:34

## 2020-01-12 RX ADMIN — PANTOPRAZOLE SODIUM 40 MG: 40 INJECTION, POWDER, FOR SOLUTION INTRAVENOUS at 10:12

## 2020-01-12 RX ADMIN — ISODIUM CHLORIDE 3 ML: 0.03 SOLUTION RESPIRATORY (INHALATION) at 21:34

## 2020-01-12 RX ADMIN — ISODIUM CHLORIDE 3 ML: 0.03 SOLUTION RESPIRATORY (INHALATION) at 13:49

## 2020-01-12 RX ADMIN — Medication 10 ML: at 10:14

## 2020-01-12 ASSESSMENT — PAIN SCALES - GENERAL: PAINLEVEL_OUTOF10: 0

## 2020-01-12 ASSESSMENT — ENCOUNTER SYMPTOMS: NAUSEA: 0

## 2020-01-12 NOTE — PROGRESS NOTES
Pulmonary/Critical Care Progress Note    CC:  Follow-up:  Acute hypoxic respiratory failure  Aspiration pneumonia  Pleural effusion-resolving  Acute PE  SBO    Subjective:  Remains on room air  Had 420 out of chest tube yesterday  Patient states his breathing is \"shaky\" today, but looks as he did yesterday  Brother remains at bedside, questions why the skin on patient's hand is dry and blistered, I explained this was likely from the fluid overload he experienced after abdominal surgery     ROS  No pain  No SOB  No fever    Intake/Output Summary (Last 24 hours) at 1/12/2020 0943  Last data filed at 1/12/2020 0834  Gross per 24 hour   Intake 771.7 ml   Output 1320 ml   Net -548. 3 ml         PHYSICAL EXAM:  Blood pressure 110/73, pulse 87, temperature 97.9 °F (36.6 °C), temperature source Oral, resp. rate 18, height 5' 6\" (1.676 m), weight 126 lb 15.8 oz (57.6 kg), SpO2 94 %.'  Gen: No distress. Eyes: PERRL. No sclera icterus. No conjunctival injection. ENT: No discharge. Pharynx clear. External appearance of ears and nose normal.  Neck: Trachea midline. No obvious mass. Resp: No crackles. No wheezes. No rhonchi. Diminished in bases L > R  CV: Regular rate. Regular rhythm. No murmur or rub. No edema  GI: Non-tender. Non-distended. No hernia. Abdominal incision CDI under steri strips  Skin: Warm, dry, normal texture and turgor. No abnormalities on exposed extremities. Dry, blistered like skin on bilateral palms/finger tips, no redness or drainage. Peeling  M/S: No cyanosis. No clubbing. No joint deformity. Neuro: Moves all four extremities.    Psych:  Normal mood and affect;  developmental delays d/t mental retardation     Medications:    Scheduled Meds:   valACYclovir  1,000 mg Oral Daily    meropenem  500 mg Intravenous Q6H    levalbuterol  1.25 mg Nebulization 4x daily    sodium chloride flush  10 mL Intravenous 2 times per day    pantoprazole  40 mg Intravenous Daily    And    sodium chloride (PF) 10 mL Intravenous Daily       Continuous Infusions:   heparin (porcine) 11.9 mL/hr (01/12/20 0757)       PRN Meds:  heparin (porcine), sodium chloride nebulizer, menthol-zinc oxide, sodium chloride flush, magnesium sulfate, potassium chloride, sodium chloride flush, ondansetron, acetaminophen, dextran 70-hypromellose    Labs:  CBC:   No results for input(s): WBC, HGB, HCT, MCV, PLT in the last 72 hours. BMP:   No results for input(s): NA, K, CL, CO2, PHOS, BUN, CREATININE in the last 72 hours. Invalid input(s): CA  LIVER PROFILE:   No results for input(s): AST, ALT, LIPASE, BILIDIR, BILITOT, ALKPHOS in the last 72 hours. Invalid input(s): AMYLASE,  ALB  PT/INR: No results for input(s): PROTIME, INR in the last 72 hours. APTT:   Recent Labs     01/10/20  2202 01/11/20  0423 01/12/20  0438   APTT 61.9* 73.6* 88.0*     UA:No results for input(s): NITRITE, COLORU, PHUR, LABCAST, WBCUA, RBCUA, MUCUS, TRICHOMONAS, YEAST, BACTERIA, CLARITYU, SPECGRAV, LEUKOCYTESUR, UROBILINOGEN, BILIRUBINUR, BLOODU, GLUCOSEU, AMORPHOUS in the last 72 hours. Invalid input(s): Casey Jorge  No results for input(s): PH, PCO2, PO2 in the last 72 hours. Films:  Chest imaging and associated reports were personally reviewed and showed CXR 1/11:  Moderate acute pulmonary vascular congestion.  Mild left basilar atelectasis. No pneumothorax.  Left-sided chest tube remains in place.  Significant  clearing of the left lung base since the prior study.  No evident pleural  effusion remaining. CXR 1/12: trace left pleural effusion    Cultures:  Blood: no growth  Body fluid: no growth    ECHO  1/25/19      Conclusions      Summary   Left ventricular cavity size is normal.   Normal left ventricular wall thickness. Ejection fraction is visually estimated to be 55-60%. Pacer / ICD wire is visualized in the right ventricle.     Assessment:   Acute hypoxic respiratory failure  Aspiration pneumonia  Pleural effusion-resolving  Acute PE  SBO    Plan:  · Maintain oxygen saturations >90% with supplemental oxygen and wean as tolerated  · Continue merrem  · Consider CT removal tomorrow if drainage lessens  · PTOT, OOB  · Xopenex  · CXR daily to evaluate pleural effusion  · DVT prophylaxis with heparin gtt    Discussed plan with brother at bedside, verbalized understanding    Thank you for allowing me to participate in the care of this patient.     SHARMILA CaroP  [facility] Pulmonary, Sleep, and Critical Care

## 2020-01-12 NOTE — PROGRESS NOTES
obstruction (HCC)    Aspiration pneumonia of both lower lobes (HCC)    Hypoxia    Septicemia ([facility] [address]    Septic shock (HCC)    Pleural effusion, bilateral    Ischemic hepatitis    Right pulmonary embolus (HCC)  Resolved Problems:    Postprocedural hypotension    ANISH (acute kidney injury) ([facility] [address]    Abnormal EKG    Thrombocytopenia (HCC)    Blood pressure 110/73, pulse 87, temperature 97.9 °F (36.6 °C), temperature source Oral, resp. rate 18, height 5' 6\" (1.676 m), weight 126 lb 15.8 oz (57.6 kg), SpO2 94 %. Subjective:   Diet: Adequate intake. Patient reports no nausea. Activity level: Returning to normal.    Pain control: Well controlled. Objective:  Vital signs (most recent): Blood pressure 110/73, pulse 87, temperature 97.9 °F (36.6 °C), temperature source Oral, resp. rate 18, height 5' 6\" (1.676 m), weight 126 lb 15.8 oz (57.6 kg), SpO2 94 %. General appearance: Comfortable and in no acute distress. Abdomen: Abdomen is soft. Tenderness: There is no abdominal tenderness tenderness.       Assessment & Plan     Small bowel obstruction   Resolved   Tolerating PO   Bowels are moving    Pleural effusion   Per pulmonary    Electronically signed by Mayra Cardona MD on 1/12/2020 at 8:53 AM      Mayra Cardona MD  1/12/2020

## 2020-01-12 NOTE — PLAN OF CARE
planning  Description  Participates in care planning  Outcome: Ongoing     Problem: Injury - Risk of, Physical Injury:  Goal: Will remain free from falls  Description  Will remain free from falls  Outcome: Ongoing  Goal: Absence of physical injury  Description  Absence of physical injury  Outcome: Ongoing     Problem: Mood - Altered:  Goal: Mood stable  Description  Mood stable  Outcome: Ongoing  Goal: Absence of abusive behavior  Description  Absence of abusive behavior  Outcome: Ongoing  Goal: Verbalizations of feeling emotionally comfortable while being cared for will increase  Description  Verbalizations of feeling emotionally comfortable while being cared for will increase  Outcome: Ongoing     Problem: Psychomotor Activity - Altered:  Goal: Absence of psychomotor disturbance signs and symptoms  Description  Absence of psychomotor disturbance signs and symptoms  Outcome: Ongoing     Problem: Sensory Perception - Impaired:  Goal: Demonstrations of improved sensory functioning will increase  Description  Demonstrations of improved sensory functioning will increase  Outcome: Ongoing  Goal: Decrease in sensory misperception frequency  Description  Decrease in sensory misperception frequency  Outcome: Ongoing  Goal: Able to refrain from responding to false sensory perceptions  Description  Able to refrain from responding to false sensory perceptions  Outcome: Ongoing  Goal: Demonstrates accurate environmental perceptions  Description  Demonstrates accurate environmental perceptions  Outcome: Ongoing  Goal: Able to distinguish between reality-based and nonreality-based thinking  Description  Able to distinguish between reality-based and nonreality-based thinking  Outcome: Ongoing  Goal: Able to interrupt nonreality-based thinking  Description  Able to interrupt nonreality-based thinking  Outcome: Ongoing     Problem: Sleep Pattern Disturbance:  Goal: Appears well-rested  Description  Appears well-rested  Outcome:

## 2020-01-12 NOTE — PROGRESS NOTES
Clinical Pharmacy Note  Heparin Dosing       Lab Results   Component Value Date    APTT 88.0 01/12/2020     Lab Results   Component Value Date    HGB 9.5 01/09/2020    HCT 29.5 01/09/2020     01/09/2020    INR 1.15 12/18/2019       Current Infusion Rate: 13.6 mL/hr    Plan:  HOLD for 1 hour  Rate: Decrease to 11.9 mL/hr  Next aPTT: 1400 01/12/20    Pharmacy will continue to monitor and adjust based on aPTT results.     Shalini Noble, PharmD  1/12/2020 7:00 AM

## 2020-01-12 NOTE — PROGRESS NOTES
[facility] Progress Note  1/12/2020 7:51 AM  Subjective:   Admit Date: 12/18/2019      Chief Complaint: Sl less SOB    Interval History: As per pulm--pl eff have decreased with pleurodysis  He is up walking in halls wit assistance       Diet: Dietary Nutrition Supplements: Frozen Oral Supplement  DIET DYSPHAGIA PUREED; Dysphagia Pureed; Extremely Thick (Spoon-Pudding)  Medications:   Scheduled Meds:   valACYclovir  1,000 mg Oral Daily    meropenem  500 mg Intravenous Q6H    [Held by provider] apixaban  10 mg Oral BID    levalbuterol  1.25 mg Nebulization 4x daily    sodium chloride flush  10 mL Intravenous 2 times per day    pantoprazole  40 mg Intravenous Daily    And    sodium chloride (PF)  10 mL Intravenous Daily     Continuous Infusions:   heparin (porcine) Stopped (01/12/20 0700)       Review of Systems -   General ROS: afebrile  Respiratory ROS: positive for - cough  Cardiovascular ROS: no chest pain  Musculoskeletal ROS:positive for - :joint pain  Neurological ROS: blank ited conversation     Objective:   Vitals: /71   Pulse 101   Temp 98 °F (36.7 °C) (Oral)   Resp 17   Ht 5' 6\" (1.676 m)   Wt 126 lb 15.8 oz (57.6 kg)   SpO2 91%   BMI 20.50 kg/m²   General appearance: alert and cooperative with exam  HEENT: Head: Normocephalic, no lesions, without obvious abnormality. Neck: no adenopathy, no carotid bruit, no JVD, supple, symmetrical, trachea midline and thyroid not enlarged, symmetric, no tenderness/mass/nodules  Lungs: clear to auscultation bilaterally  Heart: regular rate and rhythm, S1, S2 normal, no murmur, click, rub or gallop  Abdomen: soft, non-tender; bowel sounds normal; no masses,  no organomegaly  Extremities: extremities normal, atraumatic, no cyanosis or edema  Neurologic: Mental status: conversant--no focal deficits     No results displayed because visit has over 200 results.             Assessment & Plan:   Principal Problem:    Acute respiratory failure with hypoxia (HCC)--cont antibx --merrem   Active Problems:    Small bowel obstruction (HCC)    Aspiration pneumonia of both lower lobes (HCC)    Hypoxia    Septicemia (HCC)    Septic shock (HCC)---resolved     Pleural effusion, bilateral--improved     Ischemic hepatitis    Right pulmonary embolus (HCC)  Resolved Problems:    Postprocedural hypotension    ANISH (acute kidney injury) ([facility] [address]    Abnormal EKG    Thrombocytopenia (HCC)  Will cont iv antibx and cont to incr activity   Please note that over 35 minutes was spent in evaluating the patient, review of records and results, discussion with staff/family, etc.    Wendy Alvarado MD

## 2020-01-13 ENCOUNTER — APPOINTMENT (OUTPATIENT)
Dept: GENERAL RADIOLOGY | Age: 60
DRG: 853 | End: 2020-01-13
Payer: COMMERCIAL

## 2020-01-13 LAB
A/G RATIO: 0.7 (ref 1.1–2.2)
ALBUMIN SERPL-MCNC: 2.9 G/DL (ref 3.4–5)
ALP BLD-CCNC: 117 U/L (ref 40–129)
ALT SERPL-CCNC: 63 U/L (ref 10–40)
ANION GAP SERPL CALCULATED.3IONS-SCNC: 14 MMOL/L (ref 3–16)
APTT: 33.6 SEC (ref 24.2–36.2)
APTT: 80.3 SEC (ref 24.2–36.2)
APTT: 90 SEC (ref 24.2–36.2)
AST SERPL-CCNC: 31 U/L (ref 15–37)
BASOPHILS ABSOLUTE: 0.1 K/UL (ref 0–0.2)
BASOPHILS RELATIVE PERCENT: 0.8 %
BILIRUB SERPL-MCNC: 0.3 MG/DL (ref 0–1)
BUN BLDV-MCNC: 11 MG/DL (ref 7–20)
CALCIUM SERPL-MCNC: 9 MG/DL (ref 8.3–10.6)
CHLORIDE BLD-SCNC: 101 MMOL/L (ref 99–110)
CO2: 24 MMOL/L (ref 21–32)
CREAT SERPL-MCNC: 0.7 MG/DL (ref 0.9–1.3)
EOSINOPHILS ABSOLUTE: 0.2 K/UL (ref 0–0.6)
EOSINOPHILS RELATIVE PERCENT: 1.6 %
GFR AFRICAN AMERICAN: >60
GFR NON-AFRICAN AMERICAN: >60
GLOBULIN: 4 G/DL
GLUCOSE BLD-MCNC: 114 MG/DL (ref 70–99)
HCT VFR BLD CALC: 30 % (ref 40.5–52.5)
HEMOGLOBIN: 9.9 G/DL (ref 13.5–17.5)
LYMPHOCYTES ABSOLUTE: 2.4 K/UL (ref 1–5.1)
LYMPHOCYTES RELATIVE PERCENT: 16.5 %
MCH RBC QN AUTO: 33.3 PG (ref 26–34)
MCHC RBC AUTO-ENTMCNC: 32.9 G/DL (ref 31–36)
MCV RBC AUTO: 101.1 FL (ref 80–100)
MONOCYTES ABSOLUTE: 1 K/UL (ref 0–1.3)
MONOCYTES RELATIVE PERCENT: 7.1 %
NEUTROPHILS ABSOLUTE: 10.7 K/UL (ref 1.7–7.7)
NEUTROPHILS RELATIVE PERCENT: 74 %
PDW BLD-RTO: 15.7 % (ref 12.4–15.4)
PLATELET # BLD: 642 K/UL (ref 135–450)
PMV BLD AUTO: 8.6 FL (ref 5–10.5)
POTASSIUM SERPL-SCNC: 4 MMOL/L (ref 3.5–5.1)
RBC # BLD: 2.97 M/UL (ref 4.2–5.9)
SODIUM BLD-SCNC: 139 MMOL/L (ref 136–145)
TOTAL PROTEIN: 6.9 G/DL (ref 6.4–8.2)
WBC # BLD: 14.5 K/UL (ref 4–11)

## 2020-01-13 PROCEDURE — 2580000003 HC RX 258: Performed by: INTERNAL MEDICINE

## 2020-01-13 PROCEDURE — 94760 N-INVAS EAR/PLS OXIMETRY 1: CPT

## 2020-01-13 PROCEDURE — 71045 X-RAY EXAM CHEST 1 VIEW: CPT

## 2020-01-13 PROCEDURE — 80053 COMPREHEN METABOLIC PANEL: CPT

## 2020-01-13 PROCEDURE — 36415 COLL VENOUS BLD VENIPUNCTURE: CPT

## 2020-01-13 PROCEDURE — 94640 AIRWAY INHALATION TREATMENT: CPT

## 2020-01-13 PROCEDURE — 99232 SBSQ HOSP IP/OBS MODERATE 35: CPT | Performed by: INTERNAL MEDICINE

## 2020-01-13 PROCEDURE — 6360000002 HC RX W HCPCS: Performed by: INTERNAL MEDICINE

## 2020-01-13 PROCEDURE — 99233 SBSQ HOSP IP/OBS HIGH 50: CPT | Performed by: INTERNAL MEDICINE

## 2020-01-13 PROCEDURE — 6370000000 HC RX 637 (ALT 250 FOR IP): Performed by: INTERNAL MEDICINE

## 2020-01-13 PROCEDURE — 2060000000 HC ICU INTERMEDIATE R&B

## 2020-01-13 PROCEDURE — C9113 INJ PANTOPRAZOLE SODIUM, VIA: HCPCS | Performed by: INTERNAL MEDICINE

## 2020-01-13 PROCEDURE — 85025 COMPLETE CBC W/AUTO DIFF WBC: CPT

## 2020-01-13 PROCEDURE — 85730 THROMBOPLASTIN TIME PARTIAL: CPT

## 2020-01-13 RX ORDER — FAMOTIDINE 20 MG/1
20 TABLET, FILM COATED ORAL 2 TIMES DAILY
Status: DISCONTINUED | OUTPATIENT
Start: 2020-01-13 | End: 2020-01-14 | Stop reason: HOSPADM

## 2020-01-13 RX ADMIN — SODIUM CHLORIDE, PRESERVATIVE FREE 10 ML: 5 INJECTION INTRAVENOUS at 20:43

## 2020-01-13 RX ADMIN — ISODIUM CHLORIDE 3 ML: 0.03 SOLUTION RESPIRATORY (INHALATION) at 15:52

## 2020-01-13 RX ADMIN — LEVALBUTEROL HYDROCHLORIDE 1.25 MG: 1.25 SOLUTION, CONCENTRATE RESPIRATORY (INHALATION) at 15:52

## 2020-01-13 RX ADMIN — PANTOPRAZOLE SODIUM 40 MG: 40 INJECTION, POWDER, FOR SOLUTION INTRAVENOUS at 08:33

## 2020-01-13 RX ADMIN — APIXABAN 5 MG: 5 TABLET, FILM COATED ORAL at 20:43

## 2020-01-13 RX ADMIN — VALACYCLOVIR HYDROCHLORIDE 1000 MG: 500 TABLET, FILM COATED ORAL at 08:33

## 2020-01-13 RX ADMIN — LEVALBUTEROL HYDROCHLORIDE 1.25 MG: 1.25 SOLUTION, CONCENTRATE RESPIRATORY (INHALATION) at 12:18

## 2020-01-13 RX ADMIN — Medication 10 ML: at 08:34

## 2020-01-13 RX ADMIN — APIXABAN 5 MG: 5 TABLET, FILM COATED ORAL at 14:10

## 2020-01-13 RX ADMIN — SODIUM CHLORIDE, PRESERVATIVE FREE 10 ML: 5 INJECTION INTRAVENOUS at 08:34

## 2020-01-13 RX ADMIN — ISODIUM CHLORIDE 3 ML: 0.03 SOLUTION RESPIRATORY (INHALATION) at 20:08

## 2020-01-13 RX ADMIN — FAMOTIDINE 20 MG: 20 TABLET, FILM COATED ORAL at 20:43

## 2020-01-13 RX ADMIN — HEPARIN SODIUM 12.5 ML/HR: 10000 INJECTION, SOLUTION INTRAVENOUS at 03:43

## 2020-01-13 RX ADMIN — LEVALBUTEROL HYDROCHLORIDE 1.25 MG: 1.25 SOLUTION, CONCENTRATE RESPIRATORY (INHALATION) at 20:08

## 2020-01-13 RX ADMIN — LEVALBUTEROL HYDROCHLORIDE 1.25 MG: 1.25 SOLUTION, CONCENTRATE RESPIRATORY (INHALATION) at 08:03

## 2020-01-13 ASSESSMENT — PAIN SCALES - GENERAL
PAINLEVEL_OUTOF10: 0

## 2020-01-13 NOTE — PROGRESS NOTES
Pulmonary Progress Note    CC:  Follow up hypoxia, chest tube, pleural effusion    Subjective:  Room air  Afebrile  30 ml of output from chest tube in past 24 hours      Intake/Output Summary (Last 24 hours) at 1/13/2020 0907  Last data filed at 1/13/2020 0816  Gross per 24 hour   Intake 497.68 ml   Output 1155 ml   Net -657.32 ml         PHYSICAL EXAM:  Blood pressure 107/69, pulse 96, temperature 97.7 °F (36.5 °C), temperature source Oral, resp. rate 16, height 5' 6\" (1.676 m), weight 123 lb 0.3 oz (55.8 kg), SpO2 95 %.'  Gen: No distress. Downs facies   Eyes: PERRL. No sclera icterus. No conjunctival injection. ENT: No discharge. Pharynx clear. External appearance of ears and nose normal.  Neck: Trachea midline. No obvious mass. Resp:Chest tube in place. Clear  CV: Regular rate. Regular rhythm. No murmur or rub. GI: Non-tender. Non-distended. No hernia. Skin: Warm, dry, normal texture and turgor. No nodule on exposed extremities. Lymph: No cervical LAD. No supraclavicular LAD. M/S: No cyanosis. No clubbing. No joint deformity. Neuro: Moves all four extremities. CN 2-12 tested, no defect noted. Ext:   no edema    Medications:    Scheduled Meds:   valACYclovir  1,000 mg Oral Daily    levalbuterol  1.25 mg Nebulization 4x daily    sodium chloride flush  10 mL Intravenous 2 times per day    pantoprazole  40 mg Intravenous Daily    And    sodium chloride (PF)  10 mL Intravenous Daily       Continuous Infusions:   heparin (porcine) 12.5 mL/hr (01/13/20 0343)       PRN Meds:  heparin (porcine), sodium chloride nebulizer, menthol-zinc oxide, sodium chloride flush, magnesium sulfate, potassium chloride, sodium chloride flush, ondansetron, acetaminophen, dextran 70-hypromellose    Labs:  CBC: No results for input(s): WBC, HGB, HCT, MCV, PLT in the last 72 hours. BMP: No results for input(s): NA, K, CL, CO2, PHOS, BUN, CREATININE in the last 72 hours.     Invalid input(s): CA  LIVER PROFILE: No

## 2020-01-13 NOTE — CARE COORDINATION
Discharge Planning:  SW spoke with pts brother at length re: d/c plans. Pts brother stated that he has concerns about what might happen if pt does not get better with skilled care. Pts brother stated that he and his spouse have cared for this pt for the past 30 years but they are concerned as pts cognition seems to be declining and they would like to know what options they might have. Pt is already connected with the DD waiver and has waiver Medicaid. SW discussed long term placement vs a possible group home setting and encouraged pts brother to contact pts  through the DD waiver to share some of these concerns.    Electronically signed by Audie Elizalde on 1/13/2020 at 1:57 PM

## 2020-01-13 NOTE — PROGRESS NOTES
General and Vascular Surgery                                                           Daily Progress Note      Pt Name: Lisa Banks  Medical Record Number: [id number]  Date of Birth 1960   Today's Date: 1/13/2020      ASSESSMENT/PLAN  1. Small bowel obstruction, hypovolemic shock  -12/18 exploratory laparotomy with RITESH, reduction of closed loop SBO  -tolerating oral intake   -swollen upper lip secondary to trauma from vent improving  -Continue PT/OT  -Leukocytosis: 15.8-->18.5-->13.5-->15.3-->14.5  -Abdomen soft, minimal incision tenderness. No signs of infection. Aspiration pneumonia  Acute hypoxemic respiratory failure  Acute PE  Pleural effusion      EDUCATION  Patient educated about their illness/diagnosis, stated above, and all questions answered. We discussed the importance of nutrition, medications they are taking, and healthy lifestyle. Froy Im has improved from yesterday. Pain is well controlled. Denies SOB or abdominal pain. OBJECTIVE  VITALS:  height is 5' 6\" (1.676 m) and weight is 123 lb 0.3 oz (55.8 kg). His oral temperature is 97.7 °F (36.5 °C). His blood pressure is 107/69 and his pulse is 96. His respiration is 16 and oxygen saturation is 95%. VITALS:  /69   Pulse 96   Temp 97.7 °F (36.5 °C) (Oral)   Resp 16   Ht 5' 6\" (1.676 m)   Wt 123 lb 0.3 oz (55.8 kg)   SpO2 95%   BMI 19.86 kg/m²   GENERAL: alert, no distress  ABDOMEN: soft, NT, ND. Midline incision with sterstrips intact. I/O last 3 completed shifts: In: 440.8 [P.O.:60; I.V.:380.8]  Out: 3625 [UDGRM:7712]  I/O this shift:  In: 176.9 [P.O.:120;  I.V.:56.9]  Out: 230 [Urine:200; Chest Tube:30]    LABS  Recent Labs     01/13/20  1017   WBC 14.5*   HGB 9.9*   HCT 30.0*   *      K 4.0      CO2 24   BUN 11   CREATININE 0.7*   CALCIUM 9.0   AST 31   ALT 63*   BILITOT 0.3     CBC:   Lab Results   Component Value Date    WBC 14.5 01/13/2020    RBC 2.97 01/13/2020    RBC 4.68 03/13/2017    HGB 9.9 01/13/2020    HCT 30.0 01/13/2020    .1 01/13/2020    MCH 33.3 01/13/2020    MCHC 32.9 01/13/2020    RDW 15.7 01/13/2020     01/13/2020    MPV 8.6 01/13/2020     CMP:    Lab Results   Component Value Date     01/13/2020    K 4.0 01/13/2020    K 5.5 12/19/2019     01/13/2020    CO2 24 01/13/2020    BUN 11 01/13/2020    CREATININE 0.7 01/13/2020    GFRAA >60 01/13/2020    GFRAA >60 04/09/2013    AGRATIO 0.7 01/13/2020    LABGLOM >60 01/13/2020    GLUCOSE 114 01/13/2020    GLUCOSE 115 03/13/2017    PROT 6.9 01/13/2020    PROT 7.3 03/13/2017    LABALBU 2.9 01/13/2020    CALCIUM 9.0 01/13/2020    BILITOT 0.3 01/13/2020    ALKPHOS 117 01/13/2020    AST 31 01/13/2020    ALT 63 01/13/2020        Byron Lara PA-C  Electronically signed 1/13/2020 at 11:25 AM No

## 2020-01-14 VITALS
HEART RATE: 108 BPM | HEIGHT: 66 IN | SYSTOLIC BLOOD PRESSURE: 113 MMHG | OXYGEN SATURATION: 96 % | BODY MASS INDEX: 19.84 KG/M2 | WEIGHT: 123.46 LBS | TEMPERATURE: 98 F | DIASTOLIC BLOOD PRESSURE: 54 MMHG | RESPIRATION RATE: 16 BRPM

## 2020-01-14 LAB
BILIRUBIN URINE: NEGATIVE
BLOOD, URINE: NEGATIVE
CLARITY: CLEAR
COLOR: YELLOW
GLUCOSE URINE: NEGATIVE MG/DL
IRON SATURATION: 21 % (ref 20–50)
IRON: 46 UG/DL (ref 59–158)
KETONES, URINE: NEGATIVE MG/DL
LEUKOCYTE ESTERASE, URINE: NEGATIVE
MICROSCOPIC EXAMINATION: NORMAL
NITRITE, URINE: NEGATIVE
PH UA: 7.5 (ref 5–8)
PRO-BNP: 21 PG/ML (ref 0–124)
PROTEIN UA: NEGATIVE MG/DL
SPECIFIC GRAVITY UA: 1.01 (ref 1–1.03)
TOTAL IRON BINDING CAPACITY: 216 UG/DL (ref 260–445)
URINE REFLEX TO CULTURE: NORMAL
URINE TYPE: NORMAL
UROBILINOGEN, URINE: 1 E.U./DL

## 2020-01-14 PROCEDURE — 81003 URINALYSIS AUTO W/O SCOPE: CPT

## 2020-01-14 PROCEDURE — 2580000003 HC RX 258: Performed by: INTERNAL MEDICINE

## 2020-01-14 PROCEDURE — 36415 COLL VENOUS BLD VENIPUNCTURE: CPT

## 2020-01-14 PROCEDURE — 51701 INSERT BLADDER CATHETER: CPT

## 2020-01-14 PROCEDURE — 83540 ASSAY OF IRON: CPT

## 2020-01-14 PROCEDURE — 6360000002 HC RX W HCPCS: Performed by: INTERNAL MEDICINE

## 2020-01-14 PROCEDURE — 51798 US URINE CAPACITY MEASURE: CPT

## 2020-01-14 PROCEDURE — 94760 N-INVAS EAR/PLS OXIMETRY 1: CPT

## 2020-01-14 PROCEDURE — 99239 HOSP IP/OBS DSCHRG MGMT >30: CPT | Performed by: INTERNAL MEDICINE

## 2020-01-14 PROCEDURE — APPNB30 APP NON BILLABLE TIME 0-30 MINS: Performed by: NURSE PRACTITIONER

## 2020-01-14 PROCEDURE — 6370000000 HC RX 637 (ALT 250 FOR IP): Performed by: INTERNAL MEDICINE

## 2020-01-14 PROCEDURE — APPSS30 APP SPLIT SHARED TIME 16-30 MINUTES: Performed by: NURSE PRACTITIONER

## 2020-01-14 PROCEDURE — 83550 IRON BINDING TEST: CPT

## 2020-01-14 PROCEDURE — 94640 AIRWAY INHALATION TREATMENT: CPT

## 2020-01-14 PROCEDURE — 83880 ASSAY OF NATRIURETIC PEPTIDE: CPT

## 2020-01-14 RX ORDER — LEVALBUTEROL 1.25 MG/.5ML
1.25 SOLUTION, CONCENTRATE RESPIRATORY (INHALATION) 4 TIMES DAILY
Qty: 120 EACH | Refills: 0
Start: 2020-01-14

## 2020-01-14 RX ORDER — VALACYCLOVIR HYDROCHLORIDE 1 G/1
1000 TABLET, FILM COATED ORAL DAILY
Qty: 20 TABLET | Refills: 0
Start: 2020-01-14

## 2020-01-14 RX ORDER — FAMOTIDINE 20 MG/1
20 TABLET, FILM COATED ORAL 2 TIMES DAILY
Qty: 60 TABLET | Refills: 3
Start: 2020-01-14

## 2020-01-14 RX ADMIN — SODIUM CHLORIDE, PRESERVATIVE FREE 10 ML: 5 INJECTION INTRAVENOUS at 09:38

## 2020-01-14 RX ADMIN — LEVALBUTEROL HYDROCHLORIDE 1.25 MG: 1.25 SOLUTION, CONCENTRATE RESPIRATORY (INHALATION) at 12:01

## 2020-01-14 RX ADMIN — APIXABAN 5 MG: 5 TABLET, FILM COATED ORAL at 09:29

## 2020-01-14 RX ADMIN — VALACYCLOVIR HYDROCHLORIDE 1000 MG: 500 TABLET, FILM COATED ORAL at 09:29

## 2020-01-14 RX ADMIN — FAMOTIDINE 20 MG: 20 TABLET, FILM COATED ORAL at 09:29

## 2020-01-14 RX ADMIN — LEVALBUTEROL HYDROCHLORIDE 1.25 MG: 1.25 SOLUTION, CONCENTRATE RESPIRATORY (INHALATION) at 07:47

## 2020-01-14 ASSESSMENT — PAIN SCALES - GENERAL
PAINLEVEL_OUTOF10: 0
PAINLEVEL_OUTOF10: 0

## 2020-01-14 NOTE — DISCHARGE INSTR - COC
Continuity of Care Form    Patient Name: Dunia Bello   :  1960  MRN:  [id number]    Admit date:  2019  Discharge date:  2020    Code Status Order: Full Code   Advance Directives:   Advance Care Flowsheet Documentation     Date/Time Healthcare Directive Type of Healthcare Directive Copy in [address] Agent's Name Healthcare Agent's Phone Number    [phone]  No, patient does not have an advance directive for healthcare treatment -- -- -- -- --          Admitting Physician:  Jessica Tripathi MD  PCP: Magnolia Aggarwal MD    Discharging Nurse: [facility] Unit/Room#: A4H-0759/7181-70  Discharging Unit Phone Number: [phone]    Emergency Contact:   Extended Emergency Contact Information  Primary Emergency Contact: Marcel Bassett  Address: [address] [place] [address], [address] [facility] [address] Phone: [phone]  Relation: Brother/Sister  Secondary Emergency Contact: Bimal Santos  Address: [facility] [address] Phone: [phone]  Relation: Other    Past Surgical History:  Past Surgical History:   Procedure Laterality Date    CATARACT REMOVAL WITH IMPLANT      Bilateral    COLON SURGERY  2014    for removal colon cancer    COLONOSCOPY  2017    Dr Iva Alvarez N/A 2019    COLONOSCOPY WITH BIOPSY performed by Adolfo Dupont MD at [address] [place] [address]  N/A 2019    EXPLORATORY LAPAROTOMY, LYSIS OF ADHESIONS performed by Jessica Tripathi MD at [place] [address]       Immunization History:   Immunization History   Administered Date(s) Administered    Influenza, Quadv, 6 mo and older, IM, PF (Flulaval, Fluarix) 2019    Influenza, Quadv, IM, PF (6 mo and older Fluzone, Flulaval, Fluarix, and 3 yrs and older Afluria) 2019    Pneumococcal Polysaccharide (Fdilasquf22) 2017    Td, unspecified Mobility/ADLs:  Walking   Assisted  Transfer  Assisted  Bathing  Assisted  Dressing  Assisted  Toileting  Assisted  Feeding  Assisted  Med Admin  Assisted  Med Delivery   crushed    Wound Care Documentation and Therapy:  Incision 01/07/14 Abdomen (Active)   Number of days: 2197        Elimination:  Continence:   · Bowel: Yes  · Bladder: Yes  Urinary Catheter: Indication for Use of Catheter: Acute urinary retention/obstruction   Colostomy/Ileostomy/Ileal Conduit: No       Date of Last BM: 1/14/20    Intake/Output Summary (Last 24 hours) at 1/14/2020 0852  Last data filed at 1/14/2020 0620  Gross per 24 hour   Intake 610 ml   Output 875 ml   Net -265 ml     I/O last 3 completed shifts: In: 666.9 [P.O.:600; I.V.:66.9]  Out: 1105 [Urine:1075; Chest Tube:30]    Safety Concerns: At Risk for Falls and History of Seizures    Impairments/Disabilities:      None    Nutrition Therapy:  Current Nutrition Therapy:   - Oral Diet:  Dysphagia 1 pureed    Routes of Feeding: Oral  Liquids: Spoon Thick Liquids (Pudding)  Daily Fluid Restriction: no  Last Modified Barium Swallow with Video (Video Swallowing Test):     Treatments at the Time of Hospital Discharge:   Respiratory Treatments: See [facility]  Oxygen Therapy:  is not on home oxygen therapy.   Ventilator:        Rehab Therapies: Physical Therapy and Occupational Therapy  Weight Bearing Status/Restrictions: No weight bearing restirctions  Other Medical Equipment (for information only, NOT a DME order):  bath bench  Other Treatments: ***    Patient's personal belongings (please select all that are sent with patient):  None    RN SIGNATURE:  Electronically signed by Kady Ferrer RN on 1/14/20 at 12:50 PM    CASE MANAGEMENT/SOCIAL WORK SECTION    Inpatient Status Date: 12/18/19    Readmission Risk Assessment Score:  Readmission Risk              Risk of Unplanned Readmission:        24           Discharging to Facility/ Agency   · Name: [facility]  · Address: [address] [facility]  [address]  · Phone: [phone]  · Fax: [phone]      / signature: Electronically signed by Julián Mendieta on 1/14/20 at 10:50 AM    PHYSICIAN SECTION    Prognosis: Fair    Condition at Discharge: Stable    Rehab Potential (if transferring to Rehab): Fair    Recommended Labs or Other Treatments After Discharge: cbc diff ,  And cmp in a week,  Pt/ot/speech. Thickened liquids, pureed diet as per hospital ST notes. Physician Certification: I certify the above information and transfer of Bozena Montenegro  is necessary for the continuing treatment of the diagnosis listed and that he requires [facility] for greater 30 days.      Update Admission H&P: Changes in H&P as follows - long complicated hospitalization    PHYSICIAN SIGNATURE:  Electronically signed by Maura Corbett MD on 1/14/20 at 8:55 AM

## 2020-01-14 NOTE — PROGRESS NOTES
Attempted to intermittent straight cath for post void residual.  Could not advance all the way. Pt states he was in pain. Stopped trying. Waiting on coude cath to try.   Electronically signed by Sally Moon RN on 1/14/2020 at 11:03 AM

## 2020-01-14 NOTE — PROGRESS NOTES
Patient discharged to [facility] via wheelchair with brother and belongings at 46. Sent discharge paperwork with brother to give to [facility]. No telemetry in use and IV removed.   Electronically signed by Fabricio Rayo RN on 1/14/2020 at 2:15 PM

## 2020-01-14 NOTE — PROGRESS NOTES
Straight cath performed with coude tip. No complications. 400ml clear yellow urine obtained. Urinalysis sent.   Electronically signed by Gregg Resendiz RN on 1/14/2020 at 11:36 AM

## 2020-01-14 NOTE — PROGRESS NOTES
Department of Internal Medicine  General Internal Medicine  Attending Progress Note    Chief Complaint: getting to eat very little      HPI:   SUBJECTIVE:  63 yo male patient with a history of down's syndrome, colon cancer (resected),  Pacemaker for chb who presented with SBO and septic shock. Now on thickened liquids and purreed diet which need to be spoon fed. Brother is frustrated over the restrictions. Off oxygen feeling well, walking around the room  Still on special diet, not improving with swallow therapy  To have ct tube pulled out today.   And if all well go to [address] [place] [address] tomorrow    Past Medical History:   Diagnosis Date    Acne rosacea     Cognitive impairment     sees Dr Neil Chew, started Aricept 12/2019    Colon cancer ([facility] [address] 1/7/2014    ileocecal valve/Leuenberger/yrly surveillance    Down syndrome     Gout     HIGH CHOLESTEROL     2017 3.1 % framingham risk score    History of diarrhea     since childhood, worse since partial colectomy resolved after a few months    Macrocytosis     followed by Dr Erika Duque and released    Seborrheic dermatitis     Seizure disorder ([facility] [address] 2019    Status post biventricular cardiac pacemaker insertion 01/2019    for long sinus pauses with syncopal willis    Status post partial colectomy 2016    diarrhea    Syncope and collapse 01/2019    recurrent syncope, found seizures and sinus pauses    Weight loss 2015    since 2014 at the time of partial colectomy lost 25 lbs     Past Surgical History:   Procedure Laterality Date    CATARACT REMOVAL WITH IMPLANT  2007    Bilateral    COLON SURGERY  1/7//2014    for removal colon cancer    COLONOSCOPY  02/27/2017    Dr Alejandro Chauhan N/A 8/21/2019    COLONOSCOPY WITH BIOPSY performed by Renetta Resendiz MD at [address] N/A 12/18/2019    EXPLORATORY LAPAROTOMY, LYSIS OF ADHESIONS performed by Marques Bustos MD at [address] [place] [address] History Problem Relation Age of Onset    Diabetes Brother     Hypertension Brother     Stroke Brother     High Cholesterol Brother     Cancer Mother         melanoma    Other Father         cerebral aneurysm     Social History     Tobacco Use    Smoking status: Never Smoker    Smokeless tobacco: Never Used   Substance Use Topics    Alcohol use: No    Drug use: No       Prior to Admission medications    Medication Sig Start Date End Date Taking? Authorizing Provider   doxycycline (VIBRAMYCIN) 50 MG capsule Take 50 mg by mouth 2 times daily 11/26/19  Yes Historical Provider, MD   simvastatin (ZOCOR) 20 MG tablet Take 1 tablet by mouth daily 12/10/19  Yes Mian Martin MD   allopurinol (ZYLOPRIM) 100 MG tablet Take 1 tablet by mouth daily 12/9/19  Yes Mian Martin MD   donepezil (ARICEPT) 5 MG tablet Take 1 tablet by mouth nightly 12/9/19  Yes Mian Martin MD   Multiple Vitamins-Minerals (THERAPEUTIC MULTIVITAMIN-MINERALS) tablet Take 1 tablet by mouth daily   Yes Historical Provider, MD   Azelaic Acid 15 % GEL Apply topically daily 12/2/19   Historical Provider, MD   hydrocortisone (HYTONE) 2.5 % lotion Apply topically daily 11/26/19   Historical Provider, MD   metroNIDAZOLE (METROCREAM) 0.75 % cream Apply topically daily 11/26/19   Historical Provider, MD         ROS:  A comprehensive review of systems was negative except for: weakness , sob     OBJECTIVE:      PHYSICAL:  Intake/Output:   Patient Vitals for the past 8 hrs:   BP Temp Temp src Pulse Resp SpO2   01/13/20 2008 -- -- -- -- 18 94 %   01/13/20 1632 102/68 98.4 °F (36.9 °C) Oral 98 18 94 %   01/13/20 1552 -- -- -- -- 18 100 %     I/O last 3 completed shifts: In: 847.7 [P.O.:420;  I.V.:427.7]  Out: 1105 [Urine:1075; Chest Tube:30]  I/O this shift:  In: 240 [P.O.:240]  Out: -   VITALS:    /68   Pulse 98   Temp 98.4 °F (36.9 °C) (Oral)   Resp 18   Ht 5' 6\" (1.676 m)   Wt 123 lb 0.3 oz (55.8 kg)   SpO2 94%   BMI 19.86 kg/m²     General Appearance: Portable    Result Date: 12/19/2019  Small bilateral pleural effusions with adjacent atelectasis. Xr Chest Portable    Result Date: 12/18/2019  Low lung volume due to inspiratory effort. Mild bibasilar atelectasis with no other significant finding in the chest.     Ct Chest Abdomen Pelvis Wo Contrast    Result Date: 12/18/2019  Findings compatible with small bowel obstruction with transition point in the right mid abdomen, likely in mid to distal ileum. No obvious etiology identified. Small volume ascites, likely reactive. Areas of consolidation to the lungs bilaterally which may relate to atelectasis although multifocal pneumonia or aspiration pneumonitis could have a similar appearance. Patulous/distended appearance to the esophagus with fluid attenuation material within nearly the entirety of the esophagus, possibly secondary to the bowel obstruction and refluxed gastric contents. Patient may be at risk for aspiration. Trace bilateral pleural effusions. ASSESSMENT AND PLAN    Active Problems:    Small bowel obstruction ([address] RESOLVED  Plan: back to eating with restrictions due to dysphagia and tolerating from GI standpoint    Aspiration pneumonia of both lower lobes (HCC) pleural fluid left much worse than right  Plan:chest tube in, decreased drainage, CT chest to further evaluate for loculations.     Recent cxr much improvement after drainage improved    Hypoxia  Plan: currently better    Septicemia ([address] Suspect has another aspiration pneumonia  Plan: discussed with Dr Dax Allen will start Merrem  Cognitive impairment likely alzheimers  Continue aricept  Abnormal LFTS  Improving  Elevated wbc and platelets could be due to iron deficiency   Will  check      Di Avelar MD

## 2020-01-14 NOTE — PLAN OF CARE
Pt has denied pain t/o shift. He is mostly continent of bowel and bladder but needs assistance with using the urinal/bedpan. Proactive toileting performed t/o shift. Pt is a high fall risk. He threw his legs over the R side of the bed at the beginning of the shift, setting off the bed alarm. Pt stated he needed to use the bathroom. Pt assisted with urinal and  bed pad changed after pt had a couple smears of stool on his pad. Goal of proactive toileting written on white board and reviewed with PCA. Bed kept in locked, low position with side rails up X 3 and an active bed alarm. CLWR although pt has not been able to return demonstration for use. Pt yells out when he needs assistance. Hourly rounded completed t/o shift using the dome light timer for reminding. Pt is oriented to self but calm. Once pt assisted onto L side using wedge, he did not want to be repositioned. He said he was comfortable. Offered repositioning Q2H but pt refused. Per nursing report, pt is to be discharged today to [facility]. Pt updated. Reminder written on white board.

## 2020-01-14 NOTE — PROGRESS NOTES
Patient discharge instructions printed and placed in folder to give to brother to take to [facility]. Report called to Chelly Jeter at [address] at [phone].   Electronically signed by Ayla Yang RN on 1/14/2020 at 1:12 PM

## 2020-01-15 ENCOUNTER — CARE COORDINATION (OUTPATIENT)
Dept: CASE MANAGEMENT | Age: 60
End: 2020-01-15

## 2020-01-15 NOTE — DISCHARGE SUMMARY
Physician Discharge Summary     Patient ID:   Heather Kaba  [id number]  32 y.o.  1960    Admit date: 12/18/2019    Discharge date and time: 1/14/2020  2:29 PM     Admitting Physician: Claudine Cheadle, MD     Discharge Physician: Marcelina Diego MD    Discharge Diagnoses:   SBO  Bilateral Pneumonia  Septic Shock  Emergent Explor Lap  Down's Syndrome  Alzheimers dementia  Ischemic hepatitis   S/p partial colectomy for colon cancer 2017  Right sided PE  Large left pleural effusion  Small right pleural effusion  Chest tube left pleural  Thoracentesis right pleural  TPA treatment for loculations left pleural fluid  Aspiration pneumonia HAP  Aspiration/abnromal swallow  Urinary retention over 500ml on day of dc  Generalized weakness    Discharged Condition: fair      Hospital Course: 65 yo wm presented to the ER in septic shock with an acute SBO and bilateral pneumonia. He was taken urgently to surgery by Dr Laurent Amaya (see OR report) to relieve the obstruction. He did not require any excision of bowel, mainly lysis of adhesions, emptying of gastric contents from within the stomach, manual relief of obstruction. Postop He was kept on the vent and moved to the ICU where he remained critically ill for several days;requiring pressors, vent support, heavy sedation to keep him from pulling out lines, tubes, etc.  And he was kept on Merrem the full seven days. He was npo during that time, eventually TPN added. It took several days to get him off the vent. He underwent a right sided thoracentesis to evaluate the right pleural effusion which was exudative and had neg cultures. He was eventually successfully weaned off the vent. On the first day once transferred to PCU he was tachycardic and spiked a temp and had developed a moderate right pleural effusion and large left pleural effusion. CT chest revealed acute right PE. Venous duplex of his legs was neg bilat.   He was placed on iv heparin and supported with oxygen supp. The etiology of the large left pleural effusion was explored by chest tube placement and evaluation of the fluid. It was exudative with neg cytology and neg cxs. It did not drain adequately and chest ct revealed loculations which were successfully treated with TPA. He had the chest ct pulled when drainage decreased to less 733WN without complications. He was changed from heparin drip to Eliquis. He would need 3 more months of Eliquis to complete treatment of his PE. He developed another fever and leukocytosis on the floor and was cultured and started back on merrem. His fever and wbc resolved. It became clear that he was aspirating and swallow evaluation including MBS revealed severe aspiration. He had a difficult time learning how to change his swallow technique and was fed with thickened liquid and spoon fed his pureed diet. Unfortunately his alternative diet would need to be continued. He was very weak and was to go to SNF on discharge to work on getting stronger  He also seemed to have difficulty urinating on the planned day of discharge and bladder scan was greater than 500ml. UA was clear.  He would need isc's until this improved and possibly gu eval.      Consults:   IP CONSULT TO GENERAL SURGERY  IP CONSULT TO PRIMARY CARE PROVIDER  IP CONSULT TO DIETITIAN  IP CONSULT TO DIETITIAN  IP CONSULT TO DIETITIAN    Discharge Exam:  General Appearance: alert and oriented to person, place and time, well-developed and well-nourished, in no acute distress  Skin: warm and dry, no rash or erythema  Head: normocephalic and atraumatic  Eyes: conjunctivae normal and sclera anicteric  ENT: hearing grossly normal bilaterally and sinuses non-tender  Neck: neck supple and non tender without mass, no thyromegaly or thyroid nodules, no cervical lymphadenopathy   Pulmonary/Chest: clear to auscultation bilaterally- no wheezes, rales or rhonchi, normal air movement, no respiratory tablet, R-3NO PRINT      valACYclovir (VALTREX) 1 g tablet Take 1 tablet by mouth daily, Disp-20 tablet, R-0NO PRINT      menthol-zinc oxide (CALMOSEPTINE) 0.44-20.6 % OINT ointment Apply 1-4 each topically 3 times daily for 7 days Max 30 ml per day., Topical, 3 TIMES DAILY Starting Tue 1/14/2020, Until Tue 1/21/2020, For 7 days, Disp-1 Tube, R-4, NO PRINT      dextran 70-hypromellose (TEARS NATURALE) 0.1-0.3 % SOLN opthalmic solution Place 1 drop into both eyes 3 times daily, Disp-1 Bottle, R-3NO PRINT      famotidine (PEPCID) 20 MG tablet Take 1 tablet by mouth 2 times daily, Disp-60 tablet, R-3NO PRINT         CONTINUE these medications which have NOT CHANGED    Details   donepezil (ARICEPT) 5 MG tablet Take 1 tablet by mouth nightly, Disp-30 tablet, R-3NO PRINT      Azelaic Acid 15 % GEL Apply topically dailyHistorical Med      hydrocortisone (HYTONE) 2.5 % lotion Apply topically daily, Topical, DAILY Starting Tue 11/26/2019, Historical Med         STOP taking these medications       doxycycline (VIBRAMYCIN) 50 MG capsule Comments:   Reason for Stopping:         metroNIDAZOLE (METROCREAM) 0.75 % cream Comments:   Reason for Stopping:         simvastatin (ZOCOR) 20 MG tablet Comments:   Reason for Stopping:         allopurinol (ZYLOPRIM) 100 MG tablet Comments:   Reason for Stopping:         Multiple Vitamins-Minerals (THERAPEUTIC MULTIVITAMIN-MINERALS) tablet Comments:   Reason for Stopping:         Cholecalciferol (VITAMIN D) 2000 units CAPS capsule Comments:   Reason for Stopping:         Cholestyramine POWD Comments:   Reason for Stopping:         loperamide (LOPERAMIDE A-D) 2 MG tablet Comments:   Reason for Stopping:                 Activity: activity as tolerated  Diet: dysphagia diet  Wound Care: as directed    Follow-up with Laura CastanedaDermatologist   Time Spent: over 35 minutes spent performing hospital discharge  Signed:  Maura Corbett MD  1/14/2020  8:42 PM

## 2020-01-22 ENCOUNTER — OUTSIDE SERVICES (OUTPATIENT)
Dept: WOUND CARE | Age: 60
End: 2020-01-22
Payer: COMMERCIAL

## 2020-01-22 PROCEDURE — 99308 SBSQ NF CARE LOW MDM 20: CPT | Performed by: CLINICAL NURSE SPECIALIST

## 2020-01-22 NOTE — PROGRESS NOTES
[facility]    Patient name: Caroline Mendez  :   [birth year]  Facility:  [address] [place] [address] Service: skilled nursing facility (89)    Primary diagnosis for wound-care consultation: Wounds to occiput    Additional ulcer(s) noted?  none    History of Present Illness: Initial visit. Recent hospitalization for extended stay. Patient with recent pneumonitis, sepsis, small bowel obstruction, Alzheimer's disease, acute respiratory failure with hypoxia, Down syndrome, muscle weakness and dysphasia. Here for rehab. Review of Systems: Pertinent systems reviewed in the HPI; all other systems reviewed, and negative. Pertinent elements of past medical, surgical, family, and/or social history: As above    Medications and allergies are detailed in the nursing home chart, and were reviewed by me today.  _______________________    General Physical exam:    General Appearance: alert and oriented to person, well developed and well-nourished, in no acute distress  Psychiatric:  Cheerful and talkative  Skin: warm and dry, no rash  Head: normocephalic and atraumatic  Eyes: pupils equal, round, sclerae anicteric, conjunctivae normal  ENT: no thrush or oral ulcers  Neck:No complaints, normal appearance  Pulmonary/Chest: Respirations easy at rest, no cough or respiratory distress  Cardiovascular: No chest pain, normal rate, toes warm, cap refill normal  Abdomen: No nausea or vomiting  Extremities: no cyanosis, edema or cellulitis  Musculoskeletal: Ambulatory, moves all extremities, no deformities  Neurologic: distal sensation to light touch intact, no allodynia. Wound exam:    Wound location: Occiput, posterior   Length (cm) 2.5   Width (cm) 1.8   Depth (cm) 0.1   Tunneling 0   Undermining 0    Wound type:   Pressure  Grade - stage - thickness: Unstageable     Description of periwound: Intact    Description of wound bed: Wound with dry, fully intact, scab.  Surrounding tissue and ulcer without signs and symptoms of infection. No purulence, malodor, erythema, increased temperature, or increased pain. Wound exam:    Wound location: Occiput - lateral   Length (cm) 2   Width (cm) 2   Depth (cm) 0.1   Tunneling 0   Undermining 0    Wound type:   Pressure  Grade - stage - thickness: Unstageable     Description of periwound: Intact    Description of wound bed: Wound with dry, fully adherent, intact scab. Surrounding tissue and ulcer without signs and symptoms of infection. No purulence, malodor, erythema, increased temperature, or increased pain.  _______________________    Recent labs and data reviewed: 1/20/2020: Chemistry within normal limits except for albumin 3.0. WBC 14, RBC 3.2, hemoglobin/hematocrit: 11/34  _______________________     Mello Duque diagnoses & assessment:  Unstageable pressure injury to occipital-posterior and lateral    Debridement is not indicated today, based on the history and exam above.  _______________________    Procedure:    Consent obtained. Time out performed per [facility].      _______________________    Recommendations:    - Dressings / Compression / Offloading: Betadine 2 times per day    - Labs / Diagnostic studies: none    - Medications / nutritional support: Appetite is good    - Further Consultations recommended: OT,PT,ST following    - Anticipated follow-up: Weekly evaluation  _______________________    Electronically signed by SAUMYA Escobar CNP on 1/22/2020 at 12:02 PM

## 2020-01-29 ENCOUNTER — HOSPITAL ENCOUNTER (OUTPATIENT)
Dept: SPEECH THERAPY | Age: 60
Setting detail: THERAPIES SERIES
Discharge: HOME | End: 2020-01-29
Payer: COMMERCIAL

## 2020-01-29 ENCOUNTER — OUTSIDE SERVICES (OUTPATIENT)
Dept: WOUND CARE | Age: 60
End: 2020-01-29
Payer: COMMERCIAL

## 2020-01-29 ENCOUNTER — HOSPITAL ENCOUNTER (OUTPATIENT)
Dept: GENERAL RADIOLOGY | Age: 60
Discharge: HOME | End: 2020-01-29
Payer: COMMERCIAL

## 2020-01-29 PROCEDURE — 99308 SBSQ NF CARE LOW MDM 20: CPT | Performed by: CLINICAL NURSE SPECIALIST

## 2020-01-29 PROCEDURE — 74230 X-RAY XM SWLNG FUNCJ C+: CPT

## 2020-01-29 PROCEDURE — 92611 MOTION FLUOROSCOPY/SWALLOW: CPT

## 2020-01-29 NOTE — PROGRESS NOTES
residue post swallow    3. Analysis of compensatory strategies  · Pt with volitional cough which does clear penetration  · Pt 's clearance swallows do not completely clear residue from the pyriform sinus  · Pt with multiple swallows in attempt to clear oral/pharyngeal residue of dysphagia minced and moist food items  · Pt was unable to consistently follow any sequential compensatory swallow strategies (posture or swallow technique    4. Pt's distractibility during this assessment due to complaints of \"bottom pain\" impacting. Pt documented history of Dementia appeared to also impact carryover of compensatory strategies/techniques this date/time. Dysphagia Score: Dysphagia Outcome Severity Scale: Level 3: Moderate dysphagia- Total assistance, supervision or strategies. Two or more diet consistencies restricted    Aspiration/Penetration Risk:   · deep penetration of thin liquids with inconsistent complete clearance and poor sensation with risk of aspiration; penetration before/during swallow of nectar which appeared to clear with swallow.    · Multiple episodes of penetration of pyriform sinus residue post swallow with incomplete clearance which increases as viscosity of consistency increases    Diet Recommendations:  Solid consistency: Dysphagia Pureed (Dysphagia I)   Liquid consistency: Mildly Thick (Nectar)   Medication administration: Meds in puree(crush if able)     Compensatory Swallow Strategies:    Upright as possible for all oral intake, Swallow 2 times per bite/sip, Small bites/sips, Remain upright for 30-45 minutes after meals(training in modified supraglottic swallow)    Therapy:  Requires SLP Intervention: Yes    Referrals: Back to SNF MD/SLP NSG    Therapy Interventions:   Therapeutic Interventions: Bolus control exercises, Patient/Family education, Laryngeal exercises, Shaker, Tongue base strengthening, Pharyngeal exercises, Diet tolerance monitoring    Prognosis:  Prognosis for safe diet advancement: good  Barriers to reach goals: (guarded 2/2 to cognitive deficits related to Down Syndrome and documented Dementia History; Severity of deficits)  Consulted and agree with results and recommendations: Patient(Call to SNF)    Education:  Consulted and agree with results and recommendations: Patient(Call to SNF)  Patient Education Response: Verbalizes understanding, No evidence of learning               Assessment: Test Data   Pain:  Pain Assessment  Patient Currently in Pain: Yes(bottom pain \"hurt a lot\"; positioning)    General  Cognitive/Behavior  Behavior/Cognition  Behavior/Cognition: Alert; Cooperative;Pleasant mood;Distractible(pt with frequent complaints of 'bottom pain\" throughout exam)    Vision/Hearing  Vision  Vision: Impaired(DNT not test; functional for exam)  Hearing  Hearing: Exceptions to [facility]  Hearing Exceptions: Hard of hearing/hearing concerns    Consistencies Assessed     Dysphagia Minced and Moist (Dysphagia II), Dysphagia Pureed (Dysphagia I), Honey cup, Nectar cup, Nectar  teaspoon, Thin cup    Positioning   Upright in Videofluoroscopic Chair; Lateral and A-P Plane View    Indicators of Oral Phase Dysfunction   Oral Phase - Major Contributing Deficits  Poor Mastication: Mechanical soft solid  Weak Lingual Manipulation: Mechanical soft solid, Puree  Reduced Posterior Propulsion: Mechanical soft solid, Puree(Disorganized)  Reduced Bolus Control: All  Decreased Bolus Cohesion: Mechanical soft solid  Lingual / Palatal Residue: All  Premature Bolus Loss to Pharynx: All  Delayed Trigger of Palatal Elevation: (inconsistent across consistencies)  Reduced Tongue Base Retraction: Mechanical soft solid      Indicators of Pharyngeal Phase Dysfunction  Pharyngeal Phase - Major Contributing Deficits  Delayed Swallow Initiation: All  Premature Spillage to Valleculae: All  Premature Spillage to Pyriform: Thin cup  Reduced Pharyngeal Peristalsis: All(most symptomatic as viscosity increased.  pt with

## 2020-02-05 ENCOUNTER — OUTSIDE SERVICES (OUTPATIENT)
Dept: WOUND CARE | Age: 60
End: 2020-02-05
Payer: COMMERCIAL

## 2020-02-05 PROCEDURE — 99308 SBSQ NF CARE LOW MDM 20: CPT | Performed by: CLINICAL NURSE SPECIALIST

## 2020-02-05 NOTE — PROGRESS NOTES
[facility]    Patient name: Tara Kelly  :   [birth year]  Facility:  [address] [place] [address] Service: skilled nursing facility (57)    Primary diagnosis for wound-care consultation: Unstageable pressure injury to occipital, posterior and lateral versus hyperkeratosis. Additional ulcer(s) noted? None    History of Present Illness: Patient with Alzheimer's disease, recent pneumonia, sepsis, small bowel obstruction, lengthy stay in the hospital with acute respiratory failure. Down syndrome. Here for rehab. Will be moving to long-term care today. Review of Systems: Pertinent systems reviewed in the HPI; all other systems reviewed, and negative. Pertinent elements of past medical, surgical, family, and/or social history: No changes this week. Medications and allergies are detailed in the nursing home chart, and were reviewed by me today.  _______________________    General Physical exam:      General Appearance: alert and oriented to person, well developed and well-nourished, in no acute distress  Psychiatric:  Mood and affect appropriate for situation  Skin: warm and dry, no rash  Head: normocephalic and atraumatic  Eyes: pupils equal, round, sclerae anicteric, conjunctivae normal  ENT: no thrush or oral ulcers  Neck:No complaints, normal appearance  Pulmonary/Chest: Respirations easy at rest, no cough or respiratory distress  Cardiovascular: No chest pain, normal rate, toes warm, cap refill normal  Abdomen: No nausea or vomiting  Extremities: no cyanosis, edema or cellulitis  Musculoskeletal: Ambulatory, moves all extremities, no deformities  Neurologic: distal sensation to light touch intact, no allodynia.       Wound exam:    Wound location: Occiput, posterior   Length (cm) 2   Width (cm) 1.5   Depth (cm) 0.1   Tunneling 0   Undermining 0    Wound type:   Pressure  Grade - stage - thickness: Unstageable     Description of periwound: Intact    Description of wound bed: Wound with dry, fully intact, eschar. Surrounding tissue and ulcer without signs and symptoms of infection. No purulence, malodor, erythema, increased temperature, or increased pain. Wound exam:    Wound location: Occiput - lateral   Length (cm) 1.5   Width (cm) 1.5   Depth (cm) 0.1   Tunneling 0   Undermining 0    Wound type:   Pressure  Grade - stage - thickness: Unstageable     Description of periwound: intact    Description of wound bed: Wound with dry, fluid here, intact eschar. Surrounding tissue and ulcer without signs and symptoms of infection. No purulence, malodor, erythema, increased temperature, or increased pain.  _______________________    Recent labs and data reviewed: 1/29/2020: Hemoglobin/hematocrit: 10/32, RBC 3417.  1/20/2020: Albumin 3, chemistry within normal limits  _______________________     Audria Canton diagnoses & assessment:  Unstageable pressure injury to occipital, posterior and lateral versus hyperkeratosis    Debridement is not indicated today, based on the history and exam above.  _______________________    Procedure:    Consent obtained. Time out performed per [facility].      _______________________    Recommendations:    - Dressings / Compression / Offloading: Betadine 2 times daily    - Labs / Diagnostic studies: none    - Medications / nutritional support: Appetite is good    - Further Consultations recommended: none    - Anticipated follow-up: Weekly evaluation  _______________________    Electronically signed by SAUMYA Monte CNP on 2/5/2020 at 5:23 PM

## 2020-02-10 ENCOUNTER — CARE COORDINATION (OUTPATIENT)
Dept: CASE MANAGEMENT | Age: 60
End: 2020-02-10

## 2020-02-12 ENCOUNTER — OUTSIDE SERVICES (OUTPATIENT)
Dept: WOUND CARE | Age: 60
End: 2020-02-12
Payer: COMMERCIAL

## 2020-02-12 PROCEDURE — 99308 SBSQ NF CARE LOW MDM 20: CPT | Performed by: CLINICAL NURSE SPECIALIST

## 2020-02-12 NOTE — PROGRESS NOTES
88 [facility]    Patient name: Car Sharma  :   [birth year]  Facility:  [address] [place] [address] Service: nursing facility (52)    Primary diagnosis for wound-care consultation: unstageable pressure injury vs. Hyperkeratosis to occipital posterior and lateral    Additional ulcer(s) noted?  none    History of Present Illness: As above. Posterior area resolved, scab off. Moved to nursing home unit last week. Review of Systems: Pertinent systems reviewed in the HPI; all other systems reviewed, and negative. Pertinent elements of past medical, surgical, family, and/or social history: Patient with Down's syndrome, Alzheimer's disease, recent pneumonia and sepsis, small bowel obstruction, lengthy stay in the hospital with acute respiratory failure. Appetite good. No fever or chills. Medications and allergies are detailed in the nursing home chart, and were reviewed by me today.  _______________________    General Physical exam:    Vital signs:  109/61, 96, 78, 18, 98% room air    General Appearance: alert and oriented to person, place and time, well developed and well-nourished, in no acute distress  Psychiatric:  Mood and affect appropriate for situation  Skin: warm and dry, no rash. Multiple hyperkeratotic lesions on forehead and scalp  Head: normocephalic and atraumatic  Eyes: pupils equal, round, sclerae anicteric, conjunctivae normal  ENT: no thrush or oral ulcers  Neck:No complaints, normal appearance  Pulmonary/Chest: Respirations easy at rest, no cough or respiratory distress  Cardiovascular: No chest pain, normal rate, toes warm, cap refill normal  Abdomen: No nausea or vomiting  Extremities: no cyanosis, edema or cellulitis  Musculoskeletal: Ambulatory, moves all extremities, no deformities  Neurologic: distal sensation to light touch intact, no allodynia.       Wound exam:    Wound location: Occiput, posterior - Resolved    Wound type:   Pressure vs.

## 2020-02-19 ENCOUNTER — OUTSIDE SERVICES (OUTPATIENT)
Dept: WOUND CARE | Age: 60
End: 2020-02-19
Payer: COMMERCIAL

## 2020-02-19 PROCEDURE — 99308 SBSQ NF CARE LOW MDM 20: CPT | Performed by: CLINICAL NURSE SPECIALIST

## 2020-02-19 NOTE — PROGRESS NOTES
erythema, increased temperature, or increased pain. Wound exam:    Wound location: Occiput - lateral   Length (cm) 1   Width (cm) 1.2   Depth (cm) 0.1   Tunneling 0   Undermining 0    Wound type:   Pressure vs. hyperkeratosis   Grade - stage - thickness: Scab covered     Description of periwound: intact    Description of wound bed: Wound with dry, fully adherent, intact scab. Surrounding tissue and ulcer without signs and symptoms of infection. No purulence, malodor, erythema, increased temperature, or increased pain.  _______________________    Recent labs and data reviewed: No new labs  _______________________     [facility] diagnoses & assessment:  Unstageable pressure injury to occipital-posterior lateral versus hyperkeratosis    Debridement is not indicated today, based on the history and exam above.  _______________________    Procedure:    Consent obtained. Time out performed per [facility].      _______________________    Recommendations:    - Dressings / Compression / Offloading: Betadine 2 times a day    - Labs / Diagnostic studies: None    - Medications / nutritional support: Appetite is good    - Further Consultations recommended: None    - Anticipated follow-up: Weekly evaluation  _______________________    Electronically signed by SAUMYA Hurst CNP on 2/19/2020 at 5:28 PM

## 2020-02-26 ENCOUNTER — OUTSIDE SERVICES (OUTPATIENT)
Dept: WOUND CARE | Age: 60
End: 2020-02-26
Payer: COMMERCIAL

## 2020-02-26 PROCEDURE — 99307 SBSQ NF CARE SF MDM 10: CPT | Performed by: CLINICAL NURSE SPECIALIST

## 2020-02-26 NOTE — PROGRESS NOTES
and symptoms of infection. No purulence, malodor, erythema, increased temperature, or increased pain.  _______________________    Recent labs and data reviewed: No new labs  _______________________     Wilfred Niraj diagnoses & assessment:  Unstageable pressure injury versus hyperkeratosis, stable    Debridement is not indicated today, based on the history and exam above.  _______________________    Procedure:    Consent obtained. Time out performed per [facility].    _______________________    Recommendations:    - Dressings / Compression / Offloading: Betadine    - Labs / Diagnostic studies: None    - Medications / nutritional support: Appetite good    - Further Consultations recommended: None    - Anticipated follow-up: Weekly evaluation  _______________________    Electronically signed by SAUMYA Bautista CNP on 2/26/2020 at 12:48 PM

## 2020-03-05 ENCOUNTER — OUTSIDE SERVICES (OUTPATIENT)
Dept: WOUND CARE | Age: 60
End: 2020-03-05
Payer: COMMERCIAL

## 2020-03-05 PROCEDURE — 99308 SBSQ NF CARE LOW MDM 20: CPT | Performed by: CLINICAL NURSE SPECIALIST

## 2020-03-05 NOTE — PROGRESS NOTES
[facility]    Patient name: Sukh Mccann  :   [birth year]  Facility:  [address] [place] [address] Service: nursing facility (52)    Primary diagnosis for wound-care consultation: Unstageable pressure injury versus hyperkeratosis to occiput    Additional ulcer(s) noted?  none    History of Present Illness: Dry, fully adherent, intact scab area to occiput    Review of Systems: Pertinent systems reviewed in the HPI; all other systems reviewed, and negative. Pertinent elements of past medical, surgical, family, and/or social history: Patient with Alzheimer disease and Down syndrome. Ambulatory, appetite is good, no fever or chills. Medications and allergies are detailed in the nursing home chart, and were reviewed by me today.  _______________________    General Physical exam:      General Appearance: alert and oriented to person, well developed and well-nourished, in no acute distress  Psychiatric:  Mood and affect appropriate for situation  Skin: warm and dry, no rash  Head: normocephalic and atraumatic  Eyes: pupils equal, round, sclerae anicteric, conjunctivae normal  ENT: no thrush or oral ulcers  Neck:No complaints, normal appearance  Pulmonary/Chest: Respirations easy at rest, no cough or respiratory distress  Cardiovascular: No chest pain, normal rate, toes warm, cap refill normal  Abdomen: No nausea or vomiting  Extremities: no cyanosis, edema or cellulitis  Musculoskeletal: Ambulatory, moves all extremities, no deformities  Neurologic: distal sensation to light touch intact, no allodynia. Wound exam:    Wound location: Occiput - lateral   Length (cm) 1.4   Width (cm) 1.4   Depth (cm) 0.1   Tunneling 0   Undermining 0    Wound type:   Pressure versus hyperkeratosis  Grade - stage - thickness: Scab covered     Description of periwound: intact    Description of wound bed: Wound with dry, fully adherent, intact scab.   Surrounding tissue and ulcer without signs and symptoms of infection. No purulence, malodor, erythema, increased temperature, or increased pain.  _______________________    Recent labs and data reviewed: No new labs  _______________________     Alben Mole diagnoses & assessment:  unstageable pressure injury versus hyperkeratosis, stable scab    Debridement is not indicated today, based on the history and exam above.  _______________________    Procedure:    Consent obtained. Time out performed per [facility].      _______________________    Recommendations:    - Dressings / Compression / Offloading: Betadine    - Labs / Diagnostic studies: none    - Medications / nutritional support: Appetite is good    - Further Consultations recommended: none    - Anticipated follow-up: Weekly evaluation  _______________________    Electronically signed by SAUMYA Howell CNP on 3/5/2020 at 8:58 AM

## 2020-03-11 ENCOUNTER — OUTSIDE SERVICES (OUTPATIENT)
Dept: WOUND CARE | Age: 60
End: 2020-03-11
Payer: COMMERCIAL

## 2020-03-11 PROCEDURE — 99308 SBSQ NF CARE LOW MDM 20: CPT | Performed by: CLINICAL NURSE SPECIALIST

## 2020-03-11 NOTE — PROGRESS NOTES
88 [facility]    Patient name: Ting Alonzo  :   [birth year]  Facility:  [address] [place] [address] Service: nursing facility (63)    Primary diagnosis for wound-care consultation: Unstageable pressure injury versus hyperkeratosis to occiput-lateral    Additional ulcer(s) noted? None    History of Present Illness: Dry, full adherent intact scab. Appetite good. No fever or chills. Up ad aristides. Review of Systems: Pertinent systems reviewed in the HPI; all other systems reviewed, and negative. Pertinent elements of past medical, surgical, family, and/or social history: Patient with Alzheimer's disease and Down syndrome. Medications and allergies are detailed in the nursing home chart, and were reviewed by me today.  _______________________    General Physical exam:    Vital signs: 103/67, 97.6, 62, 16, 99%     General Appearance: alert and oriented to person, well developed and well-nourished, in no acute distress  Psychiatric:  Mood and affect appropriate for situation  Skin: warm and dry, no rash  Head: normocephalic and atraumatic  Eyes: pupils equal, round, sclerae anicteric, conjunctivae normal  ENT: no thrush or oral ulcers  Neck:No complaints, normal appearance  Pulmonary/Chest: Respirations easy at rest, no cough or respiratory distress  Cardiovascular: No chest pain, normal rate, toes warm, cap refill normal  Abdomen: No nausea or vomiting  Extremities: no cyanosis, edema or cellulitis  Musculoskeletal: Ambulatory, moves all extremities, no deformities  Neurologic: distal sensation to light touch intact, no allodynia.       Wound exam:    Wound location: Occiput - lateral   Length (cm) 1   Width (cm) 1   Depth (cm) 0.1 - estimated, covered with scab   Tunneling 0   Undermining 0    Wound type:   Unstageable pressure injury versus hyperkeratosis  Grade - stage - thickness: Full thickness     Description of periwound: Intact    Description of wound bed: Wound with

## 2020-03-13 ENCOUNTER — TELEPHONE (OUTPATIENT)
Dept: FAMILY MEDICINE CLINIC | Age: 60
End: 2020-03-13

## 2020-03-13 DIAGNOSIS — L71.9 ACNE ROSACEA: Primary | ICD-10-CM

## 2020-03-13 RX ORDER — AZELAIC ACID 0.15 G/G
GEL TOPICAL
Qty: 1 TUBE | Refills: 1 | Status: SHIPPED | OUTPATIENT
Start: 2020-03-13

## 2020-03-13 NOTE — TELEPHONE ENCOUNTER
I received a PA for Finacea (azelic acid). The script in the chart in not valid. Please advise. Thank you.

## 2020-03-17 ENCOUNTER — NURSE ONLY (OUTPATIENT)
Dept: CARDIOLOGY CLINIC | Age: 60
End: 2020-03-17
Payer: COMMERCIAL

## 2020-03-17 PROCEDURE — 93294 REM INTERROG EVL PM/LDLS PM: CPT | Performed by: INTERNAL MEDICINE

## 2020-03-17 PROCEDURE — 93296 REM INTERROG EVL PM/IDS: CPT | Performed by: INTERNAL MEDICINE

## 2020-03-18 ENCOUNTER — OUTSIDE SERVICES (OUTPATIENT)
Dept: WOUND CARE | Age: 60
End: 2020-03-18
Payer: COMMERCIAL

## 2020-03-18 PROCEDURE — 99308 SBSQ NF CARE LOW MDM 20: CPT | Performed by: CLINICAL NURSE SPECIALIST

## 2020-03-18 NOTE — PROGRESS NOTES
88 [facility]    Patient name: Jayesh Brandon  :   [birth year]  Facility:  [address] [place] [address] Service: nursing facility (62)    Primary diagnosis for wound-care consultation: Unstageable pressure injury versus hyperkeratosis to occiput. Additional ulcer(s) noted? None    History of Present Illness: Dry. Adherent scab on back of head. Resolved today. Scab is off. Skin underneath healed. Appetite good. No fever or chills. Review of Systems: Pertinent systems reviewed in the HPI; all other systems reviewed, and negative. Pertinent elements of past medical, surgical, family, and/or social history: Patient with Alzheimer's disease and Down syndrome. Up ad aristides. Medications and allergies are detailed in the nursing home chart, and were reviewed by me today.  _______________________    General Physical exam:    Vital signs:  110/70, 98, 64, 16, 98% room air    General Appearance: alert and oriented to person, well developed and well-nourished, in no acute distress  Psychiatric:  Mood and affect appropriate for situation  Skin: warm and dry, no rash  Head: normocephalic and atraumatic  Eyes: pupils equal, round, sclerae anicteric, conjunctivae normal  ENT: no thrush or oral ulcers  Neck:No complaints, normal appearance  Pulmonary/Chest: Respirations easy at rest, no cough or respiratory distress  Cardiovascular: No chest pain, normal rate, toes warm, cap refill normal  Abdomen: No nausea or vomiting  Extremities: no cyanosis, edema or cellulitis  Musculoskeletal: Ambulatory, moves all extremities, no deformities  Neurologic: distal sensation to light touch intact, no allodynia. Wound exam:    Wound location: Occiput - lateral - Resolved today    Wound type:   Unstageable pressure injury versus hyperkeratosis  Grade - stage - thickness: Covered with scab/eschar     Description of periwound: intact    Description of wound bed: Wound resolved.   Surrounding tissue without signs and symptoms of infection. No purulence, malodor, erythema, increased temperature, or increased pain.  _______________________    Recent labs and data reviewed: No new labs  _______________________     Leanord Larve diagnoses & assessment:  Resolved wound to back of head. Debridement is not indicated today, based on the history and exam above.  _______________________    Procedure:    Consent obtained. Time out performed per [facility]. _______________________    Recommendations:    - Dressings / Compression / Offloading: Discontinue treatment    - Labs / Diagnostic studies: none    - Medications / nutritional support: Appetite is good    - Further Consultations recommended: none    - Anticipated follow-up: Nursing to follow.  Reconsult as necessary  _______________________    Electronically signed by SAUMYA Rao - CNP on 3/18/2020 at 4:45 PM

## 2020-09-01 ENCOUNTER — NURSE ONLY (OUTPATIENT)
Dept: CARDIOLOGY CLINIC | Age: 60
End: 2020-09-01
Payer: COMMERCIAL

## 2020-09-01 PROCEDURE — 93296 REM INTERROG EVL PM/IDS: CPT | Performed by: INTERNAL MEDICINE

## 2020-09-01 PROCEDURE — 93294 REM INTERROG EVL PM/LDLS PM: CPT | Performed by: INTERNAL MEDICINE

## 2020-09-01 NOTE — LETTER
[address] [facility] [phone]  [facility]  [address] [facility] [phone]    Pacemaker/Defibrillator Clinic          09/01/20        [place]   [address] [facility] [address]        Dear Dagoberto Tan    This letter is to inform you that we received the transmission from your monitor at home that checks your implanted heart device. The next date your monitor will automatically transmit will be 12-2-20. If your report needs attention we will notify you. Your device and monitor are wireless and most transmit cellularly, but please periodically check your monitor is still plugged in to the electrical outlet. If you still use the telephone land line to send please ensure the connection to the phone christina is secure. This will help to ensure successful automatic transmissions in the future. Also, the monitor needs to be close to you while sleeping at night. Please be aware that the remote device transmission sites are periodically monitored only during regular business hours during which simultaneous in-office device clinics are being run. If your transmission requires attention, we will contact you as soon as possible. Thank you.             [address]

## 2020-09-01 NOTE — PROGRESS NOTES
We received remote transmission from patient's monitor at home. Transmission shows normal sensing and pacing function. Known RV lead issue. Pt was re programmed to AAI. Noted ST. EP physician will review. See interrogation under cardiology tab in the [address] [place] [address] [address] field for more details.

## 2020-12-02 ENCOUNTER — NURSE ONLY (OUTPATIENT)
Dept: CARDIOLOGY CLINIC | Age: 60
End: 2020-12-02
Payer: COMMERCIAL

## 2020-12-02 PROCEDURE — 93296 REM INTERROG EVL PM/IDS: CPT | Performed by: INTERNAL MEDICINE

## 2020-12-02 PROCEDURE — 93294 REM INTERROG EVL PM/LDLS PM: CPT | Performed by: INTERNAL MEDICINE

## 2020-12-02 NOTE — LETTER
[address] [facility] [phone]  [facility]  [facility] [address] [place] [address] [phone]    Pacemaker/Defibrillator Clinic          12/02/20        Isac Baldwin  [address] [facility] [address]        Dear Isac Baldwin    This letter is to inform you that we received the transmission from your monitor at home that checks your implanted heart device. The next date your monitor will automatically transmit will be 3-4-21. If your report needs attention we will notify you. Your device and monitor are wireless and most transmit cellularly, but please periodically check your monitor is still plugged in to the electrical outlet. If you still use the telephone land line to send please ensure the connection to the phone christina is secure. This will help to ensure successful automatic transmissions in the future. Also, the monitor needs to be close to you while sleeping at night. Please be aware that the remote device transmission sites are periodically monitored only during regular business hours during which simultaneous in-office device clinics are being run. If your transmission requires attention, we will contact you as soon as possible. Thank you.             [facility]

## 2020-12-02 NOTE — PROGRESS NOTES
We received remote transmission from patient's monitor at home. Transmission shows normal sensing and pacing function. Noted ST. EP physician will review. See interrogation under cardiology tab in the [address] [facility] [address] field for more details.

## 2021-03-03 PROCEDURE — 93294 REM INTERROG EVL PM/LDLS PM: CPT | Performed by: INTERNAL MEDICINE

## 2021-03-03 PROCEDURE — 93296 REM INTERROG EVL PM/IDS: CPT | Performed by: INTERNAL MEDICINE

## 2021-03-03 NOTE — PROGRESS NOTES
We received remote transmission from patient's monitor at home. Transmission shows normal sensing and pacing function. EP physician will review. See interrogation under cardiology tab in the [address] [facility] [address] field for more details.

## 2021-03-04 ENCOUNTER — NURSE ONLY (OUTPATIENT)
Dept: CARDIOLOGY CLINIC | Age: 61
End: 2021-03-04
Payer: COMMERCIAL

## 2021-03-04 DIAGNOSIS — R00.1 BRADYCARDIA: ICD-10-CM

## 2021-03-04 DIAGNOSIS — I45.5 SINUS PAUSE: ICD-10-CM

## 2021-03-04 DIAGNOSIS — Z95.0 PACEMAKER: ICD-10-CM

## 2021-04-02 ENCOUNTER — TELEPHONE (OUTPATIENT)
Dept: CARDIOLOGY CLINIC | Age: 61
End: 2021-04-02

## 2021-06-14 ENCOUNTER — NURSE ONLY (OUTPATIENT)
Dept: CARDIOLOGY CLINIC | Age: 61
End: 2021-06-14
Payer: COMMERCIAL

## 2021-06-14 DIAGNOSIS — Z95.0 PACEMAKER: ICD-10-CM

## 2021-06-14 DIAGNOSIS — I45.5 SINUS PAUSE: ICD-10-CM

## 2021-06-14 PROCEDURE — 93294 REM INTERROG EVL PM/LDLS PM: CPT | Performed by: INTERNAL MEDICINE

## 2021-06-14 PROCEDURE — 93296 REM INTERROG EVL PM/IDS: CPT | Performed by: INTERNAL MEDICINE

## 2021-06-14 NOTE — PROGRESS NOTES
We received remote transmission from patient's monitor at home. Transmission shows normal sensing and pacing function. Known RV lead issue. Pt was reprogrammed to AAI. EP physician will review. See interrogation under cardiology tab in the [address] [place] [address] [address] field for more details.

## 2021-06-14 NOTE — LETTER
[address] [facility] [phone]  [address]  [facility]- [phone]    Pacemaker/Defibrillator Clinic    06/14/21      Lisa Marquez  [address] [place] [address]      Dear Lisa Marquez    This letter is to inform you that we received the transmission from your monitor at home that checks your implanted heart device. The next date your monitor will automatically transmit will be 9/14. If your report needs attention we will notify you. Your device and monitor are wireless and most transmit cellularly, but please periodically check your monitor is still plugged in to the electrical outlet. If you still use the telephone land line to send please ensure the connection to the phone christina is secure. This will help to ensure successful automatic transmissions in the future. Also, the monitor needs to be close to you while sleeping at night. Please be aware that the remote device transmission sites are periodically monitored only during regular business hours during which simultaneous in-office device clinics are being run. If your transmission requires attention, we will contact you as soon as possible. **PLEASE NOTE** that our [facility] policy and processes are changing to ensure a more seamless approach for all parties involved, allowing more time for our nurses to address patient issues and concerns. We will no longer be sending letters for NORMAL remote transmissions. You will be contacted by phone if your transmission requires attention (as previously done), and letters will only be sent regarding monitor disconnections or missed transmissions if you are unable to be reached by phone. Please do not be alarmed by this new process, as we will continue to contact you if your transmission report requires attention. This will be your final \"remote received\" letter.   From this point forward, the [facility] will be utilizing the no news is good news approach. As always, please feel free to contact your nurse with any questions or concerns. Thank you.     [address]

## 2021-10-04 ENCOUNTER — NURSE ONLY (OUTPATIENT)
Dept: CARDIOLOGY CLINIC | Age: 61
End: 2021-10-04
Payer: COMMERCIAL

## 2021-10-04 DIAGNOSIS — Z95.0 PACEMAKER: ICD-10-CM

## 2021-10-04 DIAGNOSIS — R00.1 BRADYCARDIA: ICD-10-CM

## 2021-10-04 DIAGNOSIS — I49.5 SSS (SICK SINUS SYNDROME) (HCC): ICD-10-CM

## 2021-10-05 ENCOUNTER — TELEPHONE (OUTPATIENT)
Dept: CARDIOLOGY CLINIC | Age: 61
End: 2021-10-05

## 2021-10-05 PROCEDURE — 93296 REM INTERROG EVL PM/IDS: CPT | Performed by: INTERNAL MEDICINE

## 2021-10-05 PROCEDURE — 93294 REM INTERROG EVL PM/LDLS PM: CPT | Performed by: INTERNAL MEDICINE

## 2021-10-05 NOTE — RESULT ENCOUNTER NOTE
Device interrogation reviewed. Please evaluate patient activity and patient symptoms during periods of SVT/ST. If during physical exertion, no further workup.

## 2021-10-05 NOTE — PROGRESS NOTES
We received remote transmission from patient's dual chamber pacemaker monitor at home. Transmission shows normal sensing and pacing function. Known RV lead issue-pt was reprogrammed to AAI 60bpm. ST/SVT noted as well as NSVT (pt does not appear to be on a beta blocker). Ap 22.3%   0%  Echo 01.2019 showed EF of 55-60%. EP physician will review. See interrogation under cardiology tab in the [address] [facility] [address] field for more details.

## 2021-10-05 NOTE — TELEPHONE ENCOUNTER
Patient is past due for follow up with Dr. James Hernandez. Please contact patient and scheduled an appointment next available with device check prior.

## 2021-11-22 NOTE — PROGRESS NOTES
[address]   Cardiac Consultation    Referring Provider:  Jamin Durand MD     Chief Complaint   Patient presents with    Follow-up     \" I feel good\" \"I am healthy\"     Other     pacer check        HPI:  Dick Veliz is a 63 y.o. male whom I initially saw while hospitalized (1/8-1/12/19) at [facility] 1/2019. He presented with reports of sudden falling episodes. The Down's syndrome patient used to live  with his brother and his brother's wife. His brother observed one episode. The patient was walking for a drink. He suddenly fell and brother heard. When his brother looked over he was on the ground but communicating. The brother says that the patient can not provide any reliable history. The brother believes the patient has unconsciousness ever so briefly. He was not found sweaty and no complaints of nausea etc. There is suggestion he has low bp at times. The brother mentions that the patient has had a personality change in past 2 months about the time falls started. Unclear if relationship. He developed sinus bradycardia with pauses associated with syncope in hospital. Discussed and decided to proceed with DDD pacer for cardioinhibitory sinus arrest with syncope. Subsequently underwent implantation of dual-chamber pacemaker (1/11/2019). Her has not had syncope since, now programmed AAI mode. Interval History: Today he presents to office accompanied by his brother for management of his device. His brother states that he has been residing in the Nursing home for the past two years. His brother states the patient has also been diagnosed with Dementia. He states the he also had COVID. The patient states \" I'm healthy\". His brother states the he enjoys living in the nursing home. He denies chest pain/pressure, tightness, edema, shortness of breath, heart racing, palpitations, lightheadedness, dizziness, syncope, presyncope,  PND or orthopnea.      Past Medical History:   has a past medical history of Acne rosacea, Cognitive impairment, Colon cancer ([facility] [address] Down syndrome, Gout, HIGH CHOLESTEROL, History of diarrhea, Macrocytosis, Seborrheic dermatitis, Seizure disorder ([facility] [address] Status post biventricular cardiac pacemaker insertion, Status post partial colectomy, Syncope and collapse, and Weight loss. Surgical History:   has a past surgical history that includes Cataract removal with implant (2007); Colon surgery (1/7//2014); Colonoscopy (02/27/2017); pacemaker placement; Colonoscopy (N/A, 8/21/2019); and Small intestine surgery (N/A, 12/18/2019). Social History:   reports that he has never smoked. He has never used smokeless tobacco. He reports that he does not drink alcohol and does not use drugs. Family History:  family history includes Cancer in his mother; Diabetes in his brother; High Cholesterol in his brother; Hypertension in his brother; Other in his father; Stroke in his brother.      Home Medications:  Outpatient Encounter Medications as of 11/23/2021   Medication Sig Dispense Refill    Azelaic Acid 15 % GEL Apply topically daily 1 Tube 1    levalbuterol (XOPENEX) 1.25 MG/0.5ML nebulizer solution Take 0.5 mLs by nebulization 4 times daily 120 each 0    apixaban (ELIQUIS) 5 MG TABS tablet Take 1 tablet by mouth 2 times daily 60 tablet 3    valACYclovir (VALTREX) 1 g tablet Take 1 tablet by mouth daily 20 tablet 0    dextran 70-hypromellose (TEARS NATURALE) 0.1-0.3 % SOLN opthalmic solution Place 1 drop into both eyes 3 times daily 1 Bottle 3    famotidine (PEPCID) 20 MG tablet Take 1 tablet by mouth 2 times daily 60 tablet 3    hydrocortisone (HYTONE) 2.5 % lotion Apply topically daily      donepezil (ARICEPT) 5 MG tablet Take 1 tablet by mouth nightly 30 tablet 3     Facility-Administered Encounter Medications as of 11/23/2021   Medication Dose Route Frequency Provider Last Rate Last Admin    iopamidol (ISOVUE-370) 76 % injection 10 mL  10 mL Other ONCE PRN Nhan Burgess MD Allergies:  Penicillins     Review of Systems   Constitutional: Negative for activity change and appetite change. Down's appearance  HENT: Negative for facial swelling and neck pain. Eyes: Negative for discharge and itching. Respiratory: Negative for chest tightness and shortness of breath. Cardiovascular: Negative for palpitations. Negative for chest pain. Negative for leg swelling. Gastrointestinal: Negative for abdominal pain and abdominal distention. Genitourinary: Negative. Musculoskeletal: Negative. Skin: Negative for color change and pallor. Neurological: Negative for dizziness, syncope and light-headedness. Hematological: Negative. Psychiatric/Behavioral: Negative for behavioral problems and agitation. /68   Pulse 82   Ht 5' 6\" (1.676 m)   Wt 145 lb (65.8 kg)   SpO2 95%   BMI 23.40 kg/m²       Objective:  Physical Exam   Nursing note and vitals reviewed. Constitutional: He appears well-developed and well-nourished. Down's appearing  Head: atraumatic. Eyes: Right eye exhibits no discharge. Left eye exhibits no discharge. Neck: Neck supple. JVP flat and no HJR  Cardiovascular: Normal rate, regular rhythm and normal heart sounds. Pulmonary/Chest: Effort normal and breath sounds normal.   Abdominal: Soft. Musculoskeletal: He exhibits no edema. Neurological: He is alert without any gross abnormalities. Skin: Skin is warm and dry. scabs forehead  Psychiatric: He has a normal mood and affect. Left pectoral site intact    Limited echo: 19  Left ventricular cavity size is normal.  Normal left ventricular wall thickness. Ejection fraction is visually estimated to be 55-60%. Pacer / ICD wire is visualized in the right ventricle  Reviewed with Dr. Gustavo Rodriguez and no pericardial fluid identified     [id number] SR    I independently reviewed and interpreted the ECG, echocardiogram,  results and used them for my plan of care. Assessment:    Sick sinus sydrome  History of syncope   Pacemaker in situ  [employer]onic dual chamber pacer   Device interrogated today and Based on threshold, impedance, and intrinsic sensing tests run today, the device appears to be functioning normally. Historically high RV threshold( 4.0 V today) and low impedance. Both are stable and pt RV paces 0%  Remaining battery life is 11 years 4 months  AP 21.16%    0%  10 episodes \"SVT-ST\" longest 1:21 - looks like ST which makes sense clinically  The syncope etiology is likely sick sinus syncope. Denies any reoccurrence of syncopal episodes s/p PPM implantation. Plan:  1) Follow up in one year with device check    Marcos Mark M.D. Cardiac Electrophysiology  [address]  [address], [address], [facility] [address]  [phone]    This note was scribed in the presence of Kyra Erwin MD by Iman Cooney RN. The scribes documentation has been prepared under my direction and personally reviewed by me in its entirety. I confirm that the note above accurately reflects all work, treatment, procedures, and medical decision making performed by me.

## 2021-11-23 ENCOUNTER — OFFICE VISIT (OUTPATIENT)
Dept: CARDIOLOGY CLINIC | Age: 61
End: 2021-11-23
Payer: COMMERCIAL

## 2021-11-23 ENCOUNTER — NURSE ONLY (OUTPATIENT)
Dept: CARDIOLOGY CLINIC | Age: 61
End: 2021-11-23
Payer: COMMERCIAL

## 2021-11-23 VITALS
HEIGHT: 66 IN | WEIGHT: 145 LBS | HEART RATE: 82 BPM | OXYGEN SATURATION: 95 % | DIASTOLIC BLOOD PRESSURE: 68 MMHG | SYSTOLIC BLOOD PRESSURE: 120 MMHG | BODY MASS INDEX: 23.3 KG/M2

## 2021-11-23 DIAGNOSIS — R00.1 BRADYCARDIA: ICD-10-CM

## 2021-11-23 DIAGNOSIS — I49.5 SSS (SICK SINUS SYNDROME) (HCC): ICD-10-CM

## 2021-11-23 DIAGNOSIS — I49.5 SSS (SICK SINUS SYNDROME) (HCC): Primary | ICD-10-CM

## 2021-11-23 DIAGNOSIS — I45.5 SINUS PAUSE: ICD-10-CM

## 2021-11-23 DIAGNOSIS — Z87.898 HISTORY OF SYNCOPE: ICD-10-CM

## 2021-11-23 DIAGNOSIS — Z95.0 CARDIAC PACEMAKER IN SITU: ICD-10-CM

## 2021-11-23 DIAGNOSIS — Z95.0 PACEMAKER: ICD-10-CM

## 2021-11-23 DIAGNOSIS — Z95.0 S/P PLACEMENT OF CARDIAC PACEMAKER: ICD-10-CM

## 2021-11-23 PROCEDURE — 99213 OFFICE O/P EST LOW 20 MIN: CPT | Performed by: INTERNAL MEDICINE

## 2021-11-23 PROCEDURE — 93280 PM DEVICE PROGR EVAL DUAL: CPT | Performed by: INTERNAL MEDICINE

## 2021-11-23 NOTE — PROGRESS NOTES
Pt seen in clinic today for overdue annual cardiac device interrogation. Their device is a MDT 2 chamber ppm   Based on threshold, impedance, and intrinsic sensing tests run today, the device appears to be functioning normally. historically high RV threshold and low impedance. Both are stable and pt RV paces 0%  Remaining battery life is 11y4m  AP 21.16%                  0%      10 episodes \"SVT-ST\"  longest 1:21 - looks like ST or possibly atach w/ RVR avg rate 150-160bpm    Rx: apixaban (ELIQUIS) 5 MG TABS tablet 1 tab po bid    Pt was informed of findings today and general questions have been answered with regard to device. Home monitoring hardware is transmitting on schedule. Results discussed with or to be reviewed by EP    Pt to see Dr. Smiley Machado in clinic today following their device check.

## 2022-01-03 ENCOUNTER — NURSE ONLY (OUTPATIENT)
Dept: CARDIOLOGY CLINIC | Age: 62
End: 2022-01-03
Payer: COMMERCIAL

## 2022-01-03 DIAGNOSIS — R00.1 BRADYCARDIA: ICD-10-CM

## 2022-01-03 DIAGNOSIS — I49.5 SSS (SICK SINUS SYNDROME) (HCC): ICD-10-CM

## 2022-01-03 DIAGNOSIS — Z95.0 S/P PLACEMENT OF CARDIAC PACEMAKER: ICD-10-CM

## 2022-01-04 PROCEDURE — 93296 REM INTERROG EVL PM/IDS: CPT | Performed by: INTERNAL MEDICINE

## 2022-01-04 PROCEDURE — 93294 REM INTERROG EVL PM/LDLS PM: CPT | Performed by: INTERNAL MEDICINE

## 2022-01-04 NOTE — PROGRESS NOTES
We received remote transmission from patient's dual chamber pacemaker monitor at home. Transmission shows normal sensing and pacing function. Known RV lead issue-programmed AAI 60bpm. ST/SVT noted. EP physician will review. See interrogation under cardiology tab in the [address] [place] [address] [address] field for more details. Will continue to monitor remotely.

## 2022-04-04 ENCOUNTER — NURSE ONLY (OUTPATIENT)
Dept: CARDIOLOGY CLINIC | Age: 62
End: 2022-04-04
Payer: COMMERCIAL

## 2022-04-04 DIAGNOSIS — I49.5 SSS (SICK SINUS SYNDROME) (HCC): ICD-10-CM

## 2022-04-04 DIAGNOSIS — R00.1 BRADYCARDIA: ICD-10-CM

## 2022-04-04 DIAGNOSIS — Z95.0 PACEMAKER: ICD-10-CM

## 2022-04-04 PROCEDURE — 93296 REM INTERROG EVL PM/IDS: CPT | Performed by: INTERNAL MEDICINE

## 2022-04-04 PROCEDURE — 93294 REM INTERROG EVL PM/LDLS PM: CPT | Performed by: INTERNAL MEDICINE

## 2022-04-04 NOTE — PROGRESS NOTES
We received remote transmission from patient's dual chamber pacemaker monitor at home. Transmission shows normal sensing and pacing function. Known RV lead issue-programmed AAI 60bpm.     EP physician will review. See interrogation under cardiology tab in the [address] [place] [address] [address] field for more details. Will continue to monitor remotely.

## 2022-07-13 ENCOUNTER — NURSE ONLY (OUTPATIENT)
Dept: CARDIOLOGY CLINIC | Age: 62
End: 2022-07-13
Payer: COMMERCIAL

## 2022-07-13 DIAGNOSIS — R00.1 BRADYCARDIA: ICD-10-CM

## 2022-07-13 DIAGNOSIS — I49.5 SSS (SICK SINUS SYNDROME) (HCC): ICD-10-CM

## 2022-07-13 DIAGNOSIS — Z95.0 PACEMAKER: ICD-10-CM

## 2022-07-13 PROCEDURE — 93296 REM INTERROG EVL PM/IDS: CPT | Performed by: INTERNAL MEDICINE

## 2022-07-13 PROCEDURE — 93294 REM INTERROG EVL PM/LDLS PM: CPT | Performed by: INTERNAL MEDICINE

## 2022-07-13 NOTE — PROGRESS NOTES
We received remote transmission from patient's dual chamber pacemaker monitor at home. Transmission shows normal sensing and pacing function. Known RV lead issue-programmed AAI 60bpm.     EP physician will review. See interrogation under cardiology tab in the [address] [place] [address] [address] field for more details. Will continue to monitor remotely.      (End of 91-day monitoring period 7/13/22)

## 2022-08-15 NOTE — PROGRESS NOTES
4 Eyes Skin Assessment     The patient is being assess for  Shift Handoff    I agree that 2 RN's have performed a thorough Head to Toe Skin Assessment on the patient. ALL assessment sites listed below have been assessed. Areas assessed by both nurses: Cyndi/Yaz  [x]   Head, Face, and Ears   [x]   Shoulders, Back, and Chest  [x]   Arms, Elbows, and Hands   [x]   Coccyx, Sacrum, and IschIum  [x]   Legs, Feet, and Heels        Does the Patient have Skin Breakdown?   No         Stuart Prevention initiated:  Yes   Wound Care Orders initiated:  NA      [facility] nurse consulted for Pressure Injury (Stage 3,4, Unstageable, DTI, NWPT, and Complex wounds), New and Established Ostomies:  NA      Nurse 1 eSignature: {Esignature:[id number]}    **SHARE this note so that the co-signing nurse is able to place an eSignature**    Nurse 2 eSignature: {Esignature:[id number]} [FreeTextEntry1] : brain tumor [de-identified] : 85M hx HTN, HLD, R-handed, Israeli speaking, recent workup at OSH (7/15-7/22) for confusion/disorientation and urinary incontinence included brain MRI and was diagnosed with large bifrontal lesion. Recent hosp adm to [facility] where ca w/u was done, CT CAP w/o abnormal findings. s/p Right  stereo biopsy, TIMOTHY x2, R crani for tumor debulking via tubular retractor on 7/28/22. Today presents for 2 week follow up. Staples removed. c/o generalized fatigue, mild SOB, abulia, HA that is improving since surgery. Denies N/V/LOC/seizure, or other concerning neurologic complaints.\par \par Neuro Exam: \par Neurologically nonfocal in [place]

## 2022-10-13 ENCOUNTER — NURSE ONLY (OUTPATIENT)
Dept: CARDIOLOGY CLINIC | Age: 62
End: 2022-10-13
Payer: COMMERCIAL

## 2022-10-13 DIAGNOSIS — Z95.0 PACEMAKER: ICD-10-CM

## 2022-10-13 DIAGNOSIS — I49.5 SSS (SICK SINUS SYNDROME) (HCC): Primary | ICD-10-CM

## 2022-10-13 PROCEDURE — 93296 REM INTERROG EVL PM/IDS: CPT | Performed by: INTERNAL MEDICINE

## 2022-10-13 PROCEDURE — 93294 REM INTERROG EVL PM/LDLS PM: CPT | Performed by: INTERNAL MEDICINE

## 2022-10-13 NOTE — PROGRESS NOTES
We received remote transmission from patient's dual chamber pacemaker monitor at home. Transmission shows normal sensing and pacing function. Known RV lead issue-programmed AAI 60bpm.     EP physician will review. See interrogation under cardiology tab in the [address] [place] [address] [address] field for more details. Will continue to monitor remotely.      (End of 91-day monitoring period 10/13/22)

## 2023-01-26 ENCOUNTER — NURSE ONLY (OUTPATIENT)
Dept: CARDIOLOGY CLINIC | Age: 63
End: 2023-01-26
Payer: COMMERCIAL

## 2023-01-26 PROCEDURE — 93296 REM INTERROG EVL PM/IDS: CPT | Performed by: INTERNAL MEDICINE

## 2023-01-26 PROCEDURE — 93294 REM INTERROG EVL PM/LDLS PM: CPT | Performed by: INTERNAL MEDICINE

## 2023-01-26 NOTE — Clinical Note
Please contact pt to schedule device OV-last yearly OV ck 11.23.21 and no upcoming appts/recalls; RA threshold monitoring to be activated if possible please.

## 2023-01-31 ENCOUNTER — TELEPHONE (OUTPATIENT)
Dept: CARDIOLOGY CLINIC | Age: 63
End: 2023-01-31

## 2023-01-31 NOTE — TELEPHONE ENCOUNTER
Tried to call pt but mailbox full. Please schedule pt an apptmnt with Dr Edwina De Dios and for a device check.

## 2023-01-31 NOTE — PROGRESS NOTES
Remote transmission received from patient's dual chamber pacemaker monitor at home. Known RV lead issue-programmed AAI 60bpm. Transmission shows normal sensing and pacing function. RA auto-threshold monitoring programmed off-office staff notified. No new arrhythmias/events recorded. Ap 47.3%   0.0% (AAI)    End of 91-day monitoring period 1/26/23. EP physician will review. See interrogation under cardiology tab in the [address] [place] [address] [address] field for more details. Will continue to monitor remotely.

## 2023-01-31 NOTE — TELEPHONE ENCOUNTER
----- Message from Demetri Payton sent at 1/31/2023  3:01 PM EST -----  Please contact pt to schedule device OV-last yearly OV ck 11.23.21 and no upcoming appts/recalls; RA threshold monitoring to be activated if possible please.

## 2023-02-01 ENCOUNTER — TELEPHONE (OUTPATIENT)
Dept: CARDIOLOGY CLINIC | Age: 63
End: 2023-02-01

## 2023-02-01 NOTE — TELEPHONE ENCOUNTER
----- Message from Mitesh Garcai MD sent at 1/31/2023  4:09 PM EST -----  time for device check in office at convenience. ? Autocapture feature on?

## 2023-05-01 ENCOUNTER — NURSE ONLY (OUTPATIENT)
Dept: CARDIOLOGY CLINIC | Age: 63
End: 2023-05-01
Payer: COMMERCIAL

## 2023-05-01 ENCOUNTER — OFFICE VISIT (OUTPATIENT)
Dept: CARDIOLOGY CLINIC | Age: 63
End: 2023-05-01
Payer: COMMERCIAL

## 2023-05-01 VITALS
OXYGEN SATURATION: 99 % | HEART RATE: 67 BPM | DIASTOLIC BLOOD PRESSURE: 80 MMHG | WEIGHT: 116.4 LBS | HEIGHT: 66 IN | SYSTOLIC BLOOD PRESSURE: 118 MMHG | BODY MASS INDEX: 18.71 KG/M2

## 2023-05-01 DIAGNOSIS — Z95.0 S/P PLACEMENT OF CARDIAC PACEMAKER: Primary | ICD-10-CM

## 2023-05-01 DIAGNOSIS — Z95.0 PACEMAKER: ICD-10-CM

## 2023-05-01 DIAGNOSIS — R00.1 BRADYCARDIA: ICD-10-CM

## 2023-05-01 DIAGNOSIS — I49.5 SSS (SICK SINUS SYNDROME) (HCC): Primary | ICD-10-CM

## 2023-05-01 DIAGNOSIS — I49.5 SSS (SICK SINUS SYNDROME) (HCC): ICD-10-CM

## 2023-05-01 DIAGNOSIS — Z87.898 HISTORY OF SYNCOPE: ICD-10-CM

## 2023-05-01 PROCEDURE — 93279 PRGRMG DEV EVAL PM/LDLS PM: CPT | Performed by: INTERNAL MEDICINE

## 2023-05-01 PROCEDURE — 99213 OFFICE O/P EST LOW 20 MIN: CPT | Performed by: INTERNAL MEDICINE

## 2023-05-01 PROCEDURE — 93000 ELECTROCARDIOGRAM COMPLETE: CPT | Performed by: INTERNAL MEDICINE

## 2023-05-01 RX ORDER — ATORVASTATIN CALCIUM 10 MG/1
TABLET, FILM COATED ORAL
COMMUNITY
Start: 2023-04-29

## 2023-05-01 RX ORDER — ASCORBIC ACID 250 MG
TABLET ORAL
COMMUNITY

## 2023-05-01 NOTE — PROGRESS NOTES
[DISCONTINUED] famotidine (PEPCID) 20 MG tablet Take 1 tablet by mouth 2 times daily (Patient not taking: Reported on 5/1/2023) 60 tablet 3    [DISCONTINUED] hydrocortisone (HYTONE) 2.5 % lotion Apply topically daily (Patient not taking: Reported on 5/1/2023)       Facility-Administered Encounter Medications as of 5/1/2023   Medication Dose Route Frequency Provider Last Rate Last Admin    iopamidol (ISOVUE-370) 76 % injection 10 mL  10 mL Other ONCE PRN Hilary Barrett MD           Allergies:  Penicillins     Review of Systems   Constitutional: Negative for activity change and appetite change. Down's appearance  HENT: Negative for facial swelling and neck pain. Eyes: Negative for discharge and itching. Respiratory: Negative for chest tightness and shortness of breath. Cardiovascular: Negative for palpitations. Negative for chest pain. Negative for leg swelling. Gastrointestinal: Negative for abdominal pain and abdominal distention. Genitourinary: Negative. Musculoskeletal: Negative. Skin: Negative for color change and pallor. Neurological: Negative for dizziness, syncope and light-headedness. Hematological: Negative. Psychiatric/Behavioral: Negative for behavioral problems and agitation. Not patient for exam or appt. /80   Pulse 67   Ht 5' 6\" (1.676 m)   Wt 116 lb 6.4 oz (52.8 kg)   SpO2 99%   BMI 18.79 kg/m²       Objective:  Physical Exam   Nursing note and vitals reviewed. Constitutional: He appears well-developed and well-nourished. Down's appearing  Head: atraumatic. Eyes: Right eye exhibits no discharge. Left eye exhibits no discharge. Neck: Neck supple. JVP flat and no HJR  Cardiovascular: Normal rate, regular rhythm and normal heart sounds. Pulmonary/Chest: Effort normal and breath sounds normal.   Abdominal: Soft. Musculoskeletal: He exhibits no edema. Neurological: He is alert without any gross abnormalities. Skin: Skin is warm and dry.  Rosacea

## 2023-05-01 NOTE — PROGRESS NOTES
Patient comes in for programming evaluation on their Medtronic [id number] [place] XT DR MRI SC PPM.    Last remote on 1/26/23    All sensing and pacing parameters are within normal range. Battery life 9.2y  P-ApVs @ 60bpm  U- AsVs @ 53bpm  AP 39.1%.  0%. AT/AF burden 0%  PVC 2.1/hr  1 fast A&V episode on 12/9/21 x 13 seconds  Patient remains on Eliquis  Updated patient ID. Please see interrogation for more detail. Patient will see [facility] today and follow up in 3 months in office or remotely.

## 2023-05-25 ENCOUNTER — NURSE ONLY (OUTPATIENT)
Dept: CARDIOLOGY CLINIC | Age: 63
End: 2023-05-25

## 2023-05-25 DIAGNOSIS — Z95.0 PACEMAKER: ICD-10-CM

## 2023-05-25 DIAGNOSIS — I49.5 SSS (SICK SINUS SYNDROME) (HCC): Primary | ICD-10-CM

## 2023-06-09 NOTE — PROGRESS NOTES
Remote transmission received from patient's dual chamber pacemaker monitor at home. Known RV lead issue-programmed AAI 60bpm. Transmission shows normal sensing and pacing function. RA threshold checked in office 5/1/23 . No new arrhythmias/events recorded. Ap 50.8%   0.0% (AAI)    End of 91-day monitoring period 5/25/23. EP physician will review. See interrogation under cardiology tab in the [address] [place] [address] [address] field for more details. Will continue to monitor remotely.

## 2023-09-03 PROCEDURE — 93294 REM INTERROG EVL PM/LDLS PM: CPT | Performed by: INTERNAL MEDICINE

## 2023-09-03 PROCEDURE — 93296 REM INTERROG EVL PM/IDS: CPT | Performed by: INTERNAL MEDICINE

## 2023-12-03 PROCEDURE — 93294 REM INTERROG EVL PM/LDLS PM: CPT | Performed by: INTERNAL MEDICINE

## 2023-12-03 PROCEDURE — 93296 REM INTERROG EVL PM/IDS: CPT | Performed by: INTERNAL MEDICINE

## 2023-12-07 NOTE — PLAN OF CARE
Ongoing     Problem: Pain:  Goal: Pain level will decrease  Description  Pain level will decrease  Outcome: Ongoing  Goal: Control of acute pain  Description  Control of acute pain  Outcome: Ongoing  Goal: Control of chronic pain  Description  Control of chronic pain  Outcome: Ongoing None

## 2024-10-01 ENCOUNTER — APPOINTMENT (OUTPATIENT)
Dept: CT IMAGING | Age: 64
End: 2024-10-01
Payer: COMMERCIAL

## 2024-10-01 ENCOUNTER — HOSPITAL ENCOUNTER (EMERGENCY)
Age: 64
Discharge: HOME | End: 2024-10-01
Attending: EMERGENCY MEDICINE
Payer: COMMERCIAL

## 2024-10-01 VITALS
TEMPERATURE: 98.4 F | SYSTOLIC BLOOD PRESSURE: 129 MMHG | HEART RATE: 86 BPM | DIASTOLIC BLOOD PRESSURE: 81 MMHG | RESPIRATION RATE: 20 BRPM | OXYGEN SATURATION: 96 %

## 2024-10-01 DIAGNOSIS — S01.511A LIP LACERATION, INITIAL ENCOUNTER: Primary | ICD-10-CM

## 2024-10-01 DIAGNOSIS — R41.82 ALTERED MENTAL STATUS, UNSPECIFIED ALTERED MENTAL STATUS TYPE: ICD-10-CM

## 2024-10-01 LAB
ANION GAP SERPL CALCULATED.3IONS-SCNC: 13 MMOL/L (ref 3–16)
BASOPHILS # BLD: 0.2 K/UL (ref 0–0.2)
BASOPHILS NFR BLD: 1.1 %
BUN SERPL-MCNC: 15 MG/DL (ref 7–20)
CALCIUM SERPL-MCNC: 9.5 MG/DL (ref 8.3–10.6)
CHLORIDE SERPL-SCNC: 100 MMOL/L (ref 99–110)
CO2 SERPL-SCNC: 26 MMOL/L (ref 21–32)
CREAT SERPL-MCNC: 1 MG/DL (ref 0.8–1.3)
DEPRECATED RDW RBC AUTO: 13.8 % (ref 12.4–15.4)
EOSINOPHIL # BLD: 0 K/UL (ref 0–0.6)
EOSINOPHIL NFR BLD: 0.3 %
GFR SERPLBLD CREATININE-BSD FMLA CKD-EPI: 85 ML/MIN/{1.73_M2}
GLUCOSE SERPL-MCNC: 97 MG/DL (ref 70–99)
HCT VFR BLD AUTO: 46.8 % (ref 40.5–52.5)
HGB BLD-MCNC: 15.7 G/DL (ref 13.5–17.5)
LYMPHOCYTES # BLD: 1.5 K/UL (ref 1–5.1)
LYMPHOCYTES NFR BLD: 11.2 %
MCH RBC QN AUTO: 33.9 PG (ref 26–34)
MCHC RBC AUTO-ENTMCNC: 33.5 G/DL (ref 31–36)
MCV RBC AUTO: 101.3 FL (ref 80–100)
MONOCYTES # BLD: 1.2 K/UL (ref 0–1.3)
MONOCYTES NFR BLD: 8.4 %
NEUTROPHILS # BLD: 10.8 K/UL (ref 1.7–7.7)
NEUTROPHILS NFR BLD: 79 %
PLATELET # BLD AUTO: 255 K/UL (ref 135–450)
PMV BLD AUTO: 8.8 FL (ref 5–10.5)
POTASSIUM SERPL-SCNC: 4.8 MMOL/L (ref 3.5–5.1)
RBC # BLD AUTO: 4.62 M/UL (ref 4.2–5.9)
SODIUM SERPL-SCNC: 139 MMOL/L (ref 136–145)
WBC # BLD AUTO: 13.7 K/UL (ref 4–11)

## 2024-10-01 PROCEDURE — 85025 COMPLETE CBC W/AUTO DIFF WBC: CPT

## 2024-10-01 PROCEDURE — 99284 EMERGENCY DEPT VISIT MOD MDM: CPT

## 2024-10-01 PROCEDURE — 70450 CT HEAD/BRAIN W/O DYE: CPT

## 2024-10-01 PROCEDURE — 93005 ELECTROCARDIOGRAM TRACING: CPT | Performed by: EMERGENCY MEDICINE

## 2024-10-01 PROCEDURE — 80048 BASIC METABOLIC PNL TOTAL CA: CPT

## 2024-10-01 NOTE — DISCHARGE INSTRUCTIONS
Your head CT did not show and bleeding in your brain.   Your vitals were normal.  Your lab tests shows a mildly elevated WBC count (but improved from prior labs that seem to show chronic WBC elevation).   Your electrolytes and kidney function was normal.  We did not get a urine specimen in order to avoid trauma.    Follow up with your primary doctor within 1 week.  Seek medical attention for fever, worsening mental status changes or other new/concerning symptoms.

## 2024-10-01 NOTE — ED PROVIDER NOTES
46.8 40.5 - 52.5 %    .3 (H) 80.0 - 100.0 fL    MCH 33.9 26.0 - 34.0 pg    MCHC 33.5 31.0 - 36.0 g/dL    RDW 13.8 12.4 - 15.4 %    Platelets 255 135 - 450 K/uL    MPV 8.8 5.0 - 10.5 fL    Neutrophils % 79.0 %    Lymphocytes % 11.2 %    Monocytes % 8.4 %    Eosinophils % 0.3 %    Basophils % 1.1 %    Neutrophils Absolute 10.8 (H) 1.7 - 7.7 K/uL    Lymphocytes Absolute 1.5 1.0 - 5.1 K/uL    Monocytes Absolute 1.2 0.0 - 1.3 K/uL    Eosinophils Absolute 0.0 0.0 - 0.6 K/uL    Basophils Absolute 0.2 0.0 - 0.2 K/uL   BMP w/ Reflex to MG   Result Value Ref Range    Sodium 139 136 - 145 mmol/L    Potassium reflex Magnesium 4.8 3.5 - 5.1 mmol/L    Chloride 100 99 - 110 mmol/L    CO2 26 21 - 32 mmol/L    Anion Gap 13 3 - 16    Glucose 97 70 - 99 mg/dL    BUN 15 7 - 20 mg/dL    Creatinine 1.0 0.8 - 1.3 mg/dL    Est, Glom Filt Rate 85 >60    Calcium 9.5 8.3 - 10.6 mg/dL   EKG 12 Lead   Result Value Ref Range    Ventricular Rate 120 BPM    Atrial Rate 120 BPM    P-R Interval 148 ms    QRS Duration 78 ms    Q-T Interval 308 ms    QTc Calculation (Bazett) 435 ms    P Axis 59 degrees    R Axis 47 degrees    T Axis 34 degrees    Diagnosis       Sinus tachycardiaOtherwise normal ECGWhen compared with ECG of 31-DEC-2019 21:38,No significant change was found         RADIOLOGY  I have reviewed all radiographic studies for this visit.   CT HEAD WO CONTRAST   Final Result   Motion artifact throughout the exam.  Within the limitations of the exam, no   obvious acute intracranial abnormality is seen.      Diffuse moderate atrophy and mild chronic microischemic changes scattered in   the deep white matter which is more prominent.      Chronic right maxillary sinusitis which is more apparent.                ECG interpretation by emergency physician  Sinus tachycardia, rate 120, no ST elevation or depression, compared to prior EKG rate has increased but otherwise unchanged, some motion artifact in leads    ED COURSE/MDM    64 y.o. male

## 2024-10-01 NOTE — ED NOTES
Pt will answer some yes/no questions. Per family member mental status has been declining recently. Pt is not a baseline mental status.    Split lip noted, denies pain

## 2024-10-02 LAB
EKG ATRIAL RATE: 120 BPM
EKG DIAGNOSIS: NORMAL
EKG P AXIS: 59 DEGREES
EKG P-R INTERVAL: 148 MS
EKG Q-T INTERVAL: 308 MS
EKG QRS DURATION: 78 MS
EKG QTC CALCULATION (BAZETT): 435 MS
EKG R AXIS: 47 DEGREES
EKG T AXIS: 34 DEGREES
EKG VENTRICULAR RATE: 120 BPM

## 2024-10-02 PROCEDURE — 93010 ELECTROCARDIOGRAM REPORT: CPT | Performed by: INTERNAL MEDICINE

## 2024-11-08 ENCOUNTER — HOSPITAL ENCOUNTER (EMERGENCY)
Age: 64
Discharge: HOME | End: 2024-11-08
Attending: STUDENT IN AN ORGANIZED HEALTH CARE EDUCATION/TRAINING PROGRAM
Payer: COMMERCIAL

## 2024-11-08 ENCOUNTER — APPOINTMENT (OUTPATIENT)
Dept: VASCULAR LAB | Age: 64
End: 2024-11-08
Attending: STUDENT IN AN ORGANIZED HEALTH CARE EDUCATION/TRAINING PROGRAM
Payer: COMMERCIAL

## 2024-11-08 VITALS
RESPIRATION RATE: 18 BRPM | HEART RATE: 95 BPM | SYSTOLIC BLOOD PRESSURE: 119 MMHG | OXYGEN SATURATION: 99 % | DIASTOLIC BLOOD PRESSURE: 64 MMHG

## 2024-11-08 DIAGNOSIS — R20.9 PAINFUL AND COLD LOWER EXTREMITY: Primary | ICD-10-CM

## 2024-11-08 DIAGNOSIS — M79.606 PAINFUL AND COLD LOWER EXTREMITY: Primary | ICD-10-CM

## 2024-11-08 LAB
ALBUMIN SERPL-MCNC: 3.6 G/DL (ref 3.4–5)
ALBUMIN/GLOB SERPL: 0.9 {RATIO} (ref 1.1–2.2)
ALP SERPL-CCNC: 72 U/L (ref 40–129)
ALT SERPL-CCNC: 15 U/L (ref 10–40)
ANION GAP SERPL CALCULATED.3IONS-SCNC: 11 MMOL/L (ref 3–16)
AST SERPL-CCNC: 23 U/L (ref 15–37)
BASOPHILS # BLD: 0.2 K/UL (ref 0–0.2)
BASOPHILS NFR BLD: 1.2 %
BILIRUB SERPL-MCNC: 0.4 MG/DL (ref 0–1)
BUN SERPL-MCNC: 24 MG/DL (ref 7–20)
CALCIUM SERPL-MCNC: 9.7 MG/DL (ref 8.3–10.6)
CHLORIDE SERPL-SCNC: 104 MMOL/L (ref 99–110)
CO2 SERPL-SCNC: 28 MMOL/L (ref 21–32)
CREAT SERPL-MCNC: 1.4 MG/DL (ref 0.8–1.3)
DEPRECATED RDW RBC AUTO: 13.4 % (ref 12.4–15.4)
EOSINOPHIL # BLD: 0 K/UL (ref 0–0.6)
EOSINOPHIL NFR BLD: 0.1 %
GFR SERPLBLD CREATININE-BSD FMLA CKD-EPI: 57 ML/MIN/{1.73_M2}
GLUCOSE SERPL-MCNC: 156 MG/DL (ref 70–99)
HCT VFR BLD AUTO: 44.3 % (ref 40.5–52.5)
HGB BLD-MCNC: 14.4 G/DL (ref 13.5–17.5)
LYMPHOCYTES # BLD: 1.3 K/UL (ref 1–5.1)
LYMPHOCYTES NFR BLD: 9.2 %
MCH RBC QN AUTO: 32.9 PG (ref 26–34)
MCHC RBC AUTO-ENTMCNC: 32.5 G/DL (ref 31–36)
MCV RBC AUTO: 101 FL (ref 80–100)
MONOCYTES # BLD: 1.5 K/UL (ref 0–1.3)
MONOCYTES NFR BLD: 10.6 %
NEUTROPHILS # BLD: 11.4 K/UL (ref 1.7–7.7)
NEUTROPHILS NFR BLD: 78.9 %
PLATELET # BLD AUTO: 272 K/UL (ref 135–450)
PMV BLD AUTO: 8.4 FL (ref 5–10.5)
POTASSIUM SERPL-SCNC: 4.7 MMOL/L (ref 3.5–5.1)
PROT SERPL-MCNC: 7.7 G/DL (ref 6.4–8.2)
RBC # BLD AUTO: 4.39 M/UL (ref 4.2–5.9)
SODIUM SERPL-SCNC: 143 MMOL/L (ref 136–145)
WBC # BLD AUTO: 14.5 K/UL (ref 4–11)

## 2024-11-08 PROCEDURE — 99284 EMERGENCY DEPT VISIT MOD MDM: CPT

## 2024-11-08 PROCEDURE — 80053 COMPREHEN METABOLIC PANEL: CPT

## 2024-11-08 PROCEDURE — 93925 LOWER EXTREMITY STUDY: CPT

## 2024-11-08 PROCEDURE — 85025 COMPLETE CBC W/AUTO DIFF WBC: CPT

## 2024-11-08 ASSESSMENT — PAIN - FUNCTIONAL ASSESSMENT: PAIN_FUNCTIONAL_ASSESSMENT: NONE - DENIES PAIN

## 2024-11-08 NOTE — ED NOTES
Patient agitated by the BP cuff and pulse ox so they were taken off.  Will take VS when he is calmed down some

## 2024-11-08 NOTE — DISCHARGE INSTRUCTIONS
Take your medications as prescribed.   Follow-up with the referral given to you and your family doctor.     You do have good pulses in your legs you may be having some vasospasm that may benefit from further evaluation in the outpatient setting.     Return if you develop any new or worsening symptoms as we discussed today.

## 2024-11-08 NOTE — ED PROVIDER NOTES
of his lower extremities concerned about his feet being cold and possibly having vascular injury.  He had no recent traumatic injury or fall.  On lamination he has some cool feet as well as some mottling appearance of his lower extremities he has a dopplerable PT that is higher up along where you would expect anatomically.  Nonpalpable but dopplerable.  He was sent off for VASc ultrasound for further evaluation the technician advised that he had good flow to both his DPs and PTs.  But had limitations in completing entirety of the examination due to the patient's cooperativeness since he has good flow at this time I do not believe there to be any significant PAD present I believe this to be may be vasculitis or some vasospasm that is taking place that is transient in nature as upon reevaluation at a later time in his visit he had warmth throughout both lower extremities and the mottling was gone.  I discharged the patient discussed this with the caregiver the need to follow-up with the vascular surgeon as needed and his family doctor as well.      Consults (none if blank):        Shared Decision-Making was performed and disposition discussed with the patient's caregiver patient and their questions were answered     Social determinants of health impacting treatment or disposition: Patient has Down syndrome        Code Status:    Not addressed at this visit      Vitals Reviewed:    Vitals:    11/08/24 1330 11/08/24 1438   BP: 119/64    Pulse: 95    Resp: 18    SpO2: 100% 99%       The patient was seen and examined.The results of pertinent diagnostic studies and exam findings were discussed. The patient’s provisional diagnosis and plan of care were discussed. Any medications were reviewed and indications and risks of medications were discussed with the patient's care given    Strict verbal and written return precautions, instructions and appropriate follow-up were provided as well..     ED Medications administered this

## 2024-11-09 LAB
VAS RIGHT ATA DIST PSV: 49 CM/S
VAS RIGHT CFA DIST PSV: 62.9 CM/S
VAS RIGHT CFA PROX PSV: 91.8 CM/S
VAS RIGHT PERONEAL DIST PSV: 43 CM/S
VAS RIGHT PERONEAL MID PSV: 32 CM/S
VAS RIGHT PERONEAL PROX PSV: 44 CM/S
VAS RIGHT PFA PROX PSV: 88 CM/S
VAS RIGHT POP A DIST PSV: 60.4 CM/S
VAS RIGHT POP A PROX PSV: 45.7 CM/S
VAS RIGHT POP A PROX VEL RATIO: 0.6
VAS RIGHT PTA DIST PSV: 49 CM/S
VAS RIGHT PTA MID PSV: 50 CM/S
VAS RIGHT PTA PROX PSV: 64 CM/S
VAS RIGHT SFA DIST PSV: 76.4 CM/S
VAS RIGHT SFA DIST VEL RATIO: 0.81
VAS RIGHT SFA MID PSV: 94.9 CM/S
VAS RIGHT SFA MID VEL RATIO: 1.2
VAS RIGHT SFA PROX PSV: 80.1 CM/S
VAS RIGHT SFA PROX VEL RATIO: 0.9

## 2024-11-24 ENCOUNTER — HOSPITAL ENCOUNTER (INPATIENT)
Age: 64
LOS: 9 days | Discharge: HOSPICE | DRG: 640 | End: 2024-12-03
Attending: EMERGENCY MEDICINE | Admitting: HOSPITALIST
Payer: COMMERCIAL

## 2024-11-24 DIAGNOSIS — N17.9 AKI (ACUTE KIDNEY INJURY) (HCC): ICD-10-CM

## 2024-11-24 DIAGNOSIS — E87.0 HYPERNATREMIA: Primary | ICD-10-CM

## 2024-11-24 PROBLEM — D72.829 LEUKOCYTOSIS: Status: ACTIVE | Noted: 2024-11-24

## 2024-11-24 PROBLEM — E86.0 DEHYDRATION: Status: ACTIVE | Noted: 2024-11-24

## 2024-11-24 LAB
ALBUMIN SERPL-MCNC: 3.3 G/DL (ref 3.4–5)
ALBUMIN/GLOB SERPL: 0.6 {RATIO} (ref 1.1–2.2)
ALP SERPL-CCNC: 71 U/L (ref 40–129)
ALT SERPL-CCNC: 31 U/L (ref 10–40)
ANION GAP SERPL CALCULATED.3IONS-SCNC: 11 MMOL/L (ref 3–16)
AST SERPL-CCNC: 35 U/L (ref 15–37)
BASOPHILS # BLD: 0.1 K/UL (ref 0–0.2)
BASOPHILS NFR BLD: 1 %
BILIRUB SERPL-MCNC: 0.3 MG/DL (ref 0–1)
BUN SERPL-MCNC: 58 MG/DL (ref 7–20)
CALCIUM SERPL-MCNC: 9.5 MG/DL (ref 8.3–10.6)
CHLORIDE SERPL-SCNC: 123 MMOL/L (ref 99–110)
CO2 SERPL-SCNC: 29 MMOL/L (ref 21–32)
CREAT SERPL-MCNC: 2 MG/DL (ref 0.8–1.3)
DEPRECATED RDW RBC AUTO: 14.5 % (ref 12.4–15.4)
EOSINOPHIL # BLD: 0.1 K/UL (ref 0–0.6)
EOSINOPHIL NFR BLD: 0.7 %
GFR SERPLBLD CREATININE-BSD FMLA CKD-EPI: 37 ML/MIN/{1.73_M2}
GLUCOSE SERPL-MCNC: 135 MG/DL (ref 70–99)
HCT VFR BLD AUTO: 47.9 % (ref 40.5–52.5)
HGB BLD-MCNC: 15.2 G/DL (ref 13.5–17.5)
LYMPHOCYTES # BLD: 1.5 K/UL (ref 1–5.1)
LYMPHOCYTES NFR BLD: 10.8 %
MCH RBC QN AUTO: 32.1 PG (ref 26–34)
MCHC RBC AUTO-ENTMCNC: 31.7 G/DL (ref 31–36)
MCV RBC AUTO: 101 FL (ref 80–100)
MONOCYTES # BLD: 1.1 K/UL (ref 0–1.3)
MONOCYTES NFR BLD: 7.9 %
NEUTROPHILS # BLD: 10.9 K/UL (ref 1.7–7.7)
NEUTROPHILS NFR BLD: 79.6 %
OSMOLALITY SERPL: 378 MOSM/KG (ref 280–301)
PLATELET # BLD AUTO: 286 K/UL (ref 135–450)
PMV BLD AUTO: 10.9 FL (ref 5–10.5)
POTASSIUM SERPL-SCNC: 4 MMOL/L (ref 3.5–5.1)
PROT SERPL-MCNC: 8.5 G/DL (ref 6.4–8.2)
RBC # BLD AUTO: 4.74 M/UL (ref 4.2–5.9)
SODIUM SERPL-SCNC: 160 MMOL/L (ref 136–145)
SODIUM SERPL-SCNC: 163 MMOL/L (ref 136–145)
WBC # BLD AUTO: 13.7 K/UL (ref 4–11)

## 2024-11-24 PROCEDURE — 85025 COMPLETE CBC W/AUTO DIFF WBC: CPT

## 2024-11-24 PROCEDURE — 80053 COMPREHEN METABOLIC PANEL: CPT

## 2024-11-24 PROCEDURE — 2580000003 HC RX 258: Performed by: EMERGENCY MEDICINE

## 2024-11-24 PROCEDURE — 83930 ASSAY OF BLOOD OSMOLALITY: CPT

## 2024-11-24 PROCEDURE — 84295 ASSAY OF SERUM SODIUM: CPT

## 2024-11-24 PROCEDURE — 2000000000 HC ICU R&B

## 2024-11-24 PROCEDURE — 2580000003 HC RX 258: Performed by: HOSPITALIST

## 2024-11-24 PROCEDURE — 99285 EMERGENCY DEPT VISIT HI MDM: CPT

## 2024-11-24 RX ORDER — ACETAMINOPHEN 650 MG/1
650 SUPPOSITORY RECTAL EVERY 6 HOURS PRN
Status: DISCONTINUED | OUTPATIENT
Start: 2024-11-24 | End: 2024-12-03 | Stop reason: HOSPADM

## 2024-11-24 RX ORDER — SODIUM CHLORIDE 9 MG/ML
INJECTION, SOLUTION INTRAVENOUS PRN
Status: DISCONTINUED | OUTPATIENT
Start: 2024-11-24 | End: 2024-12-03 | Stop reason: HOSPADM

## 2024-11-24 RX ORDER — ONDANSETRON 4 MG/1
4 TABLET, ORALLY DISINTEGRATING ORAL EVERY 8 HOURS PRN
Status: DISCONTINUED | OUTPATIENT
Start: 2024-11-24 | End: 2024-12-03 | Stop reason: HOSPADM

## 2024-11-24 RX ORDER — ENOXAPARIN SODIUM 100 MG/ML
30 INJECTION SUBCUTANEOUS DAILY
Status: DISCONTINUED | OUTPATIENT
Start: 2024-11-25 | End: 2024-11-25

## 2024-11-24 RX ORDER — SODIUM CHLORIDE 9 MG/ML
INJECTION, SOLUTION INTRAVENOUS CONTINUOUS
Status: DISCONTINUED | OUTPATIENT
Start: 2024-11-24 | End: 2024-11-25

## 2024-11-24 RX ORDER — MAGNESIUM SULFATE IN WATER 40 MG/ML
2000 INJECTION, SOLUTION INTRAVENOUS PRN
Status: DISCONTINUED | OUTPATIENT
Start: 2024-11-24 | End: 2024-11-25

## 2024-11-24 RX ORDER — POTASSIUM CHLORIDE 7.45 MG/ML
10 INJECTION INTRAVENOUS PRN
Status: DISCONTINUED | OUTPATIENT
Start: 2024-11-24 | End: 2024-11-25

## 2024-11-24 RX ORDER — POLYETHYLENE GLYCOL 3350 17 G/17G
17 POWDER, FOR SOLUTION ORAL DAILY PRN
Status: DISCONTINUED | OUTPATIENT
Start: 2024-11-24 | End: 2024-12-03 | Stop reason: HOSPADM

## 2024-11-24 RX ORDER — SODIUM CHLORIDE 0.9 % (FLUSH) 0.9 %
5-40 SYRINGE (ML) INJECTION PRN
Status: DISCONTINUED | OUTPATIENT
Start: 2024-11-24 | End: 2024-12-03 | Stop reason: HOSPADM

## 2024-11-24 RX ORDER — SODIUM CHLORIDE 0.9 % (FLUSH) 0.9 %
5-40 SYRINGE (ML) INJECTION EVERY 12 HOURS SCHEDULED
Status: DISCONTINUED | OUTPATIENT
Start: 2024-11-24 | End: 2024-12-03 | Stop reason: HOSPADM

## 2024-11-24 RX ORDER — ACETAMINOPHEN 325 MG/1
650 TABLET ORAL EVERY 6 HOURS PRN
Status: DISCONTINUED | OUTPATIENT
Start: 2024-11-24 | End: 2024-12-03 | Stop reason: HOSPADM

## 2024-11-24 RX ORDER — POTASSIUM CHLORIDE 1500 MG/1
40 TABLET, EXTENDED RELEASE ORAL PRN
Status: DISCONTINUED | OUTPATIENT
Start: 2024-11-24 | End: 2024-11-25

## 2024-11-24 RX ORDER — ONDANSETRON 2 MG/ML
4 INJECTION INTRAMUSCULAR; INTRAVENOUS EVERY 6 HOURS PRN
Status: DISCONTINUED | OUTPATIENT
Start: 2024-11-24 | End: 2024-12-03 | Stop reason: HOSPADM

## 2024-11-24 RX ORDER — 0.9 % SODIUM CHLORIDE 0.9 %
1000 INTRAVENOUS SOLUTION INTRAVENOUS ONCE
Status: COMPLETED | OUTPATIENT
Start: 2024-11-24 | End: 2024-11-24

## 2024-11-24 RX ADMIN — SODIUM CHLORIDE: 0.9 INJECTION, SOLUTION INTRAVENOUS at 23:50

## 2024-11-24 RX ADMIN — SODIUM CHLORIDE 1000 ML: 9 INJECTION, SOLUTION INTRAVENOUS at 19:49

## 2024-11-24 ASSESSMENT — PAIN - FUNCTIONAL ASSESSMENT
PAIN_FUNCTIONAL_ASSESSMENT: NONE - DENIES PAIN
PAIN_FUNCTIONAL_ASSESSMENT: NONE - DENIES PAIN

## 2024-11-24 NOTE — ED TRIAGE NOTES
Patient brought in by EMS from [facility]. EMS states that facility called because patient had low sodium level. Patient has history of down syndrome and alzheimer's.

## 2024-11-25 LAB
ANION GAP SERPL CALCULATED.3IONS-SCNC: 17 MMOL/L (ref 3–16)
BACTERIA URNS QL MICRO: ABNORMAL /HPF
BASOPHILS # BLD: 0.4 K/UL (ref 0–0.2)
BASOPHILS NFR BLD: 3.2 %
BILIRUB UR QL STRIP.AUTO: NEGATIVE
BUN SERPL-MCNC: 52 MG/DL (ref 7–20)
CALCIUM SERPL-MCNC: 9.4 MG/DL (ref 8.3–10.6)
CHLORIDE SERPL-SCNC: 128 MMOL/L (ref 99–110)
CLARITY UR: ABNORMAL
CO2 SERPL-SCNC: 21 MMOL/L (ref 21–32)
COLOR UR: YELLOW
CREAT SERPL-MCNC: 1.9 MG/DL (ref 0.8–1.3)
CREAT UR-MCNC: 91 MG/DL (ref 39–259)
DEPRECATED RDW RBC AUTO: 13.9 % (ref 12.4–15.4)
EOSINOPHIL # BLD: 0.1 K/UL (ref 0–0.6)
EOSINOPHIL NFR BLD: 0.8 %
EPI CELLS #/AREA URNS AUTO: 0 /HPF (ref 0–5)
GFR SERPLBLD CREATININE-BSD FMLA CKD-EPI: 39 ML/MIN/{1.73_M2}
GLUCOSE BLD-MCNC: 151 MG/DL (ref 70–99)
GLUCOSE BLD-MCNC: 178 MG/DL (ref 70–99)
GLUCOSE BLD-MCNC: 197 MG/DL (ref 70–99)
GLUCOSE SERPL-MCNC: 127 MG/DL (ref 70–99)
GLUCOSE UR STRIP.AUTO-MCNC: NEGATIVE MG/DL
HCT VFR BLD AUTO: 43.1 % (ref 40.5–52.5)
HGB BLD-MCNC: 13.8 G/DL (ref 13.5–17.5)
HGB UR QL STRIP.AUTO: ABNORMAL
HYALINE CASTS #/AREA URNS AUTO: 4 /LPF (ref 0–8)
KETONES UR STRIP.AUTO-MCNC: NEGATIVE MG/DL
LEUKOCYTE ESTERASE UR QL STRIP.AUTO: ABNORMAL
LYMPHOCYTES # BLD: 1.7 K/UL (ref 1–5.1)
LYMPHOCYTES NFR BLD: 14.9 %
MCH RBC QN AUTO: 32.1 PG (ref 26–34)
MCHC RBC AUTO-ENTMCNC: 32 G/DL (ref 31–36)
MCV RBC AUTO: 100.5 FL (ref 80–100)
MONOCYTES # BLD: 1 K/UL (ref 0–1.3)
MONOCYTES NFR BLD: 8.8 %
MUCUS: PRESENT
NEUTROPHILS # BLD: 8.3 K/UL (ref 1.7–7.7)
NEUTROPHILS NFR BLD: 72.3 %
NITRITE UR QL STRIP.AUTO: NEGATIVE
PERFORMED ON: ABNORMAL
PH UR STRIP.AUTO: 6 [PH] (ref 5–8)
PLATELET # BLD AUTO: 248 K/UL (ref 135–450)
PMV BLD AUTO: 10.6 FL (ref 5–10.5)
POTASSIUM SERPL-SCNC: 4.1 MMOL/L (ref 3.5–5.1)
PROT UR STRIP.AUTO-MCNC: 100 MG/DL
RBC # BLD AUTO: 4.29 M/UL (ref 4.2–5.9)
RBC CLUMPS #/AREA URNS AUTO: 1 /HPF (ref 0–4)
REASON FOR REJECTION: NORMAL
REJECTED TEST: NORMAL
SODIUM SERPL-SCNC: 164 MMOL/L (ref 136–145)
SODIUM SERPL-SCNC: 165 MMOL/L (ref 136–145)
SODIUM SERPL-SCNC: 166 MMOL/L (ref 136–145)
SODIUM UR-SCNC: 32 MMOL/L
SP GR UR STRIP.AUTO: 1.02 (ref 1–1.03)
UA DIPSTICK W REFLEX MICRO PNL UR: YES
URN SPEC COLLECT METH UR: ABNORMAL
UROBILINOGEN UR STRIP-ACNC: 0.2 E.U./DL
WBC # BLD AUTO: 11.5 K/UL (ref 4–11)
WBC #/AREA URNS AUTO: 630 /HPF (ref 0–5)

## 2024-11-25 PROCEDURE — 36415 COLL VENOUS BLD VENIPUNCTURE: CPT

## 2024-11-25 PROCEDURE — 81001 URINALYSIS AUTO W/SCOPE: CPT

## 2024-11-25 PROCEDURE — 84300 ASSAY OF URINE SODIUM: CPT

## 2024-11-25 PROCEDURE — 6360000002 HC RX W HCPCS: Performed by: HOSPITALIST

## 2024-11-25 PROCEDURE — 2000000000 HC ICU R&B

## 2024-11-25 PROCEDURE — 2580000003 HC RX 258: Performed by: HOSPITALIST

## 2024-11-25 PROCEDURE — 51702 INSERT TEMP BLADDER CATH: CPT

## 2024-11-25 PROCEDURE — 6370000000 HC RX 637 (ALT 250 FOR IP)

## 2024-11-25 PROCEDURE — 51798 US URINE CAPACITY MEASURE: CPT

## 2024-11-25 PROCEDURE — 84295 ASSAY OF SERUM SODIUM: CPT

## 2024-11-25 PROCEDURE — 80048 BASIC METABOLIC PNL TOTAL CA: CPT

## 2024-11-25 PROCEDURE — 2580000003 HC RX 258

## 2024-11-25 PROCEDURE — 6360000002 HC RX W HCPCS

## 2024-11-25 PROCEDURE — 85025 COMPLETE CBC W/AUTO DIFF WBC: CPT

## 2024-11-25 PROCEDURE — 87086 URINE CULTURE/COLONY COUNT: CPT

## 2024-11-25 PROCEDURE — 82570 ASSAY OF URINE CREATININE: CPT

## 2024-11-25 PROCEDURE — 87186 SC STD MICRODIL/AGAR DIL: CPT

## 2024-11-25 PROCEDURE — 87077 CULTURE AEROBIC IDENTIFY: CPT

## 2024-11-25 RX ORDER — SODIUM CHLORIDE, SODIUM LACTATE, POTASSIUM CHLORIDE, CALCIUM CHLORIDE 600; 310; 30; 20 MG/100ML; MG/100ML; MG/100ML; MG/100ML
INJECTION, SOLUTION INTRAVENOUS CONTINUOUS
Status: DISCONTINUED | OUTPATIENT
Start: 2024-11-25 | End: 2024-11-25

## 2024-11-25 RX ORDER — DIVALPROEX SODIUM 125 MG/1
125 CAPSULE, COATED PELLETS ORAL 2 TIMES DAILY
Status: ON HOLD | COMMUNITY
End: 2024-12-03 | Stop reason: HOSPADM

## 2024-11-25 RX ORDER — ACETAMINOPHEN 500 MG
500 TABLET ORAL EVERY 6 HOURS PRN
COMMUNITY

## 2024-11-25 RX ORDER — DONEPEZIL HYDROCHLORIDE 10 MG/1
10 TABLET, FILM COATED ORAL NIGHTLY
Status: DISCONTINUED | OUTPATIENT
Start: 2024-11-25 | End: 2024-12-03 | Stop reason: HOSPADM

## 2024-11-25 RX ORDER — MIRTAZAPINE 15 MG/1
15 TABLET, FILM COATED ORAL NIGHTLY
Status: DISCONTINUED | OUTPATIENT
Start: 2024-11-25 | End: 2024-12-03 | Stop reason: HOSPADM

## 2024-11-25 RX ORDER — LEVETIRACETAM 500 MG/5ML
1000 INJECTION, SOLUTION, CONCENTRATE INTRAVENOUS EVERY 12 HOURS
Status: DISCONTINUED | OUTPATIENT
Start: 2024-11-25 | End: 2024-11-25

## 2024-11-25 RX ORDER — MIRTAZAPINE 15 MG/1
15 TABLET, FILM COATED ORAL NIGHTLY
COMMUNITY

## 2024-11-25 RX ORDER — LEVETIRACETAM 500 MG/5ML
1500 INJECTION, SOLUTION, CONCENTRATE INTRAVENOUS ONCE
Status: COMPLETED | OUTPATIENT
Start: 2024-11-25 | End: 2024-11-25

## 2024-11-25 RX ORDER — DEXTROSE MONOHYDRATE 50 MG/ML
INJECTION, SOLUTION INTRAVENOUS CONTINUOUS
Status: DISPENSED | OUTPATIENT
Start: 2024-11-25 | End: 2024-11-26

## 2024-11-25 RX ORDER — DIVALPROEX SODIUM 125 MG/1
125 CAPSULE, COATED PELLETS ORAL 2 TIMES DAILY
Status: DISCONTINUED | OUTPATIENT
Start: 2024-11-25 | End: 2024-12-03 | Stop reason: HOSPADM

## 2024-11-25 RX ORDER — DONEPEZIL HYDROCHLORIDE 10 MG/1
10 TABLET, FILM COATED ORAL NIGHTLY
COMMUNITY

## 2024-11-25 RX ORDER — ALLOPURINOL 100 MG/1
100 TABLET ORAL DAILY
COMMUNITY

## 2024-11-25 RX ADMIN — DIVALPROEX SODIUM 125 MG: 125 CAPSULE, COATED PELLETS ORAL at 19:42

## 2024-11-25 RX ADMIN — DONEPEZIL HYDROCHLORIDE 10 MG: 10 TABLET, FILM COATED ORAL at 19:42

## 2024-11-25 RX ADMIN — DIVALPROEX SODIUM 125 MG: 125 CAPSULE, COATED PELLETS ORAL at 12:00

## 2024-11-25 RX ADMIN — LEVETIRACETAM 1500 MG: 100 INJECTION INTRAVENOUS at 06:16

## 2024-11-25 RX ADMIN — DEXTROSE MONOHYDRATE: 50 INJECTION, SOLUTION INTRAVENOUS at 17:16

## 2024-11-25 RX ADMIN — SODIUM CHLORIDE, POTASSIUM CHLORIDE, SODIUM LACTATE AND CALCIUM CHLORIDE: 600; 310; 30; 20 INJECTION, SOLUTION INTRAVENOUS at 06:20

## 2024-11-25 RX ADMIN — DEXTROSE MONOHYDRATE: 50 INJECTION, SOLUTION INTRAVENOUS at 08:43

## 2024-11-25 RX ADMIN — APIXABAN 5 MG: 5 TABLET, FILM COATED ORAL at 19:42

## 2024-11-25 RX ADMIN — DEXTROSE MONOHYDRATE: 50 INJECTION, SOLUTION INTRAVENOUS at 12:55

## 2024-11-25 RX ADMIN — DEXTROSE MONOHYDRATE: 50 INJECTION, SOLUTION INTRAVENOUS at 21:23

## 2024-11-25 RX ADMIN — MIRTAZAPINE 15 MG: 15 TABLET, FILM COATED ORAL at 19:42

## 2024-11-25 RX ADMIN — ENOXAPARIN SODIUM 30 MG: 100 INJECTION SUBCUTANEOUS at 08:44

## 2024-11-25 RX ADMIN — SODIUM CHLORIDE, PRESERVATIVE FREE 10 ML: 5 INJECTION INTRAVENOUS at 19:43

## 2024-11-25 RX ADMIN — APIXABAN 5 MG: 5 TABLET, FILM COATED ORAL at 12:01

## 2024-11-25 RX ADMIN — WATER 1000 MG: 1 INJECTION INTRAMUSCULAR; INTRAVENOUS; SUBCUTANEOUS at 12:01

## 2024-11-25 ASSESSMENT — PAIN SCALES - GENERAL
PAINLEVEL_OUTOF10: 0
PAINLEVEL_OUTOF10: 0

## 2024-11-25 NOTE — ED PROVIDER NOTES
[facility] EMERGENCY DEPARTMENT    CHIEF COMPLAINT  Low Sodium (Patient brought in by EMS from [facility] [place] [facility]. EMS states that facility called because patient had low sodium level. Patient has history of down syndrome and alzheimer's.)       HISTORY OF PRESENT ILLNESS  Dilip Bassett is a 64 y.o. male who presents to the ED with concern for low sodium level. Patient at baseline mental status, essentially non-verbal, not contributing meaningfully to history. Brother, Marcel, reports patient seen in this ED earlier this month for concern of discoloration of a limb but was noted to have good flow to both DP and PT. Was noted to have elevated BUN / Cr at that encounter (24 / 1.4) and was encouraged increased fluids. Brother isn't sure what he's been consuming.       I have reviewed the following from the nursing documentation:    Past Medical History:   Diagnosis Date    Acne rosacea     Cognitive impairment     sees Dr Gary Stinson, started Aricept 12/2019    Colon cancer (HCC) 1/7/2014    ileocecal valve/Thom[place]ia surveillance    Down syndrome     Gout     HIGH CHOLESTEROL     2017 3.1 % framingham risk score    History of diarrhea     since childhood, worse since partial colectomy resolved after a few months    Macrocytosis     followed by Dr Tomas and released    Seborrheic dermatitis     Seizure disorder (HCC) 2019    Status post biventricular cardiac pacemaker insertion 01/2019    for long sinus pauses with syncopal willis    Status post partial colectomy 2016    diarrhea    Syncope and collapse 01/2019    recurrent syncope, found seizures and sinus pauses    Weight loss 2015    since 2014 at the time of partial colectomy lost 25 lbs     Past Surgical History:   Procedure Laterality Date    CATARACT REMOVAL WITH IMPLANT  2007    Bilateral    COLON SURGERY  1/7//2014    for removal colon cancer    COLONOSCOPY  02/27/2017    Dr Nesbitt    COLONOSCOPY N/A 8/21/2019    COLONOSCOPY WITH  MD    Note: This chart was created using voice recognition dictation software. Efforts were made by me to ensure accuracy, however some errors may be present due to limitations of this technology and occasionally words are not transcribed correctly.       Moises Guzman MD  11/24/24 9196

## 2024-11-25 NOTE — PROGRESS NOTES
NAME:  Dilip Bassett  YOB: 1960  MEDICAL RECORD NUMBER:  [id number]    Shift Summary: Deleon placed. Fluids switched to D5- most recent sodium was 165. Very restless in bed. Mitts placed for pulling at deleon. Diet changed to nursing home diet- pureed and nectar thick. Na lads Q6 per Dr. Miranda note, next check at 1900. BP Q6 due to agitation with cuff, keeps ripping off pulse ox despite mitts- frequent SPO2 checks     Family updated: Yes:  Brother     Rhythm: Sinus Tachycardia     Most recent vitals:   Visit Vitals  /89   Pulse (!) 117   Temp 99.2 °F (37.3 °C) (Bladder)   Resp 18   Ht 1.676 m (5' 6\")   Wt 51.6 kg (113 lb 12.1 oz)   SpO2 94%   BMI 18.36 kg/m²           No data found.    No data found.      Respiratory support needed (if any):  - RA    Admission weight Weight - Scale: 51.6 kg (113 lb 12.1 oz) (11/24/24 1850)    Today's weight    Wt Readings from Last 1 Encounters:   11/24/24 51.6 kg (113 lb 12.1 oz)        Deleon need assessed each shift: YES -  - continue deleon r/t - I&O, retention  UOP >30ml/hr: YES  Last documented BM (in last 48 hrs):  Patient Vitals for the past 48 hrs:   Last BM (including prior to admit) Stool Occurrence   11/25/24 1030 11/25/24 1                Restraints (in use currently or dc'd in last 12 hrs): Yes    Order current and documentation up to date? Yes    Lines/Drains reviewed @ bedside.  Peripheral IV 11/24/24 Right;Anterior Forearm (Active)   Number of days: 0       Urinary Catheter 11/25/24 Deleon-Temperature (Active)   Number of days: 0         Drip rates at handoff:    dextrose 125 mL/hr at 11/25/24 1716    sodium chloride         Lab Data:   CBC:   Recent Labs     11/24/24 1948 11/25/24  0612   WBC 13.7* 11.5*   HGB 15.2 13.8   HCT 47.9 43.1   .0* 100.5*    248     BMP:    Recent Labs     11/24/24 1948 11/24/24  2156 11/25/24  0501 11/25/24  1231   *   < > 166* 165*   K 4.0  --  4.1  --    CO2 29  --  21  --    BUN 58*  --  52*

## 2024-11-25 NOTE — PROGRESS NOTES
Name:  Dilip Bassett /Age/Sex: 1960  (64 y.o. male)   MRN & CSN:  [id number] & [id number] Encounter Date/Time: 2024 11:32 AM EST   Location:  F2X-0322 PCP: Oniel Beltran     Attending:Elena Moreno MD       Dilip Bassett is a 64 y.o. male with  has a past medical history of Acne rosacea, Cognitive impairment, Colon cancer (HCC), Down syndrome, Gout, HIGH CHOLESTEROL, History of diarrhea, Macrocytosis, Seborrheic dermatitis, Seizure disorder (HCC), Status post biventricular cardiac pacemaker insertion, Status post partial colectomy, Syncope and collapse, and Weight loss. who presents with Dehydration    Hospital Day: 2      Interval history:     Seen and examined at bedside. Patient getting very agitated with blood pressure cuff, okay to monitor less frequently. Appears confused    Review of Systems:      10 point ROS negative except stated above    Objective:     Intake/Output Summary (Last 24 hours) at 2024 1132  Last data filed at 2024 1100  Gross per 24 hour   Intake 1084.72 ml   Output 1425 ml   Net -340.28 ml      Vitals:   Vitals:    24 0836 24 0906 24 1000 24 1100   BP: 119/77 121/74     Pulse: 95 97 (!) 108 (!) 104   Resp: 22 15 16 16   Temp: 97.7 °F (36.5 °C)      TempSrc: Axillary      SpO2: 96%   92%   Weight:       Height:             Physical Exam:        General: Mild distress  Eyes: EOMI  ENT: neck supple  Cardiovascular: Regular rate.  Respiratory: Clear to auscultation  Gastrointestinal: Soft, non tender  Genitourinary: no suprapubic tenderness  Musculoskeletal: No edema  Neuro: Confused  Psych: Mood appropriate.       Assessment and Plan     Hypernatremia : Likely due to dehydration with significant free water deficit. Na still trending up and started on D5W. Encourage fluid intake. Monitor BMP Q8H and in ICU. Nephrology following    ANISH : Due to dehydration vs obstruction. Power catheter placed with output. Urine

## 2024-11-25 NOTE — PROGRESS NOTES
Okay to check BP Q6 per hospitalist Dr. Moreno due to agitation/aggressiveness from pt due BP check

## 2024-11-25 NOTE — PROGRESS NOTES
Pt's brother at bedside. Brother informed RN that pt is on thickened liquids and a pureed diet at the nursing home. Admission paperwork from nursing home reviewed in pt hard chart- states he was on a pureed diet and nectar thick liquids. Diet changed and  notified

## 2024-11-25 NOTE — PLAN OF CARE
Problem: Discharge Planning  Goal: Discharge to home or other facility with appropriate resources  11/25/2024 1303 by Beverley Blankenship RN  Outcome: Progressing  Flowsheets (Taken 11/25/2024 0012 by Ava Esparza RN)  Discharge to home or other facility with appropriate resources:   Arrange for needed discharge resources and transportation as appropriate   Identify discharge learning needs (meds, wound care, etc)   Arrange for interpreters to assist at discharge as needed  11/25/2024 0412 by Ava Esparza RN  Outcome: Progressing  Flowsheets (Taken 11/25/2024 0012)  Discharge to home or other facility with appropriate resources:   Arrange for needed discharge resources and transportation as appropriate   Identify discharge learning needs (meds, wound care, etc)   Arrange for interpreters to assist at discharge as needed     Problem: Pain  Goal: Verbalizes/displays adequate comfort level or baseline comfort level  11/25/2024 1303 by Beverley Blankenship RN  Outcome: Progressing  Flowsheets (Taken 11/25/2024 1303)  Verbalizes/displays adequate comfort level or baseline comfort level: Assess pain using appropriate pain scale  11/25/2024 0412 by Ava Esparza RN  Outcome: Progressing     Problem: Safety - Adult  Goal: Free from fall injury  11/25/2024 1303 by Beverley Blankenship RN  Outcome: Progressing  Flowsheets (Taken 11/25/2024 1303)  Free From Fall Injury: Instruct family/caregiver on patient safety  11/25/2024 0412 by Ava Esparza RN  Outcome: Progressing     Problem: Skin/Tissue Integrity  Goal: Absence of new skin breakdown  Description: 1.  Monitor for areas of redness and/or skin breakdown  2.  Assess vascular access sites hourly  3.  Every 4-6 hours minimum:  Change oxygen saturation probe site  4.  Every 4-6 hours:  If on nasal continuous positive airway pressure, respiratory therapy assess nares and determine need for appliance change or resting period.  11/25/2024 1303 by Beverley Blankenship RN  Outcome:  electrolyte imbalances   Administer electrolyte replacement as ordered

## 2024-11-25 NOTE — H&P
V2.0  History and Physical      Name:  Dilip Bassett /Age/Sex: 1960  (64 y.o. male)   MRN & CSN:  [id number] & [id number] Encounter Date/Time: 2024 10:52 PM EST   [facility] PCP: Oniel Beltran       Hospital Day: 1    Assessment and Plan:   Dilip Bassett is a 64 y.o. male with a pmh of Down syndrome; Alzheimer disease; colon cancer status post bowel resection; permanent pacemaker who lives at the nursing home and presents with Dehydration    Hospital Problems             Last Modified POA    * (Principal) Dehydration 2024 Yes    Down's syndrome 2024 Yes    Seizure disorder (HCC) 2024 Yes    ANISH (acute kidney injury) (HCC) 2024 Yes    Right pulmonary embolus (HCC) 2024 Yes    Leukocytosis 2024 Yes       Plan:  Received normal saline bolus at the emergency department.  Will admit patient to intensive care unit because of extremely high sodium.  A copious IV hydration.  Trend BMP.  Avoid nephrotoxic's.  History of right pulmonary embolus-Eliquis to be continued.  History of seizures-continue home seizure medications.      Advance care planning:  Advanced care planning was discussed with patient for a total of 16 minutes.  Full code, DNR CC, DNR CCA, power of  and living will were discussed.  Patient brother who was at the bedside elected the patient will be DNR CCA without intubation.   Disposition:   Current Living situation: NH  Expected Disposition: NH  Estimated D/C: 3 days    Diet Adult regular diet   DVT Prophylaxis [] Lovenox, []  Heparin, [] SCDs, [] Ambulation,  [x] Eliquis, [] Xarelto, [] Coumadin   Code Status Full code   Surrogate Decision Maker/ POA Brother, Marcel Bassett who was at the bedside     Personally reviewed Lab Studies and Imaging     Discussed management of the case with  ED provider who recommended admission      Drugs that require monitoring for toxicity include normal sodium IV infusion and the method of      General: NAD  Eyes: EOMI  ENT: Dry tongue; neck supple  Cardiovascular: Tachycardia  Respiratory: Clear to auscultation  Gastrointestinal: Soft, non tender  Genitourinary: no suprapubic tenderness  Musculoskeletal: No edema  Skin: warm, dry  Neuro: Alert. Confused  Psych: confused; fidgety      Past Medical History:   PMHx   Past Medical History:   Diagnosis Date    Acne rosacea     Cognitive impairment     sees Dr Gary Stinson, started Aricept 12/2019    Colon cancer (HCC) 1/7/2014    ileocecal valve/Thom/tonia surveillance    Down syndrome     Gout     HIGH CHOLESTEROL     2017 3.1 % framingham risk score    History of diarrhea     since childhood, worse since partial colectomy resolved after a few months    Macrocytosis     followed by Dr Tomas and released    Seborrheic dermatitis     Seizure disorder (HCC) 2019    Status post biventricular cardiac pacemaker insertion 01/2019    for long sinus pauses with syncopal willis    Status post partial colectomy 2016    diarrhea    Syncope and collapse 01/2019    recurrent syncope, found seizures and sinus pauses    Weight loss 2015    since 2014 at the time of partial colectomy lost 25 lbs     PSHX:  has a past surgical history that includes Cataract removal with implant (2007); Colon surgery (1/7//2014); Colonoscopy (02/27/2017); pacemaker placement; Colonoscopy (N/A, 8/21/2019); and Small intestine surgery (N/A, 12/18/2019).  Allergies:   Allergies   Allergen Reactions    Penicillins      rash     Fam HX: family history includes Cancer in his mother; Diabetes in his brother; High Cholesterol in his brother; Hypertension in his brother; Other in his father; Stroke in his brother.  Soc HX:   Social History     Socioeconomic History    Marital status: Single     Spouse name: None    Number of children: None    Years of education: None    Highest education level: None   Occupational History    Occupation: works at [employer] full time   Tobacco Use    Smoking

## 2024-11-25 NOTE — PROGRESS NOTES
This RN reviewing chart for admission to ICU. SBAR report received from [facility] at this time.

## 2024-11-25 NOTE — CONSULTS
Nephrology Consult Note   [email or web address]      Reason for consultation: ANISH / Hypernatremia -- baseline Cr ~ 0.7-1.0 mg/dL    History of Present Illness: Dilip Bassett is a 63 yo male with a PMHx of down syndrome, alzheimer's disease, h/o colon cancer s/p partial colectomy, seizure disorder. Patient presents to  ED on 11/24/2024 from nursing home with abnormal labs. Na+ is noted to be 163 mmol/L upon admission and Cr is 2.0 mg/dL. Patient is confused and unable to provide any history. His mucus membranes are very dry. He received a 1 L NS bolus in the ED and was started on continuous NS @ 100 mL/hr briefly and then LR @ 150 mL/hr. Today, Na+ is 166 mmol/L and Cr is 1.9 mg/dL.     Ws have been consulted for ANISH and hypernatremia management. No prior to admission culprit agents noted. BP intermittently on soft side. Bladder scan was ordered and revealed PVR of 583 mL. A deleon catheter was placed. Urine looks purulent.     Subjective:      Patient seen and examined. Labs and chart reviewed. Resting in bed on RA. Initially pleasantly confused and then became agitated.     There were not complications last night.    Patient review of systems: DARLIN -- AMS    Past Medical History:   Diagnosis Date    Acne rosacea     Cognitive impairment     sees Dr Gary Stinson, started Aricept 12/2019    Colon cancer (HCC) 1/7/2014    ileocecal valve/Thom/tonia surveillance    Down syndrome     Gout     HIGH CHOLESTEROL     2017 3.1 % framingham risk score    History of diarrhea     since childhood, worse since partial colectomy resolved after a few months    Macrocytosis     followed by Dr Tomas and released    Seborrheic dermatitis     Seizure disorder (HCC) 2019    Status post biventricular cardiac pacemaker insertion 01/2019    for long sinus pauses with syncopal willis    Status post partial colectomy 2016    diarrhea    Syncope and collapse 01/2019    recurrent syncope, found seizures and sinus pauses    Weight loss 2015

## 2024-11-25 NOTE — PROGRESS NOTES
Bladder scan showed 583 mL. Per order place deleon if bladder scan is >300. Temp sensing deleon was placed. Urine specimens were obtained. Cloudy, yellow urine was noted

## 2024-11-25 NOTE — PROGRESS NOTES
NAME:  Dilip Bassett  YOB: 1960  MEDICAL RECORD NUMBER:  [id number]    Shift Summary: 11/25 pt to ED from nursing home for reported low sodium. In ED found to have elevated NA at 163. ANISH and elevated creat. Pt to ICU to monitor sodium level. Awaiting recent Na level.    Family updated: Yes:  brother stevie    Rhythm: Normal Sinus Rhythm     Most recent vitals:   Visit Vitals  BP 94/77   Pulse 88   Temp 98.2 °F (36.8 °C) (Axillary)   Resp 18   Ht 1.676 m (5' 6\")   Wt 51.6 kg (113 lb 12.1 oz)   SpO2 93%   BMI 18.36 kg/m²           No data found.    No data found.      Respiratory support needed (if any):  - RA    Admission weight Weight - Scale: 51.6 kg (113 lb 12.1 oz) (11/24/24 1850)    Today's weight    Wt Readings from Last 1 Encounters:   11/24/24 51.6 kg (113 lb 12.1 oz)        Deleon need assessed each shift: N/A - no deleon present  UOP >30ml/hr: YES  Last documented BM (in last 48 hrs):  No data found.             Restraints (in use currently or dc'd in last 12 hrs): No    Order current and documentation up to date? No    Lines/Drains reviewed @ bedside.  Peripheral IV 11/24/24 Right;Anterior Forearm (Active)   Number of days: 0         Drip rates at handoff:    sodium chloride      sodium chloride Stopped (11/25/24 0401)       Lab Data:   CBC:   Recent Labs     11/24/24 1948   WBC 13.7*   HGB 15.2   HCT 47.9   .0*        BMP:    Recent Labs     11/24/24 1948 11/24/24 2156   * 160*   K 4.0  --    CO2 29  --    BUN 58*  --    CREATININE 2.0*  --      LIVR:   Recent Labs     11/24/24 1948   AST 35   ALT 31     PT/INR: No results for input(s): \"INR\" in the last 72 hours.    Invalid input(s): \"PROT\"  APTT: No results for input(s): \"APTT\" in the last 72 hours.  ABG: No results for input(s): \"PHART\", \"MSY7NFA\", \"PO2ART\" in the last 72 hours.    Any consults during the shift? No    Any signed and held orders to be released?  No        4 Eyes Skin Assessment       The

## 2024-11-25 NOTE — PROGRESS NOTES
Bilateral mitts applied. Pt repeatedly pulling off pulse ox and at deleon catheter. Notified MD Moreno and attempted to call brother Marcel to make aware

## 2024-11-25 NOTE — FLOWSHEET NOTE
Pt arrived to ICU via ED stretcher. Pt alert but disoriented due to down syndrome and alzheimer disease. Pt transferred to bed. Pt tolerated well. VVS stable. Pt is fidgety and pulls off pulse ox, bp cuff and leads. PT brother at bedside to answer questions. Pt placed in seizure precautions.   11/24/24 2323   Vitals   Temp 97.7 °F (36.5 °C)   Temp Source Axillary   Pulse (!) 117   Heart Rate Source Monitor   Respirations 20   BP (!) 152/104   MAP (Calculated) 120   MAP (mmHg) 116   BP Location Left upper arm   BP Upper/Lower Upper   Patient Position Semi fowlers   Cardiac Rhythm Sinus rhythm   Pain Assessment   Pain Assessment Adult Nonverbal Pain Scale (NPVS)   Adult Nonverbal Pain Scale (NVPS)   Face 0   Activity (Movement) 1   Guarding 0   Physiology (Vital Signs) 0   Respiratory 0   NVPS Score  1   Opioid-Induced Sedation   POSS Score 1   Oxygen Therapy   SpO2 90 %   Pulse Oximetry Type Intermittent   Pulse via Oximetry 59 beats per minute   Pulse Oximeter Device Mode Intermittent   Pulse Oximeter Device Location Finger   O2 Device None (Room air)

## 2024-11-25 NOTE — PROGRESS NOTES
Multiple attempts to keep pulse ox on. Pt just proceeds to rip it off. Attempted to try toe reading but does not

## 2024-11-25 NOTE — PLAN OF CARE
Problem: Discharge Planning  Goal: Discharge to home or other facility with appropriate resources  Outcome: Progressing  Flowsheets (Taken 11/25/2024 0012)  Discharge to home or other facility with appropriate resources:   Arrange for needed discharge resources and transportation as appropriate   Identify discharge learning needs (meds, wound care, etc)   Arrange for interpreters to assist at discharge as needed     Problem: Pain  Goal: Verbalizes/displays adequate comfort level or baseline comfort level  Outcome: Progressing     Problem: Safety - Adult  Goal: Free from fall injury  Outcome: Progressing     Problem: Skin/Tissue Integrity  Goal: Absence of new skin breakdown  Description: 1.  Monitor for areas of redness and/or skin breakdown  2.  Assess vascular access sites hourly  3.  Every 4-6 hours minimum:  Change oxygen saturation probe site  4.  Every 4-6 hours:  If on nasal continuous positive airway pressure, respiratory therapy assess nares and determine need for appliance change or resting period.  Outcome: Progressing

## 2024-11-26 PROBLEM — E43 SEVERE MALNUTRITION (HCC): Status: ACTIVE | Noted: 2024-11-26

## 2024-11-26 LAB
ALBUMIN SERPL-MCNC: 2.8 G/DL (ref 3.4–5)
ANION GAP SERPL CALCULATED.3IONS-SCNC: 9 MMOL/L (ref 3–16)
BUN SERPL-MCNC: 33 MG/DL (ref 7–20)
CALCIUM SERPL-MCNC: 8.6 MG/DL (ref 8.3–10.6)
CHLORIDE SERPL-SCNC: 121 MMOL/L (ref 99–110)
CO2 SERPL-SCNC: 28 MMOL/L (ref 21–32)
CREAT SERPL-MCNC: 1.4 MG/DL (ref 0.8–1.3)
DEPRECATED RDW RBC AUTO: 13.8 % (ref 12.4–15.4)
GFR SERPLBLD CREATININE-BSD FMLA CKD-EPI: 57 ML/MIN/{1.73_M2}
GLUCOSE BLD-MCNC: 119 MG/DL (ref 70–99)
GLUCOSE BLD-MCNC: 126 MG/DL (ref 70–99)
GLUCOSE BLD-MCNC: 139 MG/DL (ref 70–99)
GLUCOSE BLD-MCNC: 150 MG/DL (ref 70–99)
GLUCOSE BLD-MCNC: 177 MG/DL (ref 70–99)
GLUCOSE SERPL-MCNC: 155 MG/DL (ref 70–99)
HCT VFR BLD AUTO: 39.8 % (ref 40.5–52.5)
HGB BLD-MCNC: 13.1 G/DL (ref 13.5–17.5)
MCH RBC QN AUTO: 32.8 PG (ref 26–34)
MCHC RBC AUTO-ENTMCNC: 33 G/DL (ref 31–36)
MCV RBC AUTO: 99.6 FL (ref 80–100)
PERFORMED ON: ABNORMAL
PHOSPHATE SERPL-MCNC: 3.9 MG/DL (ref 2.5–4.9)
PLATELET # BLD AUTO: 220 K/UL (ref 135–450)
PMV BLD AUTO: 11.4 FL (ref 5–10.5)
POTASSIUM SERPL-SCNC: 3.5 MMOL/L (ref 3.5–5.1)
RBC # BLD AUTO: 4 M/UL (ref 4.2–5.9)
SODIUM SERPL-SCNC: 145 MMOL/L (ref 136–145)
SODIUM SERPL-SCNC: 151 MMOL/L (ref 136–145)
SODIUM SERPL-SCNC: 153 MMOL/L (ref 136–145)
SODIUM SERPL-SCNC: 158 MMOL/L (ref 136–145)
WBC # BLD AUTO: 10.1 K/UL (ref 4–11)

## 2024-11-26 PROCEDURE — 80069 RENAL FUNCTION PANEL: CPT

## 2024-11-26 PROCEDURE — 84295 ASSAY OF SERUM SODIUM: CPT

## 2024-11-26 PROCEDURE — 94760 N-INVAS EAR/PLS OXIMETRY 1: CPT

## 2024-11-26 PROCEDURE — 2000000000 HC ICU R&B

## 2024-11-26 PROCEDURE — 6370000000 HC RX 637 (ALT 250 FOR IP)

## 2024-11-26 PROCEDURE — 2580000003 HC RX 258

## 2024-11-26 PROCEDURE — 2580000003 HC RX 258: Performed by: REGISTERED NURSE

## 2024-11-26 PROCEDURE — 85027 COMPLETE CBC AUTOMATED: CPT

## 2024-11-26 PROCEDURE — 6360000002 HC RX W HCPCS

## 2024-11-26 PROCEDURE — 36415 COLL VENOUS BLD VENIPUNCTURE: CPT

## 2024-11-26 RX ORDER — DEXTROSE MONOHYDRATE 50 MG/ML
INJECTION, SOLUTION INTRAVENOUS CONTINUOUS
Status: DISCONTINUED | OUTPATIENT
Start: 2024-11-26 | End: 2024-11-27

## 2024-11-26 RX ADMIN — MIRTAZAPINE 15 MG: 15 TABLET, FILM COATED ORAL at 20:00

## 2024-11-26 RX ADMIN — WATER 1000 MG: 1 INJECTION INTRAMUSCULAR; INTRAVENOUS; SUBCUTANEOUS at 11:50

## 2024-11-26 RX ADMIN — APIXABAN 5 MG: 5 TABLET, FILM COATED ORAL at 09:29

## 2024-11-26 RX ADMIN — DEXTROSE MONOHYDRATE: 50 INJECTION, SOLUTION INTRAVENOUS at 08:20

## 2024-11-26 RX ADMIN — DIVALPROEX SODIUM 125 MG: 125 CAPSULE, COATED PELLETS ORAL at 09:29

## 2024-11-26 RX ADMIN — APIXABAN 5 MG: 5 TABLET, FILM COATED ORAL at 20:00

## 2024-11-26 RX ADMIN — DONEPEZIL HYDROCHLORIDE 10 MG: 10 TABLET, FILM COATED ORAL at 20:01

## 2024-11-26 RX ADMIN — DEXTROSE MONOHYDRATE: 50 INJECTION, SOLUTION INTRAVENOUS at 13:57

## 2024-11-26 RX ADMIN — DIVALPROEX SODIUM 125 MG: 125 CAPSULE, COATED PELLETS ORAL at 20:01

## 2024-11-26 RX ADMIN — DEXTROSE MONOHYDRATE: 50 INJECTION, SOLUTION INTRAVENOUS at 20:20

## 2024-11-26 RX ADMIN — DEXTROSE MONOHYDRATE: 50 INJECTION, SOLUTION INTRAVENOUS at 01:54

## 2024-11-26 ASSESSMENT — PAIN SCALES - GENERAL
PAINLEVEL_OUTOF10: 0

## 2024-11-26 NOTE — PLAN OF CARE
normal limits  11/25/2024 2334 by Charla Stone, RN  Outcome: Progressing  11/25/2024 1303 by Beverley Blankenship, RN  Outcome: Progressing  Flowsheets (Taken 11/25/2024 1303)  Electrolytes maintained within normal limits:   Monitor labs and assess patient for signs and symptoms of electrolyte imbalances   Administer electrolyte replacement as ordered     Problem: Safety - Medical Restraint  Goal: Remains free of injury from restraints (Restraint for Interference with Medical Device)  Description: INTERVENTIONS:  1. Determine that other, less restrictive measures have been tried or would not be effective before applying the restraint  2. Evaluate the patient's condition at the time of restraint application  3. Inform patient/family regarding the reason for restraint  4. Q2H: Monitor safety, psychosocial status, comfort, nutrition and hydration  Outcome: Progressing

## 2024-11-26 NOTE — PROGRESS NOTES
NAME:  Dilip Bassett  YOB: 1960  MEDICAL RECORD NUMBER:  [id number]    Shift Summary: D5W gtt rate increased to 150ml/hr, potential seizure like activity observed by RN, behavioral issues, able to remove mitt restraints, pulls at deleon, yells out.     Family updated: No    Rhythm: Sinus Tachycardia     Most recent vitals:   Visit Vitals  BP (!) 95/59   Pulse 75   Temp 97.9 °F (36.6 °C) (Bladder)   Resp 11   Ht 1.676 m (5' 6\")   Wt 52.3 kg (115 lb 4.8 oz)   SpO2 (!) 88%   BMI 18.61 kg/m²           No data found.    No data found.      Respiratory support needed (if any):  - RA    Admission weight Weight - Scale: 51.6 kg (113 lb 12.1 oz) (11/24/24 1850)    Today's weight    Wt Readings from Last 1 Encounters:   11/26/24 52.3 kg (115 lb 4.8 oz)        Deleon need assessed each shift: YES -  - continue deleon r/t - retention  UOP >30ml/hr: YES  Last documented BM (in last 48 hrs):  Patient Vitals for the past 48 hrs:   Last BM (including prior to admit) Stool Occurrence   11/25/24 1030 11/25/24 1   11/25/24 2000 11/25/24 --                Restraints (in use currently or dc'd in last 12 hrs): Yes    Order current and documentation up to date? Yes    Lines/Drains reviewed @ bedside.  Peripheral IV 11/24/24 Right;Anterior Forearm (Active)   Number of days: 1       Urinary Catheter 11/25/24 Deleon-Temperature (Active)   Number of days: 0         Drip rates at handoff:    dextrose 150 mL/hr at 11/26/24 0154    sodium chloride         Lab Data:   CBC:   Recent Labs     11/24/24 1948 11/25/24  0612   WBC 13.7* 11.5*   HGB 15.2 13.8   HCT 47.9 43.1   .0* 100.5*    248     BMP:    Recent Labs     11/24/24 1948 11/24/24  2156 11/25/24  0501 11/25/24  1231 11/25/24  2315   *   < > 166* 165* 164*   K 4.0  --  4.1  --   --    CO2 29  --  21  --   --    BUN 58*  --  52*  --   --    CREATININE 2.0*  --  1.9*  --   --     < > = values in this interval not displayed.     LIVR:   Recent Labs

## 2024-11-26 NOTE — PROGRESS NOTES
V2.0    [facility] Progress Note      Name:  Dilip Bassett /Age/Sex: 1960  (64 y.o. male)   MRN & CSN:  [id number] & [id number] Encounter Date/Time: 2024 3:39 PM EST   Location:  [id number] PCP: Oniel Beltran     Attending:Yamilet Cabrera MD       Hospital Day: 3    Assessment and Recommendations   Dilip Bassett is a 64 y.o. male who presents with Dehydration      Plan:      Hypernatremia : Likely due to dehydration with significant free water deficit. Na still trending up and started on D5W. Encourage fluid intake. Monitor BMP Q8H and in ICU. Nephrology following, Na improved to 158     ANISH : Due to dehydration vs obstruction. Power catheter placed with output. Urine electrolytes ordered. Monitor Cr with BMP improved to 1.4     Abnormal UA : Suggestive of UTI. Difficult to classify as symptomatic vs asymptomatic due to patient's mentation. Started on IV ceftriaxone and will follow urine culture     History of seizure : Resume home medications and monitor     DVT prophylaxis : Eliquis for history of pulmonary embolism         Diet ADULT DIET; Dysphagia - Pureed; Mildly Thick (Nectar)  ADULT ORAL NUTRITION SUPPLEMENT; Lunch, Dinner; Frozen Oral Supplement   DVT Prophylaxis [] Lovenox, []  Heparin, [] SCDs, [] Ambulation,  [x] Eliquis, [] Xarelto  [] Coumadin   Code Status Full Code             Personally reviewed Lab Studies and Imaging     Subjective:     Cont to be confused, tolerating diet but not taking much PO    Review of Systems:      Pertinent positives and negatives discussed in HPI    Objective:     Intake/Output Summary (Last 24 hours) at 2024 1539  Last data filed at 2024 1456  Gross per 24 hour   Intake 4024.4 ml   Output 1430 ml   Net 2594.4 ml      Vitals:   Vitals:    24 0632 24 0745 24 0802 24 1200   BP: 102/85  104/71 (!) 159/105   Pulse: 92 99 90 95   Resp:  15 23 19   Temp:   97.9 °F (36.6 °C) 97.8 °F (36.6 °C)   TempSrc:    Met with patient at bedside, introduced myself and explained my role as RN case manager. Discussed plan of care (ID consult, ordered PICC, IV abx therapy, labs/testing, discharge planning, etc.). Pt verbalized understanding. Pt stated that he is independent and resides with his wife.  Discussed [facility] options- Pt agreeable to [place] [phone] and stated that he has no [place] [phone] agency preference    Referral made to [facility] at [address] [facility]. (phone [phone] fax [phone])     Informed patient that case management and social work will continue to follow to assist with the transition of care

## 2024-11-26 NOTE — PROGRESS NOTES
Department of Internal Medicine  Nephrology Progress Note      HISTORY OF PRESENTING ILLNESS: A 64-year-old male with past medical history significant for Down syndrome, dementia, colon cancer status post partial colectomy, seizure disorder, nursing home resident, sent to ER because of abnormal labs. The patient was found to have a sodium of 163 with a creatinine of 2.0.     Confused / labs reviewed      REVIEW OF SYSTEMS:  Limited due to pt factor     Physical Exam:    VITALS:  BP (!) 159/105   Pulse 95   Temp 97.8 °F (36.6 °C) (Bladder)   Resp 19   Ht 1.676 m (5' 6\")   Wt 52.3 kg (115 lb 4.8 oz)   SpO2 96%   BMI 18.61 kg/m²   24HR INTAKE/OUTPUT:    Intake/Output Summary (Last 24 hours) at 11/26/2024 1557  Last data filed at 11/26/2024 1456  Gross per 24 hour   Intake 4024.4 ml   Output 1430 ml   Net 2594.4 ml       Constitutional:  Doing well  Respiratory:  CTA  Gastrointestinal:  no tenderness.  Normal Bowel Sounds  Cardiovascular:  S1, S2 RRR   Edema:  Lower Extremity has no edema    DATA:    CBC:  Lab Results   Component Value Date/Time    WBC 10.1 11/26/2024 06:11 AM    RBC 4.00 11/26/2024 06:11 AM    RBC 4.68 03/13/2017 08:13 AM    HGB 13.1 11/26/2024 06:11 AM    HCT 39.8 11/26/2024 06:11 AM    MCV 99.6 11/26/2024 06:11 AM    MCH 32.8 11/26/2024 06:11 AM    MCHC 33.0 11/26/2024 06:11 AM    RDW 13.8 11/26/2024 06:11 AM     11/26/2024 06:11 AM    MPV 11.4 11/26/2024 06:11 AM     CMP:  Lab Results   Component Value Date/Time     11/26/2024 11:37 AM    K 3.5 11/26/2024 06:11 AM    K 4.1 11/25/2024 05:01 AM     11/26/2024 06:11 AM    CO2 28 11/26/2024 06:11 AM    BUN 33 11/26/2024 06:11 AM    CREATININE 1.4 11/26/2024 06:11 AM    GFRAA >60 01/13/2020 10:17 AM    GFRAA >60 04/09/2013 11:09 AM    AGRATIO 0.6 11/24/2024 07:48 PM    LABGLOM 57 11/26/2024 06:11 AM    GLUCOSE 155 11/26/2024 06:11 AM    GLUCOSE 115 03/13/2017 08:13 AM    CALCIUM 8.6 11/26/2024 06:11 AM    BILITOT 0.3 11/24/2024  07:48 PM    ALKPHOS 71 11/24/2024 07:48 PM    AST 35 11/24/2024 07:48 PM    ALT 31 11/24/2024 07:48 PM      Hepatic Function Panel:   Lab Results   Component Value Date/Time    ALKPHOS 71 11/24/2024 07:48 PM    ALT 31 11/24/2024 07:48 PM    AST 35 11/24/2024 07:48 PM    BILITOT 0.3 11/24/2024 07:48 PM    BILIDIR <0.2 01/09/2020 04:31 AM    IBILI see below 01/09/2020 04:31 AM      Phosphorus:   Lab Results   Component Value Date/Time    PHOS 3.9 11/26/2024 06:11 AM       ASSESSMENT:  Principal Problem:    Dehydration  Active Problems:    Down's syndrome    Seizure disorder (HCC)    ANISH (acute kidney injury) (HCC)    Right pulmonary embolus (HCC)    Leukocytosis    Severe malnutrition (HCC)  Resolved Problems:    * No resolved hospital problems. *      PLAN:  1. Acute kidney injury.  Etiology, dehydration/urinary retention.   Continue with hydration (hypotonic fluid).  Follow up serial BMPs.  2. Hypernatremia, most likely free water deficit greater than 5.5 L.  Tolerating  D5W.  Rate of correction 8 to 10 mEq/L per 24 hours.  Check sodium every 6 hours.  3. History of Down syndrome.  4. History of dementia.  5. History of seizure disorder.  Supportive care.  We will follow the patient from renal standpoint.       Isiah Miranda MD, MD

## 2024-11-26 NOTE — CARE COORDINATION
Case Management Assessment  Initial Evaluation    Date/Time of Evaluation: 11/26/2024 5:12 PM  Assessment Completed by: Eric Hernandez RN    If patient is discharged prior to next notation, then this note serves as note for discharge by case management.    Patient Name: Dilip Bassett                   YOB: 1960  Diagnosis: Dehydration [E86.0]  Hypernatremia [E87.0]  ANISH (acute kidney injury) (HCC) [N17.9]                   Date / Time: 11/24/2024  6:46 PM    Patient Admission Status: Inpatient   Readmission Risk (Low < 19, Mod (19-27), High > 27): Readmission Risk Score: 16.8    Current PCP: Oniel Beltran  PCP verified by CM? Yes    Chart Reviewed: Yes      History Provided by: Child/Family (Brother Marcel)  Patient Orientation: Other (see comment) (Down's Syndrome/Alzheimer's)    Patient Cognition: Other (see comment) (Down's Syndrome/Alzheimer's)    Hospitalization in the last 30 days (Readmission):  No    If yes, Readmission Assessment in CM Navigator will be completed.    Advance Directives:      Code Status: Full Code   Patient's Primary Decision Maker is: Legal Next of Kin      Discharge Planning:    Patient lives with: Other (Comment) (Long-term Care Facility) Type of Home: Long-Term Care  Primary Care Giver: Other (Comment) (Facility Staff)  Patient Support Systems include: Family Members   Current Financial resources: Medicare, Medicaid  Current community resources: ECF/Home Care  Current services prior to admission: Extended Care Facility            Current DME:              Type of Home Care services:  None    ADLS  Prior functional level: Assistance with the following:, Bathing, Dressing, Feeding, Cooking, Housework, Shopping, Toileting, Mobility  Current functional level: Assistance with the following:, Bathing, Dressing, Toileting, Feeding, Cooking, Housework, Shopping, Mobility    PT AM-PAC:   /24  OT AM-PAC:   /24    Family can provide assistance at DC: No  Would you like Case

## 2024-11-26 NOTE — PROGRESS NOTES
Nephrology Progress Note   [employer] [employer]      Reason for consultation:  ANISH / Hypernatremia -- baseline Cr ~ 0.7-1.0 mg/dL     HPI: Dilip Bassett is a 65 yo male with a PMHx of down syndrome, alzheimer's disease, h/o colon cancer s/p partial colectomy, seizure disorder. Patient presents to  ED on 2024 from nursing home with abnormal labs. Na+ is noted to be 163 mmol/L upon admission and Cr is 2.0 mg/dL. Patient is confused and unable to provide any history. His mucus membranes are very dry. He received a 1 L NS bolus in the ED and was started on continuous NS @ 100 mL/hr briefly and then LR @ 150 mL/hr. Today, Na+ is 166 mmol/L and Cr is 1.9 mg/dL.      Ws have been consulted for ANISH and hypernatremia management. No prior to admission culprit agents noted. BP intermittently on soft side. Bladder scan was ordered and revealed PVR of 583 mL. A deleon catheter was placed. Urine looks purulent.     Subjective:    The patient has been seen and examined. Labs and chart reviewed. Resting in bed. D5W was increased to 150 mL/hr overnight. Na+ 158 mmol/L this AM. Cr is trending down.    There were not complications overnight.    Patient review of systems: DARLIN -- AMS      Allergies:  Allergies   Allergen Reactions    Penicillins      rash        Scheduled Meds:   apixaban  5 mg Oral BID    divalproex  125 mg Oral BID    donepezil  10 mg Oral Nightly    mirtazapine  15 mg Oral Nightly    cefTRIAXone (ROCEPHIN) IV  1,000 mg IntraVENous Q24H    sodium chloride flush  5-40 mL IntraVENous 2 times per day        sodium chloride         PRN Meds:sodium chloride flush, sodium chloride, ondansetron **OR** ondansetron, polyethylene glycol, acetaminophen **OR** acetaminophen, melatonin    Physical Exam:    TEMPERATURE:  Current - Temp: 97.9 °F (36.6 °C); Max - Temp  Av.5 °F (36.9 °C)  Min: 97.9 °F (36.6 °C)  Max: 99.2 °F (37.3 °C)  RESPIRATIONS RANGE: Resp  Avg: 15.5  Min: 10  Max: 23  PULSE RANGE: Pulse  Av.7  Min:

## 2024-11-26 NOTE — PROGRESS NOTES
This RN noted tremors and stiffness in all extremities during bed change, secure message sent to Raissa Holcomb NP, for concern for seizure like activity. NP to bedside, pt lethargic but following commands. D5 gtt rate increased to 150ml/hr.

## 2024-11-26 NOTE — PROGRESS NOTES
NAME:  Dilip Bassett  YOB: 1960  MEDICAL RECORD NUMBER:  [id number]    Shift Summary: Na down to 153. Continues on D5 at 125ml/hr. Has poor very PO intake. Downgraded to PCU    Family updated: Yes:  stevie    Rhythm: Normal Sinus Rhythm     Most recent vitals:   Visit Vitals  /63   Pulse 97   Temp 99 °F (37.2 °C) (Bladder)   Resp 23   Ht 1.676 m (5' 6\")   Wt 52.3 kg (115 lb 4.8 oz)   SpO2 100%   BMI 18.61 kg/m²           No data found.    No data found.      Respiratory support needed (if any):  - RA    Admission weight Weight - Scale: 51.6 kg (113 lb 12.1 oz) (11/24/24 1850)    Today's weight    Wt Readings from Last 1 Encounters:   11/26/24 52.3 kg (115 lb 4.8 oz)        Power need assessed each shift:Yes  UOP >30ml/hr: YES  Last documented BM (in last 48 hrs):  Patient Vitals for the past 48 hrs:   Last BM (including prior to admit) Stool Occurrence   11/25/24 1030 11/25/24 1   11/25/24 2000 11/25/24 --   11/26/24 0800 11/25/24 --                Restraints (in use currently or dc'd in last 12 hrs): No    Order current and documentation up to date? No    Lines/Drains reviewed @ bedside.  Peripheral IV 11/24/24 Right;Anterior Forearm (Active)   Number of days: 1       Urinary Catheter 11/25/24 Power-Temperature (Active)   Number of days: 1         Drip rates at handoff:    dextrose 125 mL/hr at 11/26/24 1456    sodium chloride         Lab Data:   CBC:   Recent Labs     11/25/24  0612 11/26/24  0611   WBC 11.5* 10.1   HGB 13.8 13.1*   HCT 43.1 39.8*   .5* 99.6    220     BMP:    Recent Labs     11/25/24  0501 11/25/24  1231 11/26/24  0611 11/26/24  1137   *   < > 158* 153*   K 4.1  --  3.5  --    CO2 21  --  28  --    BUN 52*  --  33*  --    CREATININE 1.9*  --  1.4*  --     < > = values in this interval not displayed.     LIVR:   Recent Labs     11/24/24 1948   AST 35   ALT 31     PT/INR: No results for input(s): \"INR\" in the last 72 hours.    Invalid input(s):

## 2024-11-26 NOTE — PROGRESS NOTES
Comprehensive Nutrition Assessment    Type and Reason for Visit:  Initial, Positive nutrition screen (weight loss)    Nutrition Recommendations/Plan:   Continue pureed textures foods with mildly thick liquids  Magic cup with lunch and dinner  Pre thickened lemon water in fridge for patient to sip on     Malnutrition Assessment:  Malnutrition Status:  Severe malnutrition (11/26/24 1444)    Context:  Acute Illness     Findings of the 6 clinical characteristics of malnutrition:  Energy Intake:  Unable to assess  Weight Loss:  Unable to assess (weight has flucutated over the past 5 years from 120's up to 160's)     Body Fat Loss:  Moderate body fat loss Orbital, Buccal region   Muscle Mass Loss:  Moderate muscle mass loss Calf (gastrocnemius), Thigh (quadriceps), Clavicles (pectoralis & deltoids)  Fluid Accumulation:  Unable to assess     Strength:  Not Performed    Nutrition Assessment:    Patient admitted from nursing home with dehydration.  Currently on a pureed diet with mildly thick liquids.   Nurse was feeding patient this am.  Patient cooperated and was very friendly.  RD ordered Magic cup bid and brought a chocolate up for patient to eat.  RD also brought up several prethickened lemon water for patient to sip on.  Hypernatremic.  Also on D5% at 125 ml/hr.  Patient ate 50-75% meals and % of magic cup supplements so far.  Severe malnutrition determined, severe muscle loss and subcutaneous fat loss identified at time of visit.    Nutrition Related Findings:    last BM On 11/25; Na improving to 153 mmol/L Wound Type: None       Current Nutrition Intake & Therapies:    Average Meal Intake: %, 51-75%  Average Supplements Intake: %  ADULT DIET; Dysphagia - Pureed; Mildly Thick (Nectar)  ADULT ORAL NUTRITION SUPPLEMENT; Lunch, Dinner; Frozen Oral Supplement    Anthropometric Measures:  Height: 167.6 cm (5' 6\")  Ideal Body Weight (IBW): 142 lbs (65 kg)       Current Body Weight:  ,   IBW.    Current

## 2024-11-26 NOTE — PLAN OF CARE
Problem: Discharge Planning  Goal: Discharge to home or other facility with appropriate resources  11/26/2024 1131 by Randi Ellis RN  Outcome: Progressing  Flowsheets (Taken 11/26/2024 0800)  Discharge to home or other facility with appropriate resources: Identify barriers to discharge with patient and caregiver  11/25/2024 2334 by Charla Stone RN  Outcome: Progressing     Problem: Pain  Goal: Verbalizes/displays adequate comfort level or baseline comfort level  11/26/2024 1131 by Randi Ellis RN  Outcome: Progressing  Flowsheets (Taken 11/26/2024 0802)  Verbalizes/displays adequate comfort level or baseline comfort level: Encourage patient to monitor pain and request assistance  11/25/2024 2334 by Charla Stone RN  Outcome: Progressing     Problem: Safety - Adult  Goal: Free from fall injury  11/26/2024 1131 by Randi Ellis RN  Outcome: Progressing  11/25/2024 2334 by Charla Stone RN  Outcome: Progressing     Problem: Skin/Tissue Integrity  Goal: Absence of new skin breakdown  Description: 1.  Monitor for areas of redness and/or skin breakdown  2.  Assess vascular access sites hourly  3.  Every 4-6 hours minimum:  Change oxygen saturation probe site  4.  Every 4-6 hours:  If on nasal continuous positive airway pressure, respiratory therapy assess nares and determine need for appliance change or resting period.  11/26/2024 1131 by Randi Ellis RN  Outcome: Progressing  11/25/2024 2334 by Charla Stone RN  Outcome: Progressing     Problem: Neurosensory - Adult  Goal: Achieves maximal functionality and self care  11/26/2024 1131 by Randi Ellis RN  Outcome: Progressing  Flowsheets (Taken 11/26/2024 0800)  Achieves maximal functionality and self care: Monitor swallowing and airway patency with patient fatigue and changes in neurological status  11/25/2024 2334 by Charla Stone RN  Outcome: Progressing     Problem: Cardiovascular - Adult  Goal: Maintains optimal cardiac output and  hemodynamic stability  11/26/2024 1131 by Randi Ellis RN  Outcome: Progressing  Flowsheets (Taken 11/26/2024 0800)  Maintains optimal cardiac output and hemodynamic stability: Monitor blood pressure and heart rate  11/25/2024 2334 by Charla Stone RN  Outcome: Progressing     Problem: Skin/Tissue Integrity - Adult  Goal: Skin integrity remains intact  11/26/2024 1131 by Randi Ellis RN  Outcome: Progressing  Flowsheets (Taken 11/26/2024 0800)  Skin Integrity Remains Intact: Monitor for areas of redness and/or skin breakdown  11/25/2024 2334 by Charla Stone RN  Outcome: Progressing     Problem: Musculoskeletal - Adult  Goal: Return ADL status to a safe level of function  11/26/2024 1131 by Randi Ellis RN  Outcome: Progressing  Flowsheets (Taken 11/26/2024 0800)  Return ADL Status to a Safe Level of Function: Administer medication as ordered  11/25/2024 2334 by Charla Stone RN  Outcome: Progressing     Problem: Genitourinary - Adult  Goal: Urinary catheter remains patent  11/26/2024 1131 by Randi Ellis RN  Outcome: Progressing  Flowsheets (Taken 11/26/2024 0800)  Urinary catheter remains patent: Assess patency of urinary catheter  11/25/2024 2334 by Charla Stone RN  Outcome: Progressing     Problem: Metabolic/Fluid and Electrolytes - Adult  Goal: Electrolytes maintained within normal limits  11/26/2024 1131 by Randi Ellis RN  Outcome: Progressing  Flowsheets (Taken 11/26/2024 0800)  Electrolytes maintained within normal limits: Monitor labs and assess patient for signs and symptoms of electrolyte imbalances  11/25/2024 2334 by Charla Stone RN  Outcome: Progressing     Problem: Safety - Medical Restraint  Goal: Remains free of injury from restraints (Restraint for Interference with Medical Device)  Description: INTERVENTIONS:  1. Determine that other, less restrictive measures have been tried or would not be effective before applying the restraint  2. Evaluate the patient's

## 2024-11-27 LAB
ALBUMIN SERPL-MCNC: 2.6 G/DL (ref 3.4–5)
ANION GAP SERPL CALCULATED.3IONS-SCNC: 8 MMOL/L (ref 3–16)
BACTERIA UR CULT: ABNORMAL
BUN SERPL-MCNC: 20 MG/DL (ref 7–20)
CALCIUM SERPL-MCNC: 8 MG/DL (ref 8.3–10.6)
CHLORIDE SERPL-SCNC: 110 MMOL/L (ref 99–110)
CO2 SERPL-SCNC: 26 MMOL/L (ref 21–32)
CREAT SERPL-MCNC: 1.1 MG/DL (ref 0.8–1.3)
DEPRECATED RDW RBC AUTO: 13.4 % (ref 12.4–15.4)
GFR SERPLBLD CREATININE-BSD FMLA CKD-EPI: 76 ML/MIN/{1.73_M2}
GLUCOSE BLD-MCNC: 132 MG/DL (ref 70–99)
GLUCOSE SERPL-MCNC: 143 MG/DL (ref 70–99)
HCT VFR BLD AUTO: 37.5 % (ref 40.5–52.5)
HGB BLD-MCNC: 12.4 G/DL (ref 13.5–17.5)
MCH RBC QN AUTO: 32.9 PG (ref 26–34)
MCHC RBC AUTO-ENTMCNC: 33.1 G/DL (ref 31–36)
MCV RBC AUTO: 99.3 FL (ref 80–100)
ORGANISM: ABNORMAL
PERFORMED ON: ABNORMAL
PHOSPHATE SERPL-MCNC: 3.1 MG/DL (ref 2.5–4.9)
PLATELET # BLD AUTO: 209 K/UL (ref 135–450)
PMV BLD AUTO: 11.3 FL (ref 5–10.5)
POTASSIUM SERPL-SCNC: 3.1 MMOL/L (ref 3.5–5.1)
POTASSIUM SERPL-SCNC: 3.9 MMOL/L (ref 3.5–5.1)
RBC # BLD AUTO: 3.78 M/UL (ref 4.2–5.9)
SODIUM SERPL-SCNC: 143 MMOL/L (ref 136–145)
SODIUM SERPL-SCNC: 144 MMOL/L (ref 136–145)
SODIUM SERPL-SCNC: 145 MMOL/L (ref 136–145)
SODIUM SERPL-SCNC: 146 MMOL/L (ref 136–145)
WBC # BLD AUTO: 9.2 K/UL (ref 4–11)

## 2024-11-27 PROCEDURE — 97166 OT EVAL MOD COMPLEX 45 MIN: CPT

## 2024-11-27 PROCEDURE — 6370000000 HC RX 637 (ALT 250 FOR IP)

## 2024-11-27 PROCEDURE — 80069 RENAL FUNCTION PANEL: CPT

## 2024-11-27 PROCEDURE — 85027 COMPLETE CBC AUTOMATED: CPT

## 2024-11-27 PROCEDURE — 97530 THERAPEUTIC ACTIVITIES: CPT

## 2024-11-27 PROCEDURE — 6360000002 HC RX W HCPCS

## 2024-11-27 PROCEDURE — 2580000003 HC RX 258

## 2024-11-27 PROCEDURE — 6370000000 HC RX 637 (ALT 250 FOR IP): Performed by: HOSPITALIST

## 2024-11-27 PROCEDURE — 97162 PT EVAL MOD COMPLEX 30 MIN: CPT | Performed by: PHYSICAL THERAPIST

## 2024-11-27 PROCEDURE — 2580000003 HC RX 258: Performed by: HOSPITALIST

## 2024-11-27 PROCEDURE — 84295 ASSAY OF SERUM SODIUM: CPT

## 2024-11-27 PROCEDURE — 2060000000 HC ICU INTERMEDIATE R&B

## 2024-11-27 PROCEDURE — 84132 ASSAY OF SERUM POTASSIUM: CPT

## 2024-11-27 PROCEDURE — 94760 N-INVAS EAR/PLS OXIMETRY 1: CPT

## 2024-11-27 PROCEDURE — 97535 SELF CARE MNGMENT TRAINING: CPT

## 2024-11-27 PROCEDURE — 36415 COLL VENOUS BLD VENIPUNCTURE: CPT

## 2024-11-27 PROCEDURE — P9045 ALBUMIN (HUMAN), 5%, 250 ML: HCPCS

## 2024-11-27 PROCEDURE — 97530 THERAPEUTIC ACTIVITIES: CPT | Performed by: PHYSICAL THERAPIST

## 2024-11-27 RX ORDER — SODIUM CHLORIDE 450 MG/100ML
INJECTION, SOLUTION INTRAVENOUS CONTINUOUS
Status: DISCONTINUED | OUTPATIENT
Start: 2024-11-27 | End: 2024-11-28

## 2024-11-27 RX ORDER — ALBUMIN HUMAN 50 G/1000ML
25 SOLUTION INTRAVENOUS EVERY 8 HOURS
Status: COMPLETED | OUTPATIENT
Start: 2024-11-27 | End: 2024-11-28

## 2024-11-27 RX ORDER — POTASSIUM CHLORIDE 1500 MG/1
40 TABLET, EXTENDED RELEASE ORAL ONCE
Status: COMPLETED | OUTPATIENT
Start: 2024-11-27 | End: 2024-11-27

## 2024-11-27 RX ADMIN — WATER 1000 MG: 1 INJECTION INTRAMUSCULAR; INTRAVENOUS; SUBCUTANEOUS at 11:55

## 2024-11-27 RX ADMIN — Medication 3 MG: at 20:27

## 2024-11-27 RX ADMIN — APIXABAN 5 MG: 5 TABLET, FILM COATED ORAL at 20:27

## 2024-11-27 RX ADMIN — SODIUM CHLORIDE, PRESERVATIVE FREE 10 ML: 5 INJECTION INTRAVENOUS at 08:16

## 2024-11-27 RX ADMIN — SODIUM CHLORIDE: 4.5 INJECTION, SOLUTION INTRAVENOUS at 08:49

## 2024-11-27 RX ADMIN — SODIUM CHLORIDE, PRESERVATIVE FREE 10 ML: 5 INJECTION INTRAVENOUS at 20:32

## 2024-11-27 RX ADMIN — DIVALPROEX SODIUM 125 MG: 125 CAPSULE, COATED PELLETS ORAL at 08:16

## 2024-11-27 RX ADMIN — MIRTAZAPINE 15 MG: 15 TABLET, FILM COATED ORAL at 20:27

## 2024-11-27 RX ADMIN — ALBUMIN (HUMAN) 25 G: 12.5 INJECTION, SOLUTION INTRAVENOUS at 11:05

## 2024-11-27 RX ADMIN — DONEPEZIL HYDROCHLORIDE 10 MG: 10 TABLET, FILM COATED ORAL at 20:27

## 2024-11-27 RX ADMIN — APIXABAN 5 MG: 5 TABLET, FILM COATED ORAL at 08:16

## 2024-11-27 RX ADMIN — DIVALPROEX SODIUM 125 MG: 125 CAPSULE, COATED PELLETS ORAL at 20:27

## 2024-11-27 RX ADMIN — POTASSIUM CHLORIDE 40 MEQ: 1500 TABLET, EXTENDED RELEASE ORAL at 08:53

## 2024-11-27 RX ADMIN — DEXTROSE MONOHYDRATE: 50 INJECTION, SOLUTION INTRAVENOUS at 04:48

## 2024-11-27 RX ADMIN — SODIUM CHLORIDE: 4.5 INJECTION, SOLUTION INTRAVENOUS at 18:50

## 2024-11-27 RX ADMIN — ALBUMIN (HUMAN) 25 G: 12.5 INJECTION, SOLUTION INTRAVENOUS at 17:20

## 2024-11-27 ASSESSMENT — PAIN SCALES - GENERAL
PAINLEVEL_OUTOF10: 0

## 2024-11-27 NOTE — PROGRESS NOTES
Nephrology Progress Note   [email or web address]      Reason for consultation:  ANISH / Hypernatremia -- baseline Cr ~ 0.7-1.0 mg/dL     HPI: Dilip Bassett is a 63 yo male with a PMHx of down syndrome, alzheimer's disease, h/o colon cancer s/p partial colectomy, seizure disorder. Patient presents to  ED on 2024 from nursing home with abnormal labs. Na+ is noted to be 163 mmol/L upon admission and Cr is 2.0 mg/dL. Patient is confused and unable to provide any history. His mucus membranes are very dry. He received a 1 L NS bolus in the ED and was started on continuous NS @ 100 mL/hr briefly and then LR @ 150 mL/hr. Today, Na+ is 166 mmol/L and Cr is 1.9 mg/dL.      Ws have been consulted for ANISH and hypernatremia management. No prior to admission culprit agents noted. BP intermittently on soft side. Bladder scan was ordered and revealed PVR of 583 mL. A deleon catheter was placed. Urine looks purulent.     Subjective:    The patient has been seen and examined. Labs and chart reviewed. Resting in bed. Na+ 144 mmol/L this AM. Cr is close to baseline. BP is low.     There were not complications overnight.    Patient review of systems: DARLIN       Allergies:  Allergies   Allergen Reactions    Penicillins      rash        Scheduled Meds:   potassium chloride  40 mEq Oral Once    apixaban  5 mg Oral BID    divalproex  125 mg Oral BID    donepezil  10 mg Oral Nightly    mirtazapine  15 mg Oral Nightly    cefTRIAXone (ROCEPHIN) IV  1,000 mg IntraVENous Q24H    sodium chloride flush  5-40 mL IntraVENous 2 times per day        sodium chloride      sodium chloride         PRN Meds:sodium chloride flush, sodium chloride, ondansetron **OR** ondansetron, polyethylene glycol, acetaminophen **OR** acetaminophen, melatonin    Physical Exam:    TEMPERATURE:  Current - Temp: 98.4 °F (36.9 °C); Max - Temp  Av.8 °F (37.1 °C)  Min: 97.8 °F (36.6 °C)  Max: 99.9 °F (37.7 °C)  RESPIRATIONS RANGE: Resp  Av.4  Min: 12  Max: 25  PULSE  Date/Time    COLORU Yellow 11/25/2024 09:16 AM    PHUR 6.0 11/25/2024 09:16 AM    PHUR 7.5 01/14/2020 11:30 AM    WBCUA 630 11/25/2024 09:16 AM    RBCUA 1 11/25/2024 09:16 AM    MUCUS Present 11/25/2024 09:16 AM    BACTERIA 3+ 11/25/2024 09:16 AM    CLARITYU TURBID 11/25/2024 09:16 AM    LEUKOCYTESUR LARGE 11/25/2024 09:16 AM    UROBILINOGEN 0.2 11/25/2024 09:16 AM    BILIRUBINUR Negative 11/25/2024 09:16 AM    BLOODU SMALL 11/25/2024 09:16 AM    GLUCOSEU Negative 11/25/2024 09:16 AM         IMPRESSION/RECOMMENDATIONS:      ANISH: baseline Cr ~ 0.7-1.0 mg/dL. Etiology of ANISH likely 2/2 dehydration and urinary retention. UTI.              - Cr 2.0>1.9>1.4>1.1 mg/dL. Close to baseline.               - UA suggestive of UTI. 4 hyaline casts present. FeNa 0.4%.              - Bladder scan ordered and revealed urinary retention -- deleon catheter placed 11/25/2024              - Continue IVF -- solution changed              - Avoid nephrotoxic agents              - Daily RFTs     Hypernatremia: initial free water deficit 5.7 L.              - Na+ 163>>166>>158>>144 mmol/L              - Changed IVF to 1/2 NS               - Push PO fluids              - Recheck Na+ this afternoon and then daily if Na+ stable    Hypotension   - IVF changed to 1/2 NS   - Give albumin 5% 25g x 4 doses    Hypokalemia   - K+ low -- replacement ordered   - Recheck later this AM    UTI   - UA suggestive of UTI   - Urine cx positive for Citrobacter koseri   - On rocephin   - Management per primary     Down syndrome  Dementia     Seizure disorder              - On keppra     Will discuss with nephrology attending physician, Dr. Miranda.  See attestation for additional recommendations.    Shanice Johnson, SAUYMA - CNP

## 2024-11-27 NOTE — PLAN OF CARE
Problem: Discharge Planning  Goal: Discharge to home or other facility with appropriate resources  Outcome: Progressing     Problem: Pain  Goal: Verbalizes/displays adequate comfort level or baseline comfort level  Outcome: Progressing  Flowsheets (Taken 11/26/2024 1600 by Randi Ellis RN)  Verbalizes/displays adequate comfort level or baseline comfort level: Encourage patient to monitor pain and request assistance     Problem: Safety - Adult  Goal: Free from fall injury  Outcome: Progressing     Problem: Skin/Tissue Integrity  Goal: Absence of new skin breakdown  Description: 1.  Monitor for areas of redness and/or skin breakdown  2.  Assess vascular access sites hourly  3.  Every 4-6 hours minimum:  Change oxygen saturation probe site  4.  Every 4-6 hours:  If on nasal continuous positive airway pressure, respiratory therapy assess nares and determine need for appliance change or resting period.  Outcome: Progressing

## 2024-11-27 NOTE — PLAN OF CARE
Problem: Pain  Goal: Verbalizes/displays adequate comfort level or baseline comfort level  11/27/2024 0824 by Lyndsay Webster, RN  Outcome: Progressing     Problem: Safety - Adult  Goal: Free from fall injury  11/27/2024 0824 by Lyndsay Webster, RN  Outcome: Progressing

## 2024-11-27 NOTE — PROGRESS NOTES
NAME:  Dilip Bassett  YOB: 1960  MEDICAL RECORD NUMBER:  [id number]    Shift Summary: Pt Na now 145. Fluids running at 125. Deleon with purulent, cloudy urine. Pt unable to express needs.     Family updated: No    Rhythm: Normal Sinus Rhythm     Most recent vitals:   Visit Vitals  BP (!) 84/76   Pulse (!) 101   Temp 99.9 °F (37.7 °C) (Bladder)   Resp 22   Ht 1.676 m (5' 6\")   Wt 52.3 kg (115 lb 4.8 oz)   SpO2 96%   BMI 18.61 kg/m²           No data found.    No data found.      Respiratory support needed (if any):  - RA    Admission weight Weight - Scale: 51.6 kg (113 lb 12.1 oz) (11/24/24 1850)    Today's weight    Wt Readings from Last 1 Encounters:   11/26/24 52.3 kg (115 lb 4.8 oz)        Deleon need assessed each shift: YES -  - continue deleon r/t -    UOP >30ml/hr: YES  Last documented BM (in last 48 hrs):  Patient Vitals for the past 48 hrs:   Last BM (including prior to admit) Stool Occurrence   11/25/24 1030 11/25/24 1   11/25/24 2000 11/25/24 --   11/26/24 0800 11/25/24 --                Restraints (in use currently or dc'd in last 12 hrs): No    Order current and documentation up to date? No    Lines/Drains reviewed @ bedside.  Peripheral IV 11/24/24 Right;Anterior Forearm (Active)   Number of days: 2       Peripheral IV 11/26/24 Right Forearm (Active)   Number of days: 0       Urinary Catheter 11/25/24 Deleon-Temperature (Active)   Number of days: 1         Drip rates at handoff:    dextrose 125 mL/hr at 11/26/24 2020    sodium chloride         Lab Data:   CBC:   Recent Labs     11/25/24  0612 11/26/24  0611   WBC 11.5* 10.1   HGB 13.8 13.1*   HCT 43.1 39.8*   .5* 99.6    220     BMP:    Recent Labs     11/25/24  0501 11/25/24  1231 11/26/24  0611 11/26/24  1137 11/26/24  1852 11/26/24 2026   *   < > 158*   < > 151* 145   K 4.1  --  3.5  --   --   --    CO2 21  --  28  --   --   --    BUN 52*  --  33*  --   --   --    CREATININE 1.9*  --  1.4*  --   --   --     < > =

## 2024-11-27 NOTE — PROGRESS NOTES
Physician Progress Note      PATIENT:               JAN TUTTLE  CSN #:                  [id number]  :                       1960  ADMIT DATE:       2024 6:46 PM  DISCH DATE:  RESPONDING  PROVIDER #:        Yamilet Cabrera MD          QUERY TEXT:    Pt admitted with dehydration and has severe malnutrition documented in    RD note. Please further specify type of malnutrition with documentation in the   medical record.    The medical record reflects the following:  Risk Factors: weight loss, BMI 18.61, dehydration    Clinical Indicators:  RD consult note- Malnutrition Assessment:  Malnutrition Status:  Severe malnutrition (24 1444)  Context:  Acute Illness  Findings of the 6 clinical characteristics of malnutrition:  Energy Intake:  Unable to assess  Weight Loss:  Unable to assess (weight has flucutated over the past 5 years   from 120's up to 160's)  Body Fat Loss:  Moderate body fat loss Orbital, Buccal region  Muscle Mass Loss:  Moderate muscle mass loss Calf (gastrocnemius), Thigh   (quadriceps), Clavicles (pectoralis & deltoids)    Treatment: RD consult, Magic cup with lunch and dinner      ASPEN Criteria:    https://aspenjournals.onlinelibrary.dominguez.com/doi/full/10.1177/585326726178813  5  Options provided:  -- Severe Malnutrition  -- Other - I will add my own diagnosis  -- Disagree - Not applicable / Not valid  -- Disagree - Clinically unable to determine / Unknown  -- Refer to Clinical Documentation Reviewer    PROVIDER RESPONSE TEXT:    This patient has severe malnutrition.    Query created by: Maureen Mojica on 2024 10:39 AM      Electronically signed by:  Yamilet Cabrera MD 2024 2:48 PM

## 2024-11-27 NOTE — CARE COORDINATION
Discharge planning:     Per chart review, patient is a resident at [facility].     [facility]  [address] [place] [address]  Phone: [phone]  Fax: [phone]  Called to Emily in admissions, confirmed patient is LTC resident and can return at discharge.   Baseline: wheelchair bound with 1 person assist.   Verified with Emily if SNF is recommended patient will need precert. If LTC precert will NOT be needed for patient to return.     PT/OT to evaluate patient.     KARIS Cárdenas, LSW, Social Work/Case Management   [phone]  Electronically signed by KARIS Cárdenas on 11/27/2024 at 11:51 AM    PT/OT recommending return to long term care.     [facility]: Called to Emily #[phone], notified of plan for patient to return under LTC. And verified no precert needed for return.   Electronically signed by KARIS Cárdenas on 11/27/2024 at 2:33 PM

## 2024-11-27 NOTE — PROGRESS NOTES
PM assessment complete, VSS at this time. IV placed for more efficient fluid balance. Pt unable to express needs.

## 2024-11-27 NOTE — PROGRESS NOTES
Department of Internal Medicine  Nephrology Progress Note      HISTORY OF PRESENTING ILLNESS: A 64-year-old male with past medical history significant for Down syndrome, dementia, colon cancer status post partial colectomy, seizure disorder, nursing home resident, sent to ER because of abnormal labs. The patient was found to have a sodium of 163 with a creatinine of 2.0.     Confused / labs reviewed    Tx out of ICU ,   REVIEW OF SYSTEMS:  Limited due to pt factor     Physical Exam:    VITALS:  /62   Pulse 66   Temp 98.4 °F (36.9 °C) (Bladder)   Resp 15   Ht 1.676 m (5' 6\")   Wt 52.2 kg (115 lb 1.3 oz)   SpO2 95%   BMI 18.57 kg/m²   24HR INTAKE/OUTPUT:    Intake/Output Summary (Last 24 hours) at 11/27/2024 1110  Last data filed at 11/27/2024 0938  Gross per 24 hour   Intake 3446.9 ml   Output 1525 ml   Net 1921.9 ml       Constitutional:  Doing well  Respiratory:  CTA  Gastrointestinal:  no tenderness.  Normal Bowel Sounds  Cardiovascular:  S1, S2 RRR   Edema:  Lower Extremity has no edema    DATA:    CBC:  Lab Results   Component Value Date/Time    WBC 9.2 11/27/2024 04:46 AM    RBC 3.78 11/27/2024 04:46 AM    RBC 4.68 03/13/2017 08:13 AM    HGB 12.4 11/27/2024 04:46 AM    HCT 37.5 11/27/2024 04:46 AM    MCV 99.3 11/27/2024 04:46 AM    MCH 32.9 11/27/2024 04:46 AM    MCHC 33.1 11/27/2024 04:46 AM    RDW 13.4 11/27/2024 04:46 AM     11/27/2024 04:46 AM    MPV 11.3 11/27/2024 04:46 AM     CMP:  Lab Results   Component Value Date/Time     11/27/2024 08:27 AM    K 3.1 11/27/2024 04:46 AM    K 4.1 11/25/2024 05:01 AM     11/27/2024 04:46 AM    CO2 26 11/27/2024 04:46 AM    BUN 20 11/27/2024 04:46 AM    CREATININE 1.1 11/27/2024 04:46 AM    GFRAA >60 01/13/2020 10:17 AM    GFRAA >60 04/09/2013 11:09 AM    AGRATIO 0.6 11/24/2024 07:48 PM    LABGLOM 76 11/27/2024 04:46 AM    GLUCOSE 143 11/27/2024 04:46 AM    GLUCOSE 115 03/13/2017 08:13 AM    CALCIUM 8.0 11/27/2024 04:46 AM    BILITOT 0.3

## 2024-11-27 NOTE — PROGRESS NOTES
V2.0    [facility] [place]      Name:  Dilip Bassett /Age/Sex: 1960  (64 y.o. male)   MRN & CSN:  [id number] & [id number] Encounter Date/Time: 2024 2:48 PM EST   Location:  [id number] PCP: Oniel Beltran     Attending:Yamilet Cabrera MD       Hospital Day: 4    Assessment and Recommendations   Dilip Bassett is a 64 y.o. male who presents with Dehydration      Plan:          Hypernatremia : Likely due to dehydration with significant free water deficit. Na still trending up and started on D5W. Encourage fluid intake. Monitor BMP Q8H and in ICU. Nephrology following, Na improved from 158-->145     ANISH : Due to dehydration vs obstruction. Power catheter placed with output. Urine electrolytes ordered. Monitor Cr with BMP improved from 1.4-->1.1    Hypokalemia - replete     Abnormal UA : Suggestive of UTI. Difficult to classify as symptomatic vs asymptomatic due to patient's mentation. Started on IV ceftriaxone and will follow urine culture    History of seizure : Resume home medications and monitor    DVT prophylaxis : Eliquis for history of pulmonary embolism       Diet ADULT DIET; Dysphagia - Pureed; Mildly Thick (Nectar)  ADULT ORAL NUTRITION SUPPLEMENT; Lunch, Dinner; Frozen Oral Supplement   DVT Prophylaxis [] Lovenox, []  Heparin, [] SCDs, [] Ambulation,  [x] Eliquis, [] Xarelto  [] Coumadin   Code Status Full Code             Personally reviewed Lab Studies and Imaging       Subjective:     Transferred out of ICU, brother at bedside, pt eating and drinking fluids adequately    Review of Systems:      Pertinent positives and negatives discussed in HPI    Objective:     Intake/Output Summary (Last 24 hours) at 2024 1448  Last data filed at 2024 1419  Gross per 24 hour   Intake 2590.36 ml   Output 1300 ml   Net 1290.36 ml      Vitals:   Vitals:    24 0832 24 0847 24 0900 24 1051   BP:  97/61 98/63 101/62   Pulse: 61 61 69 66   Resp: 15 10 13 15

## 2024-11-28 LAB
ALBUMIN SERPL-MCNC: 3.4 G/DL (ref 3.4–5)
ANION GAP SERPL CALCULATED.3IONS-SCNC: 10 MMOL/L (ref 3–16)
BUN SERPL-MCNC: 13 MG/DL (ref 7–20)
CALCIUM SERPL-MCNC: 8.7 MG/DL (ref 8.3–10.6)
CHLORIDE SERPL-SCNC: 111 MMOL/L (ref 99–110)
CO2 SERPL-SCNC: 25 MMOL/L (ref 21–32)
CREAT SERPL-MCNC: 0.9 MG/DL (ref 0.8–1.3)
DEPRECATED RDW RBC AUTO: 13.4 % (ref 12.4–15.4)
GFR SERPLBLD CREATININE-BSD FMLA CKD-EPI: >90 ML/MIN/{1.73_M2}
GLUCOSE BLD-MCNC: 174 MG/DL (ref 70–99)
GLUCOSE BLD-MCNC: 209 MG/DL (ref 70–99)
GLUCOSE SERPL-MCNC: 108 MG/DL (ref 70–99)
HCT VFR BLD AUTO: 37.8 % (ref 40.5–52.5)
HGB BLD-MCNC: 12.7 G/DL (ref 13.5–17.5)
MCH RBC QN AUTO: 32.9 PG (ref 26–34)
MCHC RBC AUTO-ENTMCNC: 33.5 G/DL (ref 31–36)
MCV RBC AUTO: 98.1 FL (ref 80–100)
PERFORMED ON: ABNORMAL
PERFORMED ON: ABNORMAL
PHOSPHATE SERPL-MCNC: 2.6 MG/DL (ref 2.5–4.9)
PLATELET # BLD AUTO: 194 K/UL (ref 135–450)
PMV BLD AUTO: 11.4 FL (ref 5–10.5)
POTASSIUM SERPL-SCNC: 3.8 MMOL/L (ref 3.5–5.1)
RBC # BLD AUTO: 3.85 M/UL (ref 4.2–5.9)
SODIUM SERPL-SCNC: 146 MMOL/L (ref 136–145)
WBC # BLD AUTO: 7.2 K/UL (ref 4–11)

## 2024-11-28 PROCEDURE — 6360000002 HC RX W HCPCS

## 2024-11-28 PROCEDURE — 6370000000 HC RX 637 (ALT 250 FOR IP)

## 2024-11-28 PROCEDURE — 85027 COMPLETE CBC AUTOMATED: CPT

## 2024-11-28 PROCEDURE — 2060000000 HC ICU INTERMEDIATE R&B

## 2024-11-28 PROCEDURE — 2580000003 HC RX 258: Performed by: INTERNAL MEDICINE

## 2024-11-28 PROCEDURE — 2580000003 HC RX 258

## 2024-11-28 PROCEDURE — 94760 N-INVAS EAR/PLS OXIMETRY 1: CPT

## 2024-11-28 PROCEDURE — 2580000003 HC RX 258: Performed by: HOSPITALIST

## 2024-11-28 PROCEDURE — 6370000000 HC RX 637 (ALT 250 FOR IP): Performed by: HOSPITALIST

## 2024-11-28 PROCEDURE — 36415 COLL VENOUS BLD VENIPUNCTURE: CPT

## 2024-11-28 PROCEDURE — P9045 ALBUMIN (HUMAN), 5%, 250 ML: HCPCS

## 2024-11-28 PROCEDURE — 80069 RENAL FUNCTION PANEL: CPT

## 2024-11-28 RX ORDER — DEXTROSE MONOHYDRATE 50 MG/ML
INJECTION, SOLUTION INTRAVENOUS CONTINUOUS
Status: DISCONTINUED | OUTPATIENT
Start: 2024-11-28 | End: 2024-11-29

## 2024-11-28 RX ADMIN — SODIUM CHLORIDE, PRESERVATIVE FREE 10 ML: 5 INJECTION INTRAVENOUS at 08:48

## 2024-11-28 RX ADMIN — WATER 1000 MG: 1 INJECTION INTRAMUSCULAR; INTRAVENOUS; SUBCUTANEOUS at 12:20

## 2024-11-28 RX ADMIN — DEXTROSE MONOHYDRATE: 50 INJECTION, SOLUTION INTRAVENOUS at 20:39

## 2024-11-28 RX ADMIN — ALBUMIN (HUMAN) 25 G: 12.5 INJECTION, SOLUTION INTRAVENOUS at 03:08

## 2024-11-28 RX ADMIN — Medication 3 MG: at 22:04

## 2024-11-28 RX ADMIN — DEXTROSE MONOHYDRATE: 50 INJECTION, SOLUTION INTRAVENOUS at 16:13

## 2024-11-28 RX ADMIN — DIVALPROEX SODIUM 125 MG: 125 CAPSULE, COATED PELLETS ORAL at 08:48

## 2024-11-28 RX ADMIN — DIVALPROEX SODIUM 125 MG: 125 CAPSULE, COATED PELLETS ORAL at 21:05

## 2024-11-28 RX ADMIN — ALBUMIN (HUMAN) 25 G: 12.5 INJECTION, SOLUTION INTRAVENOUS at 08:51

## 2024-11-28 RX ADMIN — APIXABAN 5 MG: 5 TABLET, FILM COATED ORAL at 08:48

## 2024-11-28 RX ADMIN — APIXABAN 5 MG: 5 TABLET, FILM COATED ORAL at 21:05

## 2024-11-28 RX ADMIN — DONEPEZIL HYDROCHLORIDE 10 MG: 10 TABLET, FILM COATED ORAL at 21:05

## 2024-11-28 RX ADMIN — SODIUM CHLORIDE: 4.5 INJECTION, SOLUTION INTRAVENOUS at 05:26

## 2024-11-28 RX ADMIN — MIRTAZAPINE 15 MG: 15 TABLET, FILM COATED ORAL at 21:05

## 2024-11-28 ASSESSMENT — PAIN SCALES - GENERAL: PAINLEVEL_OUTOF10: 0

## 2024-11-28 NOTE — PROGRESS NOTES
V2.0    [facility] Progress Note      Name:  Dilip Bassett /Age/Sex: 1960  (64 y.o. male)   MRN & CSN:  [id number] & [id number] Encounter Date/Time: 2024 2:05 PM EST   Location:  [id number] PCP: Oniel Beltran     Attending:Yamilet Cabrera MD       Hospital Day: 5    Assessment and Recommendations   Dilip Bassett is a 64 y.o. malewho presents with Dehydration      Plan:     Hypernatremia : Likely due to dehydration with significant free water deficit. Na still trending up and started on D5W. Encourage fluid intake. Monitor BMP Q8H and in ICU. Nephrology following, Na improved from 158-->145-->146     ANISH : Due to dehydration vs obstruction. Power catheter placed with output. Urine electrolytes ordered. Monitor Cr with BMP improved from 1.4-->1.1-->0.9     Hypokalemia - replete     Abnormal UA : Suggestive of UTI. Difficult to classify as symptomatic vs asymptomatic due to patient's mentation. Started on IV ceftriaxone and urine culture reviewed, complete IV abx     History of seizure : Resume home medications and monitor     DVT prophylaxis : Eliquis for history of pulmonary embolism       Diet ADULT DIET; Dysphagia - Pureed; Mildly Thick (Nectar)  ADULT ORAL NUTRITION SUPPLEMENT; Lunch, Dinner; Frozen Oral Supplement   DVT Prophylaxis [] Lovenox, []  Heparin, [] SCDs, [] Ambulation,  [x] Eliquis, [] Xarelto  [] Coumadin   Code Status Full Code             Personally reviewed Lab Studies and Imaging       Subjective:       No acute events overnight, Na improved, creat stable, tolerating diet    Review of Systems:      Pertinent positives and negatives discussed in HPI    Objective:     Intake/Output Summary (Last 24 hours) at 2024 1405  Last data filed at 2024 1015  Gross per 24 hour   Intake 600 ml   Output 3100 ml   Net -2500 ml      Vitals:   Vitals:    24 1908 24 0420 24 0723 24 1158   BP: 112/84  111/62    Pulse: 92  88 88   Resp: 19  16 18

## 2024-11-28 NOTE — PROGRESS NOTES
Department of Internal Medicine  Nephrology Progress Note      HISTORY OF PRESENTING ILLNESS: A 64-year-old male with past medical history significant for Down syndrome, dementia, colon cancer status post partial colectomy, seizure disorder, nursing home resident, sent to ER because of abnormal labs. The patient was found to have a sodium of 163 with a creatinine of 2.0.     Confused / labs reviewed    Tx out of ICU .   REVIEW OF SYSTEMS:  Limited due to pt factor   Family at bed side   Physical Exam:    VITALS:  /62   Pulse 88   Temp 98.1 °F (36.7 °C) (Axillary)   Resp 18   Ht 1.676 m (5' 6\")   Wt 61 kg (134 lb 7.7 oz)   SpO2 95%   BMI 21.71 kg/m²   24HR INTAKE/OUTPUT:    Intake/Output Summary (Last 24 hours) at 11/28/2024 1456  Last data filed at 11/28/2024 1015  Gross per 24 hour   Intake 360 ml   Output 3100 ml   Net -2740 ml       Constitutional:  Doing well  Respiratory:  CTA  Gastrointestinal:  no tenderness.  Normal Bowel Sounds  Cardiovascular:  S1, S2 RRR   Edema:  Lower Extremity has no edema    DATA:    CBC:  Lab Results   Component Value Date/Time    WBC 7.2 11/28/2024 06:39 AM    RBC 3.85 11/28/2024 06:39 AM    RBC 4.68 03/13/2017 08:13 AM    HGB 12.7 11/28/2024 06:39 AM    HCT 37.8 11/28/2024 06:39 AM    MCV 98.1 11/28/2024 06:39 AM    MCH 32.9 11/28/2024 06:39 AM    MCHC 33.5 11/28/2024 06:39 AM    RDW 13.4 11/28/2024 06:39 AM     11/28/2024 06:39 AM    MPV 11.4 11/28/2024 06:39 AM     CMP:  Lab Results   Component Value Date/Time     11/28/2024 06:39 AM    K 3.8 11/28/2024 06:39 AM    K 4.1 11/25/2024 05:01 AM     11/28/2024 06:39 AM    CO2 25 11/28/2024 06:39 AM    BUN 13 11/28/2024 06:39 AM    CREATININE 0.9 11/28/2024 06:39 AM    GFRAA >60 01/13/2020 10:17 AM    GFRAA >60 04/09/2013 11:09 AM    AGRATIO 0.6 11/24/2024 07:48 PM    LABGLOM >90 11/28/2024 06:39 AM    GLUCOSE 108 11/28/2024 06:39 AM    GLUCOSE 115 03/13/2017 08:13 AM    CALCIUM 8.7 11/28/2024 06:39 AM     BILITOT 0.3 11/24/2024 07:48 PM    ALKPHOS 71 11/24/2024 07:48 PM    AST 35 11/24/2024 07:48 PM    ALT 31 11/24/2024 07:48 PM      Hepatic Function Panel:   Lab Results   Component Value Date/Time    ALKPHOS 71 11/24/2024 07:48 PM    ALT 31 11/24/2024 07:48 PM    AST 35 11/24/2024 07:48 PM    BILITOT 0.3 11/24/2024 07:48 PM    BILIDIR <0.2 01/09/2020 04:31 AM    IBILI see below 01/09/2020 04:31 AM      Phosphorus:   Lab Results   Component Value Date/Time    PHOS 2.6 11/28/2024 06:39 AM       ASSESSMENT:  Principal Problem:    Dehydration  Active Problems:    Down's syndrome    Seizure disorder (HCC)    ANISH (acute kidney injury) (HCC)    Right pulmonary embolus (HCC)    Leukocytosis    Severe malnutrition (HCC)  Resolved Problems:    * No resolved hospital problems. *      PLAN:  1. Acute kidney injury.  Etiology, dehydration/urinary retention.     Resolved with IVF .   2. Hypernatremia, most likely free water deficit greater than 5.5 L.  Na noted ,IVF adjusted .   3. History of Down syndrome.  4. History of dementia.  5. History of seizure disorder.  Supportive care.  We will follow the patient from renal standpoint.    Pts family updated .        Isiah Miranda MD, FACP

## 2024-11-28 NOTE — PLAN OF CARE
Problem: Discharge Planning  Goal: Discharge to home or other facility with appropriate resources  Outcome: Progressing     Problem: Pain  Goal: Verbalizes/displays adequate comfort level or baseline comfort level  11/27/2024 2034 by Emigdio Ansari RN  Outcome: Progressing     Problem: Safety - Adult  Goal: Free from fall injury  11/27/2024 2034 by Emigdio Ansari RN  Outcome: Progressing     Problem: Skin/Tissue Integrity  Goal: Absence of new skin breakdown  Description: 1.  Monitor for areas of redness and/or skin breakdown  2.  Assess vascular access sites hourly  3.  Every 4-6 hours minimum:  Change oxygen saturation probe site  4.  Every 4-6 hours:  If on nasal continuous positive airway pressure, respiratory therapy assess nares and determine need for appliance change or resting period.  Outcome: Progressing     Problem: Neurosensory - Adult  Goal: Achieves maximal functionality and self care  Outcome: Progressing     Problem: Cardiovascular - Adult  Goal: Maintains optimal cardiac output and hemodynamic stability  Outcome: Progressing     Problem: Skin/Tissue Integrity - Adult  Goal: Skin integrity remains intact  Outcome: Progressing  Flowsheets (Taken 11/27/2024 0824 by Lyndsay Webster, RN)  Skin Integrity Remains Intact:   Monitor for areas of redness and/or skin breakdown   Assess vascular access sites hourly     Problem: Musculoskeletal - Adult  Goal: Return ADL status to a safe level of function  Outcome: Progressing     Problem: Genitourinary - Adult  Goal: Urinary catheter remains patent  Outcome: Progressing     Problem: Metabolic/Fluid and Electrolytes - Adult  Goal: Electrolytes maintained within normal limits  Outcome: Progressing  Flowsheets (Taken 11/27/2024 0800 by Lyndsay Webster, RN)  Electrolytes maintained within normal limits: Monitor labs and assess patient for signs and symptoms of electrolyte imbalances

## 2024-11-29 LAB
ALBUMIN SERPL-MCNC: 3.1 G/DL (ref 3.4–5)
ANION GAP SERPL CALCULATED.3IONS-SCNC: 9 MMOL/L (ref 3–16)
BUN SERPL-MCNC: 14 MG/DL (ref 7–20)
CALCIUM SERPL-MCNC: 8.5 MG/DL (ref 8.3–10.6)
CHLORIDE SERPL-SCNC: 110 MMOL/L (ref 99–110)
CO2 SERPL-SCNC: 24 MMOL/L (ref 21–32)
CREAT SERPL-MCNC: 1 MG/DL (ref 0.8–1.3)
DEPRECATED RDW RBC AUTO: 13.6 % (ref 12.4–15.4)
GFR SERPLBLD CREATININE-BSD FMLA CKD-EPI: 85 ML/MIN/{1.73_M2}
GLUCOSE BLD-MCNC: 138 MG/DL (ref 70–99)
GLUCOSE BLD-MCNC: 146 MG/DL (ref 70–99)
GLUCOSE BLD-MCNC: 152 MG/DL (ref 70–99)
GLUCOSE BLD-MCNC: 176 MG/DL (ref 70–99)
GLUCOSE BLD-MCNC: 176 MG/DL (ref 70–99)
GLUCOSE BLD-MCNC: 320 MG/DL (ref 70–99)
GLUCOSE SERPL-MCNC: 170 MG/DL (ref 70–99)
HCT VFR BLD AUTO: 36.3 % (ref 40.5–52.5)
HGB BLD-MCNC: 12.3 G/DL (ref 13.5–17.5)
MCH RBC QN AUTO: 33.1 PG (ref 26–34)
MCHC RBC AUTO-ENTMCNC: 33.8 G/DL (ref 31–36)
MCV RBC AUTO: 97.9 FL (ref 80–100)
PERFORMED ON: ABNORMAL
PHOSPHATE SERPL-MCNC: 2.8 MG/DL (ref 2.5–4.9)
PLATELET # BLD AUTO: 207 K/UL (ref 135–450)
PMV BLD AUTO: 11.4 FL (ref 5–10.5)
POTASSIUM SERPL-SCNC: 3.7 MMOL/L (ref 3.5–5.1)
RBC # BLD AUTO: 3.71 M/UL (ref 4.2–5.9)
SODIUM SERPL-SCNC: 143 MMOL/L (ref 136–145)
WBC # BLD AUTO: 9.6 K/UL (ref 4–11)

## 2024-11-29 PROCEDURE — 2580000003 HC RX 258: Performed by: HOSPITALIST

## 2024-11-29 PROCEDURE — 2060000000 HC ICU INTERMEDIATE R&B

## 2024-11-29 PROCEDURE — 6370000000 HC RX 637 (ALT 250 FOR IP)

## 2024-11-29 PROCEDURE — 6370000000 HC RX 637 (ALT 250 FOR IP): Performed by: INTERNAL MEDICINE

## 2024-11-29 PROCEDURE — 85027 COMPLETE CBC AUTOMATED: CPT

## 2024-11-29 PROCEDURE — 94760 N-INVAS EAR/PLS OXIMETRY 1: CPT

## 2024-11-29 PROCEDURE — 80069 RENAL FUNCTION PANEL: CPT

## 2024-11-29 PROCEDURE — 36415 COLL VENOUS BLD VENIPUNCTURE: CPT

## 2024-11-29 PROCEDURE — 2580000003 HC RX 258: Performed by: INTERNAL MEDICINE

## 2024-11-29 PROCEDURE — 97530 THERAPEUTIC ACTIVITIES: CPT

## 2024-11-29 PROCEDURE — 97530 THERAPEUTIC ACTIVITIES: CPT | Performed by: PHYSICAL THERAPIST

## 2024-11-29 PROCEDURE — 6370000000 HC RX 637 (ALT 250 FOR IP): Performed by: HOSPITALIST

## 2024-11-29 RX ORDER — HALOPERIDOL 5 MG/ML
2 INJECTION INTRAMUSCULAR EVERY 6 HOURS PRN
Status: DISCONTINUED | OUTPATIENT
Start: 2024-11-29 | End: 2024-12-03 | Stop reason: HOSPADM

## 2024-11-29 RX ORDER — 0.9 % SODIUM CHLORIDE 0.9 %
500 INTRAVENOUS SOLUTION INTRAVENOUS ONCE
Status: COMPLETED | OUTPATIENT
Start: 2024-11-29 | End: 2024-11-29

## 2024-11-29 RX ORDER — DEXTROSE MONOHYDRATE 50 MG/ML
INJECTION, SOLUTION INTRAVENOUS CONTINUOUS
Status: DISCONTINUED | OUTPATIENT
Start: 2024-11-29 | End: 2024-12-01

## 2024-11-29 RX ORDER — INSULIN GLARGINE 100 [IU]/ML
10 INJECTION, SOLUTION SUBCUTANEOUS ONCE
Status: COMPLETED | OUTPATIENT
Start: 2024-11-29 | End: 2024-11-29

## 2024-11-29 RX ORDER — CEFUROXIME AXETIL 250 MG/1
500 TABLET ORAL EVERY 12 HOURS SCHEDULED
Status: DISCONTINUED | OUTPATIENT
Start: 2024-11-29 | End: 2024-11-30

## 2024-11-29 RX ADMIN — SODIUM CHLORIDE, PRESERVATIVE FREE 10 ML: 5 INJECTION INTRAVENOUS at 21:06

## 2024-11-29 RX ADMIN — SODIUM CHLORIDE, PRESERVATIVE FREE 10 ML: 5 INJECTION INTRAVENOUS at 08:21

## 2024-11-29 RX ADMIN — DIVALPROEX SODIUM 125 MG: 125 CAPSULE, COATED PELLETS ORAL at 08:30

## 2024-11-29 RX ADMIN — DONEPEZIL HYDROCHLORIDE 10 MG: 10 TABLET, FILM COATED ORAL at 21:06

## 2024-11-29 RX ADMIN — DIVALPROEX SODIUM 125 MG: 125 CAPSULE, COATED PELLETS ORAL at 21:06

## 2024-11-29 RX ADMIN — SODIUM CHLORIDE 500 ML: 9 INJECTION, SOLUTION INTRAVENOUS at 18:36

## 2024-11-29 RX ADMIN — CEFUROXIME AXETIL 500 MG: 250 TABLET ORAL at 10:42

## 2024-11-29 RX ADMIN — INSULIN GLARGINE 10 UNITS: 100 INJECTION, SOLUTION SUBCUTANEOUS at 19:05

## 2024-11-29 RX ADMIN — DEXTROSE MONOHYDRATE: 50 INJECTION, SOLUTION INTRAVENOUS at 19:08

## 2024-11-29 RX ADMIN — CEFUROXIME AXETIL 500 MG: 250 TABLET ORAL at 21:05

## 2024-11-29 RX ADMIN — MIRTAZAPINE 15 MG: 15 TABLET, FILM COATED ORAL at 21:06

## 2024-11-29 RX ADMIN — Medication 3 MG: at 21:06

## 2024-11-29 RX ADMIN — ACETAMINOPHEN 650 MG: 325 TABLET ORAL at 21:05

## 2024-11-29 RX ADMIN — APIXABAN 5 MG: 5 TABLET, FILM COATED ORAL at 21:06

## 2024-11-29 RX ADMIN — APIXABAN 5 MG: 5 TABLET, FILM COATED ORAL at 08:30

## 2024-11-29 ASSESSMENT — PAIN SCALES - GENERAL
PAINLEVEL_OUTOF10: 0

## 2024-11-29 ASSESSMENT — PAIN SCALES - PAIN ASSESSMENT IN ADVANCED DEMENTIA (PAINAD)
CONSOLABILITY: NO NEED TO CONSOLE
BREATHING: NORMAL
FACIALEXPRESSION: SMILING OR INEXPRESSIVE
TOTALSCORE: 0
BODYLANGUAGE: RELAXED

## 2024-11-29 NOTE — PROGRESS NOTES
Department of Internal Medicine  Nephrology Progress Note      HISTORY OF PRESENTING ILLNESS: A 64-year-old male with past medical history significant for Down syndrome, dementia, colon cancer status post partial colectomy, seizure disorder, nursing home resident, sent to ER because of abnormal labs. The patient was found to have a sodium of 163 with a creatinine of 2.0.     Confused / labs reviewed      REVIEW OF SYSTEMS:  Limited due to pt factor   No Family at bed side   Physical Exam:    VITALS:  BP (!) 90/55   Pulse 78   Temp 97.3 °F (36.3 °C) (Axillary)   Resp 22   Ht 1.676 m (5' 6\")   Wt 58.7 kg (129 lb 6.6 oz)   SpO2 93%   BMI 20.89 kg/m²   24HR INTAKE/OUTPUT:    Intake/Output Summary (Last 24 hours) at 11/29/2024 1321  Last data filed at 11/29/2024 1137  Gross per 24 hour   Intake 20 ml   Output 2150 ml   Net -2130 ml       Constitutional:  Doing well  Respiratory:  CTA  Gastrointestinal:  no tenderness.  Normal Bowel Sounds  Cardiovascular:  S1, S2 RRR   Edema:  Lower Extremity has no edema    DATA:    CBC:  Lab Results   Component Value Date/Time    WBC 9.6 11/29/2024 05:07 AM    RBC 3.71 11/29/2024 05:07 AM    RBC 4.68 03/13/2017 08:13 AM    HGB 12.3 11/29/2024 05:07 AM    HCT 36.3 11/29/2024 05:07 AM    MCV 97.9 11/29/2024 05:07 AM    MCH 33.1 11/29/2024 05:07 AM    MCHC 33.8 11/29/2024 05:07 AM    RDW 13.6 11/29/2024 05:07 AM     11/29/2024 05:07 AM    MPV 11.4 11/29/2024 05:07 AM     CMP:  Lab Results   Component Value Date/Time     11/29/2024 05:07 AM    K 3.7 11/29/2024 05:07 AM    K 4.1 11/25/2024 05:01 AM     11/29/2024 05:07 AM    CO2 24 11/29/2024 05:07 AM    BUN 14 11/29/2024 05:07 AM    CREATININE 1.0 11/29/2024 05:07 AM    GFRAA >60 01/13/2020 10:17 AM    GFRAA >60 04/09/2013 11:09 AM    AGRATIO 0.6 11/24/2024 07:48 PM    LABGLOM 85 11/29/2024 05:07 AM    GLUCOSE 170 11/29/2024 05:07 AM    GLUCOSE 115 03/13/2017 08:13 AM    CALCIUM 8.5 11/29/2024 05:07 AM    BILITOT

## 2024-11-29 NOTE — PROGRESS NOTES
age over 65    Estimated Daily Nutrient Needs:  Energy Requirements Based On: Kcal/kg  Weight Used for Energy Requirements: Current  Energy (kcal/day): 8990-8010 (30-35 kcal/52.3 kg)  Weight Used for Protein Requirements: Current  Protein (g/day): 78-94 (1.5-1.8 g/52.3 kg)  Method Used for Fluid Requirements: 1 ml/kcal  Fluid (ml/day):      Nutrition Diagnosis:   Severe malnutrition, in context of acute illness or injury related to inadequate protein-energy intake as evidenced by lab values, criteria as identified in malnutrition assessment, muscle loss, loss of subcutaneous fat    Nutrition Interventions:   Food and/or Nutrient Delivery: Continue Current Diet, Continue Oral Nutrition Supplement     Coordination of Nutrition Care: Continue to monitor while inpatient       Goals:  Goals: PO intake 75% or greater          Nutrition Monitoring and Evaluation:      Food/Nutrient Intake Outcomes: Food and Nutrient Intake, Supplement Intake  Physical Signs/Symptoms Outcomes: Biochemical Data, Nutrition Focused Physical Findings    Discharge Planning:    Too soon to determine     RINKU SANDOVAL RD, LD  Contact: Office: [phone]; Maximilian: [phone]

## 2024-11-29 NOTE — PLAN OF CARE
Problem: Discharge Planning  Goal: Discharge to home or other facility with appropriate resources  Outcome: Progressing     Problem: Pain  Goal: Verbalizes/displays adequate comfort level or baseline comfort level  Outcome: Progressing     Problem: Safety - Adult  Goal: Free from fall injury  Outcome: Progressing     Problem: Skin/Tissue Integrity  Goal: Absence of new skin breakdown  Description: 1.  Monitor for areas of redness and/or skin breakdown  2.  Assess vascular access sites hourly  3.  Every 4-6 hours minimum:  Change oxygen saturation probe site  4.  Every 4-6 hours:  If on nasal continuous positive airway pressure, respiratory therapy assess nares and determine need for appliance change or resting period.  Outcome: Progressing     Problem: Neurosensory - Adult  Goal: Achieves maximal functionality and self care  Outcome: Progressing     Problem: Cardiovascular - Adult  Goal: Maintains optimal cardiac output and hemodynamic stability  Outcome: Progressing     Problem: Skin/Tissue Integrity - Adult  Goal: Skin integrity remains intact  Outcome: Progressing     Problem: Musculoskeletal - Adult  Goal: Return ADL status to a safe level of function  Outcome: Progressing     Problem: Genitourinary - Adult  Goal: Urinary catheter remains patent  Outcome: Progressing     Problem: Metabolic/Fluid and Electrolytes - Adult  Goal: Electrolytes maintained within normal limits  Outcome: Progressing     Problem: Safety - Medical Restraint  Goal: Remains free of injury from restraints (Restraint for Interference with Medical Device)  Description: INTERVENTIONS:  1. Determine that other, less restrictive measures have been tried or would not be effective before applying the restraint  2. Evaluate the patient's condition at the time of restraint application  3. Inform patient/family regarding the reason for restraint  4. Q2H: Monitor safety, psychosocial status, comfort, nutrition and hydration  Outcome: Progressing

## 2024-11-29 NOTE — PROGRESS NOTES
Physical Therapy  Facility/Department: [address] PROGRESSIVE CARE  Physical Therapy Treatment Note    Name: Dilip Bassett  : 1960  MRN: [id number]  Date of Service: 2024    Discharge Recommendations:  Long Term Care with PT, Long Term Care without PT (at discretion of facility)   PT Equipment Recommendations  Equipment Needed: No      Dilip Bassett scored a 8/24 on the AM-PAC short mobility form. Current research shows that an AM-PAC score of 18 or greater is typically associated with a discharge to the patient's home setting. At this time recommend pt return to LTC facility with or without therapy at discretion of facility.  Please see assessment section for further patient specific details.    If patient discharges prior to next session this note will serve as a discharge summary.  Please see below for the latest assessment towards goals.       Patient Diagnosis(es): The primary encounter diagnosis was Hypernatremia. A diagnosis of ANISH (acute kidney injury) (HCC) was also pertinent to this visit.  Past Medical History:  has a past medical history of Acne rosacea, Cognitive impairment, Colon cancer (HCC), Down syndrome, Gout, HIGH CHOLESTEROL, History of diarrhea, Macrocytosis, Seborrheic dermatitis, Seizure disorder (HCC), Status post biventricular cardiac pacemaker insertion, Status post partial colectomy, Syncope and collapse, and Weight loss.  Past Surgical History:  has a past surgical history that includes Cataract removal with implant (); Colon surgery (2014); Colonoscopy (2017); pacemaker placement; Colonoscopy (N/A, 2019); and Small intestine surgery (N/A, 2019).    Assessment  Body Structures, Functions, Activity Limitations Requiring Skilled Therapeutic Intervention: Decreased functional mobility   Assessment: pt is a 63 yo male with downs syndrome who was adm to hosp from his LTC facility with dehydration, ANISH and possible UTI.  Per facility pt is w/c bound but  11/29/2024 at 10:25 AM

## 2024-11-29 NOTE — PROGRESS NOTES
V2.0    [facility] [place]      Name:  Dilip Bassett /Age/Sex: 1960  (64 y.o. male)   MRN & CSN:  [id number] & [id number] Encounter Date/Time: 2024 1:31 PM EST   Location:  [id number] PCP: Oniel Beltran     Attending:Yamilet Cabrera MD       Hospital Day: 6    Assessment and Recommendations   Dilip Bassett is a 64 y.o. male who presents with Dehydration      Plan:      Hypernatremia : Likely due to dehydration with significant free water deficit. Na still trending up and started on D5W. Encourage fluid intake. Monitor BMP Q8H and in ICU. Nephrology following, Na improved from 158-->145-->146, nephro started pt on d5     ANISH : Due to dehydration vs obstruction. Power catheter placed with output. Urine electrolytes ordered. Monitor Cr with BMP improved from 1.4-->1.1-->0.9     Hypokalemia - replete     Abnormal UA : Suggestive of UTI. Difficult to classify as symptomatic vs asymptomatic due to patient's mentation. Started on IV ceftriaxone and urine culture reviewed, complete IV abx     History of seizure : Resume home medications and monitor     DVT prophylaxis : Eliquis for history of pulmonary embolism       Diet ADULT DIET; Dysphagia - Pureed; Mildly Thick (Nectar)  ADULT ORAL NUTRITION SUPPLEMENT; Lunch, Dinner; Frozen Oral Supplement   DVT Prophylaxis [] Lovenox, []  Heparin, [] SCDs, [] Ambulation,  [x] Eliquis, [] Xarelto  [] Coumadin   Code Status Full Code             Personally reviewed Lab Studies and Imaging       Subjective:     Pt has been more lethargic today, not had his breakfast much and hasn't been drinking PO liquids    Review of Systems:      Pertinent positives and negatives discussed in HPI    Objective:     Intake/Output Summary (Last 24 hours) at 2024 1331  Last data filed at 2024 1137  Gross per 24 hour   Intake 20 ml   Output 2150 ml   Net -2130 ml      Vitals:   Vitals:    24 0300 24 0740 24 0850 24 1045   BP: 102/63      Hemoglobin A1C:   Lab Results   Component Value Date/Time    LABA1C 5.6 12/19/2019 09:00 AM     TSH:   Lab Results   Component Value Date/Time    TSH 1.41 12/09/2019 10:14 AM    TSH 1.24 11/15/2013 11:22 AM     Troponin: No results found for: \"TROPONINT\"  Lactic Acid: No results for input(s): \"LACTA\" in the last 72 hours.  BNP: No results for input(s): \"PROBNP\" in the last 72 hours.  UA:  Lab Results   Component Value Date/Time    NITRU Negative 11/25/2024 09:16 AM    COLORU Yellow 11/25/2024 09:16 AM    PHUR 6.0 11/25/2024 09:16 AM    PHUR 7.5 01/14/2020 11:30 AM    WBCUA 630 11/25/2024 09:16 AM    RBCUA 1 11/25/2024 09:16 AM    MUCUS Present 11/25/2024 09:16 AM    BACTERIA 3+ 11/25/2024 09:16 AM    CLARITYU TURBID 11/25/2024 09:16 AM    LEUKOCYTESUR LARGE 11/25/2024 09:16 AM    UROBILINOGEN 0.2 11/25/2024 09:16 AM    BILIRUBINUR Negative 11/25/2024 09:16 AM    BLOODU SMALL 11/25/2024 09:16 AM    GLUCOSEU Negative 11/25/2024 09:16 AM    KETUA Negative 11/25/2024 09:16 AM     Urine Cultures:   Lab Results   Component Value Date/Time    LABURIN >100,000 CFU/ml 11/25/2024 09:16 AM     Blood Cultures:   Lab Results   Component Value Date/Time    BC No Growth after 4 days of incubation. 01/07/2020 09:29 AM     Lab Results   Component Value Date/Time    BLOODCULT2 No Growth after 4 days of incubation. 01/07/2020 09:25 AM     Organism:   Lab Results   Component Value Date/Time    ORG Citrobacter koseri 11/25/2024 09:16 AM         Electronically signed by Yamilet Cabrera MD on 11/29/2024 at 1:31 PM

## 2024-11-29 NOTE — PROGRESS NOTES
Occupational Therapy  Facility/Department: [facility] PROGRESSIVE CARE  Occupational Therapy Daily Assessment    Name: Dilip Bassett  : 1960  MRN: [id number]  Date of Service: 2024    Discharge Recommendations:  Long Term Care without OT, Long Term Care with OT (discretion of ECF)  OT Equipment Recommendations  Equipment Needed: No     Dilip Bassett scored a 10/24 on the AM-PAC ADL Inpatient form.  Recommend return to ECF with/without therapy at discretion of ECF.       Patient Diagnosis(es): The primary encounter diagnosis was Hypernatremia. A diagnosis of ANISH (acute kidney injury) (HCC) was also pertinent to this visit.  Past Medical History:  has a past medical history of Acne rosacea, Cognitive impairment, Colon cancer (HCC), Down syndrome, Gout, HIGH CHOLESTEROL, History of diarrhea, Macrocytosis, Seborrheic dermatitis, Seizure disorder (HCC), Status post biventricular cardiac pacemaker insertion, Status post partial colectomy, Syncope and collapse, and Weight loss.  Past Surgical History:  has a past surgical history that includes Cataract removal with implant (); Colon surgery (2014); Colonoscopy (2017); pacemaker placement; Colonoscopy (N/A, 2019); and Small intestine surgery (N/A, 2019).    Assessment  Performance deficits / Impairments: Decreased functional mobility ;Decreased ADL status;Decreased endurance;Decreased balance  Assessment: Pt is a 65 y/o male w/PMHx: Down syndrome; Alzheimer disease; colon cancer status post bowel resection; permanent pacemaker admitted d/t dehydration/low sodium levels. PTA- pt resides at [facility] and completed fxl transfers <> w/c w/1 person assist from staff, received assistance w/ADLs, was able to feed self w/Set-up A (pt's brother present at bedside to provide PLOF information as pt is a poor historian). Today, pt required mod/max A x 2 for bed mobility and min/mod A for sitting balance EOB. Pt appears to lack full jose m  functional limits    Cognition  Overall Cognitive Status: Exceptions  Arousal/Alertness: Delayed responses to stimuli  Following Commands: Inconsistently follows commands  Attention Span: Difficulty dividing attention;Difficulty attending to directions  Memory: Decreased recall of recent events;Decreased short term memory;Decreased recall of biographical Information;Decreased recall of precautions  Safety Judgement: Impaired  Problem Solving: Impaired  Insights: Impaired  Initiation: Impaired  Sequencing: Impaired  Cognition Comment: pt w/diagnoses of down syndrome and dementia  Orientation  Overall Orientation Status: Impaired  Orientation Level: Disoriented to situation;Disoriented to time;Disoriented to place;Disoriented to person (oriented to self)         Static Sitting Balance Exercises: sitting EOB with min A ~ 5-6 min  Dynamic Sitting Balance Exercises: min/mod A when attempting activities; pt often resisting movement    Education Given To: Patient  Education Provided: Role of Therapy;Plan of Care  Education Method: Verbal;Demonstration  Barriers to Learning: Cognition  Education Outcome: Continued education needed;Unable to demonstrate understanding;Unable to verbalize     AM-PAC - ADL  AM-PAC Daily Activity - Inpatient   How much help is needed for putting on and taking off regular lower body clothing?: Total  How much help is needed for bathing (which includes washing, rinsing, drying)?: Total  How much help is needed for toileting (which includes using toilet, bedpan, or urinal)?: Total  How much help is needed for putting on and taking off regular upper body clothing?: A Lot  How much help is needed for taking care of personal grooming?: A Lot  How much help for eating meals?: A Little  AM-[facility] Inpatient Daily Activity Raw Score: 10  AM-PAC Inpatient ADL T-Scale Score : 27.31  ADL Inpatient CMS 0-100% Score: 74.7  ADL Inpatient CMS G-Code Modifier : CL      Goals  Short Term Goals  Time Frame for Short

## 2024-11-29 NOTE — CARE COORDINATION
DISCHARGE PLANNING:    DC plan to return to [facility].  No precert required.  Will need transport.  If patient requires SNF, would need a precert.  trudy OCONNELL neph recs.    #[phone]  Electronically signed by Rashmi Baxter RN on 11/29/2024 at 3:11 PM

## 2024-11-30 ENCOUNTER — APPOINTMENT (OUTPATIENT)
Dept: GENERAL RADIOLOGY | Age: 64
DRG: 640 | End: 2024-11-30
Payer: COMMERCIAL

## 2024-11-30 LAB
ALBUMIN SERPL-MCNC: 2.9 G/DL (ref 3.4–5)
ALBUMIN SERPL-MCNC: 3 G/DL (ref 3.4–5)
ALBUMIN/GLOB SERPL: 0.9 {RATIO} (ref 1.1–2.2)
ALP SERPL-CCNC: 48 U/L (ref 40–129)
ALT SERPL-CCNC: 23 U/L (ref 10–40)
ANION GAP SERPL CALCULATED.3IONS-SCNC: 11 MMOL/L (ref 3–16)
ANION GAP SERPL CALCULATED.3IONS-SCNC: 7 MMOL/L (ref 3–16)
AST SERPL-CCNC: 40 U/L (ref 15–37)
BILIRUB SERPL-MCNC: 0.4 MG/DL (ref 0–1)
BUN SERPL-MCNC: 14 MG/DL (ref 7–20)
BUN SERPL-MCNC: 15 MG/DL (ref 7–20)
CALCIUM SERPL-MCNC: 7.8 MG/DL (ref 8.3–10.6)
CALCIUM SERPL-MCNC: 8.2 MG/DL (ref 8.3–10.6)
CHLORIDE SERPL-SCNC: 109 MMOL/L (ref 99–110)
CHLORIDE SERPL-SCNC: 112 MMOL/L (ref 99–110)
CO2 SERPL-SCNC: 22 MMOL/L (ref 21–32)
CO2 SERPL-SCNC: 23 MMOL/L (ref 21–32)
CREAT SERPL-MCNC: 1.1 MG/DL (ref 0.8–1.3)
CREAT SERPL-MCNC: 1.3 MG/DL (ref 0.8–1.3)
DEPRECATED RDW RBC AUTO: 13.5 % (ref 12.4–15.4)
GFR SERPLBLD CREATININE-BSD FMLA CKD-EPI: 62 ML/MIN/{1.73_M2}
GFR SERPLBLD CREATININE-BSD FMLA CKD-EPI: 76 ML/MIN/{1.73_M2}
GLUCOSE BLD-MCNC: 107 MG/DL (ref 70–99)
GLUCOSE BLD-MCNC: 109 MG/DL (ref 70–99)
GLUCOSE BLD-MCNC: 115 MG/DL (ref 70–99)
GLUCOSE BLD-MCNC: 117 MG/DL (ref 70–99)
GLUCOSE BLD-MCNC: 172 MG/DL (ref 70–99)
GLUCOSE SERPL-MCNC: 146 MG/DL (ref 70–99)
GLUCOSE SERPL-MCNC: 182 MG/DL (ref 70–99)
HCT VFR BLD AUTO: 32.7 % (ref 40.5–52.5)
HGB BLD-MCNC: 10.5 G/DL (ref 13.5–17.5)
LACTATE BLDV-SCNC: 1.4 MMOL/L (ref 0.4–2)
LACTATE BLDV-SCNC: 2.4 MMOL/L (ref 0.4–2)
MAGNESIUM SERPL-MCNC: 1.89 MG/DL (ref 1.8–2.4)
MCH RBC QN AUTO: 31.9 PG (ref 26–34)
MCHC RBC AUTO-ENTMCNC: 32.1 G/DL (ref 31–36)
MCV RBC AUTO: 99.4 FL (ref 80–100)
PERFORMED ON: ABNORMAL
PHOSPHATE SERPL-MCNC: 3.1 MG/DL (ref 2.5–4.9)
PLATELET # BLD AUTO: 181 K/UL (ref 135–450)
PMV BLD AUTO: 10.5 FL (ref 5–10.5)
POTASSIUM SERPL-SCNC: 3.6 MMOL/L (ref 3.5–5.1)
POTASSIUM SERPL-SCNC: 3.7 MMOL/L (ref 3.5–5.1)
PROT SERPL-MCNC: 6.3 G/DL (ref 6.4–8.2)
RBC # BLD AUTO: 3.29 M/UL (ref 4.2–5.9)
SODIUM SERPL-SCNC: 142 MMOL/L (ref 136–145)
SODIUM SERPL-SCNC: 142 MMOL/L (ref 136–145)
WBC # BLD AUTO: 27.1 K/UL (ref 4–11)

## 2024-11-30 PROCEDURE — 6360000002 HC RX W HCPCS: Performed by: REGISTERED NURSE

## 2024-11-30 PROCEDURE — 83735 ASSAY OF MAGNESIUM: CPT

## 2024-11-30 PROCEDURE — P9047 ALBUMIN (HUMAN), 25%, 50ML: HCPCS | Performed by: REGISTERED NURSE

## 2024-11-30 PROCEDURE — 6360000002 HC RX W HCPCS: Performed by: INTERNAL MEDICINE

## 2024-11-30 PROCEDURE — 87040 BLOOD CULTURE FOR BACTERIA: CPT

## 2024-11-30 PROCEDURE — 2580000003 HC RX 258: Performed by: INTERNAL MEDICINE

## 2024-11-30 PROCEDURE — 87641 MR-STAPH DNA AMP PROBE: CPT

## 2024-11-30 PROCEDURE — 36415 COLL VENOUS BLD VENIPUNCTURE: CPT

## 2024-11-30 PROCEDURE — 2060000000 HC ICU INTERMEDIATE R&B

## 2024-11-30 PROCEDURE — 6370000000 HC RX 637 (ALT 250 FOR IP): Performed by: REGISTERED NURSE

## 2024-11-30 PROCEDURE — 80053 COMPREHEN METABOLIC PANEL: CPT

## 2024-11-30 PROCEDURE — 83605 ASSAY OF LACTIC ACID: CPT

## 2024-11-30 PROCEDURE — 94761 N-INVAS EAR/PLS OXIMETRY MLT: CPT

## 2024-11-30 PROCEDURE — 71045 X-RAY EXAM CHEST 1 VIEW: CPT

## 2024-11-30 PROCEDURE — 85027 COMPLETE CBC AUTOMATED: CPT

## 2024-11-30 PROCEDURE — 2580000003 HC RX 258: Performed by: REGISTERED NURSE

## 2024-11-30 PROCEDURE — 2700000000 HC OXYGEN THERAPY PER DAY

## 2024-11-30 PROCEDURE — 2580000003 HC RX 258: Performed by: HOSPITALIST

## 2024-11-30 PROCEDURE — 94640 AIRWAY INHALATION TREATMENT: CPT

## 2024-11-30 PROCEDURE — 87449 NOS EACH ORGANISM AG IA: CPT

## 2024-11-30 PROCEDURE — 6370000000 HC RX 637 (ALT 250 FOR IP)

## 2024-11-30 RX ORDER — 0.9 % SODIUM CHLORIDE 0.9 %
1000 INTRAVENOUS SOLUTION INTRAVENOUS ONCE
Status: COMPLETED | OUTPATIENT
Start: 2024-11-30 | End: 2024-11-30

## 2024-11-30 RX ORDER — SODIUM CHLORIDE 450 MG/100ML
500 INJECTION, SOLUTION INTRAVENOUS ONCE
Status: COMPLETED | OUTPATIENT
Start: 2024-11-30 | End: 2024-11-30

## 2024-11-30 RX ORDER — ALBUMIN (HUMAN) 12.5 G/50ML
25 SOLUTION INTRAVENOUS ONCE
Status: COMPLETED | OUTPATIENT
Start: 2024-11-30 | End: 2024-11-30

## 2024-11-30 RX ORDER — IPRATROPIUM BROMIDE AND ALBUTEROL SULFATE 2.5; .5 MG/3ML; MG/3ML
1 SOLUTION RESPIRATORY (INHALATION)
Status: DISCONTINUED | OUTPATIENT
Start: 2024-11-30 | End: 2024-12-02

## 2024-11-30 RX ORDER — IPRATROPIUM BROMIDE AND ALBUTEROL SULFATE 2.5; .5 MG/3ML; MG/3ML
1 SOLUTION RESPIRATORY (INHALATION)
Status: DISCONTINUED | OUTPATIENT
Start: 2024-11-30 | End: 2024-11-30

## 2024-11-30 RX ORDER — GUAIFENESIN/DEXTROMETHORPHAN 100-10MG/5
5 SYRUP ORAL EVERY 4 HOURS PRN
Status: DISCONTINUED | OUTPATIENT
Start: 2024-11-30 | End: 2024-12-03 | Stop reason: HOSPADM

## 2024-11-30 RX ORDER — IPRATROPIUM BROMIDE AND ALBUTEROL SULFATE 2.5; .5 MG/3ML; MG/3ML
SOLUTION RESPIRATORY (INHALATION)
Status: COMPLETED
Start: 2024-11-30 | End: 2024-11-30

## 2024-11-30 RX ORDER — ACETAMINOPHEN 650 MG/1
650 SUPPOSITORY RECTAL ONCE
Status: COMPLETED | OUTPATIENT
Start: 2024-11-30 | End: 2024-11-30

## 2024-11-30 RX ORDER — ENOXAPARIN SODIUM 100 MG/ML
1 INJECTION SUBCUTANEOUS 2 TIMES DAILY
Status: DISCONTINUED | OUTPATIENT
Start: 2024-11-30 | End: 2024-12-03 | Stop reason: HOSPADM

## 2024-11-30 RX ORDER — ALBUTEROL SULFATE 0.83 MG/ML
2.5 SOLUTION RESPIRATORY (INHALATION) EVERY 4 HOURS PRN
Status: DISCONTINUED | OUTPATIENT
Start: 2024-11-30 | End: 2024-12-03 | Stop reason: HOSPADM

## 2024-11-30 RX ADMIN — ALBUMIN (HUMAN) 25 G: 0.25 INJECTION, SOLUTION INTRAVENOUS at 00:50

## 2024-11-30 RX ADMIN — IPRATROPIUM BROMIDE AND ALBUTEROL SULFATE 1 DOSE: 2.5; .5 SOLUTION RESPIRATORY (INHALATION) at 19:49

## 2024-11-30 RX ADMIN — IPRATROPIUM BROMIDE AND ALBUTEROL SULFATE 1 DOSE: .5; 3 SOLUTION RESPIRATORY (INHALATION) at 03:46

## 2024-11-30 RX ADMIN — SODIUM CHLORIDE 500 ML: 4.5 INJECTION, SOLUTION INTRAVENOUS at 04:45

## 2024-11-30 RX ADMIN — IPRATROPIUM BROMIDE AND ALBUTEROL SULFATE 1 DOSE: 2.5; .5 SOLUTION RESPIRATORY (INHALATION) at 11:47

## 2024-11-30 RX ADMIN — SODIUM CHLORIDE 1000 ML: 0.9 INJECTION, SOLUTION INTRAVENOUS at 00:54

## 2024-11-30 RX ADMIN — ENOXAPARIN SODIUM 60 MG: 100 INJECTION SUBCUTANEOUS at 21:03

## 2024-11-30 RX ADMIN — VANCOMYCIN HYDROCHLORIDE 1000 MG: 1 INJECTION, POWDER, LYOPHILIZED, FOR SOLUTION INTRAVENOUS at 18:10

## 2024-11-30 RX ADMIN — SODIUM CHLORIDE, PRESERVATIVE FREE 10 ML: 5 INJECTION INTRAVENOUS at 21:03

## 2024-11-30 RX ADMIN — IPRATROPIUM BROMIDE AND ALBUTEROL SULFATE 1 DOSE: 2.5; .5 SOLUTION RESPIRATORY (INHALATION) at 03:46

## 2024-11-30 RX ADMIN — IPRATROPIUM BROMIDE AND ALBUTEROL SULFATE 1 DOSE: 2.5; .5 SOLUTION RESPIRATORY (INHALATION) at 08:26

## 2024-11-30 RX ADMIN — CEFEPIME 2000 MG: 2 INJECTION, POWDER, FOR SOLUTION INTRAVENOUS at 16:01

## 2024-11-30 RX ADMIN — VANCOMYCIN HYDROCHLORIDE 1000 MG: 1 INJECTION, POWDER, LYOPHILIZED, FOR SOLUTION INTRAVENOUS at 04:34

## 2024-11-30 RX ADMIN — ACETAMINOPHEN 650 MG: 650 SUPPOSITORY RECTAL at 02:30

## 2024-11-30 RX ADMIN — CEFEPIME 2000 MG: 2 INJECTION, POWDER, FOR SOLUTION INTRAVENOUS at 04:30

## 2024-11-30 RX ADMIN — IPRATROPIUM BROMIDE AND ALBUTEROL SULFATE 1 DOSE: 2.5; .5 SOLUTION RESPIRATORY (INHALATION) at 15:38

## 2024-11-30 ASSESSMENT — PAIN SCALES - PAIN ASSESSMENT IN ADVANCED DEMENTIA (PAINAD)
BODYLANGUAGE: RELAXED
CONSOLABILITY: NO NEED TO CONSOLE
BREATHING: NORMAL
BODYLANGUAGE: RELAXED
TOTALSCORE: 0
CONSOLABILITY: NO NEED TO CONSOLE
FACIALEXPRESSION: SMILING OR INEXPRESSIVE
BREATHING: NORMAL
FACIALEXPRESSION: SMILING OR INEXPRESSIVE
BREATHING: NORMAL
FACIALEXPRESSION: SMILING OR INEXPRESSIVE
CONSOLABILITY: NO NEED TO CONSOLE
TOTALSCORE: 0
BODYLANGUAGE: RELAXED
TOTALSCORE: 0

## 2024-11-30 ASSESSMENT — PAIN SCALES - GENERAL
PAINLEVEL_OUTOF10: 0

## 2024-11-30 NOTE — PROGRESS NOTES
Throughout most of my shift patient was sleeping and eating very little.     1107: notified provider that deterioration index is 59    1708: notified provider on increased agitation, HR increasing to 150s and deleon collection bag being bright red from the patient pulling on it.    1757: provider asked to order CT abdomen w/o contrast    1759: RN asked if the CT was for the bloody urine and voiced concerns that patient would likely not stay still for scan since patient was restless and unable to follow commands, MD said no and the bloody urine is probably from trauma, RN then asked what the reason for the scan was and was told for hematuria    Provider called RN to ask for blood pressure (WDL) and gave RN verbal order for 500 cc bolus of NS and 2 mg Haldol PRN q6 and that we would not do a CT scan of abdomen    1901: provider notified about blood sugar 320, verbal order for one time 10 u lantus    1903: provider wanted d5 to continue running

## 2024-11-30 NOTE — FLOWSHEET NOTE
11/30/24 0006   Treatment Team Notification   Reason for Communication Abnormal vitals  (Bp 72/38 manually)   Name of Team Member Notified Raissa Holcomb CNP   Treatment Team Role Advanced Practice Nurse   Method of Communication Secure Message   Response At bedside;See orders   Notification Time 0021

## 2024-11-30 NOTE — FLOWSHEET NOTE
11/30/24 0006   Family/Significant Other Communication   Reason Called to get clarification on code status full code is listed on profile   Name Marcel Bassett   Relationship Sibling   Response Patient is a DNRCC not a full code.   Method of Communication Phone   Visitor in Room (name) n/a

## 2024-11-30 NOTE — CONSULTS
Clinical Pharmacy Note  Vancomycin Consult    Dilip Bassett is a 64 y.o. male ordered Vancomycin for Pneumonia; consult received from SAUMYA Walton to manage therapy. Also receiving cefepime.    Allergies:  Penicillins     Temp max:  Temp (24hrs), Av.5 °F (36.9 °C), Min:97.3 °F (36.3 °C), Max:99.8 °F (37.7 °C)      Recent Labs     24  0639 24  0507   WBC 7.2 9.6       Recent Labs     24  0507 24  0049 24  0530   BUN 14 15 14   CREATININE 1.0 1.3 1.1         Intake/Output Summary (Last 24 hours) at 2024 0653  Last data filed at 2024 0330  Gross per 24 hour   Intake 105 ml   Output 1075 ml   Net -970 ml       Culture Results:  pending    Ht Readings from Last 1 Encounters:   24 1.676 m (5' 6\")        Wt Readings from Last 1 Encounters:   24 58.7 kg (129 lb 6.6 oz)         Estimated Creatinine Clearance: 58 mL/min (based on SCr of 1.1 mg/dL).    Assessment/Plan:  Day # 1 of Vancomycin.  Patient's serum creatinine has fluctuated over the past 24 hours (1.0 > 1.3 > 1.1 mg/dL) - will follow closely  Will re-dose Vancomycin 1,000 mg IVPB x 1 tonight at 1800  Repeat random Vancomycin level and serum creatinine ordered on 24 with AM labs to assist with subsequent dosing/management.    Thank you for the consult. Will continue to follow.    Chapo Garcia RPH, PharmD, BCPS  2024 6:55 AM

## 2024-11-30 NOTE — PROGRESS NOTES
Department of Internal Medicine  Nephrology Progress Note      HISTORY OF PRESENTING ILLNESS: A 64-year-old male with past medical history significant for Down syndrome, dementia, colon cancer status post partial colectomy, seizure disorder, nursing home resident, sent to ER because of abnormal labs. The patient was found to have a sodium of 163 with a creatinine of 2.0.     Confused / labs reviewed      REVIEW OF SYSTEMS:  Limited due to pt factor   No Family at bed side   Physical Exam:    VITALS:  BP (!) 99/57   Pulse 96   Temp 98.5 °F (36.9 °C) (Axillary)   Resp 23   Ht 1.676 m (5' 6\")   Wt 58.7 kg (129 lb 6.6 oz)   SpO2 97%   BMI 20.89 kg/m²   24HR INTAKE/OUTPUT:    Intake/Output Summary (Last 24 hours) at 11/30/2024 1208  Last data filed at 11/30/2024 0603  Gross per 24 hour   Intake 195 ml   Output 975 ml   Net -780 ml       Constitutional:  Doing well  Respiratory:  CTA  Gastrointestinal:  no tenderness.  Normal Bowel Sounds  Cardiovascular:  S1, S2 RRR   Edema:  Lower Extremity has no edema    DATA:    CBC:  Lab Results   Component Value Date/Time    WBC 27.1 11/30/2024 05:30 AM    RBC 3.29 11/30/2024 05:30 AM    RBC 4.68 03/13/2017 08:13 AM    HGB 10.5 11/30/2024 05:30 AM    HCT 32.7 11/30/2024 05:30 AM    MCV 99.4 11/30/2024 05:30 AM    MCH 31.9 11/30/2024 05:30 AM    MCHC 32.1 11/30/2024 05:30 AM    RDW 13.5 11/30/2024 05:30 AM     11/30/2024 05:30 AM    MPV 10.5 11/30/2024 05:30 AM     CMP:  Lab Results   Component Value Date/Time     11/30/2024 05:30 AM    K 3.6 11/30/2024 05:30 AM    K 4.1 11/25/2024 05:01 AM     11/30/2024 05:30 AM    CO2 23 11/30/2024 05:30 AM    BUN 14 11/30/2024 05:30 AM    CREATININE 1.1 11/30/2024 05:30 AM    GFRAA >60 01/13/2020 10:17 AM    GFRAA >60 04/09/2013 11:09 AM    AGRATIO 0.9 11/30/2024 12:49 AM    LABGLOM 76 11/30/2024 05:30 AM    GLUCOSE 146 11/30/2024 05:30 AM    GLUCOSE 115 03/13/2017 08:13 AM    CALCIUM 7.8 11/30/2024 05:30 AM    BILITOT

## 2024-11-30 NOTE — PROGRESS NOTES
Called placed to [facility] as requested per Raissa Holcomb CNP. This writer spoke with Roxanna (ALY) she said patient's referral was initiated and in the event the patient approaches the end of life to inform the [facility] within 10 minutes at [phone]  Electronically signed by Funmi Daly RN on 11/30/2024 at 5:41 AM

## 2024-11-30 NOTE — PROGRESS NOTES
Code status confirmed with patients brother, Marcel. The level of care on Mr. Dilip Bassett is DNR-CC. This decision was made at 1:37 AM on 11/30/24 via telephone. RN was requested to contact [facility] [employer].      SAUMYA Walton - MAGY    11/30/2024 1:37 AM

## 2024-11-30 NOTE — FLOWSHEET NOTE
11/30/24 0020   Family/Significant Other Communication   Reason update on patient medical status   Name Marcel Bassett   Relationship Sibling   Response in route to hospital   Method of Communication Phone   Visitor in Room (name) n/a

## 2024-11-30 NOTE — RT PROTOCOL NOTE
RT Nebulizer Bronchodilator Protocol Note    There is a bronchodilator order in the chart from a provider indicating to follow the RT Bronchodilator Protocol and there is an “Initiate RT Bronchodilator Protocol” order as well (see protocol at bottom of note).    CXR Findings:  XR CHEST PORTABLE    Result Date: 11/30/2024  Mild streaky fibrotic or mild atelectatic changes, with or without mild infiltrates, at the base of the lungs bilaterally, right more than left. Right basilar opacities are mostly new as compared to previous studies chest x-ray. Normal cardiac size with cardiac pacers in position.  No CHF.       The findings from the last RT Protocol Assessment were as follows:  Smoking: None or smoker <15 pack years  Respiratory Pattern: Mild dyspnea at rest, irregular pattern, or RR 21-25 bpm  Breath Sounds: Inspiratory and expiratory or bilateral wheezing and/or rhonchi  Cough: Weak, non-productive  Indication for Bronchodilator Therapy: None  Bronchodilator Assessment Score: 13    Aerosolized bronchodilator medication orders have been revised according to the RT Nebulizer Bronchodilator Protocol below.    Respiratory Therapist to perform RT Therapy Protocol Assessment initially then follow the protocol.  Repeat RT Therapy Protocol Assessment PRN for score 0-3 or on second treatment, BID, and PRN for scores above 3.    No Indications - adjust the frequency to every 6 hours PRN wheezing or bronchospasm, if no treatments needed after 48 hours then discontinue using Per Protocol order mode.     If indication present, adjust the RT bronchodilator orders based on the Bronchodilator Assessment Score as indicated below.  If a patient is on this medication at home then do not decrease Frequency below that used at home.    0-3 - enter or revise RT bronchodilator order(s) to equivalent RT Bronchodilator order with Frequency of every 4 hours PRN for wheezing or increased work of breathing using Per Protocol order mode.

## 2024-11-30 NOTE — CONSULTS
Clinical Pharmacy Note  Vancomycin Consult    Dilip Bassett is a 64 y.o. male ordered Vancomycin for Pneumonia; consult received from SAUMYA Walton to manage therapy. Also receiving cefepime.    Allergies:  Penicillins     Temp max:  Temp (24hrs), Av.5 °F (36.9 °C), Min:97.3 °F (36.3 °C), Max:99.8 °F (37.7 °C)      Recent Labs     24  0446 24  0639 24  0507   WBC 9.2 7.2 9.6       Recent Labs     24  0639 24  0507 24  0049   BUN 13 14 15   CREATININE 0.9 1.0 1.3         Intake/Output Summary (Last 24 hours) at 2024 0337  Last data filed at 2024 0006  Gross per 24 hour   Intake 105 ml   Output 875 ml   Net -770 ml       Culture Results:      Ht Readings from Last 1 Encounters:   24 1.676 m (5' 6\")        Wt Readings from Last 1 Encounters:   24 58.7 kg (129 lb 6.6 oz)         Estimated Creatinine Clearance: 49 mL/min (based on SCr of 1.3 mg/dL).    Assessment/Plan:  Day # 1 of vancomycin.  Vancomycin 1000 mg IV x 1 dose ordered.    Goal -600  Creatinine increased slightly tonight. Will recheck the renal panel this morning and reevaluate    Thank you for the consult.   Spencer Prabhakar, CamilleD

## 2024-11-30 NOTE — PROGRESS NOTES
Rate changed on bolus to 999 ml/hour per Raissa Holcomb CNP Electronically signed by Funmi Daly RN on 11/30/2024 at 1:38 AM

## 2024-11-30 NOTE — PROGRESS NOTES
V2.0    [facility] [place]      Name:  Dilip Bassett /Age/Sex: 1960  (64 y.o. male)   MRN & CSN:  [id number] & [id number] Encounter Date/Time: 2024 3:09 PM EST   Location:  [id number] PCP: Oniel Beltran     Attending:Yamilet Cabrera MD       Hospital Day: 7    Assessment and Recommendations   Dilip Bassett is a 64 y.o. male who presents with Dehydration      Plan:      Hypernatremia : Likely due to dehydration with significant free water deficit. Na still trending up and started on D5W. Encourage fluid intake. Monitor BMP Q8H and in ICU. Nephrology following, Na improved from 158-->145-->146, nephro started pt on d5    Sepsis - associated with hypotension, tachycardia and leucocytosis. Discussed with brother at bedside, agreed to DNR CC and cont mgmt at PCU with IV abx and IVF. If no response, then will consult hospice     ANISH : Due to dehydration vs obstruction. Power catheter placed with output. Urine electrolytes ordered. Monitor Cr with BMP improved from 1.4-->1.1-->0.9     Hypokalemia - replete     Abnormal UA : Suggestive of UTI. Difficult to classify as symptomatic vs asymptomatic due to patient's mentation. Started on IV ceftriaxone and urine culture reviewed, complete IV abx     History of seizure : Resume home medications and monitor     DVT prophylaxis : Eliquis for history of pulmonary embolism         Diet Diet NPO   DVT Prophylaxis [x] Lovenox, []  Heparin, [] SCDs, [] Ambulation,  [] Eliquis, [] Xarelto  [] Coumadin   Code Status DNR-CC             Personally reviewed Lab Studies and Imaging       Interval history  Interval history:    Pt became more hypotensive and tachycardic overnight, more lethargic    Review of Systems:      Pertinent positives and negatives discussed in HPI    Objective:     Intake/Output Summary (Last 24 hours) at 2024 1509  Last data filed at 2024 1344  Gross per 24 hour   Intake 195 ml   Output 1375 ml   Net -1180 ml

## 2024-11-30 NOTE — PLAN OF CARE
Problem: Discharge Planning  Goal: Discharge to home or other facility with appropriate resources  11/29/2024 2259 by Funmi Daly RN  Outcome: Progressing  11/29/2024 1054 by Akosua Johnston RN  Outcome: Progressing     Problem: Pain  Goal: Verbalizes/displays adequate comfort level or baseline comfort level  11/29/2024 2259 by Funmi Daly RN  Outcome: Progressing  11/29/2024 1054 by Akosua Johnston RN  Outcome: Progressing     Problem: Safety - Adult  Goal: Free from fall injury  11/29/2024 2259 by Funmi Daly RN  Outcome: Progressing  11/29/2024 1054 by Akosua Johnston RN  Outcome: Progressing     Problem: Skin/Tissue Integrity  Goal: Absence of new skin breakdown  Description: 1.  Monitor for areas of redness and/or skin breakdown  2.  Assess vascular access sites hourly  3.  Every 4-6 hours minimum:  Change oxygen saturation probe site  4.  Every 4-6 hours:  If on nasal continuous positive airway pressure, respiratory therapy assess nares and determine need for appliance change or resting period.  11/29/2024 2259 by Funmi Daly RN  Outcome: Progressing  11/29/2024 1054 by Akosua Johnston RN  Outcome: Progressing     Problem: Neurosensory - Adult  Goal: Achieves maximal functionality and self care  11/29/2024 2259 by Funmi Daly RN  Outcome: Progressing  11/29/2024 1054 by Akosua Johnston RN  Outcome: Progressing     Problem: Cardiovascular - Adult  Goal: Maintains optimal cardiac output and hemodynamic stability  11/29/2024 2259 by Funmi Daly RN  Outcome: Progressing  11/29/2024 1054 by Akosua Johnston RN  Outcome: Progressing     Problem: Skin/Tissue Integrity - Adult  Goal: Skin integrity remains intact  11/29/2024 2259 by Funmi Daly RN  Outcome: Progressing  11/29/2024 1054 by Akosua Johnston RN  Outcome: Progressing     Problem: Musculoskeletal - Adult  Goal: Return ADL status to a safe level of function  11/29/2024 2259 by Funmi Daly RN  Outcome:

## 2024-11-30 NOTE — PLAN OF CARE
Problem: Discharge Planning  Goal: Discharge to home or other facility with appropriate resources  11/30/2024 0820 by Juanis Tate RN  Outcome: Not Progressing  11/29/2024 2259 by Funmi Daly RN  Outcome: Progressing     Problem: Pain  Goal: Verbalizes/displays adequate comfort level or baseline comfort level  11/30/2024 0820 by Juanis Tate RN  Outcome: Not Progressing  11/29/2024 2259 by Funmi Daly RN  Outcome: Progressing     Problem: Neurosensory - Adult  Goal: Achieves maximal functionality and self care  11/30/2024 0820 by Juanis Tate RN  Outcome: Not Progressing  11/29/2024 2259 by Funmi Daly RN  Outcome: Progressing     Problem: Musculoskeletal - Adult  Goal: Return ADL status to a safe level of function  11/30/2024 0820 by Juanis Tate RN  Outcome: Not Progressing  11/29/2024 2259 by Funmi Daly RN  Outcome: Progressing     Problem: Nutrition Deficit:  Goal: Optimize nutritional status  11/30/2024 0820 by Juanis Tate RN  Outcome: Not Progressing  11/29/2024 2259 by Funmi Daly RN  Outcome: Progressing     Problem: Discharge Planning  Goal: Discharge to home or other facility with appropriate resources  11/30/2024 0820 by Juanis Tate RN  Outcome: Not Progressing  11/29/2024 2259 by Funmi Daly RN  Outcome: Progressing     Problem: Pain  Goal: Verbalizes/displays adequate comfort level or baseline comfort level  11/30/2024 0820 by Juanis Tate RN  Outcome: Not Progressing  11/29/2024 2259 by Funmi Daly RN  Outcome: Progressing     Problem: Neurosensory - Adult  Goal: Achieves maximal functionality and self care  11/30/2024 0820 by Juanis Tate RN  Outcome: Not Progressing  11/29/2024 2259 by Funmi Daly RN  Outcome: Progressing     Problem: Musculoskeletal - Adult  Goal: Return ADL status to a safe level of function  11/30/2024 0820 by Juanis Tate RN  Outcome: Not Progressing  11/29/2024 2259 by Funmi Daly RN  Outcome: Progressing     Problem: Nutrition  Deficit:  Goal: Optimize nutritional status  11/30/2024 0820 by Juanis Tate, RN  Outcome: Not Progressing  11/29/2024 2259 by Funmi Daly, RN  Outcome: Progressing

## 2024-12-01 LAB
ALBUMIN SERPL-MCNC: 2.8 G/DL (ref 3.4–5)
ANION GAP SERPL CALCULATED.3IONS-SCNC: 12 MMOL/L (ref 3–16)
BUN SERPL-MCNC: 9 MG/DL (ref 7–20)
CALCIUM SERPL-MCNC: 8.7 MG/DL (ref 8.3–10.6)
CHLORIDE SERPL-SCNC: 105 MMOL/L (ref 99–110)
CO2 SERPL-SCNC: 21 MMOL/L (ref 21–32)
CREAT SERPL-MCNC: 0.9 MG/DL (ref 0.8–1.3)
DEPRECATED RDW RBC AUTO: 13.9 % (ref 12.4–15.4)
GFR SERPLBLD CREATININE-BSD FMLA CKD-EPI: >90 ML/MIN/{1.73_M2}
GLUCOSE BLD-MCNC: 105 MG/DL (ref 70–99)
GLUCOSE BLD-MCNC: 111 MG/DL (ref 70–99)
GLUCOSE BLD-MCNC: 128 MG/DL (ref 70–99)
GLUCOSE BLD-MCNC: 130 MG/DL (ref 70–99)
GLUCOSE BLD-MCNC: 130 MG/DL (ref 70–99)
GLUCOSE SERPL-MCNC: 115 MG/DL (ref 70–99)
HCT VFR BLD AUTO: 37.1 % (ref 40.5–52.5)
HGB BLD-MCNC: 12.2 G/DL (ref 13.5–17.5)
MCH RBC QN AUTO: 32.4 PG (ref 26–34)
MCHC RBC AUTO-ENTMCNC: 32.9 G/DL (ref 31–36)
MCV RBC AUTO: 98.6 FL (ref 80–100)
MRSA DNA SPEC QL NAA+PROBE: NORMAL
PERFORMED ON: ABNORMAL
PHOSPHATE SERPL-MCNC: 2.5 MG/DL (ref 2.5–4.9)
PLATELET # BLD AUTO: 196 K/UL (ref 135–450)
PMV BLD AUTO: 11 FL (ref 5–10.5)
POTASSIUM SERPL-SCNC: 3.7 MMOL/L (ref 3.5–5.1)
PROCALCITONIN SERPL IA-MCNC: 1.65 NG/ML (ref 0–0.15)
RBC # BLD AUTO: 3.77 M/UL (ref 4.2–5.9)
S PNEUM AG UR QL: NORMAL
SODIUM SERPL-SCNC: 138 MMOL/L (ref 136–145)
VANCOMYCIN SERPL-MCNC: 11.2 UG/ML
WBC # BLD AUTO: 16.5 K/UL (ref 4–11)

## 2024-12-01 PROCEDURE — 2580000003 HC RX 258: Performed by: REGISTERED NURSE

## 2024-12-01 PROCEDURE — 2060000000 HC ICU INTERMEDIATE R&B

## 2024-12-01 PROCEDURE — 6360000002 HC RX W HCPCS: Performed by: INTERNAL MEDICINE

## 2024-12-01 PROCEDURE — 80069 RENAL FUNCTION PANEL: CPT

## 2024-12-01 PROCEDURE — 36415 COLL VENOUS BLD VENIPUNCTURE: CPT

## 2024-12-01 PROCEDURE — 85027 COMPLETE CBC AUTOMATED: CPT

## 2024-12-01 PROCEDURE — 84145 PROCALCITONIN (PCT): CPT

## 2024-12-01 PROCEDURE — 2580000003 HC RX 258: Performed by: HOSPITALIST

## 2024-12-01 PROCEDURE — 2580000003 HC RX 258: Performed by: INTERNAL MEDICINE

## 2024-12-01 PROCEDURE — 80202 ASSAY OF VANCOMYCIN: CPT

## 2024-12-01 PROCEDURE — 6360000002 HC RX W HCPCS: Performed by: REGISTERED NURSE

## 2024-12-01 PROCEDURE — 94640 AIRWAY INHALATION TREATMENT: CPT

## 2024-12-01 PROCEDURE — 6370000000 HC RX 637 (ALT 250 FOR IP): Performed by: REGISTERED NURSE

## 2024-12-01 RX ORDER — DEXTROSE MONOHYDRATE 50 MG/ML
INJECTION, SOLUTION INTRAVENOUS CONTINUOUS
Status: DISCONTINUED | OUTPATIENT
Start: 2024-12-01 | End: 2024-12-03 | Stop reason: HOSPADM

## 2024-12-01 RX ORDER — DEXTROSE MONOHYDRATE 50 MG/ML
INJECTION, SOLUTION INTRAVENOUS CONTINUOUS
Status: DISCONTINUED | OUTPATIENT
Start: 2024-12-01 | End: 2024-12-01

## 2024-12-01 RX ADMIN — ALBUTEROL SULFATE 2.5 MG: 0.83 SOLUTION RESPIRATORY (INHALATION) at 12:08

## 2024-12-01 RX ADMIN — IPRATROPIUM BROMIDE AND ALBUTEROL SULFATE 1 DOSE: 2.5; .5 SOLUTION RESPIRATORY (INHALATION) at 15:58

## 2024-12-01 RX ADMIN — CEFEPIME 2000 MG: 2 INJECTION, POWDER, FOR SOLUTION INTRAVENOUS at 04:29

## 2024-12-01 RX ADMIN — IPRATROPIUM BROMIDE AND ALBUTEROL SULFATE 1 DOSE: 2.5; .5 SOLUTION RESPIRATORY (INHALATION) at 08:27

## 2024-12-01 RX ADMIN — DEXTROSE MONOHYDRATE: 50 INJECTION, SOLUTION INTRAVENOUS at 12:40

## 2024-12-01 RX ADMIN — CEFEPIME 2000 MG: 2 INJECTION, POWDER, FOR SOLUTION INTRAVENOUS at 21:15

## 2024-12-01 RX ADMIN — ENOXAPARIN SODIUM 60 MG: 100 INJECTION SUBCUTANEOUS at 21:12

## 2024-12-01 RX ADMIN — SODIUM CHLORIDE, PRESERVATIVE FREE 10 ML: 5 INJECTION INTRAVENOUS at 21:17

## 2024-12-01 RX ADMIN — ENOXAPARIN SODIUM 60 MG: 100 INJECTION SUBCUTANEOUS at 09:02

## 2024-12-01 RX ADMIN — IPRATROPIUM BROMIDE AND ALBUTEROL SULFATE 1 DOSE: 2.5; .5 SOLUTION RESPIRATORY (INHALATION) at 20:59

## 2024-12-01 RX ADMIN — CEFEPIME 2000 MG: 2 INJECTION, POWDER, FOR SOLUTION INTRAVENOUS at 11:52

## 2024-12-01 ASSESSMENT — PAIN SCALES - PAIN ASSESSMENT IN ADVANCED DEMENTIA (PAINAD)
FACIALEXPRESSION: SMILING OR INEXPRESSIVE
BODYLANGUAGE: RELAXED
BREATHING: OCCASIONAL LABORED BREATHING, SHORT PERIOD OF HYPERVENTILATION
TOTALSCORE: 0
TOTALSCORE: 0
CONSOLABILITY: NO NEED TO CONSOLE
FACIALEXPRESSION: SMILING OR INEXPRESSIVE
BREATHING: NORMAL
FACIALEXPRESSION: SMILING OR INEXPRESSIVE
TOTALSCORE: 0
TOTALSCORE: 1
BODYLANGUAGE: RELAXED
FACIALEXPRESSION: SMILING OR INEXPRESSIVE
FACIALEXPRESSION: SMILING OR INEXPRESSIVE
CONSOLABILITY: NO NEED TO CONSOLE
CONSOLABILITY: NO NEED TO CONSOLE
BREATHING: NORMAL
BODYLANGUAGE: RELAXED
CONSOLABILITY: NO NEED TO CONSOLE
TOTALSCORE: 0
BREATHING: NORMAL
BODYLANGUAGE: RELAXED
FACIALEXPRESSION: SMILING OR INEXPRESSIVE
CONSOLABILITY: NO NEED TO CONSOLE
BREATHING: NORMAL
CONSOLABILITY: NO NEED TO CONSOLE
CONSOLABILITY: NO NEED TO CONSOLE
BODYLANGUAGE: RELAXED
BREATHING: NORMAL
TOTALSCORE: 0
BODYLANGUAGE: RELAXED
FACIALEXPRESSION: SMILING OR INEXPRESSIVE
TOTALSCORE: 0
BREATHING: NORMAL
CONSOLABILITY: NO NEED TO CONSOLE
TOTALSCORE: 0
FACIALEXPRESSION: SMILING OR INEXPRESSIVE
BREATHING: NORMAL
BREATHING: NORMAL
BODYLANGUAGE: RELAXED
BREATHING: NORMAL
CONSOLABILITY: NO NEED TO CONSOLE
FACIALEXPRESSION: SMILING OR INEXPRESSIVE
TOTALSCORE: 0
BREATHING: NORMAL
FACIALEXPRESSION: SMILING OR INEXPRESSIVE
BODYLANGUAGE: RELAXED
FACIALEXPRESSION: SMILING OR INEXPRESSIVE
BODYLANGUAGE: RELAXED
CONSOLABILITY: NO NEED TO CONSOLE
TOTALSCORE: 0
BODYLANGUAGE: RELAXED
FACIALEXPRESSION: SMILING OR INEXPRESSIVE
BREATHING: NORMAL

## 2024-12-01 ASSESSMENT — PAIN SCALES - GENERAL
PAINLEVEL_OUTOF10: 0
PAINLEVEL_OUTOF10: 0

## 2024-12-01 NOTE — PROGRESS NOTES
Pt refuses blood glucose check.  Fighting and pulling arms away from staff.  Will attempt later.

## 2024-12-01 NOTE — PROGRESS NOTES
Clinical Pharmacy Note  Renal Dose Adjustment    Dilip Bassett is receiving Cefepime 2 gm IVPB Q12H.  This medication is renally eliminated.  Based on the patient's Estimated Creatinine Clearance: 75 mL/min (based on SCr of 0.9 mg/dL). and urine output, the dose has been adjusted to Cefepime 2 gm IVPB Q8H per protocol.    Pharmacy will continue to monitor and adjust dose as needed for changes in renal function.       Chapo Garcia [facility], PharmD, BCPS  12/1/2024 6:58 AM

## 2024-12-01 NOTE — PROGRESS NOTES
[place]2.0    [facility] [place]      Name:  Dilip Bassett /Age/Sex: 1960  (64 y.o. male)   MRN & CSN:  [id number] & [id number] Encounter Date/Time: 2024 6:02 PM EST   Location:  [id number] PCP: Oniel Beltran     Attending:Yamilet Cabrera MD       Hospital Day: 8    Assessment and Recommendations   Dilip Bassett is a 64 y.o. male who presents with Dehydration      Plan:     Hypernatremia : Likely due to dehydration with significant free water deficit. Na still trending up and started on D5W. Encourage fluid intake. Monitor BMP Q8H and in ICU. Nephrology following, Na improved from 158-->145-->146, nephro started pt on d5, Na stable     Sepsis - associated with hypotension, tachycardia and leucocytosis. Discussed with brother at bedside, agreed to DNR CC and cont mgmt at PCU with IV abx and IVF. If no response, then will consult hospice, BP improved, HR stable     ANISH : Due to dehydration vs obstruction. Power catheter placed with output. Urine electrolytes ordered. Monitor Cr with BMP improved from 1.4-->1.1-->0.9     Hypokalemia - replete     Abnormal UA : Suggestive of UTI. Difficult to classify as symptomatic vs asymptomatic due to patient's mentation. Started on IV ceftriaxone and urine culture reviewed, complete IV abx     History of seizure : Resume home medications and monitor     DVT prophylaxis : Eliquis for history of pulmonary embolism       Diet Diet NPO   DVT Prophylaxis [x] Lovenox, []  Heparin, [] SCDs, [] Ambulation,  [] Eliquis, [] Xarelto  [] Coumadin   Code Status DNR-CC             Personally reviewed Lab Studies and Imaging     Subjective:    BP and HR improved, pt still lethargic and raspy breathing    Review of Systems:      Pertinent positives and negatives discussed in HPI    Objective:     Intake/Output Summary (Last 24 hours) at 2024 1802  Last data filed at 2024 1600  Gross per 24 hour   Intake 358 ml   Output 1984 ml   Net -1626 ml      Vitals:

## 2024-12-01 NOTE — PROGRESS NOTES
Department of Internal Medicine  Nephrology Progress Note      HISTORY OF PRESENTING ILLNESS: A 64-year-old male with past medical history significant for Down syndrome, dementia, colon cancer status post partial colectomy, seizure disorder, nursing home resident, sent to ER because of abnormal labs. The patient was found to have a sodium of 163 with a creatinine of 2.0.     Confused / labs reviewed      REVIEW OF SYSTEMS:  Limited due to pt factor   No Family at bed side   Physical Exam:    VITALS:  /64   Pulse 87   Temp 97.7 °F (36.5 °C) (Axillary)   Resp 19   Ht 1.676 m (5' 6\")   Wt 62.6 kg (138 lb 0.1 oz)   SpO2 94%   BMI 22.28 kg/m²   24HR INTAKE/OUTPUT:    Intake/Output Summary (Last 24 hours) at 12/1/2024 1411  Last data filed at 12/1/2024 1021  Gross per 24 hour   Intake 358 ml   Output 1334 ml   Net -976 ml       Constitutional: resting   Respiratory:  CTA  Gastrointestinal:  no tenderness.  Normal Bowel Sounds  Cardiovascular:  S1, S2 RRR   Edema:  Lower Extremity has no edema    DATA:    CBC:  Lab Results   Component Value Date/Time    WBC 16.5 12/01/2024 04:47 AM    RBC 3.77 12/01/2024 04:47 AM    RBC 4.68 03/13/2017 08:13 AM    HGB 12.2 12/01/2024 04:47 AM    HCT 37.1 12/01/2024 04:47 AM    MCV 98.6 12/01/2024 04:47 AM    MCH 32.4 12/01/2024 04:47 AM    MCHC 32.9 12/01/2024 04:47 AM    RDW 13.9 12/01/2024 04:47 AM     12/01/2024 04:47 AM    MPV 11.0 12/01/2024 04:47 AM     CMP:  Lab Results   Component Value Date/Time     12/01/2024 04:47 AM    K 3.7 12/01/2024 04:47 AM    K 4.1 11/25/2024 05:01 AM     12/01/2024 04:47 AM    CO2 21 12/01/2024 04:47 AM    BUN 9 12/01/2024 04:47 AM    CREATININE 0.9 12/01/2024 04:47 AM    GFRAA >60 01/13/2020 10:17 AM    GFRAA >60 04/09/2013 11:09 AM    AGRATIO 0.9 11/30/2024 12:49 AM    LABGLOM >90 12/01/2024 04:47 AM    GLUCOSE 115 12/01/2024 04:47 AM    GLUCOSE 115 03/13/2017 08:13 AM    CALCIUM 8.7 12/01/2024 04:47 AM    BILITOT 0.4

## 2024-12-01 NOTE — PLAN OF CARE
Problem: Discharge Planning  Goal: Discharge to home or other facility with appropriate resources  Outcome: Progressing  Flowsheets (Taken 11/30/2024 2108)  Discharge to home or other facility with appropriate resources: Identify barriers to discharge with patient and caregiver     Problem: Pain  Goal: Verbalizes/displays adequate comfort level or baseline comfort level  Outcome: Progressing     Problem: Safety - Adult  Goal: Free from fall injury  Outcome: Progressing     Problem: Skin/Tissue Integrity  Goal: Absence of new skin breakdown  Description: 1.  Monitor for areas of redness and/or skin breakdown  2.  Assess vascular access sites hourly  3.  Every 4-6 hours minimum:  Change oxygen saturation probe site  4.  Every 4-6 hours:  If on nasal continuous positive airway pressure, respiratory therapy assess nares and determine need for appliance change or resting period.  Outcome: Progressing     Problem: Neurosensory - Adult  Goal: Achieves maximal functionality and self care  Outcome: Progressing  Flowsheets (Taken 11/30/2024 2108)  Achieves maximal functionality and self care: Monitor swallowing and airway patency with patient fatigue and changes in neurological status     Problem: Cardiovascular - Adult  Goal: Maintains optimal cardiac output and hemodynamic stability  Outcome: Progressing  Flowsheets (Taken 11/30/2024 2108)  Maintains optimal cardiac output and hemodynamic stability: Monitor blood pressure and heart rate     Problem: Skin/Tissue Integrity - Adult  Goal: Skin integrity remains intact  Outcome: Progressing  Flowsheets (Taken 11/30/2024 2108)  Skin Integrity Remains Intact: Monitor for areas of redness and/or skin breakdown     Problem: Musculoskeletal - Adult  Goal: Return ADL status to a safe level of function  Outcome: Progressing  Flowsheets (Taken 11/30/2024 2108)  Return ADL Status to a Safe Level of Function: Administer medication as ordered     Problem: Genitourinary - Adult  Goal:

## 2024-12-01 NOTE — PLAN OF CARE
Problem: Discharge Planning  Goal: Discharge to home or other facility with appropriate resources  12/1/2024 0044 by Socorro Silvestre RN  Outcome: Progressing  12/1/2024 0027 by Socorro Silvestre RN  Outcome: Progressing  Flowsheets (Taken 11/30/2024 2108)  Discharge to home or other facility with appropriate resources: Identify barriers to discharge with patient and caregiver     Problem: Pain  Goal: Verbalizes/displays adequate comfort level or baseline comfort level  12/1/2024 0044 by Socorro Silvestre RN  Outcome: Progressing  12/1/2024 0027 by Socorro Silvestre RN  Outcome: Progressing     Problem: Safety - Adult  Goal: Free from fall injury  12/1/2024 0044 by Socorro Silvestre RN  Outcome: Progressing  12/1/2024 0027 by Socorro Silvestre RN  Outcome: Progressing     Problem: Skin/Tissue Integrity  Goal: Absence of new skin breakdown  Description: 1.  Monitor for areas of redness and/or skin breakdown  2.  Assess vascular access sites hourly  3.  Every 4-6 hours minimum:  Change oxygen saturation probe site  4.  Every 4-6 hours:  If on nasal continuous positive airway pressure, respiratory therapy assess nares and determine need for appliance change or resting period.  12/1/2024 0044 by Socorro Silvestre RN  Outcome: Progressing  12/1/2024 0027 by Socorro Silvestre RN  Outcome: Progressing     Problem: Neurosensory - Adult  Goal: Achieves maximal functionality and self care  12/1/2024 0044 by Socorro Silvestre RN  Outcome: Progressing  12/1/2024 0027 by Socorro Silvestre RN  Outcome: Progressing  Flowsheets (Taken 11/30/2024 2108)  Achieves maximal functionality and self care: Monitor swallowing and airway patency with patient fatigue and changes in neurological status     Problem: Cardiovascular - Adult  Goal: Maintains optimal cardiac output and hemodynamic stability  12/1/2024 0044 by Socorro Silvestre RN  Outcome: Progressing  12/1/2024 0027 by Socorro Silvestre RN  Outcome:

## 2024-12-02 LAB
ALBUMIN SERPL-MCNC: 3.1 G/DL (ref 3.4–5)
ANION GAP SERPL CALCULATED.3IONS-SCNC: 10 MMOL/L (ref 3–16)
BUN SERPL-MCNC: 8 MG/DL (ref 7–20)
CALCIUM SERPL-MCNC: 8.8 MG/DL (ref 8.3–10.6)
CHLORIDE SERPL-SCNC: 104 MMOL/L (ref 99–110)
CO2 SERPL-SCNC: 24 MMOL/L (ref 21–32)
CREAT SERPL-MCNC: 0.8 MG/DL (ref 0.8–1.3)
DEPRECATED RDW RBC AUTO: 13.9 % (ref 12.4–15.4)
GFR SERPLBLD CREATININE-BSD FMLA CKD-EPI: >90 ML/MIN/{1.73_M2}
GLUCOSE BLD-MCNC: 116 MG/DL (ref 70–99)
GLUCOSE BLD-MCNC: 123 MG/DL (ref 70–99)
GLUCOSE BLD-MCNC: 135 MG/DL (ref 70–99)
GLUCOSE BLD-MCNC: 148 MG/DL (ref 70–99)
GLUCOSE SERPL-MCNC: 124 MG/DL (ref 70–99)
HCT VFR BLD AUTO: 34.2 % (ref 40.5–52.5)
HGB BLD-MCNC: 11.3 G/DL (ref 13.5–17.5)
MCH RBC QN AUTO: 32.2 PG (ref 26–34)
MCHC RBC AUTO-ENTMCNC: 33.2 G/DL (ref 31–36)
MCV RBC AUTO: 97 FL (ref 80–100)
PERFORMED ON: ABNORMAL
PHOSPHATE SERPL-MCNC: 2.8 MG/DL (ref 2.5–4.9)
PLATELET # BLD AUTO: 227 K/UL (ref 135–450)
PMV BLD AUTO: 10.8 FL (ref 5–10.5)
POTASSIUM SERPL-SCNC: 3.2 MMOL/L (ref 3.5–5.1)
RBC # BLD AUTO: 3.52 M/UL (ref 4.2–5.9)
SODIUM SERPL-SCNC: 138 MMOL/L (ref 136–145)
WBC # BLD AUTO: 8 K/UL (ref 4–11)

## 2024-12-02 PROCEDURE — 6360000002 HC RX W HCPCS: Performed by: INTERNAL MEDICINE

## 2024-12-02 PROCEDURE — 2580000003 HC RX 258: Performed by: INTERNAL MEDICINE

## 2024-12-02 PROCEDURE — 94760 N-INVAS EAR/PLS OXIMETRY 1: CPT

## 2024-12-02 PROCEDURE — 36415 COLL VENOUS BLD VENIPUNCTURE: CPT

## 2024-12-02 PROCEDURE — 6370000000 HC RX 637 (ALT 250 FOR IP): Performed by: REGISTERED NURSE

## 2024-12-02 PROCEDURE — 2060000000 HC ICU INTERMEDIATE R&B

## 2024-12-02 PROCEDURE — 9990000010 HC NO CHARGE VISIT

## 2024-12-02 PROCEDURE — 94640 AIRWAY INHALATION TREATMENT: CPT

## 2024-12-02 PROCEDURE — 2580000003 HC RX 258: Performed by: HOSPITALIST

## 2024-12-02 PROCEDURE — 80069 RENAL FUNCTION PANEL: CPT

## 2024-12-02 PROCEDURE — 92610 EVALUATE SWALLOWING FUNCTION: CPT

## 2024-12-02 PROCEDURE — 85027 COMPLETE CBC AUTOMATED: CPT

## 2024-12-02 RX ORDER — POTASSIUM CHLORIDE 7.45 MG/ML
10 INJECTION INTRAVENOUS
Status: COMPLETED | OUTPATIENT
Start: 2024-12-02 | End: 2024-12-02

## 2024-12-02 RX ORDER — MORPHINE SULFATE 2 MG/ML
2 INJECTION, SOLUTION INTRAMUSCULAR; INTRAVENOUS EVERY 4 HOURS PRN
Status: DISCONTINUED | OUTPATIENT
Start: 2024-12-02 | End: 2024-12-03 | Stop reason: HOSPADM

## 2024-12-02 RX ORDER — IPRATROPIUM BROMIDE AND ALBUTEROL SULFATE 2.5; .5 MG/3ML; MG/3ML
1 SOLUTION RESPIRATORY (INHALATION) EVERY 4 HOURS PRN
Status: DISCONTINUED | OUTPATIENT
Start: 2024-12-02 | End: 2024-12-03 | Stop reason: HOSPADM

## 2024-12-02 RX ADMIN — MORPHINE SULFATE 2 MG: 2 INJECTION, SOLUTION INTRAMUSCULAR; INTRAVENOUS at 22:49

## 2024-12-02 RX ADMIN — ENOXAPARIN SODIUM 60 MG: 100 INJECTION SUBCUTANEOUS at 20:38

## 2024-12-02 RX ADMIN — CEFEPIME 2000 MG: 2 INJECTION, POWDER, FOR SOLUTION INTRAVENOUS at 20:21

## 2024-12-02 RX ADMIN — POTASSIUM CHLORIDE 10 MEQ: 7.46 INJECTION, SOLUTION INTRAVENOUS at 10:56

## 2024-12-02 RX ADMIN — CEFEPIME 2000 MG: 2 INJECTION, POWDER, FOR SOLUTION INTRAVENOUS at 03:46

## 2024-12-02 RX ADMIN — DEXTROSE MONOHYDRATE: 50 INJECTION, SOLUTION INTRAVENOUS at 10:16

## 2024-12-02 RX ADMIN — POTASSIUM CHLORIDE 10 MEQ: 7.46 INJECTION, SOLUTION INTRAVENOUS at 13:11

## 2024-12-02 RX ADMIN — IPRATROPIUM BROMIDE AND ALBUTEROL SULFATE 1 DOSE: 2.5; .5 SOLUTION RESPIRATORY (INHALATION) at 11:50

## 2024-12-02 RX ADMIN — CEFEPIME 2000 MG: 2 INJECTION, POWDER, FOR SOLUTION INTRAVENOUS at 12:22

## 2024-12-02 RX ADMIN — POTASSIUM CHLORIDE 10 MEQ: 7.46 INJECTION, SOLUTION INTRAVENOUS at 12:13

## 2024-12-02 RX ADMIN — ENOXAPARIN SODIUM 60 MG: 100 INJECTION SUBCUTANEOUS at 08:29

## 2024-12-02 RX ADMIN — POTASSIUM CHLORIDE 10 MEQ: 7.46 INJECTION, SOLUTION INTRAVENOUS at 14:22

## 2024-12-02 RX ADMIN — IPRATROPIUM BROMIDE AND ALBUTEROL SULFATE 1 DOSE: 2.5; .5 SOLUTION RESPIRATORY (INHALATION) at 08:20

## 2024-12-02 RX ADMIN — SODIUM CHLORIDE, PRESERVATIVE FREE 10 ML: 5 INJECTION INTRAVENOUS at 08:29

## 2024-12-02 ASSESSMENT — PAIN SCALES - WONG BAKER: WONGBAKER_NUMERICALRESPONSE: NO HURT

## 2024-12-02 ASSESSMENT — PAIN SCALES - GENERAL: PAINLEVEL_OUTOF10: 8

## 2024-12-02 NOTE — PLAN OF CARE
Problem: Discharge Planning  Goal: Discharge to home or other facility with appropriate resources  12/2/2024 1200 by Cathie Cadet RN  Outcome: Progressing     Problem: Pain  Goal: Verbalizes/displays adequate comfort level or baseline comfort level  12/2/2024 1200 by Cathie Cadet RN  Outcome: Progressing     Problem: Safety - Adult  Goal: Free from fall injury  12/2/2024 1200 by Cathie Cadet RN  Outcome: Progressing     Problem: Skin/Tissue Integrity  Goal: Absence of new skin breakdown  Description: 1.  Monitor for areas of redness and/or skin breakdown  2.  Assess vascular access sites hourly  3.  Every 4-6 hours minimum:  Change oxygen saturation probe site  4.  Every 4-6 hours:  If on nasal continuous positive airway pressure, respiratory therapy assess nares and determine need for appliance change or resting period.  12/2/2024 1200 by Cathie Cadet RN  Outcome: Progressing     Problem: Neurosensory - Adult  Goal: Achieves maximal functionality and self care  12/2/2024 1200 by Cathie Cadet RN  Outcome: Progressing     Problem: Cardiovascular - Adult  Goal: Maintains optimal cardiac output and hemodynamic stability  12/2/2024 1200 by Cathie Cadet RN  Outcome: Progressing     Problem: Skin/Tissue Integrity - Adult  Goal: Skin integrity remains intact  12/2/2024 1200 by Cathie Cadet RN  Outcome: Progressing     Problem: Musculoskeletal - Adult  Goal: Return ADL status to a safe level of function  12/2/2024 1200 by Cathie Cadet RN  Outcome: Progressing     Problem: Genitourinary - Adult  Goal: Urinary catheter remains patent  12/2/2024 1200 by Cathie Cadet RN  Outcome: Progressing     Problem: Metabolic/Fluid and Electrolytes - Adult  Goal: Electrolytes maintained within normal limits  12/2/2024 1200 by Cathie Cadet RN  Outcome: Progressing     Problem: Safety - Medical Restraint  Goal: Remains free of injury from restraints (Restraint for Interference

## 2024-12-02 NOTE — CARE COORDINATION
Discharge planning:   Spoke to Danielle FRAGOSO with Palliative Care, family seeking hospice services through [facility].     Referral placed to [facility] via [employer].     The Plan for Transition of Care is related to the following treatment goals: hospice     The Patient and brother were provided with a choice of provider and agrees   with the discharge plan. [x] Yes [] No    Freedom of choice list was provided with basic dialogue that supports the patient's individualized plan of care/goals, treatment preferences and shares the quality data associated with the providers. [x] Yes [] No    [place]: Called to Emily in admissions, notified of the plan for patient to return with [facility] ([facility]). She states she will verify if patient can return with [facility]. Await return phone call.     PLAN: Anticipate return to [facility] with [facility].     KARIS Cárdenas, LSW, Social Work/Case Management   [phone]  Electronically signed by KARIS Cárdenas on 12/2/2024 at 2:12 PM

## 2024-12-02 NOTE — PROGRESS NOTES
V2.0    [facility] Progress Note      Name:  Dilip Bassett /Age/Sex: 1960  (64 y.o. male)   MRN & CSN:  [id number] & [id number] Encounter Date/Time: 2024 6:32 PM EST   Location:  [id number] PCP: Oniel Beltran     Attending:Yamilet Cabrera MD       Hospital Day: 9    Assessment and Recommendations   Dilip Bassett is a 64 y.o. male who presents with Dehydration      Plan:       Hypernatremia : Likely due to dehydration with significant free water deficit. Na still trending up and started on D5W. Encourage fluid intake. Monitor BMP Q8H and in ICU. Nephrology following, Na improved from 158-->145-->146, nephro started pt on d5, Na stable     Sepsis - associated with hypotension, tachycardia and leucocytosis. Discussed with brother at bedside, agreed to DNR CC and cont mgmt at PCU with IV abx and IVF. consulted hospice, BP improved, HR stable     ANISH : Due to dehydration vs obstruction. Power catheter placed with output. Urine electrolytes ordered. Monitor Cr with BMP improved from 1.4-->1.1-->0.9     Hypokalemia - replete     Abnormal UA : Suggestive of UTI. Difficult to classify as symptomatic vs asymptomatic due to patient's mentation. Started on IV ceftriaxone and urine culture reviewed, complete IV abx     History of seizure : Resume home medications and monitor     DVT prophylaxis : Eliquis for history of pulmonary embolism    Palliative care consulted, brother decided on hospice. Plan dc to hospice in am, possibly on PO abx, might need liquid form of PO abx given dysphagia       Diet Diet NPO   DVT Prophylaxis [x] Lovenox, []  Heparin, [] SCDs, [] Ambulation,  [] Eliquis, [] Xarelto  [] Coumadin   Code Status DNR-CC             Personally reviewed Lab Studies and Imaging     Subjective:     Pt more awake today, sill NPO given dysphagia, afebrile    Review of Systems:      Pertinent positives and negatives discussed in HPI    Objective:     Intake/Output Summary (Last 24 hours) at

## 2024-12-02 NOTE — CONSULTS
[facility]  Palliative Care   Consult Note    NAME:  Dilip Bassett  MEDICAL RECORD NUMBER:  [id number]  AGE: 64 y.o.   GENDER: male  : 1960  TODAY'S DATE:  2024    Subjective     Reason for Consult:  goals of care  Visit Type: Initial Consult      Dilip Bassett is a 64 y.o. male referred by:   [x] Physician    PAST MEDICAL HISTORY      Diagnosis Date    Acne rosacea     Cognitive impairment     sees Dr Gary Stinson, started Aricept 2019    Colon cancer (HCC) 2014    ileocecal valve/Thom/tonia surveillance    Down syndrome     Gout     HIGH CHOLESTEROL      3.1 % framingham risk score    History of diarrhea     since childhood, worse since partial colectomy resolved after a few months    Macrocytosis     followed by Dr Tomas and released    Seborrheic dermatitis     Seizure disorder (HCC)     Status post biventricular cardiac pacemaker insertion 2019    for long sinus pauses with syncopal willis    Status post partial colectomy 2016    diarrhea    Syncope and collapse 2019    recurrent syncope, found seizures and sinus pauses    Weight loss 2015    since  at the time of partial colectomy lost 25 lbs       PAST SURGICAL HISTORY  Past Surgical History:   Procedure Laterality Date    CATARACT REMOVAL WITH IMPLANT      Bilateral    COLON SURGERY  2014    for removal colon cancer    COLONOSCOPY  2017    Dr Nesbitt    COLONOSCOPY N/A 2019    COLONOSCOPY WITH BIOPSY performed by Willis Nesbitt MD at [facility] ENDOSCOPY    PACEMAKER PLACEMENT      SMALL INTESTINE SURGERY N/A 2019    EXPLORATORY LAPAROTOMY, LYSIS OF ADHESIONS performed by Flynn Sanchez MD at [facility] OR       FAMILY HISTORY  Family History   Problem Relation Age of Onset    Diabetes Brother     Hypertension Brother     Stroke Brother     High Cholesterol Brother     Cancer Mother         melanoma    Other Father         cerebral aneurysm       SOCIAL HISTORY  Social

## 2024-12-02 NOTE — PROGRESS NOTES
AMEE Pendleton: WSTZ 5W PROGRESSIVE CARE  DYSPHAGIA BEDSIDE SWALLOW EVALUATION     Patient: Dilip Bassett   : 1960   MRN: [id number]      Evaluation Date: 2024     Admitting Diagnosis:   Dehydration [E86.0]  Hypernatremia [E87.0]  ANISH (acute kidney injury) (HCC) [N17.9]   has a past medical history of Acne rosacea, Cognitive impairment, Colon cancer (HCC), Down syndrome, Gout, HIGH CHOLESTEROL, History of diarrhea, Macrocytosis, Seborrheic dermatitis, Seizure disorder (HCC), Status post biventricular cardiac pacemaker insertion, Status post partial colectomy, Syncope and collapse, and Weight loss.   has a past surgical history that includes Cataract removal with implant (); Colon surgery (2014); Colonoscopy (2017); pacemaker placement; Colonoscopy (N/A, 2019); and Small intestine surgery (N/A, 2019).  Allergies: medication allergies noted  Dysphagia History: Pt with most recent MBS 2020 with recommendation for puree solids with mildly thick liquids  GI history: No recent/relevant  ENT history: Pt follows with ENT for cerumen impaction  Baseline/Prior Level of Function: Living Status: pt resides in LTC; Baseline diet: puree/mildly thick     Onset Date: 2024        Reason for referral: SLP evaluation orders received due to concern for aspiration                         CURRENT ENCOUNTER DIAGNOSTICS/COURSE OF ADMISSION     H&P: Pt is a 65 y/o M with PMHx significant for down syndrome, Alzheimers disease, colon cancer, and pacemaker who presents with dehydration.     Consults: Nephrology    Imaging:  Chest X-ray: 24 \"IMPRESSION:  Mild streaky fibrotic or mild atelectatic changes with or without mild  infiltrates at the bases of the lungs bilaterally, right more than left.  Right basilar opacities are mostly new as compared to previous studies, chest  x-ray. Normal cardiac size with cardiac pacers in position.  No CHF.\"    Head CT: 10/1/24 \"IMPRESSION:  Motion artifact  CCC-SLP  Speech-Language Pathologist  [facility]  [phone]    12/02/24  11:21 AM

## 2024-12-02 NOTE — PROGRESS NOTES
Occupational Therapy      Per chart review and verified with SW- Pt plans to return to LTC with hospice at discharge. No further skilled OT warranted. Will sign off at this time.    Electronically signed by Princess Flores OT on 12/2/2024 at 2:43 PM

## 2024-12-02 NOTE — CARE COORDINATION
Discharge Planning:   PT/OT: return to LTC     [facility] [place]  [address] [place] [address]  Phone: [phone]  Fax: [phone]     PLAN: Palliative Care nurse to speak with patient and brother regarding goals of care.     NEED: confirm the discharge plan and services for discharge to LTC facility     KARIS Cárdenas, LSW, Social Work/Case Management   [phone]  Electronically signed by KARIS Cárdenas on 12/2/2024 at 12:25 PM

## 2024-12-02 NOTE — PROGRESS NOTES
Physical Therapy  Daily Treatment Attempt    24    Name: Dilip Bassett  : 1960   MRN: [id number]    Per chart review and verified with SW - Pt plans to return to LTC with hospice at discharge. No further skilled PT warranted. Will sign off at this time.     Electronically signed by Iris Green PT on 2024 at 2:44 PM

## 2024-12-03 VITALS
RESPIRATION RATE: 15 BRPM | HEART RATE: 60 BPM | DIASTOLIC BLOOD PRESSURE: 63 MMHG | SYSTOLIC BLOOD PRESSURE: 114 MMHG | WEIGHT: 126.76 LBS | TEMPERATURE: 98.1 F | OXYGEN SATURATION: 93 % | BODY MASS INDEX: 20.37 KG/M2 | HEIGHT: 66 IN

## 2024-12-03 PROCEDURE — 94760 N-INVAS EAR/PLS OXIMETRY 1: CPT

## 2024-12-03 PROCEDURE — 6360000002 HC RX W HCPCS: Performed by: INTERNAL MEDICINE

## 2024-12-03 PROCEDURE — 6360000002 HC RX W HCPCS: Performed by: FAMILY MEDICINE

## 2024-12-03 PROCEDURE — 2580000003 HC RX 258: Performed by: INTERNAL MEDICINE

## 2024-12-03 PROCEDURE — 2580000003 HC RX 258: Performed by: FAMILY MEDICINE

## 2024-12-03 RX ADMIN — CEFEPIME 2000 MG: 2 INJECTION, POWDER, FOR SOLUTION INTRAVENOUS at 11:49

## 2024-12-03 RX ADMIN — ENOXAPARIN SODIUM 60 MG: 100 INJECTION SUBCUTANEOUS at 08:54

## 2024-12-03 RX ADMIN — CEFEPIME 2000 MG: 2 INJECTION, POWDER, FOR SOLUTION INTRAVENOUS at 03:36

## 2024-12-03 ASSESSMENT — PAIN SCALES - GENERAL: PAINLEVEL_OUTOF10: 0

## 2024-12-03 NOTE — PROGRESS NOTES
SLP DEFERRAL NOTE    Per chart review, pt/family electing to pursue hospice services. Recommend liberalizing diet to PO as tolerated per hospice/palliative philosophy. No further skilled ST services warranted, please re-consult should goals/status change.    Kvng Rod M.A., CCC-SLP  Speech-Language Pathologist  [facility]  [phone]

## 2024-12-03 NOTE — DISCHARGE INSTR - COC
Active Problems:  Patient Active Problem List   Diagnosis Code    Down's syndrome Q90.9    Gout M10.9    Acne rosacea L71.9    Seborrheic dermatitis L21.9    High cholesterol E78.00    Colon cancer, ascending (HCC) C18.2    Personal history of colon cancer, stage II Z85.038    Bladder wall thickening N32.89    Weight loss R63.4    Status post partial colectomy Z90.49    Chronic diarrhea K52.9    Macrocytosis D75.89    New onset seizure (HCC) R56.9    SSS (sick sinus syndrome) (HCC) I49.5    S/P placement of cardiac pacemaker Z95.0    Bradycardia R00.1    Seizure disorder (HCC) G40.909    Status post biventricular cardiac pacemaker insertion Z95.0    Memory loss R41.3    Cognitive impairment R41.89    Colon cancer (HCC) C18.9    Down syndrome Q90.9    Small bowel obstruction (HCC) K56.609    Aspiration pneumonia of both lower lobes (HCC) J69.0    Hypoxia R09.02    Septicemia (HCC) A41.9    Septic shock (HCC) A41.9, R65.21    ANISH (acute kidney injury) (HCC) N17.9    Acute respiratory failure with hypoxia J96.01    Pleural effusion, bilateral J90    Ischemic hepatitis K72.00    Right pulmonary embolus (HCC) I26.99    Dehydration E86.0    Leukocytosis D72.829    Severe malnutrition (HCC) E43       Isolation/Infection:   Isolation            No Isolation          Patient Infection Status       Infection Onset Added Last Indicated Last Indicated By Review Planned Expiration Resolved Resolved By    None active    Resolved    C-diff Rule Out  12/18/19 12/18/19 Clostridium Difficile Toxin/Antigen   12/23/19 Jasmina Lazo, RN            Nurse Assessment:  Last Vital Signs: BP (!) 146/77   Pulse 90   Temp 97.4 °F (36.3 °C) (Axillary)   Resp 21   Ht 1.676 m (5' 6\")   Wt 57.5 kg (126 lb 12.2 oz)   SpO2 92%   BMI 20.46 kg/m²     Last documented pain score (0-10 scale): Pain Level: 0  Last Weight:   Wt Readings from Last 1 Encounters:   12/03/24 57.5 kg (126 lb 12.2 oz)     Mental Status:  disoriented    IV  Access:  - None    Nursing Mobility/ADLs:  Walking   Dependent  Transfer  Dependent  Bathing  Dependent  Dressing  Dependent  Toileting  Dependent  Feeding  Dependent  Med Admin  Dependent  Med Delivery   crushed and prefers mixed with pudding    Wound Care Documentation and Therapy:  Incision 01/11/19 Chest Left (Active)   Number of days: 2152       Incision Abdomen Mid (Active)   Number of days:         Elimination:  Continence:   Bowel: No  Bladder: No  Urinary Catheter: Insertion Date: 11/30    Colostomy/Ileostomy/Ileal Conduit: No       Date of Last BM: 12/3/2024    Intake/Output Summary (Last 24 hours) at 12/3/2024 1006  Last data filed at 12/3/2024 0410  Gross per 24 hour   Intake 1352.36 ml   Output 2100 ml   Net -747.64 ml     I/O last 3 completed shifts:  In: 1352.4 [I.V.:1057.7; IV Piggyback:294.7]  Out: 2950 [Urine:2950]    Safety Concerns:     At Risk for Falls, History of Seizures, and Aspiration Risk    Impairments/Disabilities:      Speech, cognition, ambulation    Nutrition Therapy:  Current Nutrition Therapy:   - Oral Diet:  Dysphagia - Pureed    Routes of Feeding: Oral  Liquids: Honey Thick Liquids  Daily Fluid Restriction: no  Last Modified Barium Swallow with Video (Video Swallowing Test): not done    Treatments at the Time of Hospital Discharge:   Respiratory Treatments: yes  Oxygen Therapy:  is not on home oxygen therapy.  Ventilator:    - No ventilator support    Rehab Therapies: Physical Therapy, Occupational Therapy, and Speech/Language Therapy  Weight Bearing Status/Restrictions: No weight bearing restrictions  Other Medical Equipment (for information only, NOT a DME order):    Other Treatments: n/a    Patient's personal belongings (please select all that are sent with patient):  clothing    RN SIGNATURE:  Electronically signed by Akosua Johnston RN on 12/3/24 at 11:09 AM EST    CASE MANAGEMENT/SOCIAL WORK SECTION    Inpatient Status Date: 11/24/24    Readmission Risk Assessment

## 2024-12-03 NOTE — DISCHARGE SUMMARY
Hospital Medicine Discharge Summary    Patient ID: Dilip Bassett      Patient's PCP: Oniel Beltran    Admit Date: 11/24/2024     Discharge Date:   12/3/2024    Admitting Provider: Supa Espinosa MD     Discharge Provider: Asya Peters MD     Discharge Diagnoses:       Active Hospital Problems    Diagnosis     Severe malnutrition (HCC) [E43]     Dehydration [E86.0]     Leukocytosis [D72.829]     Right pulmonary embolus (HCC) [I26.99]     ANISH (acute kidney injury) (HCC) [N17.9]     Seizure disorder (HCC) [G40.909]     Down's syndrome [Q90.9]        The patient was seen and examined on day of discharge and this discharge summary is in conjunction with any daily progress note from day of discharge.    Hospital Course:     Mr. Dilip Bassett is a 64-year-old male with past medical history of Down's syndrome, seizure disorder, severe malnutrition cognitive impairment.  He presented with sepsis most likely in the setting of urinary tract infection.  Patient was found to be hyponatremic and found to have an ANISH.  Patient was treated with IV fluid and IV antibiotics.  Palliative care was consulted and patient brother who is the POA requested patient to return back to [facility].  Patient's follow-up with PCP in 3 to 5 days or sooner if needed        Physical Exam Performed:     /63   Pulse 60   Temp 98.1 °F (36.7 °C) (Axillary)   Resp 15   Ht 1.676 m (5' 6\")   Wt 57.5 kg (126 lb 12.2 oz)   SpO2 93%   BMI 20.46 kg/m²       General appearance:  No apparent distress, cooperative.  HEENT:  Normal cephalic  Neck: Supple, with full range of motion.   Respiratory:  Normal respiratory effort. Clear to auscultation  Cardiovascular:  Regular rate and rhythm with normal S1/S2  Abdomen: Soft, non-tender, non-distended   Musculoskeletal:  No edema bilaterally.    Skin: Skin color, texture, turgor normal.  No rashes or lesions.  Neurologic:  Neurovascularly intact    Psychiatric:  Alert  Capillary Refill: Brisk,< 3 seconds   Peripheral Pulses: +2 palpable, equal bilaterally       Labs: For convenience and continuity at follow-up the following most recent labs are provided:      CBC:    Lab Results   Component Value Date/Time    WBC 8.0 12/02/2024 05:09 AM    HGB 11.3 12/02/2024 05:09 AM    HCT 34.2 12/02/2024 05:09 AM     12/02/2024 05:09 AM       Renal:    Lab Results   Component Value Date/Time     12/02/2024 05:09 AM    K 3.2 12/02/2024 05:09 AM    K 4.1 11/25/2024 05:01 AM     12/02/2024 05:09 AM    CO2 24 12/02/2024 05:09 AM    BUN 8 12/02/2024 05:09 AM    CREATININE 0.8 12/02/2024 05:09 AM    CALCIUM 8.8 12/02/2024 05:09 AM    PHOS 2.8 12/02/2024 05:09 AM         Significant Diagnostic Studies    Radiology:   XR CHEST PORTABLE   Final Result   Mild streaky fibrotic or mild atelectatic changes with or without mild   infiltrates at the bases of the lungs bilaterally, right more than left.   Right basilar opacities are mostly new as compared to previous studies, chest   x-ray.      Normal cardiac size with cardiac pacers in position.  No CHF.                Consults:     IP CONSULT TO HOSPITALIST  IP CONSULT TO NEPHROLOGY  IP CONSULT TO SPIRITUAL SERVICES  IP CONSULT TO PALLIATIVE CARE  IP CONSULT TO HOSPICE    Disposition: SNF with hospice    Condition at Discharge: Stable    Discharge Instructions/Follow-up: PCP, hospice    Code Status:  DNR-CC     Activity: activity as tolerated    Diet: regular diet      Discharge Medications:     Current Discharge Medication List             Details   allopurinol (ZYLOPRIM) 100 MG tablet Take 1 tablet by mouth daily      donepezil (ARICEPT) 10 MG tablet Take 1 tablet by mouth nightly      benzocaine (ORAJEL) 10 % mucosal gel Take 1 g by mouth every 6 hours as needed for Pain Take by mouth as needed.      mirtazapine (REMERON) 15 MG tablet Take 1 tablet by mouth nightly      acetaminophen (TYLENOL) 500 MG tablet Take

## 2024-12-03 NOTE — CONSULTS
Met with patient's brother Marcel yesterday.  Marcel is agreeable to hospice services and philosophy with plan to return to [facility].  Transport scheduled with [facility] at 1230.  Report can be called to [phone].  Please remove Ivs.  Please call with any questions.    Erin Hernandez RN  Hospital Liaison   [phone]

## 2024-12-03 NOTE — PROGRESS NOTES
Pt repeatedly pulling at lines. Pt pulled IV pole down onto himself twice. This RN acquisitioned video monitor.

## 2024-12-03 NOTE — PROGRESS NOTES
Report called to Rashid at [facility]. All questions answered. Provided call back number if more questions arise.

## 2024-12-03 NOTE — DISCHARGE INSTR - DIET

## 2024-12-03 NOTE — PROGRESS NOTES
Comprehensive Nutrition Assessment    Type and Reason for Visit:  Reassess    Nutrition Recommendations/Plan:   Nutrition per patient's wishes/per Hospice philosophy      Malnutrition Assessment:  Malnutrition Status:  Severe malnutrition (11/26/24 1444)    Context:  Acute Illness     Findings of the 6 clinical characteristics of malnutrition:  Energy Intake:  Unable to assess  Weight Loss:  Unable to assess (weight has flucutated over the past 5 years from 120's up to 160's)     Body Fat Loss:  Moderate body fat loss Orbital, Buccal region   Muscle Mass Loss:  Moderate muscle mass loss Calf (gastrocnemius), Thigh (quadriceps), Clavicles (pectoralis & deltoids)  Fluid Accumulation:  Unable to assess     Strength:  Not Performed    Nutrition Assessment:    Follow up:  Patient NPO at this time, high risk for aspiration.  Hospice consulted on 12/2.  Patient to be discharged to SNF with Hospice    Nutrition Related Findings:    labs reviewed, K 2.9 on 12/2; last BM on 12/2 Wound Type: None       Current Nutrition Intake & Therapies:    Average Meal Intake: NPO  Average Supplements Intake: NPO  Diet NPO    Anthropometric Measures:  Height: 167.6 cm (5' 6\")  Ideal Body Weight (IBW): 142 lbs (65 kg)       Current Body Weight: 58.7 kg (129 lb 6.6 oz),   IBW.    Current BMI (kg/m2): 20.9                             BMI Categories: Normal Weight (BMI 22.0 to 24.9) age over 65    Estimated Daily Nutrient Needs:  Energy Requirements Based On: Kcal/kg  Weight Used for Energy Requirements: Current  Energy (kcal/day): 4415-2395 (30-35 kcal/52.3 kg)  Weight Used for Protein Requirements: Current  Protein (g/day): 78-94 (1.5-1.8 g/52.3 kg)  Method Used for Fluid Requirements: 1 ml/kcal  Fluid (ml/day):      Nutrition Diagnosis:   Severe malnutrition, in context of acute illness or injury related to inadequate protein-energy intake as evidenced by lab values, criteria as identified in malnutrition assessment, muscle loss, loss of

## 2024-12-03 NOTE — CARE COORDINATION
Case Management Discharge Note          Date / Time of Note: 12/3/2024 11:33 AM                  Patient Name: Dilip Bassett   YOB: 1960  Diagnosis: Dehydration [E86.0]  Hypernatremia [E87.0]  ANISH (acute kidney injury) (HCC) [N17.9]   Date / Time: 11/24/2024  6:46 PM    Financial:  Payor: [employer] BETTER [facility] DUAL / Plan: [employer]ET[employer] BETTER HEALTH [facility] DUAL / Product Type: *No Product type* /      Pharmacy:    [facility] [facility] [place] [facility] - P [phone] - F [phone]  [facility]  [place] [address]  Phone: [phone] Fax: [phone]      Assistance purchasing medications?: Potential Assistance Purchasing Medications: No  Assistance provided by Case Management: None at this time    DISCHARGE Disposition: Long Term Care Facility (LTC)    Nursing Facility:   [place] [address]  Phone: [phone]  Fax: [phone]    LOC at discharge: Long Term Care with Hospice  RADHA Completed: Yes             NURSING REPORT NUMBER: [phone]               NURSING FAX NUMBER: spoke to Emily in admissions, verified they have access to all documents needed.     Notification completed in HENS/PAS?:  Not Applicable    Hospice Services:  Location: Nursing Facility  Agency: [facility]  Phone: [phone]    Consents signed: Yes      Transportation:  Transportation PLAN for discharge: EMS transportation   Mode of Transport: Ambulance stretcher - [facility]  Reason for medical transport: Bed confined: Meets the following criteria 1) unable to get out of bed without assistance or ambulate, 2) unable to safely sit up in a wheelchair, 3) unable to maintain erect seating position in a chair for time needed for transport  Name of Transport Company: [employer]  Phone: [phone]  Time of Transport: 1230    Transport form completed: Yes    IMM Completed:   Not

## 2024-12-03 NOTE — CARE COORDINATION
Discharge order acknowledged.   Plan: return to [facility] with [facility]     Via medical transport.   Requested MD complete RADHA. And DNR form      KARIS Cárdenas, LSW, Social Work/Case Management   [phone]  Electronically signed by KARIS Cárdenas on 12/3/2024 at 10:33 AM

## 2024-12-04 LAB
BACTERIA BLD CULT ORG #2: NORMAL
BACTERIA BLD CULT: NORMAL

## 2024-12-06 NOTE — PROGRESS NOTES
Physician Progress Note      PATIENT:               JAN TUTTLE  CSN #:                  [id number]  :                       1960  ADMIT DATE:       2024 6:46 PM  DISCH DATE:        12/3/2024 2:10 PM  RESPONDING  PROVIDER #:        Asya Peters MD          QUERY TEXT:    Pt admitted with hypernatremia. Pt noted to have sepsis documented in    PN. If possible, please document in progress notes and discharge summary the   present on admission status of sepsis.    The medical record reflects the following:  Risk Factors: UTI, leukocytosis, hypotension, tachycardia    Clinical Indicators:  PN- \"Sepsis - associated with hypotension,   tachycardia and leucocytosis.\"  WBC- 13.7 on admit, 11.5 (), then WNL until - up to 27.1  LA- 2.4 on   Procal- 1.65 on   HR- > 90 on admission  RR- > 20 on admission  BP- 98/81 on admit  Urine cx- >100k Citrobacter koseri  12/3 DCS- \" He presented with sepsis most likely in the setting of urinary   tract infection.\"    Treatment: Labs, BC x 2, NS bolus in ED, IVF, IV abx  Options provided:  -- Yes, sepsis was present at the time of the order to admit to the hospital  -- No, sepsis was not present on admission and developed during the inpatient   stay  -- Other - I will add my own diagnosis  -- Disagree - Not applicable / Not valid  -- Disagree - Clinically unable to determine / Unknown  -- Refer to Clinical Documentation Reviewer    PROVIDER RESPONSE TEXT:    No, sepsis was not present on admission and developed during the inpatient   stay    Query created by: Maureen Mojica on 2024 10:06 AM      Electronically signed by:  Asya Peters MD 2024 9:10 PM

## (undated) DEVICE — MAJOR SET UP PK

## (undated) DEVICE — SPONGE GZ W4XL4IN COT 12 PLY TYP VII WVN C FLD DSGN

## (undated) DEVICE — MERCY HEALTH WEST TURNOVER: Brand: MEDLINE INDUSTRIES, INC.

## (undated) DEVICE — SOLUTION IV IRRIG POUR BRL 0.9% SODIUM CHL 2F7124

## (undated) DEVICE — ELECTRODE PT RET AD L9FT HI MOIST COND ADH HYDRGEL CORDED

## (undated) DEVICE — CLEANER,CAUTERY TIP,2X2",STERILE: Brand: MEDLINE

## (undated) DEVICE — SUTURE VCRL SZ 3-0 L18IN ABSRB UD W/O NDL POLYGLACTIN 910 J110T

## (undated) DEVICE — ELECTRODE ES L65IN DIA3MM S STL BLDE MPLR OPN APPRCH EZ TO

## (undated) DEVICE — CHLORAPREP 26ML ORANGE

## (undated) DEVICE — GARMENT COMPR STD FOR 17IN CALF UNIF THER FLOTRN

## (undated) DEVICE — GLOVE ORANGE PI 7   MSG9070

## (undated) DEVICE — STAPLER SKIN H3.9MM WIRE DIA0.58MM CRWN 6.9MM 35 STPL FIX

## (undated) DEVICE — SEALER ENDOSCP NANO COAT OPN DIV CRV L JAW LIGASURE IMPACT

## (undated) DEVICE — CONTAINER SPEC 480ML CLR POLYSTYR 10% NEUT BUFF FRMLN ZN

## (undated) DEVICE — SPONGE LAP W18XL18IN WHT COT 4 PLY FLD STRUNG RADPQ DISP ST

## (undated) DEVICE — FORCEPS BX 240CM 2.4MM L NDL RAD JAW 4 M00513334

## (undated) DEVICE — COVER LT HNDL BLU PLAS

## (undated) DEVICE — ENDOSCOPY KIT: Brand: MEDLINE INDUSTRIES, INC.

## (undated) DEVICE — SHEET, T, LAPAROTOMY, STERILE: Brand: MEDLINE

## (undated) DEVICE — TOTAL TRAY, 16FR 10ML SIL FOLEY, URN: Brand: MEDLINE